# Patient Record
Sex: FEMALE | Race: BLACK OR AFRICAN AMERICAN | NOT HISPANIC OR LATINO | URBAN - METROPOLITAN AREA
[De-identification: names, ages, dates, MRNs, and addresses within clinical notes are randomized per-mention and may not be internally consistent; named-entity substitution may affect disease eponyms.]

---

## 2022-01-05 ENCOUNTER — EMERGENCY (EMERGENCY)
Facility: HOSPITAL | Age: 22
LOS: 0 days | Discharge: HOME | End: 2022-01-05
Attending: EMERGENCY MEDICINE | Admitting: EMERGENCY MEDICINE
Payer: COMMERCIAL

## 2022-01-05 VITALS
HEART RATE: 80 BPM | RESPIRATION RATE: 20 BRPM | TEMPERATURE: 98 F | OXYGEN SATURATION: 100 % | DIASTOLIC BLOOD PRESSURE: 64 MMHG | SYSTOLIC BLOOD PRESSURE: 131 MMHG

## 2022-01-05 VITALS
DIASTOLIC BLOOD PRESSURE: 88 MMHG | TEMPERATURE: 100 F | HEIGHT: 63 IN | OXYGEN SATURATION: 100 % | WEIGHT: 212.97 LBS | HEART RATE: 84 BPM | RESPIRATION RATE: 18 BRPM | SYSTOLIC BLOOD PRESSURE: 167 MMHG

## 2022-01-05 DIAGNOSIS — O26.891 OTHER SPECIFIED PREGNANCY RELATED CONDITIONS, FIRST TRIMESTER: ICD-10-CM

## 2022-01-05 DIAGNOSIS — R10.9 UNSPECIFIED ABDOMINAL PAIN: ICD-10-CM

## 2022-01-05 DIAGNOSIS — O20.9 HEMORRHAGE IN EARLY PREGNANCY, UNSPECIFIED: ICD-10-CM

## 2022-01-05 DIAGNOSIS — Z3A.08 8 WEEKS GESTATION OF PREGNANCY: ICD-10-CM

## 2022-01-05 LAB
ALLERGY+IMMUNOLOGY DIAG STUDY NOTE: SIGNIFICANT CHANGE UP
APPEARANCE UR: CLEAR — SIGNIFICANT CHANGE UP
BACTERIA # UR AUTO: NEGATIVE — SIGNIFICANT CHANGE UP
BASOPHILS # BLD AUTO: 0.01 K/UL — SIGNIFICANT CHANGE UP (ref 0–0.2)
BASOPHILS NFR BLD AUTO: 0.2 % — SIGNIFICANT CHANGE UP (ref 0–1)
BILIRUB UR-MCNC: NEGATIVE — SIGNIFICANT CHANGE UP
BLD GP AB SCN SERPL QL: SIGNIFICANT CHANGE UP
COLOR SPEC: YELLOW — SIGNIFICANT CHANGE UP
DAT IGG-SP REAG RBC-IMP: ABNORMAL
DIFF PNL FLD: ABNORMAL
DIR ANTIGLOB POLYSPECIFIC INTERPRETATION: ABNORMAL
EOSINOPHIL # BLD AUTO: 0.03 K/UL — SIGNIFICANT CHANGE UP (ref 0–0.7)
EOSINOPHIL NFR BLD AUTO: 0.6 % — SIGNIFICANT CHANGE UP (ref 0–8)
EPI CELLS # UR: 5 /HPF — SIGNIFICANT CHANGE UP (ref 0–5)
GLUCOSE UR QL: NEGATIVE — SIGNIFICANT CHANGE UP
HCG SERPL-ACNC: HIGH MIU/ML
HCT VFR BLD CALC: 29 % — LOW (ref 37–47)
HGB BLD-MCNC: 10.6 G/DL — LOW (ref 12–16)
HYALINE CASTS # UR AUTO: 1 /LPF — SIGNIFICANT CHANGE UP (ref 0–7)
IAT COMP-SP REAG SERPL QL: ABNORMAL
IMM GRANULOCYTES NFR BLD AUTO: 0.2 % — SIGNIFICANT CHANGE UP (ref 0.1–0.3)
KETONES UR-MCNC: NEGATIVE — SIGNIFICANT CHANGE UP
LEUKOCYTE ESTERASE UR-ACNC: NEGATIVE — SIGNIFICANT CHANGE UP
LYMPHOCYTES # BLD AUTO: 0.85 K/UL — LOW (ref 1.2–3.4)
LYMPHOCYTES # BLD AUTO: 16.2 % — LOW (ref 20.5–51.1)
MCHC RBC-ENTMCNC: 33 PG — HIGH (ref 27–31)
MCHC RBC-ENTMCNC: 36.6 G/DL — SIGNIFICANT CHANGE UP (ref 32–37)
MCV RBC AUTO: 90.3 FL — SIGNIFICANT CHANGE UP (ref 81–99)
MONOCYTES # BLD AUTO: 0.69 K/UL — HIGH (ref 0.1–0.6)
MONOCYTES NFR BLD AUTO: 13.2 % — HIGH (ref 1.7–9.3)
NEUTROPHILS # BLD AUTO: 3.65 K/UL — SIGNIFICANT CHANGE UP (ref 1.4–6.5)
NEUTROPHILS NFR BLD AUTO: 69.6 % — SIGNIFICANT CHANGE UP (ref 42.2–75.2)
NITRITE UR-MCNC: NEGATIVE — SIGNIFICANT CHANGE UP
NRBC # BLD: 0 /100 WBCS — SIGNIFICANT CHANGE UP (ref 0–0)
PH UR: 6.5 — SIGNIFICANT CHANGE UP (ref 5–8)
PLATELET # BLD AUTO: 274 K/UL — SIGNIFICANT CHANGE UP (ref 130–400)
PROT UR-MCNC: ABNORMAL
RBC # BLD: 3.21 M/UL — LOW (ref 4.2–5.4)
RBC # FLD: 12.3 % — SIGNIFICANT CHANGE UP (ref 11.5–14.5)
RBC CASTS # UR COMP ASSIST: 2 /HPF — SIGNIFICANT CHANGE UP (ref 0–4)
SP GR SPEC: 1.03 — SIGNIFICANT CHANGE UP (ref 1.01–1.03)
UROBILINOGEN FLD QL: SIGNIFICANT CHANGE UP
WBC # BLD: 5.24 K/UL — SIGNIFICANT CHANGE UP (ref 4.8–10.8)
WBC # FLD AUTO: 5.24 K/UL — SIGNIFICANT CHANGE UP (ref 4.8–10.8)
WBC UR QL: 4 /HPF — SIGNIFICANT CHANGE UP (ref 0–5)

## 2022-01-05 PROCEDURE — 76830 TRANSVAGINAL US NON-OB: CPT | Mod: 26

## 2022-01-05 PROCEDURE — 99285 EMERGENCY DEPT VISIT HI MDM: CPT

## 2022-01-05 NOTE — ED PROVIDER NOTE - OBJECTIVE STATEMENT
22 yo F with no significant PMHx,  currently around 8 weeks GA, presents to the ED c/o vaginal bleeding that occurred earlier tonight. Pt went to urinated and when she was wiping she noticed a small amount of BRB on toilet paper. Since then she has not had further bleeding. She admits to associated mild lower abdominal cramping. She denies other complaints. Pt denies fever, chills, nausea, vomiting, diarrhea, headache, dizziness, weakness, chest pain, SOB, back pain, LOC, trauma, cough, calf pain/swelling, recent travel, recent surgery.

## 2022-01-05 NOTE — ED PROVIDER NOTE - PHYSICAL EXAMINATION
VITAL SIGNS: I have reviewed nursing notes and confirm.  CONSTITUTIONAL: Well-developed; well-nourished; in no acute distress.  SKIN: Skin exam is warm and dry, no acute rash.  HEAD: Normocephalic; atraumatic.  EYES: Conjunctiva and sclera clear.  ENT: No nasal discharge; airway clear.   NECK: Supple; non tender.  CARD: S1, S2 normal; no murmurs, gallops, or rubs. Regular rate and rhythm.  RESP: No wheezes, rales or rhonchi. Speaking in full sentences.   ABD: Normal bowel sounds; soft; non-distended; non-tender; No rebound or guarding. No CVA tenderness.  : Normal external genitalia. Cervix closed. No bleeding or discharge. Chaperoned by NGOC Justin.   EXT: Normal ROM.  NEURO: Alert, oriented. Grossly unremarkable. No focal deficits.

## 2022-01-05 NOTE — ED PROVIDER NOTE - NS ED ROS FT
Review of Systems  Constitutional:  No fever, chills.  Eyes:  No visual changes, eye pain, or discharge.  ENMT:  No hearing changes, pain, or discharge. No nasal congestion, discharge, or bleeding. No throat pain, swelling, or difficulty swallowing.  Cardiac:  No chest pain, palpitations, syncope.  Respiratory:  No dyspnea, cough. No hemoptysis.  GI:  No nausea, vomiting, diarrhea, or abdominal pain.   :  No dysuria, frequency, or burning. (+) vaginal bleeding  MS:  No back pain.  Skin:  No skin rash, pruritis, jaundice, or lesions.  Neuro:  No headache, dizziness, loss of sensation, or focal weakness.  No change in mental status.

## 2022-01-05 NOTE — ED PROVIDER NOTE - PATIENT PORTAL LINK FT
You can access the FollowMyHealth Patient Portal offered by [facility] by registering at the following website: [email or web address]. By joining [employer]’s FollowMyHealth portal, you will also be able to view your health information using other applications (apps) compatible with our system.

## 2022-01-05 NOTE — ED PROVIDER NOTE - ATTENDING CONTRIBUTION TO CARE
pt is  8 weeks preg c/o vaginal bleeding, noticed after urinating and wiping she saw blood. no ab pain, fever.    pt in nad, ab soft, nt, nd. pelvic as above.    will get labs, imaging, supportive care

## 2022-01-05 NOTE — ED PROVIDER NOTE - CLINICAL SUMMARY MEDICAL DECISION MAKING FREE TEXT BOX
Labs reassuring.  Pelvic shows a closed os.  U/S confirms IUP.  VSS.  D/C home to f/u with GYN in [place].

## 2022-01-07 LAB
CULTURE RESULTS: SIGNIFICANT CHANGE UP
SPECIMEN SOURCE: SIGNIFICANT CHANGE UP

## 2023-02-14 ENCOUNTER — INPATIENT (INPATIENT)
Facility: HOSPITAL | Age: 23
LOS: 3 days | Discharge: HOME | DRG: 315 | End: 2023-02-18
Attending: INTERNAL MEDICINE | Admitting: INTERNAL MEDICINE
Payer: COMMERCIAL

## 2023-02-14 VITALS
HEIGHT: 63 IN | RESPIRATION RATE: 24 BRPM | SYSTOLIC BLOOD PRESSURE: 137 MMHG | DIASTOLIC BLOOD PRESSURE: 88 MMHG | OXYGEN SATURATION: 100 % | HEART RATE: 67 BPM | TEMPERATURE: 100 F | WEIGHT: 184.97 LBS

## 2023-02-14 PROCEDURE — 71046 X-RAY EXAM CHEST 2 VIEWS: CPT | Mod: 26

## 2023-02-14 RX ORDER — ACETAMINOPHEN 500 MG
975 TABLET ORAL ONCE
Refills: 0 | Status: COMPLETED | OUTPATIENT
Start: 2023-02-14 | End: 2023-02-14

## 2023-02-14 RX ORDER — INDOMETHACIN 50 MG
50 CAPSULE ORAL ONCE
Refills: 0 | Status: COMPLETED | OUTPATIENT
Start: 2023-02-14 | End: 2023-02-14

## 2023-02-14 RX ADMIN — Medication 50 MILLIGRAM(S): at 23:22

## 2023-02-14 NOTE — ED ADULT TRIAGE NOTE - TEMPERATURE IN FAHRENHEIT (DEGREES F)
100.2 Chonodrocutaneous Helical Advancement Flap Text: The defect edges were debeveled with a #15 scalpel blade.  Given the location of the defect and the proximity to free margins a chondrocutaneous helical advancement flap was deemed most appropriate.  Using a sterile surgical marker, the appropriate advancement flap was drawn incorporating the defect and placing the expected incisions within the relaxed skin tension lines where possible.    The area thus outlined was incised deep to adipose tissue with a #15 scalpel blade.  The skin margins were undermined to an appropriate distance in all directions utilizing iris scissors.

## 2023-02-14 NOTE — ED PROVIDER NOTE - ATTENDING APP SHARED VISIT CONTRIBUTION OF CARE
22-year-old female with past medical history of  (2022), reactive airway disease, and recently diagnosed Pericardial effusion presents to the ER for chest pain/shortness of breath X 6 months.  Patient states that every day she has been having trouble with her breathing, and it is getting worse with exertion, +midsternal chest discomfort with no radiation in the back, and nausea/vomiting X1 today.  Patient went to multiple different hospitals, most recently went to [facility] yesterday.  Paperwork shows that she had lab work including a D-dimer, and a CTA chest.  Given ibuprofen/dexamethasone yesterday, and discharged home with cardiology follow-up scheduled within 48 hours (has an appointment tomorrow morning).  Patient states that she has been having too much pain and therefore came to our hospital.  Feels the pain is causing her to have trouble breathing and also has dizziness.  Denies calf swelling, denies fever/cough/abdominal pain/URI symptoms or viral illness/dysuria/diarrhea/neck stiffness/headache/palpitations.    On exam patient in NAD, NCAT, PERRL, EOMI.  No carotid bruits.  Lungs: CTA B, however poor respiratory effort.  Chest wall: Mild tenderness to the sternal area.  Heart: No murmurs, regular S1-S2.  Abdomen: Soft, ND/NT, + BS, no rebound or guarding.  EXT: No pedal edema, no calf tenderness, distal pulses intact    A/P patient came with some discharge information from [facility] yesterday.  We will try to contact the ED to find out the results of the CTA.  Patient diagnosed with a pericardial effusion, we will do bedside screening cardiac sono, check labs, and reassess.    ALL: nkda  Meds: albuterol, ibuprofen  SH: no smoking,   PMD denies  LMP Feb 2, Regular periods

## 2023-02-14 NOTE — ED PROVIDER NOTE - PROGRESS NOTE DETAILS
spoke with cardiac fellow after evaluation of pt, concern for pericarditis vs myocarditis. pending labs. fellow recommend cardiac tele once labs are back spoke with [facility] for results from ed visit.. CTA reveals mild to moderate pericardial effusion with 1.2 cm thickness of pericardial space, LLL atelectasis and minimal small effusion. No PE. + axillary lymph nodes. Labs showed negative troponin and bnp. H&H 8/24, thyroid panel was negative. Tried to get approval for cardiac tele, however it is full, cardiac fellow to come see pt YF: spoke to ICU fellow Dr Cottrell given she had hypotension episode of SBP. He requests fluid to be given, repeat lactate and call back in 1/2 an hour to give him repeat VS and then he'll decide SDU vs ICU PAPITO Dwyer spoke to surgery for CT surgery and intensivist for CT surgery input to see if bed can be available for this patient. CCU not approved by Cardio fellow. Pt had one episode of hypotension in ER, responded well to IVFs (improved BP). PA states CT surgery/Intensivist on call said Given pt has no tamponade, and no surgical intervention needed or expected to be performed at present, pt not a CTU candidate and does not qualify for a CTU bed. Should the need arise for an emergent pericardiocentesis, please consult them.

## 2023-02-14 NOTE — ED ADULT NURSE NOTE - CHIEF COMPLAINT QUOTE
She's been having chest pain for six months, she was in a hospital in [place] yesterday for it, the did a CT scan. She says the chest pain is more severe today - EMS  Patient reports pain started one week after giving birth, had extensive chest work -up at [facility] yesterday.

## 2023-02-14 NOTE — ED PROVIDER NOTE - CRITICAL CARE ATTENDING CONTRIBUTION TO CARE
22-year-old female with past medical history of  (2022), reactive airway disease, and recently diagnosed Pericardial effusion presents to the ER for chest pain/shortness of breath X 6 months.  Patient states that every day she has been having trouble with her breathing, and it is getting worse with exertion, +midsternal chest discomfort with no radiation in the back, and nausea/vomiting X1 today.  Patient went to multiple different hospitals, most recently went to [facility] yesterday.  Paperwork shows that she had lab work including a D-dimer, and a CTA chest.  Given ibuprofen/dexamethasone yesterday, and discharged home with cardiology follow-up scheduled within 48 hours (has an appointment tomorrow morning).  Patient states that she has been having too much pain and therefore came to our hospital.  Feels the pain is causing her to have trouble breathing and also has dizziness.  Denies calf swelling, denies fever/cough/abdominal pain/URI symptoms or viral illness/dysuria/diarrhea/neck stiffness/headache/palpitations.    On exam patient in NAD, NCAT, PERRL, EOMI.  No carotid bruits.  Lungs: CTA B, however poor respiratory effort.  Chest wall: Mild tenderness to the sternal area.  Heart: No murmurs, regular S1-S2.  Abdomen: Soft, ND/NT, + BS, no rebound or guarding.  EXT: No pedal edema, no calf tenderness, distal pulses intact    A/P patient came with some discharge information from [facility] yesterday.  We will try to contact the ED to find out the results of the CTA.  Patient diagnosed with a pericardial effusion, we will do bedside screening cardiac sono, check labs, and reassess.    ALL: nkda  Meds: albuterol, ibuprofen  SH: no smoking,   PMD denies  LMP Feb 2, Regular periods

## 2023-02-14 NOTE — ED PROVIDER NOTE - CARE PLAN
1 Principal Discharge DX:	Pericardial effusion  Secondary Diagnosis:	Fever  Secondary Diagnosis:	Anemia  Secondary Diagnosis:	Chest pain  Secondary Diagnosis:	Shortness of breath

## 2023-02-14 NOTE — ED PROVIDER NOTE - OBJECTIVE STATEMENT
22 year old female with pmhx of c section presents to ed with chest pain. Pt admits has been having pain intermittent for 6 months, however worsened over last week. + sob. pt went to [facility] ed yesterday and had work up. CTA and labs were done, pt was diagnosed with pericardial effusion and cardiology follow up . Pt admits to body aches and feeling feverish today. 2 episodes of vomiting. pt denies abd pain, diarrhea, urinary symptoms, recent travel, sick contacts, or recent illness. no drug use.

## 2023-02-14 NOTE — ED PROVIDER NOTE - CLINICAL SUMMARY MEDICAL DECISION MAKING FREE TEXT BOX
22-year-old female with past medical history of  (2022), reactive airway disease, and recently diagnosed Pericardial effusion presents to the ER for chest pain/shortness of breath X 6 months.  Patient states that every day she has been having trouble with her breathing, and it is getting worse with exertion, +midsternal chest discomfort with no radiation in the back, and nausea/vomiting X1 today.  Patient went to multiple different hospitals, most recently went to [facility] yesterday.  Paperwork shows that she had lab work including a D-dimer, and a CTA chest.  Given ibuprofen/dexamethasone yesterday, and discharged home with cardiology follow-up scheduled within 48 hours (has an appointment tomorrow morning).  Patient states that she has been having too much pain and therefore came to our hospital.  Feels the pain is causing her to have trouble breathing and also has dizziness.  Denies calf swelling, denies fever/cough/abdominal pain/URI symptoms or viral illness/dysuria/diarrhea/neck stiffness/headache/palpitations.    All orders reviewed. Bedside sono showed pericardial effusion, concern for myocarditis vs pericarditis, cardio consult called, and pt to be admitted for further workup.

## 2023-02-14 NOTE — ED ADULT TRIAGE NOTE - CHIEF COMPLAINT QUOTE
She's been having chest pain for six months, she was in a hospital in [place] yesterday for it, the did a CT scan. She says the chest pain is more severe today - EMS  Patient reports pain started one week after giving birth, had extensive chest work -up at [facility] yesterday.

## 2023-02-14 NOTE — ED ADULT NURSE NOTE - OBJECTIVE STATEMENT
She's been having chest pain for six months, she was in a hospital in [place] yesterday for it, the did a CT scan. She says the chest pain is more severe today - EMS  Patient reports pain started one week after giving birth, had extensive chest work -up at [facility] yesterday.

## 2023-02-14 NOTE — ED PROVIDER NOTE - NS ED ATTENDING STATEMENT MOD
This was a shared visit with the JORGE. I reviewed and verified the documentation and independently performed the documented: I have personally provided the amount of critical care time documented below excluding time spent on separate procedures.

## 2023-02-15 ENCOUNTER — APPOINTMENT (OUTPATIENT)
Dept: CARDIOLOGY | Facility: CLINIC | Age: 23
End: 2023-02-15

## 2023-02-15 DIAGNOSIS — R07.9 CHEST PAIN, UNSPECIFIED: ICD-10-CM

## 2023-02-15 DIAGNOSIS — R06.02 SHORTNESS OF BREATH: ICD-10-CM

## 2023-02-15 LAB
ALBUMIN SERPL ELPH-MCNC: 3.8 G/DL — SIGNIFICANT CHANGE UP (ref 3.5–5.2)
ALBUMIN SERPL ELPH-MCNC: 4 G/DL — SIGNIFICANT CHANGE UP (ref 3.5–5.2)
ALLERGY+IMMUNOLOGY DIAG STUDY NOTE: SIGNIFICANT CHANGE UP
ALLERGY+IMMUNOLOGY DIAG STUDY NOTE: SIGNIFICANT CHANGE UP
ALP SERPL-CCNC: 56 U/L — SIGNIFICANT CHANGE UP (ref 30–115)
ALP SERPL-CCNC: 56 U/L — SIGNIFICANT CHANGE UP (ref 30–115)
ALT FLD-CCNC: 12 U/L — SIGNIFICANT CHANGE UP (ref 0–41)
ALT FLD-CCNC: 13 U/L — SIGNIFICANT CHANGE UP (ref 0–41)
ANION GAP SERPL CALC-SCNC: 10 MMOL/L — SIGNIFICANT CHANGE UP (ref 7–14)
ANION GAP SERPL CALC-SCNC: 9 MMOL/L — SIGNIFICANT CHANGE UP (ref 7–14)
APPEARANCE UR: ABNORMAL
AST SERPL-CCNC: 14 U/L — SIGNIFICANT CHANGE UP (ref 0–41)
AST SERPL-CCNC: 18 U/L — SIGNIFICANT CHANGE UP (ref 0–41)
BACTERIA # UR AUTO: ABNORMAL
BASE EXCESS BLDV CALC-SCNC: -2.1 MMOL/L — LOW (ref -2–3)
BASOPHILS # BLD AUTO: 0.01 K/UL — SIGNIFICANT CHANGE UP (ref 0–0.2)
BASOPHILS NFR BLD AUTO: 0.1 % — SIGNIFICANT CHANGE UP (ref 0–1)
BILIRUB SERPL-MCNC: 1 MG/DL — SIGNIFICANT CHANGE UP (ref 0.2–1.2)
BILIRUB SERPL-MCNC: 1.1 MG/DL — SIGNIFICANT CHANGE UP (ref 0.2–1.2)
BILIRUB UR-MCNC: NEGATIVE — SIGNIFICANT CHANGE UP
BLD GP AB SCN SERPL QL: SIGNIFICANT CHANGE UP
BUN SERPL-MCNC: 11 MG/DL — SIGNIFICANT CHANGE UP (ref 10–20)
BUN SERPL-MCNC: 12 MG/DL — SIGNIFICANT CHANGE UP (ref 10–20)
CA-I SERPL-SCNC: 1.13 MMOL/L — LOW (ref 1.15–1.33)
CALCIUM SERPL-MCNC: 8.5 MG/DL — SIGNIFICANT CHANGE UP (ref 8.4–10.5)
CALCIUM SERPL-MCNC: 8.8 MG/DL — SIGNIFICANT CHANGE UP (ref 8.4–10.5)
CHLORIDE SERPL-SCNC: 102 MMOL/L — SIGNIFICANT CHANGE UP (ref 98–110)
CHLORIDE SERPL-SCNC: 103 MMOL/L — SIGNIFICANT CHANGE UP (ref 98–110)
CO2 SERPL-SCNC: 25 MMOL/L — SIGNIFICANT CHANGE UP (ref 17–32)
CO2 SERPL-SCNC: 25 MMOL/L — SIGNIFICANT CHANGE UP (ref 17–32)
COLOR SPEC: YELLOW — SIGNIFICANT CHANGE UP
CREAT SERPL-MCNC: 0.6 MG/DL — LOW (ref 0.7–1.5)
CREAT SERPL-MCNC: 0.6 MG/DL — LOW (ref 0.7–1.5)
CRP SERPL-MCNC: 97 MG/L — HIGH
DAT IGG-SP REAG RBC-IMP: ABNORMAL
DIFF PNL FLD: NEGATIVE — SIGNIFICANT CHANGE UP
DIR ANTIGLOB POLYSPECIFIC INTERPRETATION: ABNORMAL
EGFR: 129 ML/MIN/1.73M2 — SIGNIFICANT CHANGE UP
EGFR: 129 ML/MIN/1.73M2 — SIGNIFICANT CHANGE UP
EOSINOPHIL # BLD AUTO: 0 K/UL — SIGNIFICANT CHANGE UP (ref 0–0.7)
EOSINOPHIL NFR BLD AUTO: 0 % — SIGNIFICANT CHANGE UP (ref 0–8)
EPI CELLS # UR: 7 /HPF — HIGH (ref 0–5)
ERYTHROCYTE [SEDIMENTATION RATE] IN BLOOD: 145 MM/HR — HIGH (ref 0–20)
GAS PNL BLDV: 139 MMOL/L — SIGNIFICANT CHANGE UP (ref 136–145)
GAS PNL BLDV: SIGNIFICANT CHANGE UP
GLUCOSE BLDC GLUCOMTR-MCNC: 95 MG/DL — SIGNIFICANT CHANGE UP (ref 70–99)
GLUCOSE SERPL-MCNC: 108 MG/DL — HIGH (ref 70–99)
GLUCOSE SERPL-MCNC: 86 MG/DL — SIGNIFICANT CHANGE UP (ref 70–99)
GLUCOSE UR QL: NEGATIVE — SIGNIFICANT CHANGE UP
HCG SERPL QL: NEGATIVE — SIGNIFICANT CHANGE UP
HCO3 BLDV-SCNC: 23 MMOL/L — SIGNIFICANT CHANGE UP (ref 22–29)
HCT VFR BLD CALC: 20.2 % — LOW (ref 37–47)
HCT VFR BLD CALC: 21.4 % — LOW (ref 37–47)
HCT VFR BLD CALC: 22.2 % — LOW (ref 37–47)
HCT VFR BLDA CALC: 23 % — LOW (ref 39–51)
HGB BLD CALC-MCNC: 7.5 G/DL — LOW (ref 12.6–17.4)
HGB BLD-MCNC: 6.9 G/DL — CRITICAL LOW (ref 12–16)
HGB BLD-MCNC: 7.4 G/DL — LOW (ref 12–16)
HGB BLD-MCNC: 7.7 G/DL — LOW (ref 12–16)
HYALINE CASTS # UR AUTO: 4 /LPF — SIGNIFICANT CHANGE UP (ref 0–7)
IAT COMP-SP REAG SERPL QL: ABNORMAL
IMM GRANULOCYTES NFR BLD AUTO: 0.3 % — SIGNIFICANT CHANGE UP (ref 0.1–0.3)
IRON SATN MFR SERPL: 18 UG/DL — LOW (ref 35–150)
IRON SATN MFR SERPL: 8 % — LOW (ref 15–50)
KETONES UR-MCNC: NEGATIVE — SIGNIFICANT CHANGE UP
LACTATE BLDV-MCNC: 1.7 MMOL/L — SIGNIFICANT CHANGE UP (ref 0.5–2)
LACTATE SERPL-SCNC: 2 MMOL/L — SIGNIFICANT CHANGE UP (ref 0.7–2)
LACTATE SERPL-SCNC: 2.2 MMOL/L — HIGH (ref 0.7–2)
LEUKOCYTE ESTERASE UR-ACNC: NEGATIVE — SIGNIFICANT CHANGE UP
LYMPHOCYTES # BLD AUTO: 0.71 K/UL — LOW (ref 1.2–3.4)
LYMPHOCYTES # BLD AUTO: 6.2 % — LOW (ref 20.5–51.1)
MAGNESIUM SERPL-MCNC: 1.9 MG/DL — SIGNIFICANT CHANGE UP (ref 1.8–2.4)
MCHC RBC-ENTMCNC: 34.5 G/DL — SIGNIFICANT CHANGE UP (ref 32–37)
MCHC RBC-ENTMCNC: 34.6 G/DL — SIGNIFICANT CHANGE UP (ref 32–37)
MCHC RBC-ENTMCNC: 34.7 G/DL — SIGNIFICANT CHANGE UP (ref 32–37)
MCHC RBC-ENTMCNC: 35.2 PG — HIGH (ref 27–31)
MCHC RBC-ENTMCNC: 35.6 PG — HIGH (ref 27–31)
MCHC RBC-ENTMCNC: 36 PG — HIGH (ref 27–31)
MCV RBC AUTO: 101.9 FL — HIGH (ref 81–99)
MCV RBC AUTO: 103.1 FL — HIGH (ref 81–99)
MCV RBC AUTO: 103.7 FL — HIGH (ref 81–99)
MONOCYTES # BLD AUTO: 0.78 K/UL — HIGH (ref 0.1–0.6)
MONOCYTES NFR BLD AUTO: 6.8 % — SIGNIFICANT CHANGE UP (ref 1.7–9.3)
NEUTROPHILS # BLD AUTO: 9.92 K/UL — HIGH (ref 1.4–6.5)
NEUTROPHILS NFR BLD AUTO: 86.6 % — HIGH (ref 42.2–75.2)
NITRITE UR-MCNC: NEGATIVE — SIGNIFICANT CHANGE UP
NRBC # BLD: 0 /100 WBCS — SIGNIFICANT CHANGE UP (ref 0–0)
NT-PROBNP SERPL-SCNC: 249 PG/ML — SIGNIFICANT CHANGE UP (ref 0–300)
PCO2 BLDV: 41 MMHG — SIGNIFICANT CHANGE UP (ref 39–42)
PH BLDV: 7.36 — SIGNIFICANT CHANGE UP (ref 7.32–7.43)
PH UR: 6 — SIGNIFICANT CHANGE UP (ref 5–8)
PLATELET # BLD AUTO: 392 K/UL — SIGNIFICANT CHANGE UP (ref 130–400)
PLATELET # BLD AUTO: 401 K/UL — HIGH (ref 130–400)
PLATELET # BLD AUTO: 433 K/UL — HIGH (ref 130–400)
PO2 BLDV: 41 MMHG — SIGNIFICANT CHANGE UP
POTASSIUM BLDV-SCNC: 3.4 MMOL/L — LOW (ref 3.5–5.1)
POTASSIUM SERPL-MCNC: 3.5 MMOL/L — SIGNIFICANT CHANGE UP (ref 3.5–5)
POTASSIUM SERPL-MCNC: 3.9 MMOL/L — SIGNIFICANT CHANGE UP (ref 3.5–5)
POTASSIUM SERPL-SCNC: 3.5 MMOL/L — SIGNIFICANT CHANGE UP (ref 3.5–5)
POTASSIUM SERPL-SCNC: 3.9 MMOL/L — SIGNIFICANT CHANGE UP (ref 3.5–5)
PROT SERPL-MCNC: 7.7 G/DL — SIGNIFICANT CHANGE UP (ref 6–8)
PROT SERPL-MCNC: 8.2 G/DL — HIGH (ref 6–8)
PROT UR-MCNC: ABNORMAL
RAPID RVP RESULT: SIGNIFICANT CHANGE UP
RBC # BLD: 1.91 M/UL — LOW (ref 4.2–5.4)
RBC # BLD: 2.1 M/UL — LOW (ref 4.2–5.4)
RBC # BLD: 2.14 M/UL — LOW (ref 4.2–5.4)
RBC # FLD: 15.2 % — HIGH (ref 11.5–14.5)
RBC # FLD: 16.3 % — HIGH (ref 11.5–14.5)
RBC # FLD: 16.5 % — HIGH (ref 11.5–14.5)
RBC CASTS # UR COMP ASSIST: 3 /HPF — SIGNIFICANT CHANGE UP (ref 0–4)
SAO2 % BLDV: 63.9 % — SIGNIFICANT CHANGE UP
SARS-COV-2 RNA SPEC QL NAA+PROBE: SIGNIFICANT CHANGE UP
SODIUM SERPL-SCNC: 137 MMOL/L — SIGNIFICANT CHANGE UP (ref 135–146)
SODIUM SERPL-SCNC: 137 MMOL/L — SIGNIFICANT CHANGE UP (ref 135–146)
SP GR SPEC: 1.02 — SIGNIFICANT CHANGE UP (ref 1.01–1.03)
TIBC SERPL-MCNC: 215 UG/DL — LOW (ref 220–430)
TROPONIN T SERPL-MCNC: <0.01 NG/ML — SIGNIFICANT CHANGE UP
TROPONIN T SERPL-MCNC: <0.01 NG/ML — SIGNIFICANT CHANGE UP
TSH SERPL-MCNC: 2.49 UIU/ML — SIGNIFICANT CHANGE UP (ref 0.27–4.2)
UIBC SERPL-MCNC: 197 UG/DL — SIGNIFICANT CHANGE UP (ref 110–370)
UROBILINOGEN FLD QL: SIGNIFICANT CHANGE UP
WBC # BLD: 11.46 K/UL — HIGH (ref 4.8–10.8)
WBC # BLD: 8.43 K/UL — SIGNIFICANT CHANGE UP (ref 4.8–10.8)
WBC # BLD: 8.82 K/UL — SIGNIFICANT CHANGE UP (ref 4.8–10.8)
WBC # FLD AUTO: 11.46 K/UL — HIGH (ref 4.8–10.8)
WBC # FLD AUTO: 8.43 K/UL — SIGNIFICANT CHANGE UP (ref 4.8–10.8)
WBC # FLD AUTO: 8.82 K/UL — SIGNIFICANT CHANGE UP (ref 4.8–10.8)
WBC UR QL: 7 /HPF — HIGH (ref 0–5)

## 2023-02-15 PROCEDURE — 99223 1ST HOSP IP/OBS HIGH 75: CPT

## 2023-02-15 PROCEDURE — 84484 ASSAY OF TROPONIN QUANT: CPT

## 2023-02-15 PROCEDURE — 93306 TTE W/DOPPLER COMPLETE: CPT

## 2023-02-15 PROCEDURE — 82728 ASSAY OF FERRITIN: CPT

## 2023-02-15 PROCEDURE — 80053 COMPREHEN METABOLIC PANEL: CPT

## 2023-02-15 PROCEDURE — 86235 NUCLEAR ANTIGEN ANTIBODY: CPT

## 2023-02-15 PROCEDURE — 87086 URINE CULTURE/COLONY COUNT: CPT

## 2023-02-15 PROCEDURE — 83993 ASSAY FOR CALPROTECTIN FECAL: CPT

## 2023-02-15 PROCEDURE — 36430 TRANSFUSION BLD/BLD COMPNT: CPT

## 2023-02-15 PROCEDURE — 85652 RBC SED RATE AUTOMATED: CPT

## 2023-02-15 PROCEDURE — 93005 ELECTROCARDIOGRAM TRACING: CPT

## 2023-02-15 PROCEDURE — 99221 1ST HOSP IP/OBS SF/LOW 40: CPT

## 2023-02-15 PROCEDURE — 86038 ANTINUCLEAR ANTIBODIES: CPT

## 2023-02-15 PROCEDURE — 86860 RBC ANTIBODY ELUTION: CPT

## 2023-02-15 PROCEDURE — 83735 ASSAY OF MAGNESIUM: CPT

## 2023-02-15 PROCEDURE — 87040 BLOOD CULTURE FOR BACTERIA: CPT

## 2023-02-15 PROCEDURE — 86901 BLOOD TYPING SEROLOGIC RH(D): CPT

## 2023-02-15 PROCEDURE — 86922 COMPATIBILITY TEST ANTIGLOB: CPT

## 2023-02-15 PROCEDURE — P9040: CPT

## 2023-02-15 PROCEDURE — 83615 LACTATE (LD) (LDH) ENZYME: CPT

## 2023-02-15 PROCEDURE — 85025 COMPLETE CBC W/AUTO DIFF WBC: CPT

## 2023-02-15 PROCEDURE — 83550 IRON BINDING TEST: CPT

## 2023-02-15 PROCEDURE — 86900 BLOOD TYPING SEROLOGIC ABO: CPT

## 2023-02-15 PROCEDURE — 86870 RBC ANTIBODY IDENTIFICATION: CPT

## 2023-02-15 PROCEDURE — 85027 COMPLETE CBC AUTOMATED: CPT

## 2023-02-15 PROCEDURE — 85730 THROMBOPLASTIN TIME PARTIAL: CPT

## 2023-02-15 PROCEDURE — 82746 ASSAY OF FOLIC ACID SERUM: CPT

## 2023-02-15 PROCEDURE — 81001 URINALYSIS AUTO W/SCOPE: CPT

## 2023-02-15 PROCEDURE — 86850 RBC ANTIBODY SCREEN: CPT

## 2023-02-15 PROCEDURE — 82607 VITAMIN B-12: CPT

## 2023-02-15 PROCEDURE — 84443 ASSAY THYROID STIM HORMONE: CPT

## 2023-02-15 PROCEDURE — 86255 FLUORESCENT ANTIBODY SCREEN: CPT

## 2023-02-15 PROCEDURE — 86077 PHYS BLOOD BANK SERV XMATCH: CPT

## 2023-02-15 PROCEDURE — 86200 CCP ANTIBODY: CPT

## 2023-02-15 PROCEDURE — 82962 GLUCOSE BLOOD TEST: CPT

## 2023-02-15 PROCEDURE — 36415 COLL VENOUS BLD VENIPUNCTURE: CPT

## 2023-02-15 PROCEDURE — 85610 PROTHROMBIN TIME: CPT

## 2023-02-15 PROCEDURE — 85379 FIBRIN DEGRADATION QUANT: CPT

## 2023-02-15 PROCEDURE — 86162 COMPLEMENT TOTAL (CH50): CPT

## 2023-02-15 PROCEDURE — 83540 ASSAY OF IRON: CPT

## 2023-02-15 PROCEDURE — 99222 1ST HOSP IP/OBS MODERATE 55: CPT

## 2023-02-15 PROCEDURE — 86160 COMPLEMENT ANTIGEN: CPT

## 2023-02-15 PROCEDURE — 83010 ASSAY OF HAPTOGLOBIN QUANT: CPT

## 2023-02-15 PROCEDURE — 86880 COOMBS TEST DIRECT: CPT

## 2023-02-15 PROCEDURE — 86140 C-REACTIVE PROTEIN: CPT

## 2023-02-15 PROCEDURE — 83605 ASSAY OF LACTIC ACID: CPT

## 2023-02-15 RX ORDER — SODIUM CHLORIDE 9 MG/ML
1000 INJECTION INTRAMUSCULAR; INTRAVENOUS; SUBCUTANEOUS ONCE
Refills: 0 | Status: COMPLETED | OUTPATIENT
Start: 2023-02-15 | End: 2023-02-15

## 2023-02-15 RX ORDER — ACETAMINOPHEN 500 MG
650 TABLET ORAL ONCE
Refills: 0 | Status: DISCONTINUED | OUTPATIENT
Start: 2023-02-15 | End: 2023-02-18

## 2023-02-15 RX ORDER — ASPIRIN/CALCIUM CARB/MAGNESIUM 324 MG
324 TABLET ORAL ONCE
Refills: 0 | Status: COMPLETED | OUTPATIENT
Start: 2023-02-15 | End: 2023-02-15

## 2023-02-15 RX ORDER — COLCHICINE 0.6 MG
0.6 TABLET ORAL ONCE
Refills: 0 | Status: COMPLETED | OUTPATIENT
Start: 2023-02-15 | End: 2023-02-15

## 2023-02-15 RX ORDER — ASPIRIN/CALCIUM CARB/MAGNESIUM 324 MG
650 TABLET ORAL THREE TIMES A DAY
Refills: 0 | Status: DISCONTINUED | OUTPATIENT
Start: 2023-02-15 | End: 2023-02-16

## 2023-02-15 RX ORDER — PANTOPRAZOLE SODIUM 20 MG/1
40 TABLET, DELAYED RELEASE ORAL
Refills: 0 | Status: DISCONTINUED | OUTPATIENT
Start: 2023-02-15 | End: 2023-02-18

## 2023-02-15 RX ORDER — COLCHICINE 0.6 MG
0.6 TABLET ORAL
Refills: 0 | Status: DISCONTINUED | OUTPATIENT
Start: 2023-02-15 | End: 2023-02-18

## 2023-02-15 RX ORDER — ASPIRIN/CALCIUM CARB/MAGNESIUM 324 MG
650 TABLET ORAL ONCE
Refills: 0 | Status: COMPLETED | OUTPATIENT
Start: 2023-02-15 | End: 2023-02-15

## 2023-02-15 RX ORDER — ACETAMINOPHEN 500 MG
975 TABLET ORAL EVERY 8 HOURS
Refills: 0 | Status: DISCONTINUED | OUTPATIENT
Start: 2023-02-15 | End: 2023-02-18

## 2023-02-15 RX ADMIN — Medication 975 MILLIGRAM(S): at 00:01

## 2023-02-15 RX ADMIN — Medication 975 MILLIGRAM(S): at 17:21

## 2023-02-15 RX ADMIN — Medication 324 MILLIGRAM(S): at 06:53

## 2023-02-15 RX ADMIN — Medication 975 MILLIGRAM(S): at 16:19

## 2023-02-15 RX ADMIN — Medication 0.6 MILLIGRAM(S): at 18:49

## 2023-02-15 RX ADMIN — SODIUM CHLORIDE 1000 MILLILITER(S): 9 INJECTION INTRAMUSCULAR; INTRAVENOUS; SUBCUTANEOUS at 02:31

## 2023-02-15 RX ADMIN — Medication 0.6 MILLIGRAM(S): at 02:41

## 2023-02-15 RX ADMIN — Medication 975 MILLIGRAM(S): at 18:49

## 2023-02-15 RX ADMIN — Medication 650 MILLIGRAM(S): at 13:56

## 2023-02-15 RX ADMIN — PANTOPRAZOLE SODIUM 40 MILLIGRAM(S): 20 TABLET, DELAYED RELEASE ORAL at 07:51

## 2023-02-15 RX ADMIN — Medication 650 MILLIGRAM(S): at 21:26

## 2023-02-15 RX ADMIN — Medication 650 MILLIGRAM(S): at 02:41

## 2023-02-15 NOTE — H&P ADULT - ATTENDING COMMENTS
#Chest pain, described as midsternal/ L sided  continuous, reproducible with palpation; began after  6 mos prior, +pre-eclampsia  with fever; presumed pericarditis  check crp, esr  asa 650 tid  colchicine 0.6 bid  bcx, rvp  rheum serologies  ekg noted   ce neg  tte; pocus with pericardial effusion no tamponade  appreciate cards    #Progress Note Handoff:  Pending (specify):  Consults_________, Tests________, Test Results_______, Other___tte______  Family discussion: d/w pt at bedside re: treatment plan, primary dx  Disposition: Home___/SNF___/Other________/Unknown at this time____x____

## 2023-02-15 NOTE — CONSULT NOTE ADULT - ASSESSMENT
IMPRESSION:    Possible pericarditis   Probable AYAAN   intermittent asthma     PLAN:    CNS:  No depressants     HEENT: Oral care    PULMONARY:  HOB @ 45 degrees.  Aspiration precautions.  Incentive spirometry  Albuterol PRN.  OP PFT and sleep testing     CARDIOVASCULAR:  ECHO.  Cardiology FU.  Avoid overload     GI: GI prophylaxis.  Feeding.  Bowel regimen     RENAL:  Follow up lytes.  Correct as needed    INFECTIOUS DISEASE: Follow up cultures.  Full RVP.  Procal     HEMATOLOGICAL:  DVT prophylaxis.  Dimer and LE duplex     ENDOCRINE:  Follow up FS.     MUSCULOSKELETAL:  OOB to chair.  Basic Rheum screen.  ESR and CRP    Tele

## 2023-02-15 NOTE — CONSULT NOTE ADULT - ASSESSMENT
21yo presented with chest pain concerning for pericarditis.    - Check TTE.  - Monitor on tele.  - Infectious w/u per primary team.  - W/u for anemia.  - Treat with asa 650mg TID and colchicine 0.6mg BID.  - Will discuss plan with attending cardiologist. 23yo presented with chest pain concerning for pericarditis.    - Bedside POCUS reveals mild to moderate pericardial effusion. No clinical signs of tamponade.  - Check TTE.  - Monitor on tele.  - Infectious w/u per primary team.  - W/u for anemia.  - Treat with asa 650mg TID and colchicine 0.6mg BID.  - Will discuss plan with attending cardiologist. 23yo presented with chest pain concerning for pericarditis.    - Bedside POCUS reveals mild to moderate pericardial effusion. No clinical signs of tamponade.  - Check TTE.  - Monitor on tele.  - Infectious w/u per primary team.  - W/u for anemia.  - Treat with asa 650mg TID and colchicine 0.6mg BID if no contraindication.  - Will discuss plan with attending cardiologist. 23yo presented with chest pain concerning for pericarditis.    - Bedside POCUS reveals mild to moderate pericardial effusion. No clinical signs of tamponade.  - Check TTE.  - Monitor on tele.  - Infectious w/u per primary team.  - W/u for anemia.  - IV hydration given fever, poor po intake and vomiting  - Treat with asa 650mg TID and colchicine 0.6mg BID if no contraindication.  - Will discuss plan with attending cardiologist. 21yo presented with chest pain concerning for pericarditis.    - Bedside POCUS reveals mild to moderate pericardial effusion. No clinical signs of tamponade.  - Check TTE.  - Monitor on tele.  - Infectious w/u per primary team.  - W/u for anemia.  - IV hydration given fever, poor po intake and vomiting  - Check ESR, CRP.  - Treat with asa 650mg TID and colchicine 0.6mg BID if no contraindication.  - Will discuss plan with attending cardiologist.

## 2023-02-15 NOTE — H&P ADULT - NSHPPHYSICALEXAM_GEN_ALL_CORE
PHYSICAL EXAM:  GENERAL: NAD, lying in bed comfortably  HEAD:  Atraumatic, Normocephalic  EYES: EOMI, PERRLA, conjunctiva and sclera clear  ENT: Moist mucous membranes  NECK: Supple, No JVD  CHEST/LUNG: Clear to auscultation bilaterally; No rales, rhonchi, wheezing, or rubs. Unlabored respirations  HEART: Regular rate and rhythm; No murmurs, rubs, or gallops  ABDOMEN: Bowel sounds present; Soft, Nontender, Nondistended. No hepatomegaly  EXTREMITIES:  2+ Peripheral Pulses, brisk capillary refill. No clubbing, cyanosis, or edema  NERVOUS SYSTEM:  Alert & Oriented X3, speech clear. No deficits   MSK: FROM all 4 extremities, full and equal strength  SKIN: No rashes or lesions PHYSICAL EXAM:  GENERAL: NAD, lying in bed comfortably  HEAD:  Atraumatic, Normocephalic  EYES: EOMI, PERRLA, conjunctiva and sclera clear  ENT: Moist mucous membranes  NECK: Supple, No JVD  CHEST/LUNG: Clear to auscultation bilaterally; Tenderness to palpation over chest wall  HEART: Regular rate and rhythm; No murmurs, rubs, or gallops  ABDOMEN: Bowel sounds present; Soft, Nontender  EXTREMITIES:  2+ Peripheral Pulses  NERVOUS SYSTEM:  Alert & Oriented X3, speech clear. No deficits   MSK: FROM all 4 extremities, full and equal strength  SKIN: No rashes or lesions

## 2023-02-15 NOTE — H&P ADULT - ASSESSMENT
23 y/o F w/ no PMHx presenting to ED for Chest Pain.    #Suspected Pericarditis  #Mild to Moderate Pericardial Effusion, w/o Bedside ECHO findings of Tamponade   - In ED, VS for TMax 100.3, Tachycardia to 110, BP soft  - WBC mildly elevated at 11.46  - CXR showed enlarged cardiac silhouette  - EKG w/ low voltage QRS and TWI  - Trop neg x 1, repeat in AM  - OP CT Chest showing mild to moderate pericardial effusion  - Given Colchicine and Indomethacin  - Given IVF w/ improvement in heart rate  - Cardio consulted, performed bedside ECHO and states that there are no signs of tamponade at this time, did note pericardial effusion  - Start on Colchicine 0.6 mg BID and Aspirin 650 mg TID  - Obtain 2D ECHO to evaluate for effusion  - If becomes more hypotensive, or tachycardic, Urgent CT Surgery evaluation  - f/u Cardiology consult  - f/u Respiratory Viral Panel, ESR, CRP  - Telemetry monitoring    #Macrocytic Anemia  - Hg 7.7, no prior to compare  - Obtain TSH, B12, Folate, Iron Profile  - Monitor CBC, Keep Hg > 8, Transfuse as needed, Active Type and Screen    #Diet: Regular  #DVT pro: None  #GI pro: Protonix  #Dispo: SDU          Labs sig for Hg 7.7 21 y/o F w/ no PMHx presenting to ED for Chest Pain.    #Suspected Pericarditis  #Mild to Moderate Pericardial Effusion, w/o Bedside ECHO findings of Tamponade   - On PE, patient appears in mild distress but comfortable, pain present on palpation over chest wall, no LE edema  - In ED, VS for TMax 100.3, Tachycardia to 110, BP soft  - WBC mildly elevated at 11.46  - CXR showed enlarged cardiac silhouette  - EKG w/ low voltage QRS and TWI  - Trop neg x 1, repeat in AM  - OP CT Chest showing mild to moderate pericardial effusion  - Given Colchicine and Indomethacin  - Given IVF w/ improvement in heart rate  - Cardio consulted, performed bedside ECHO and states that there are no signs of tamponade at this time, did note pericardial effusion  - Start on Colchicine 0.6 mg BID and Aspirin 650 mg TID  - Obtain 2D ECHO to evaluate for effusion  - If becomes more hypotensive, or tachycardic, Urgent CT Surgery evaluation  - f/u Cardiology consult  - f/u Respiratory Viral Panel, ESR, CRP  - Telemetry monitoring    #Macrocytic Anemia  - Hg 7.7, no prior to compare  - Obtain TSH, B12, Folate, Iron Profile  - Monitor CBC, Keep Hg > 8, Transfuse as needed, Active Type and Screen    #Diet: Regular  #DVT pro: None  #GI pro: Protonix  #Dispo: SDU 23 y/o F w/ no PMHx presenting to ED for Chest Pain.    #Suspected Pericarditis  #Mild to Moderate Pericardial Effusion, w/o Bedside ECHO findings of Tamponade   - On PE, patient appears in mild distress but comfortable, pain present on palpation over chest wall, no LE edema  - In ED, VS for TMax 100.3, Tachycardia to 110, BP soft  - WBC mildly elevated at 11.46  - CXR showed enlarged cardiac silhouette  - EKG w/ low voltage QRS and TWI  - Trop neg x 1, repeat in AM  - OP CT Chest showing mild to moderate pericardial effusion  - Given Colchicine and Indomethacin  - Given IVF w/ improvement in heart rate  - Cardio consulted, performed bedside ECHO and states that there are no signs of tamponade at this time, did note pericardial effusion  - Start on Colchicine 0.6 mg BID and Aspirin 650 mg TID  - Obtain 2D ECHO to evaluate for effusion  - Obtain CTA Chest report from [facility]  - If becomes more hypotensive, or tachycardic, Urgent CT Surgery evaluation  - f/u Cardiology consult  - f/u Respiratory Viral Panel, ESR, CRP  - Telemetry monitoring    #Macrocytic Anemia  - Hg 7.7, no prior to compare  - Obtain TSH, B12, Folate, Iron Profile  - Monitor CBC, Keep Hg > 8, Transfuse as needed, Active Type and Screen    #Diet: Regular  #DVT pro: None  #GI pro: Protonix  #Dispo: SDU

## 2023-02-15 NOTE — ED ADULT NURSE REASSESSMENT NOTE - NS ED NURSE REASSESS COMMENT FT1
Hand off report given by previous nurse Naomi. pt was in the main back and is upgraded to crit for complained of chest pain. pt is in crit room 1and on continuous cardiac monitor. Blood culture collected per Md order. VS updated. Plan of care explained to pt and mother. Safety and comfort measure in place. Will continue to monitor pt.

## 2023-02-15 NOTE — CONSULT NOTE ADULT - SUBJECTIVE AND OBJECTIVE BOX
Patient is a 22y old  Female who presents with a chief complaint of     HPI:  23 y/o F w/ no PMHx presenting to ED for Chest Pain. patient states that since her  6 months ago she has been having progressively worsening chest pain, non-radiating, pressure like, worse w/ lying down, tender to palpation a/w SOB due to pain and headache. Denies any fever, chills, recent travel, sick contacts, n/v/d, cough, wheezing, or nasal congestion. Was seen at [facility] and had CT Chest performed which showed mild to moderate pericardial effusion and findings consistent w/ reactive airway disease. Was discharged on albuterol which she has not taken and Ibuprofen w/ cardio OP f/u. Denies any urinary sx, abd pain, rash. Brother has asthma, unvaccinated against COVID    In ED, VS for TMax 100.3, Tachycardia to 110, BP soft. Labs sig for Hg 7.7, otherwise unremarkable, CXR showed enlarged cardiac silhouette, EKG w/ low voltage QRS and TWI. Given Colchicine and Indomethacin. Given IVF w/ improvement in heart rate. Cardio consulted, performed bedside ECHO and states that there are no signs of tamponade at this time, did note pericardial effusion. Denied admission to CCU or Cardio stepdown, ICU consulted, approved for Medical SDU.  (15 Feb 2023 04:25)      PAST MEDICAL & SURGICAL HISTORY:      SOCIAL HX:   Smoking           No              ETOH                            Other    FAMILY HISTORY:  :  No known cardiovacular family hisotry     Review Of Systems:     All ROS are negative except per HPI       Allergies    No Known Allergies    Intolerances          PHYSICAL EXAM    ICU Vital Signs Last 24 Hrs  T(C): 37.4 (15 Feb 2023 02:25), Max: 38 (15 Feb 2023 01:00)  T(F): 99.4 (15 Feb 2023 02:25), Max: 100.4 (15 Feb 2023 01:00)  HR: 104 (15 Feb 2023 04:23) (67 - 111)  BP: 100/49 (15 Feb 2023 04:23) (86/37 - 137/88)  BP(mean): 71 (15 Feb 2023 04:23) (70 - 71)  ABP: --  ABP(mean): --  RR: 20 (15 Feb 2023 04:23) (18 - 24)  SpO2: 98% (15 Feb 2023 04:23) (98% - 100%)    O2 Parameters below as of 15 Feb 2023 04:23  Patient On (Oxygen Delivery Method): room air            CONSTITUTIONAL:  Well nourished.   NAD    ENT:   Airway patent,   Mouth with normal mucosa.   No thrush      CARDIAC:   Normal rate,   Regular rhythm.    No edema    RESPIRATORY:   No wheezing  Bilateral BS   Not tachypneic,  No use of accessory muscles    GASTROINTESTINAL:  Abdomen soft,   Non-tender,   No guarding,   + BS      NEUROLOGICAL:   Alert and oriented   No motor deficits.    SKIN:   Skin normal color for race,   No evidence of rash.            LABS:                          7.7    11.46 )-----------( 433      ( 2023 23:33 )             22.2                                               02-14    137  |  102  |  12  ----------------------------<  108<H>  3.9   |  25  |  0.6<L>    Ca    8.8      2023 23:33    TPro  8.2<H>  /  Alb  4.0  /  TBili  1.0  /  DBili  x   /  AST  18  /  ALT  12  /  AlkPhos  56  02-14                                                 CARDIAC MARKERS ( 2023 23:33 )  x     / <0.01 ng/mL / x     / x     / x                                                LIVER FUNCTIONS - ( 2023 23:33 )  Alb: 4.0 g/dL / Pro: 8.2 g/dL / ALK PHOS: 56 U/L / ALT: 12 U/L / AST: 18 U/L / GGT: x                                                                                                                                       X-Rays reviewed                                                                                     ECHO    CXR interpreted by me     MEDICATIONS  (STANDING):  aspirin 650 milliGRAM(s) Oral three times a day  colchicine 0.6 milliGRAM(s) Oral two times a day  pantoprazole    Tablet 40 milliGRAM(s) Oral before breakfast    MEDICATIONS  (PRN):

## 2023-02-15 NOTE — H&P ADULT - HISTORY OF PRESENT ILLNESS
23 y/o F w/ no PMHx presenting to ED for Chest Pain. Pt admits has been having pain intermittent for 6 months, however worsened over last week. + sob. pt went to [facility] ed yesterday and had work up. CTA and labs were done, pt was diagnosed with pericardial effusion and cardiology follow up . Pt admits to body aches and feeling feverish today. 2 episodes of vomiting. pt denies abd pain, diarrhea, urinary symptoms, recent travel, sick contacts, or recent illness. no drug use.    In ED, VS for TMax 100.3, Tachycardia to 110, BP soft. Labs sig for Hg 7.7, otherwise unremarkable, CXR showed enlarged cardiac silhouette, EKG w/ low voltage QRS and TWI. Given Colchicine and Indomethacin. Given IVF w/ improvement in heart rate. Cardio consulted, performed bedside ECHO and states that there are no signs of tamponade at this time, did note pericardial effusion. Denied admission to CCU or Cardio stepdown, ICU consulted, approved for Medical SDU.  23 y/o F w/ no PMHx presenting to ED for Chest Pain. patient states that since her  6 months ago she has been having progressively worsening chest pain, non-radiating, pressure like, worse w/ lying down, tender to palpation a/w SOB due to pain and headache. Denies any fever, chills, recent travel, sick contacts, n/v/d, cough, wheezing, or nasal congestion. Was seen at [facility] and had CT Chest performed which showed mild to moderate pericardial effusion and findings consistent w/ reactive airway disease. Was discharged on albuterol which she has not taken and Ibuprofen w/ cardio OP f/u. Denies any urinary sx, abd pain, rash. Brother has asthma    In ED, VS for TMax 100.3, Tachycardia to 110, BP soft. Labs sig for Hg 7.7, otherwise unremarkable, CXR showed enlarged cardiac silhouette, EKG w/ low voltage QRS and TWI. Given Colchicine and Indomethacin. Given IVF w/ improvement in heart rate. Cardio consulted, performed bedside ECHO and states that there are no signs of tamponade at this time, did note pericardial effusion. Denied admission to CCU or Cardio stepdown, ICU consulted, approved for Medical SDU.  21 y/o F w/ no PMHx presenting to ED for Chest Pain. patient states that since her  6 months ago she has been having progressively worsening chest pain, non-radiating, pressure like, worse w/ lying down, tender to palpation a/w SOB due to pain and headache. Denies any fever, chills, recent travel, sick contacts, n/v/d, cough, wheezing, or nasal congestion. Was seen at [facility] and had CT Chest performed which showed mild to moderate pericardial effusion and findings consistent w/ reactive airway disease. Was discharged on albuterol which she has not taken and Ibuprofen w/ cardio OP f/u. Denies any urinary sx, abd pain, rash. Brother has asthma, unvaccinated against COVID    In ED, VS for TMax 100.3, Tachycardia to 110, BP soft. Labs sig for Hg 7.7, otherwise unremarkable, CXR showed enlarged cardiac silhouette, EKG w/ low voltage QRS and TWI. Given Colchicine and Indomethacin. Given IVF w/ improvement in heart rate. Cardio consulted, performed bedside ECHO and states that there are no signs of tamponade at this time, did note pericardial effusion. Denied admission to CCU or Cardio stepdown, ICU consulted, approved for Medical SDU.

## 2023-02-15 NOTE — H&P ADULT - NSHPLABSRESULTS_GEN_ALL_CORE
7.7    11.46 )-----------( 433      ( 14 Feb 2023 23:33 )             22.2       02-14    137  |  102  |  12  ----------------------------<  108<H>  3.9   |  25  |  0.6<L>    Ca    8.8      14 Feb 2023 23:33    TPro  8.2<H>  /  Alb  4.0  /  TBili  1.0  /  DBili  x   /  AST  18  /  ALT  12  /  AlkPhos  56  02-14

## 2023-02-15 NOTE — CONSULT NOTE ADULT - SUBJECTIVE AND OBJECTIVE BOX
HISTORY OF PRESENT ILLNESS:   22 year old female with pmhx of c section presents to ed with chest pain. Pt admits has been having pain intermittent for 6 months, however worsened over last week. + sob. pt went to [facility] ed yesterday and had work up. CTA and labs were done, pt was diagnosed with pericardial effusion and cardiology follow up . Pt admits to body aches and feeling feverish today. 2 episodes of vomiting. pt denies abd pain, diarrhea, urinary symptoms, recent travel, sick contacts, or recent illness. no drug use.    PAST MEDICAL & SURGICAL HISTORY      FAMILY HISTORY:  FAMILY HISTORY:      SOCIAL HISTORY:  []smoker  []Alcohol  []Drug    ROS:  Negative except as mentioned in HPI    ALLERGIES:  No Known Allergies      MEDICATIONS:  MEDICATIONS  (STANDING):    MEDICATIONS  (PRN):      HOME MEDICATIONS:  Home Medications:      VITALS:   T(F): 100.4 (02-15 @ 01:00), Max: 100.4 (02-15 @ 01:00)  HR: 110 (02-15 @ 01:00) (67 - 111)  BP: 118/58 (02-15 @ 01:00) (102/51 - 137/88)  BP(mean): --  RR: 22 (02-15 @ 00:33) (22 - 24)  SpO2: 100% (02-15 @ 00:33) (100% - 100%)    I&O's Summary      PHYSICAL EXAM:  GEN: Not in acute distress  HEENT: NCAT, PERRL, EOMI  LUNGS: Clear to auscultation bilaterally   CARDIOVASCULAR: RRR, S1/S2 present, no murmurs, rubs or gallops, no JVD, + PP bilaterally  ABD: Soft, non-tender, non-distended  EXT: No SYED  SKIN: Intact  NEURO: AAOx3    LABS:                        7.7    11.46 )-----------( 433      ( 14 Feb 2023 23:33 )             22.2     02-14    137  |  102  |  12  ----------------------------<  108<H>  3.9   |  25  |  0.6<L>    Ca    8.8      14 Feb 2023 23:33    TPro  8.2<H>  /  Alb  4.0  /  TBili  1.0  /  DBili  x   /  AST  18  /  ALT  12  /  AlkPhos  56  02-14      Troponin T, Serum: <0.01 ng/mL (02-14-23 @ 23:33)  Lactate, Blood: 2.2 mmol/L *H* (02-14-23 @ 23:33)    Troponin <0.01, CKMB --, CK --/ 02-14-23 @ 23:33    Serum Pro-Brain Natriuretic Peptide: 249 pg/mL (02-14-23 @ 23:33)      Hemoglobin A1C   Thyroid    EKG: NSR, no acute ST changes HISTORY OF PRESENT ILLNESS:   22 year old female with pmhx of c section presents to ed with chest pain. Pt admits has been having pain intermittent for 6 months, however worsened over last week. + sob. pt went to [facility] ed yesterday and had work up. CTA and labs were done, pt was diagnosed with pericardial effusion and cardiology follow up . Pt admits to body aches and feeling feverish today. 2 episodes of vomiting. pt denies abd pain, diarrhea, urinary symptoms, recent travel, sick contacts, or recent illness. no drug use.    PAST MEDICAL & SURGICAL HISTORY      FAMILY HISTORY:  FAMILY HISTORY:      SOCIAL HISTORY:  []smoker  []Alcohol  []Drug    ROS:  Negative except as mentioned in HPI    ALLERGIES:  No Known Allergies      MEDICATIONS:  MEDICATIONS  (STANDING):    MEDICATIONS  (PRN):      HOME MEDICATIONS:  Home Medications:      VITALS:   T(F): 100.4 (02-15 @ 01:00), Max: 100.4 (02-15 @ 01:00)  HR: 110 (02-15 @ 01:00) (67 - 111)  BP: 118/58 (02-15 @ 01:00) (102/51 - 137/88)  BP(mean): --  RR: 22 (02-15 @ 00:33) (22 - 24)  SpO2: 100% (02-15 @ 00:33) (100% - 100%)    I&O's Summary      PHYSICAL EXAM:  GEN: Not in acute distress  HEENT: NCAT, PERRL, EOMI  LUNGS: Clear to auscultation bilaterally   CARDIOVASCULAR: RRR, S1/S2 present, no murmurs, rubs or gallops, no JVD, + PP bilaterally  ABD: Soft, epigastric tenderness  EXT: No SYED  SKIN: Intact  NEURO: AAOx3    LABS:                        7.7    11.46 )-----------( 433      ( 14 Feb 2023 23:33 )             22.2     02-14    137  |  102  |  12  ----------------------------<  108<H>  3.9   |  25  |  0.6<L>    Ca    8.8      14 Feb 2023 23:33    TPro  8.2<H>  /  Alb  4.0  /  TBili  1.0  /  DBili  x   /  AST  18  /  ALT  12  /  AlkPhos  56  02-14      Troponin T, Serum: <0.01 ng/mL (02-14-23 @ 23:33)  Lactate, Blood: 2.2 mmol/L *H* (02-14-23 @ 23:33)    Troponin <0.01, CKMB --, CK --/ 02-14-23 @ 23:33    Serum Pro-Brain Natriuretic Peptide: 249 pg/mL (02-14-23 @ 23:33)      Hemoglobin A1C   Thyroid    EKG: NSR, no acute ST changes HISTORY OF PRESENT ILLNESS:   22 year old female with pmhx of c section presents to ed with chest pain. Pt admits has been having pain intermittent for 6 months, however worsened over last week. + sob. pt went to [facility] ed yesterday and had work up. CTA and labs were done, pt was diagnosed with pericardial effusion and cardiology follow up . Pt admits to body aches and feeling feverish today. 2 episodes of vomiting. pt denies diarrhea, urinary symptoms, recent travel, sick contacts, or recent illness. no drug use.    PAST MEDICAL & SURGICAL HISTORY      FAMILY HISTORY:  FAMILY HISTORY:      SOCIAL HISTORY:  []smoker  []Alcohol  []Drug    ROS:  Negative except as mentioned in HPI    ALLERGIES:  No Known Allergies      MEDICATIONS:  MEDICATIONS  (STANDING):    MEDICATIONS  (PRN):      HOME MEDICATIONS:  Home Medications:      VITALS:   T(F): 100.4 (02-15 @ 01:00), Max: 100.4 (02-15 @ 01:00)  HR: 110 (02-15 @ 01:00) (67 - 111)  BP: 118/58 (02-15 @ 01:00) (102/51 - 137/88)  BP(mean): --  RR: 22 (02-15 @ 00:33) (22 - 24)  SpO2: 100% (02-15 @ 00:33) (100% - 100%)    I&O's Summary      PHYSICAL EXAM:  GEN: Not in acute distress  HEENT: NCAT, PERRL, EOMI  LUNGS: Clear to auscultation bilaterally   CARDIOVASCULAR: RRR, S1/S2 present, no murmurs, rubs or gallops, no JVD, + PP bilaterally  ABD: Soft, epigastric tenderness  EXT: No SYED  SKIN: Intact  NEURO: AAOx3    LABS:                        7.7    11.46 )-----------( 433      ( 14 Feb 2023 23:33 )             22.2     02-14    137  |  102  |  12  ----------------------------<  108<H>  3.9   |  25  |  0.6<L>    Ca    8.8      14 Feb 2023 23:33    TPro  8.2<H>  /  Alb  4.0  /  TBili  1.0  /  DBili  x   /  AST  18  /  ALT  12  /  AlkPhos  56  02-14      Troponin T, Serum: <0.01 ng/mL (02-14-23 @ 23:33)  Lactate, Blood: 2.2 mmol/L *H* (02-14-23 @ 23:33)    Troponin <0.01, CKMB --, CK --/ 02-14-23 @ 23:33    Serum Pro-Brain Natriuretic Peptide: 249 pg/mL (02-14-23 @ 23:33)      Hemoglobin A1C   Thyroid    EKG: NSR, no acute ST changes HISTORY OF PRESENT ILLNESS:   22 year old female with pmhx of c section presents to ed with chest pain. Pt admits has been having pain intermittent for 6 months, however worsened over last week. + sob. pt went to [facility] ed yesterday and had work up. CTA and labs were done, pt was diagnosed with pericardial effusion and cardiology follow up . Pt admits to body aches and feeling feverish today. 2 episodes of vomiting. pt denies diarrhea, urinary symptoms, recent travel, sick contacts, or recent illness. no drug use.    PAST MEDICAL & SURGICAL HISTORY  None    FAMILY HISTORY:  FAMILY HISTORY: No family history of premature CAD or SCD      SOCIAL HISTORY:  []smoker  []Alcohol  []Drug  None    ROS:  Negative except as mentioned in HPI    ALLERGIES:  No Known Allergies      MEDICATIONS:  MEDICATIONS  (STANDING):    MEDICATIONS  (PRN):      HOME MEDICATIONS:  Home Medications:      VITALS:   T(F): 100.4 (02-15 @ 01:00), Max: 100.4 (02-15 @ 01:00)  HR: 110 (02-15 @ 01:00) (67 - 111)  BP: 118/58 (02-15 @ 01:00) (102/51 - 137/88)  BP(mean): --  RR: 22 (02-15 @ 00:33) (22 - 24)  SpO2: 100% (02-15 @ 00:33) (100% - 100%)    I&O's Summary      PHYSICAL EXAM:  GEN: Not in acute distress  HEENT: NCAT, PERRL, EOMI  Neck: supple, no JVD  LUNGS: Clear to auscultation bilaterally , no crackles  CARDIOVASCULAR: RRR, S1/S2 present, no murmurs, rubs or gallops, no JVD, + PP bilaterally  ABD: Soft, epigastric tenderness  EXT: No SYED  SKIN: Intact  NEURO: AAOx3    LABS:                        7.7    11.46 )-----------( 433      ( 14 Feb 2023 23:33 )             22.2     02-14    137  |  102  |  12  ----------------------------<  108<H>  3.9   |  25  |  0.6<L>    Ca    8.8      14 Feb 2023 23:33    TPro  8.2<H>  /  Alb  4.0  /  TBili  1.0  /  DBili  x   /  AST  18  /  ALT  12  /  AlkPhos  56  02-14      Troponin T, Serum: <0.01 ng/mL (02-14-23 @ 23:33)  Lactate, Blood: 2.2 mmol/L *H* (02-14-23 @ 23:33)    Troponin <0.01, CKMB --, CK --/ 02-14-23 @ 23:33    Serum Pro-Brain Natriuretic Peptide: 249 pg/mL (02-14-23 @ 23:33)      Hemoglobin A1C   Thyroid    EKG: NSR, no acute ST changes

## 2023-02-16 LAB
ALBUMIN SERPL ELPH-MCNC: 3.3 G/DL — LOW (ref 3.5–5.2)
ALP SERPL-CCNC: 53 U/L — SIGNIFICANT CHANGE UP (ref 30–115)
ALT FLD-CCNC: 18 U/L — SIGNIFICANT CHANGE UP (ref 0–41)
ANION GAP SERPL CALC-SCNC: 8 MMOL/L — SIGNIFICANT CHANGE UP (ref 7–14)
APTT BLD: 29.7 SEC — SIGNIFICANT CHANGE UP (ref 27–39.2)
AST SERPL-CCNC: 19 U/L — SIGNIFICANT CHANGE UP (ref 0–41)
BASOPHILS # BLD AUTO: 0.02 K/UL — SIGNIFICANT CHANGE UP (ref 0–0.2)
BASOPHILS NFR BLD AUTO: 0.3 % — SIGNIFICANT CHANGE UP (ref 0–1)
BILIRUB SERPL-MCNC: 1.4 MG/DL — HIGH (ref 0.2–1.2)
BUN SERPL-MCNC: 8 MG/DL — LOW (ref 10–20)
C3 SERPL-MCNC: 86 MG/DL — SIGNIFICANT CHANGE UP (ref 81–157)
C4 SERPL-MCNC: 18 MG/DL — SIGNIFICANT CHANGE UP (ref 13–39)
CALCIUM SERPL-MCNC: 8.1 MG/DL — LOW (ref 8.4–10.5)
CHLORIDE SERPL-SCNC: 104 MMOL/L — SIGNIFICANT CHANGE UP (ref 98–110)
CO2 SERPL-SCNC: 24 MMOL/L — SIGNIFICANT CHANGE UP (ref 17–32)
CREAT SERPL-MCNC: 0.7 MG/DL — SIGNIFICANT CHANGE UP (ref 0.7–1.5)
D DIMER BLD IA.RAPID-MCNC: 1836 NG/ML DDU — HIGH
EGFR: 125 ML/MIN/1.73M2 — SIGNIFICANT CHANGE UP
EOSINOPHIL # BLD AUTO: 0.02 K/UL — SIGNIFICANT CHANGE UP (ref 0–0.7)
EOSINOPHIL NFR BLD AUTO: 0.3 % — SIGNIFICANT CHANGE UP (ref 0–8)
FERRITIN SERPL-MCNC: 264 NG/ML — HIGH (ref 15–150)
FOLATE SERPL-MCNC: 10 NG/ML — SIGNIFICANT CHANGE UP
GLUCOSE SERPL-MCNC: 88 MG/DL — SIGNIFICANT CHANGE UP (ref 70–99)
HCT VFR BLD CALC: 23.9 % — LOW (ref 37–47)
HGB BLD-MCNC: 8.4 G/DL — LOW (ref 12–16)
IMM GRANULOCYTES NFR BLD AUTO: 0.4 % — HIGH (ref 0.1–0.3)
INR BLD: 1.11 RATIO — SIGNIFICANT CHANGE UP (ref 0.65–1.3)
LYMPHOCYTES # BLD AUTO: 0.72 K/UL — LOW (ref 1.2–3.4)
LYMPHOCYTES # BLD AUTO: 9.7 % — LOW (ref 20.5–51.1)
MAGNESIUM SERPL-MCNC: 1.9 MG/DL — SIGNIFICANT CHANGE UP (ref 1.8–2.4)
MCHC RBC-ENTMCNC: 34.3 PG — HIGH (ref 27–31)
MCHC RBC-ENTMCNC: 35.1 G/DL — SIGNIFICANT CHANGE UP (ref 32–37)
MCV RBC AUTO: 97.6 FL — SIGNIFICANT CHANGE UP (ref 81–99)
MONOCYTES # BLD AUTO: 0.97 K/UL — HIGH (ref 0.1–0.6)
MONOCYTES NFR BLD AUTO: 13 % — HIGH (ref 1.7–9.3)
NEUTROPHILS # BLD AUTO: 5.69 K/UL — SIGNIFICANT CHANGE UP (ref 1.4–6.5)
NEUTROPHILS NFR BLD AUTO: 76.3 % — HIGH (ref 42.2–75.2)
NRBC # BLD: 0 /100 WBCS — SIGNIFICANT CHANGE UP (ref 0–0)
PLATELET # BLD AUTO: 377 K/UL — SIGNIFICANT CHANGE UP (ref 130–400)
POTASSIUM SERPL-MCNC: 4.2 MMOL/L — SIGNIFICANT CHANGE UP (ref 3.5–5)
POTASSIUM SERPL-SCNC: 4.2 MMOL/L — SIGNIFICANT CHANGE UP (ref 3.5–5)
PROT SERPL-MCNC: 7.1 G/DL — SIGNIFICANT CHANGE UP (ref 6–8)
PROTHROM AB SERPL-ACNC: 12.7 SEC — SIGNIFICANT CHANGE UP (ref 9.95–12.87)
RBC # BLD: 2.45 M/UL — LOW (ref 4.2–5.4)
RBC # FLD: 15.5 % — HIGH (ref 11.5–14.5)
SODIUM SERPL-SCNC: 136 MMOL/L — SIGNIFICANT CHANGE UP (ref 135–146)
VIT B12 SERPL-MCNC: 303 PG/ML — SIGNIFICANT CHANGE UP (ref 232–1245)
WBC # BLD: 7.45 K/UL — SIGNIFICANT CHANGE UP (ref 4.8–10.8)
WBC # FLD AUTO: 7.45 K/UL — SIGNIFICANT CHANGE UP (ref 4.8–10.8)

## 2023-02-16 PROCEDURE — 99233 SBSQ HOSP IP/OBS HIGH 50: CPT

## 2023-02-16 PROCEDURE — 93306 TTE W/DOPPLER COMPLETE: CPT | Mod: 26

## 2023-02-16 PROCEDURE — 93010 ELECTROCARDIOGRAM REPORT: CPT

## 2023-02-16 RX ORDER — OXYCODONE HYDROCHLORIDE 5 MG/1
5 TABLET ORAL ONCE
Refills: 0 | Status: DISCONTINUED | OUTPATIENT
Start: 2023-02-16 | End: 2023-02-16

## 2023-02-16 RX ORDER — OXYCODONE HYDROCHLORIDE 5 MG/1
2.5 TABLET ORAL ONCE
Refills: 0 | Status: DISCONTINUED | OUTPATIENT
Start: 2023-02-16 | End: 2023-02-16

## 2023-02-16 RX ADMIN — Medication 650 MILLIGRAM(S): at 06:10

## 2023-02-16 RX ADMIN — Medication 0.6 MILLIGRAM(S): at 06:10

## 2023-02-16 RX ADMIN — PANTOPRAZOLE SODIUM 40 MILLIGRAM(S): 20 TABLET, DELAYED RELEASE ORAL at 06:10

## 2023-02-16 RX ADMIN — OXYCODONE HYDROCHLORIDE 2.5 MILLIGRAM(S): 5 TABLET ORAL at 18:58

## 2023-02-16 RX ADMIN — Medication 0.6 MILLIGRAM(S): at 17:34

## 2023-02-16 RX ADMIN — Medication 975 MILLIGRAM(S): at 20:47

## 2023-02-16 RX ADMIN — OXYCODONE HYDROCHLORIDE 2.5 MILLIGRAM(S): 5 TABLET ORAL at 17:30

## 2023-02-16 NOTE — PROGRESS NOTE ADULT - ASSESSMENT
22F PMHx pre-eclampsia s/p  2022 here with chest pain.    #Chest pain, described as midsternal/ L sided  continuous, reproducible with palpation; began after  6 mos prior, +pre-eclampsia  with fever; presumed pericarditis  crp, esr elevated  hold asa  colchicine 0.6 bid  bcx ntd  rheum serologies  ekg noted   ce neg  tte; pocus with pericardial effusion no tamponade  appreciate cards  #Macrocytic anemia  s/p one unit prbcs total  h/h stable, trend  no evidence bleed  ?direct jodi positive  check hapto, ldh; if positive heme eval  peripheral smear  b12, folate  iron studies c/w chronic disease  #DVT ppx  ambulation    #Progress Note Handoff:  Pending (specify):  Consults_________, Tests________, Test Results_______, Other___tte______  Family discussion: d/w pt at bedside re: treatment plan, primary dx  Disposition: Home___/SNF___/Other________/Unknown at this time____x____ .

## 2023-02-16 NOTE — PROGRESS NOTE ADULT - SUBJECTIVE AND OBJECTIVE BOX
INTERVAL HPI/OVERNIGHT EVENTS:    SUBJECTIVE: Patient seen and examined at bedside.     cc: cp  no sob, abd pain, fever  cp improving, no palpitations, hahn, orthopnea    OBJECTIVE:    VITAL SIGNS:  Vital Signs Last 24 Hrs  T(C): 36.6 (2023 11:49), Max: 38.1 (15 Feb 2023 14:34)  T(F): 97.9 (2023 11:49), Max: 100.6 (15 Feb 2023 15:46)  HR: 100 (2023 11:49) (94 - 119)  BP: 129/62 (2023 11:49) (121/57 - 129/62)  BP(mean): 89 (15 Feb 2023 21:15) (81 - 89)  RR: 18 (2023 11:49) (17 - 20)  SpO2: 100% (2023 11:49) (99% - 100%)    Parameters below as of 2023 11:49  Patient On (Oxygen Delivery Method): room air          PHYSICAL EXAM:    General: NAD  HEENT: NC/AT; PERRL, clear conjunctiva  Neck: supple  Respiratory: CTA b/l  Cardiovascular: +S1/S2; RRR  Abdomen: soft, NT/ND; +BS x4  Extremities: WWP, 2+ peripheral pulses b/l; no LE edema  Skin: normal color and turgor; no rash  Neurological:    MEDICATIONS:  MEDICATIONS  (STANDING):  acetaminophen     Tablet .. 650 milliGRAM(s) Oral once  colchicine 0.6 milliGRAM(s) Oral two times a day  pantoprazole    Tablet 40 milliGRAM(s) Oral before breakfast    MEDICATIONS  (PRN):  acetaminophen     Tablet .. 975 milliGRAM(s) Oral every 8 hours PRN Mild Pain (1 - 3)      ALLERGIES:  Allergies    No Known Allergies    Intolerances        LABS:                        8.4    7.45  )-----------( 377      ( 2023 07:15 )             23.9     Hemoglobin: 8.4 g/dL ( @ 07:15)  Hemoglobin: 7.4 g/dL (02-15 @ 16:31)  Hemoglobin: 6.9 g/dL (02-15 @ 11:48)  Hemoglobin: 7.7 g/dL ( @ 23:33)    CBC Full  -  ( 2023 07:15 )  WBC Count : 7.45 K/uL  RBC Count : 2.45 M/uL  Hemoglobin : 8.4 g/dL  Hematocrit : 23.9 %  Platelet Count - Automated : 377 K/uL  Mean Cell Volume : 97.6 fL  Mean Cell Hemoglobin : 34.3 pg  Mean Cell Hemoglobin Concentration : 35.1 g/dL  Auto Neutrophil # : 5.69 K/uL  Auto Lymphocyte # : 0.72 K/uL  Auto Monocyte # : 0.97 K/uL  Auto Eosinophil # : 0.02 K/uL  Auto Basophil # : 0.02 K/uL  Auto Neutrophil % : 76.3 %  Auto Lymphocyte % : 9.7 %  Auto Monocyte % : 13.0 %  Auto Eosinophil % : 0.3 %  Auto Basophil % : 0.3 %        136  |  104  |  8<L>  ----------------------------<  88  4.2   |  24  |  0.7    Ca    8.1<L>      2023 07:15  Mg     1.9         TPro  7.1  /  Alb  3.3<L>  /  TBili  1.4<H>  /  DBili  x   /  AST  19  /  ALT  18  /  AlkPhos  53  -16    Creatinine Trend: 0.7<--, 0.6<--, 0.6<--  LIVER FUNCTIONS - ( 2023 07:15 )  Alb: 3.3 g/dL / Pro: 7.1 g/dL / ALK PHOS: 53 U/L / ALT: 18 U/L / AST: 19 U/L / GGT: x               hs Troponin:            Urinalysis Basic - ( 15 Feb 2023 08:40 )    Color: Yellow / Appearance: Slightly Turbid / S.018 / pH: x  Gluc: x / Ketone: Negative  / Bili: Negative / Urobili: <2 mg/dL   Blood: x / Protein: 30 mg/dL / Nitrite: Negative   Leuk Esterase: Negative / RBC: 3 /HPF / WBC 7 /HPF   Sq Epi: x / Non Sq Epi: 7 /HPF / Bacteria: Moderate      CSF:                      EKG:   MICROBIOLOGY:    Culture - Blood (collected 15 Feb 2023 01:53)  Source: .Blood Blood  Preliminary Report (2023 10:01):    No growth to date.    Culture - Blood (collected 15 Feb 2023 01:53)  Source: .Blood Blood  Preliminary Report (2023 10:01):    No growth to date.      IMAGING:      Labs, imaging, EKG personally reviewed    RADIOLOGY & ADDITIONAL TESTS: Reviewed.

## 2023-02-16 NOTE — PROGRESS NOTE ADULT - SUBJECTIVE AND OBJECTIVE BOX
CHIEF COMPLAINT:  Patient is a 22y old  Female who presents with a chief complaint of     INTERVAL HISTORY/OVERNIGHT EVENTS:  Pt dropped Hb overnight and got 1 unit PRBC.     ======================  MEDICATIONS:  acetaminophen     Tablet .. 650 milliGRAM(s) Oral once  aspirin 650 milliGRAM(s) Oral three times a day  colchicine 0.6 milliGRAM(s) Oral two times a day  pantoprazole    Tablet 40 milliGRAM(s) Oral before breakfast        ======================  PHYSICAL EXAMINATION:  GEN:  nad.   HEENT:  eomi. ncat  PULM:  b/l lung sounds   CARD: s1, s2  ABD: +bs. ntnd  EXT:  no new rashes.    NEURO:  no new focal deficits.   ======================  OBJECTIVE:        VS:  T(F): 97.8 ( @ 08:00), Max: 100.6 (02-15 @ 15:46)  HR: 94 ( @ 08:00) (70 - 119)  BP: 122/57 ( @ 08:00) (103/53 - 126/59)  RR: 18 ( @ 08:00) (17 - 20)  SpO2: 100% ( @ 08:00) (99% - 100%)      I/O:        Weight trend:  Weight (kg): 83.9 (-)    ======================    LABS:                          8.4    7.45  )-----------( 377      ( 2023 07:15 )             23.9         136  |  104  |  8<L>  ----------------------------<  88  4.2   |  24  |  0.7    Ca    8.1<L>      2023 07:15  Mg     1.9         TPro  7.1  /  Alb  3.3<L>  /  TBili  1.4<H>  /  DBili  x   /  AST  19  /  ALT  18  /  AlkPhos  53      LIVER FUNCTIONS - ( 2023 07:15 )  Alb: 3.3 g/dL / Pro: 7.1 g/dL / ALK PHOS: 53 U/L / ALT: 18 U/L / AST: 19 U/L / GGT: x             Troponin T, Serum: <0.01 ng/mL (02-15 @ 11:48)    CARDIAC MARKERS ( 15 Feb 2023 11:48 )  x     / <0.01 ng/mL / x     / x     / x      CARDIAC MARKERS ( 2023 23:33 )  x     / <0.01 ng/mL / x     / x     / x          Serum Pro-Brain Natriuretic Peptide: 249 pg/mL ()    Urinalysis Basic - ( 15 Feb 2023 08:40 )    Color: Yellow / Appearance: Slightly Turbid / S.018 / pH: x  Gluc: x / Ketone: Negative  / Bili: Negative / Urobili: <2 mg/dL   Blood: x / Protein: 30 mg/dL / Nitrite: Negative   Leuk Esterase: Negative / RBC: 3 /HPF / WBC 7 /HPF   Sq Epi: x / Non Sq Epi: 7 /HPF / Bacteria: Moderate           CHIEF COMPLAINT:  Patient is a 22y old  Female who presents with a chief complaint of chest pain    INTERVAL HISTORY/OVERNIGHT EVENTS:  Pt dropped Hb overnight and got 1 unit PRBC.     ======================  MEDICATIONS:  acetaminophen     Tablet .. 650 milliGRAM(s) Oral once  aspirin 650 milliGRAM(s) Oral three times a day  colchicine 0.6 milliGRAM(s) Oral two times a day  pantoprazole    Tablet 40 milliGRAM(s) Oral before breakfast        ======================  PHYSICAL EXAMINATION:  GEN:  nad.   HEENT:  eomi. ncat  PULM:  b/l lung sounds   CARD: s1, s2  ABD: +bs. ntnd  EXT:  no new rashes.    NEURO:  no new focal deficits.   ======================  OBJECTIVE:        VS:  T(F): 97.8 ( @ 08:00), Max: 100.6 (02-15 @ 15:46)  HR: 94 ( @ 08:00) (70 - 119)  BP: 122/57 ( @ 08:00) (103/53 - 126/59)  RR: 18 ( @ 08:00) (17 - 20)  SpO2: 100% ( @ 08:00) (99% - 100%)      I/O:        Weight trend:  Weight (kg): 83.9 (-)    ======================    LABS:                          8.4    7.45  )-----------( 377      ( 2023 07:15 )             23.9     -    136  |  104  |  8<L>  ----------------------------<  88  4.2   |  24  |  0.7    Ca    8.1<L>      2023 07:15  Mg     1.9         TPro  7.1  /  Alb  3.3<L>  /  TBili  1.4<H>  /  DBili  x   /  AST  19  /  ALT  18  /  AlkPhos  53      LIVER FUNCTIONS - ( 2023 07:15 )  Alb: 3.3 g/dL / Pro: 7.1 g/dL / ALK PHOS: 53 U/L / ALT: 18 U/L / AST: 19 U/L / GGT: x             Troponin T, Serum: <0.01 ng/mL (02-15 @ 11:48)    CARDIAC MARKERS ( 15 Feb 2023 11:48 )  x     / <0.01 ng/mL / x     / x     / x      CARDIAC MARKERS ( 2023 23:33 )  x     / <0.01 ng/mL / x     / x     / x          Serum Pro-Brain Natriuretic Peptide: 249 pg/mL ()    Urinalysis Basic - ( 15 Feb 2023 08:40 )    Color: Yellow / Appearance: Slightly Turbid / S.018 / pH: x  Gluc: x / Ketone: Negative  / Bili: Negative / Urobili: <2 mg/dL   Blood: x / Protein: 30 mg/dL / Nitrite: Negative   Leuk Esterase: Negative / RBC: 3 /HPF / WBC 7 /HPF   Sq Epi: x / Non Sq Epi: 7 /HPF / Bacteria: Moderate

## 2023-02-16 NOTE — PROGRESS NOTE ADULT - ASSESSMENT
IMPRESSION  Pleuritic chest pain, ACS ruled out possible pericarditis  Moderate pericardial effusion  Anemia s/p 1 unit PRBC administration    RECOMMENDATIONS  telemonitoring  please get official TTE today  c/w colchicine 0.6 BID  can hold aspirin given acute drop in hemoglobin and need for transfusion  consider protonix and w/up for anemia   IMPRESSION  Pleuritic chest pain, ACS ruled out possible pericarditis  Moderate pericardial effusion  Anemia s/p 1 unit PRBC administration  Crohns Disease    RECOMMENDATIONS  telemonitoring  please get official TTE today  c/w colchicine 0.6 BID  can hold aspirin given acute drop in hemoglobin and need for transfusion  start protonix   consider GI eval pt has hx of Crohns disease

## 2023-02-17 LAB
ALBUMIN SERPL ELPH-MCNC: 3.3 G/DL — LOW (ref 3.5–5.2)
ALLERGY+IMMUNOLOGY DIAG STUDY NOTE: SIGNIFICANT CHANGE UP
ALP SERPL-CCNC: 56 U/L — SIGNIFICANT CHANGE UP (ref 30–115)
ALT FLD-CCNC: 15 U/L — SIGNIFICANT CHANGE UP (ref 0–41)
ANA PAT FLD IF-IMP: ABNORMAL
ANA TITR SER: ABNORMAL
ANION GAP SERPL CALC-SCNC: 11 MMOL/L — SIGNIFICANT CHANGE UP (ref 7–14)
AST SERPL-CCNC: 15 U/L — SIGNIFICANT CHANGE UP (ref 0–41)
BASOPHILS # BLD AUTO: 0.01 K/UL — SIGNIFICANT CHANGE UP (ref 0–0.2)
BASOPHILS NFR BLD AUTO: 0.1 % — SIGNIFICANT CHANGE UP (ref 0–1)
BILIRUB SERPL-MCNC: 0.9 MG/DL — SIGNIFICANT CHANGE UP (ref 0.2–1.2)
BLD GP AB SCN SERPL QL: SIGNIFICANT CHANGE UP
BUN SERPL-MCNC: 8 MG/DL — LOW (ref 10–20)
CALCIUM SERPL-MCNC: 8.2 MG/DL — LOW (ref 8.4–10.5)
CHLORIDE SERPL-SCNC: 104 MMOL/L — SIGNIFICANT CHANGE UP (ref 98–110)
CO2 SERPL-SCNC: 23 MMOL/L — SIGNIFICANT CHANGE UP (ref 17–32)
CREAT SERPL-MCNC: 0.7 MG/DL — SIGNIFICANT CHANGE UP (ref 0.7–1.5)
CULTURE RESULTS: SIGNIFICANT CHANGE UP
DAT IGG-SP REAG RBC-IMP: ABNORMAL
DIR ANTIGLOB POLYSPECIFIC INTERPRETATION: ABNORMAL
EGFR: 125 ML/MIN/1.73M2 — SIGNIFICANT CHANGE UP
EOSINOPHIL # BLD AUTO: 0.03 K/UL — SIGNIFICANT CHANGE UP (ref 0–0.7)
EOSINOPHIL NFR BLD AUTO: 0.4 % — SIGNIFICANT CHANGE UP (ref 0–8)
FOLATE SERPL-MCNC: 8.6 NG/ML — SIGNIFICANT CHANGE UP
GLUCOSE SERPL-MCNC: 91 MG/DL — SIGNIFICANT CHANGE UP (ref 70–99)
HAPTOGLOB SERPL-MCNC: 171 MG/DL — SIGNIFICANT CHANGE UP (ref 34–200)
HCT VFR BLD CALC: 24 % — LOW (ref 37–47)
HGB BLD-MCNC: 8.5 G/DL — LOW (ref 12–16)
IAT COMP-SP REAG SERPL QL: ABNORMAL
IMM GRANULOCYTES NFR BLD AUTO: 0.5 % — HIGH (ref 0.1–0.3)
LDH SERPL L TO P-CCNC: 231 — SIGNIFICANT CHANGE UP (ref 50–242)
LYMPHOCYTES # BLD AUTO: 0.58 K/UL — LOW (ref 1.2–3.4)
LYMPHOCYTES # BLD AUTO: 7.9 % — LOW (ref 20.5–51.1)
MAGNESIUM SERPL-MCNC: 1.8 MG/DL — SIGNIFICANT CHANGE UP (ref 1.8–2.4)
MCHC RBC-ENTMCNC: 34.4 PG — HIGH (ref 27–31)
MCHC RBC-ENTMCNC: 35.4 G/DL — SIGNIFICANT CHANGE UP (ref 32–37)
MCV RBC AUTO: 97.2 FL — SIGNIFICANT CHANGE UP (ref 81–99)
MONOCYTES # BLD AUTO: 0.94 K/UL — HIGH (ref 0.1–0.6)
MONOCYTES NFR BLD AUTO: 12.8 % — HIGH (ref 1.7–9.3)
NEUTROPHILS # BLD AUTO: 5.75 K/UL — SIGNIFICANT CHANGE UP (ref 1.4–6.5)
NEUTROPHILS NFR BLD AUTO: 78.3 % — HIGH (ref 42.2–75.2)
NRBC # BLD: 0 /100 WBCS — SIGNIFICANT CHANGE UP (ref 0–0)
PLATELET # BLD AUTO: 432 K/UL — HIGH (ref 130–400)
POTASSIUM SERPL-MCNC: 3.6 MMOL/L — SIGNIFICANT CHANGE UP (ref 3.5–5)
POTASSIUM SERPL-SCNC: 3.6 MMOL/L — SIGNIFICANT CHANGE UP (ref 3.5–5)
PROT SERPL-MCNC: 7.2 G/DL — SIGNIFICANT CHANGE UP (ref 6–8)
RBC # BLD: 2.47 M/UL — LOW (ref 4.2–5.4)
RBC # FLD: 14.5 % — SIGNIFICANT CHANGE UP (ref 11.5–14.5)
SODIUM SERPL-SCNC: 138 MMOL/L — SIGNIFICANT CHANGE UP (ref 135–146)
SPECIMEN SOURCE: SIGNIFICANT CHANGE UP
TROPONIN T SERPL-MCNC: <0.01 NG/ML — SIGNIFICANT CHANGE UP
VIT B12 SERPL-MCNC: 226 PG/ML — LOW (ref 232–1245)
WBC # BLD: 7.35 K/UL — SIGNIFICANT CHANGE UP (ref 4.8–10.8)
WBC # FLD AUTO: 7.35 K/UL — SIGNIFICANT CHANGE UP (ref 4.8–10.8)

## 2023-02-17 PROCEDURE — 99223 1ST HOSP IP/OBS HIGH 75: CPT

## 2023-02-17 PROCEDURE — 99232 SBSQ HOSP IP/OBS MODERATE 35: CPT

## 2023-02-17 PROCEDURE — 99285 EMERGENCY DEPT VISIT HI MDM: CPT

## 2023-02-17 PROCEDURE — 86077 PHYS BLOOD BANK SERV XMATCH: CPT

## 2023-02-17 PROCEDURE — 99233 SBSQ HOSP IP/OBS HIGH 50: CPT

## 2023-02-17 RX ORDER — IBUPROFEN 200 MG
600 TABLET ORAL THREE TIMES A DAY
Refills: 0 | Status: DISCONTINUED | OUTPATIENT
Start: 2023-02-17 | End: 2023-02-18

## 2023-02-17 RX ORDER — IBUPROFEN 200 MG
400 TABLET ORAL ONCE
Refills: 0 | Status: COMPLETED | OUTPATIENT
Start: 2023-02-17 | End: 2023-02-17

## 2023-02-17 RX ORDER — FERROUS SULFATE 325(65) MG
325 TABLET ORAL DAILY
Refills: 0 | Status: DISCONTINUED | OUTPATIENT
Start: 2023-02-17 | End: 2023-02-18

## 2023-02-17 RX ADMIN — Medication 400 MILLIGRAM(S): at 00:36

## 2023-02-17 RX ADMIN — Medication 600 MILLIGRAM(S): at 22:50

## 2023-02-17 RX ADMIN — Medication 600 MILLIGRAM(S): at 10:30

## 2023-02-17 RX ADMIN — Medication 0.6 MILLIGRAM(S): at 17:47

## 2023-02-17 RX ADMIN — Medication 0.6 MILLIGRAM(S): at 05:12

## 2023-02-17 RX ADMIN — Medication 600 MILLIGRAM(S): at 11:30

## 2023-02-17 RX ADMIN — Medication 975 MILLIGRAM(S): at 20:19

## 2023-02-17 RX ADMIN — Medication 40 MILLIGRAM(S): at 17:46

## 2023-02-17 RX ADMIN — PANTOPRAZOLE SODIUM 40 MILLIGRAM(S): 20 TABLET, DELAYED RELEASE ORAL at 05:11

## 2023-02-17 NOTE — PROGRESS NOTE ADULT - SUBJECTIVE AND OBJECTIVE BOX
INTERVAL HPI/OVERNIGHT EVENTS:    SUBJECTIVE: Patient seen and examined at bedside.     cc: chest pain  no sob, abd pain, fever  cp unchanged from yesterday, no palpitations, hahn, orthopnea  OBJECTIVE:    VITAL SIGNS:  Vital Signs Last 24 Hrs  T(C): 36.7 (17 Feb 2023 09:36), Max: 38.5 (16 Feb 2023 20:40)  T(F): 98 (17 Feb 2023 09:36), Max: 101.3 (16 Feb 2023 20:40)  HR: 109 (17 Feb 2023 09:36) (98 - 114)  BP: 128/62 (17 Feb 2023 09:36) (124/68 - 139/74)  BP(mean): --  RR: 18 (17 Feb 2023 09:36) (18 - 18)  SpO2: 98% (17 Feb 2023 09:36) (98% - 100%)    Parameters below as of 17 Feb 2023 09:36  Patient On (Oxygen Delivery Method): room air          PHYSICAL EXAM:    General: NAD  HEENT: NC/AT; PERRL, clear conjunctiva  Neck: supple  Respiratory: CTA b/l  Cardiovascular: +S1/S2; RRR  Abdomen: soft, NT/ND; +BS x4  Extremities: WWP, 2+ peripheral pulses b/l; no LE edema  Skin: normal color and turgor; no rash  Neurological:    MEDICATIONS:  MEDICATIONS  (STANDING):  acetaminophen     Tablet .. 650 milliGRAM(s) Oral once  colchicine 0.6 milliGRAM(s) Oral two times a day  ibuprofen  Tablet. 600 milliGRAM(s) Oral three times a day  pantoprazole    Tablet 40 milliGRAM(s) Oral before breakfast    MEDICATIONS  (PRN):  acetaminophen     Tablet .. 975 milliGRAM(s) Oral every 8 hours PRN Mild Pain (1 - 3)      ALLERGIES:  Allergies    No Known Allergies    Intolerances        LABS:                        8.5    7.35  )-----------( 432      ( 17 Feb 2023 06:50 )             24.0     Hemoglobin: 8.5 g/dL (02-17 @ 06:50)  Hemoglobin: 8.4 g/dL (02-16 @ 07:15)  Hemoglobin: 7.4 g/dL (02-15 @ 16:31)  Hemoglobin: 6.9 g/dL (02-15 @ 11:48)  Hemoglobin: 7.7 g/dL (02-14 @ 23:33)    CBC Full  -  ( 17 Feb 2023 06:50 )  WBC Count : 7.35 K/uL  RBC Count : 2.47 M/uL  Hemoglobin : 8.5 g/dL  Hematocrit : 24.0 %  Platelet Count - Automated : 432 K/uL  Mean Cell Volume : 97.2 fL  Mean Cell Hemoglobin : 34.4 pg  Mean Cell Hemoglobin Concentration : 35.4 g/dL  Auto Neutrophil # : 5.75 K/uL  Auto Lymphocyte # : 0.58 K/uL  Auto Monocyte # : 0.94 K/uL  Auto Eosinophil # : 0.03 K/uL  Auto Basophil # : 0.01 K/uL  Auto Neutrophil % : 78.3 %  Auto Lymphocyte % : 7.9 %  Auto Monocyte % : 12.8 %  Auto Eosinophil % : 0.4 %  Auto Basophil % : 0.1 %    02-17    138  |  104  |  8<L>  ----------------------------<  91  3.6   |  23  |  0.7    Ca    8.2<L>      17 Feb 2023 06:50  Mg     1.8     02-17    TPro  7.2  /  Alb  3.3<L>  /  TBili  0.9  /  DBili  x   /  AST  15  /  ALT  15  /  AlkPhos  56  02-17    Creatinine Trend: 0.7<--, 0.7<--, 0.6<--, 0.6<--  LIVER FUNCTIONS - ( 17 Feb 2023 06:50 )  Alb: 3.3 g/dL / Pro: 7.2 g/dL / ALK PHOS: 56 U/L / ALT: 15 U/L / AST: 15 U/L / GGT: x           PT/INR - ( 16 Feb 2023 11:30 )   PT: 12.70 sec;   INR: 1.11 ratio         PTT - ( 16 Feb 2023 11:30 )  PTT:29.7 sec    hs Troponin:              CSF:                      EKG:   MICROBIOLOGY:    Culture - Urine (collected 15 Feb 2023 08:40)  Source: Clean Catch Clean Catch (Midstream)  Final Report (17 Feb 2023 09:22):    >=3 organisms. Probable collection contamination.    Culture - Blood (collected 15 Feb 2023 01:53)  Source: .Blood Blood  Preliminary Report (16 Feb 2023 10:01):    No growth to date.    Culture - Blood (collected 15 Feb 2023 01:53)  Source: .Blood Blood  Preliminary Report (16 Feb 2023 10:01):    No growth to date.      IMAGING:      Labs, imaging, EKG personally reviewed    RADIOLOGY & ADDITIONAL TESTS: Reviewed.

## 2023-02-17 NOTE — PROGRESS NOTE ADULT - SUBJECTIVE AND OBJECTIVE BOX
CHIEF COMPLAINT:  Patient is a 22y old  Female who presents with a chief complaint of     INTERVAL HISTORY/OVERNIGHT EVENTS:  No major complaints.     ======================  MEDICATIONS:  acetaminophen     Tablet .. 650 milliGRAM(s) Oral once  colchicine 0.6 milliGRAM(s) Oral two times a day  ferrous    sulfate 325 milliGRAM(s) Oral daily  ibuprofen  Tablet. 600 milliGRAM(s) Oral three times a day  pantoprazole    Tablet 40 milliGRAM(s) Oral before breakfast  predniSONE   Tablet 40 milliGRAM(s) Oral daily        ======================  PHYSICAL EXAMINATION:  GEN:  nad.   HEENT:  eomi. ncat  PULM:  b/l lung sounds   CARD: s1, s2  ABD: +bs. ntnd  EXT:  no new rashes.    NEURO:  no new focal deficits.   ======================  OBJECTIVE:        VS:  T(F): 97.7 (02-17 @ 15:35), Max: 101.3 (02-16 @ 20:40)  HR: 106 (02-17 @ 15:35) (98 - 114)  BP: 138/72 (02-17 @ 15:35) (128/62 - 139/74)  RR: 18 (02-17 @ 15:35) (18 - 18)  SpO2: 98% (02-17 @ 15:35) (98% - 99%)  I/O:        Weight trend:  Weight (kg): 83.9 (02-14)    ======================    LABS:                          8.5    7.35  )-----------( 432      ( 17 Feb 2023 06:50 )             24.0     02-17    138  |  104  |  8<L>  ----------------------------<  91  3.6   |  23  |  0.7    Ca    8.2<L>      17 Feb 2023 06:50  Mg     1.8     02-17    TPro  7.2  /  Alb  3.3<L>  /  TBili  0.9  /  DBili  x   /  AST  15  /  ALT  15  /  AlkPhos  56  02-17    LIVER FUNCTIONS - ( 17 Feb 2023 06:50 )  Alb: 3.3 g/dL / Pro: 7.2 g/dL / ALK PHOS: 56 U/L / ALT: 15 U/L / AST: 15 U/L / GGT: x           PT/INR - ( 16 Feb 2023 11:30 )   PT: 12.70 sec;   INR: 1.11 ratio         PTT - ( 16 Feb 2023 11:30 )  PTT:29.7 sec  Troponin T, Serum: <0.01 ng/mL (02-17 @ 06:50)    CARDIAC MARKERS ( 17 Feb 2023 06:50 )  x     / <0.01 ng/mL / x     / x     / x          Serum Pro-Brain Natriuretic Peptide: 249 pg/mL (02-14)        Cultures:    Culture - Urine (collected 02-15)  Source: Clean Catch Clean Catch (Midstream)  Final Report:    >=3 organisms. Probable collection contamination.    Culture - Blood (collected 02-15)  Source: .Blood Blood  Preliminary Report:    No growth to date.    Culture - Blood (collected 02-15)  Source: .Blood Blood  Preliminary Report:    No growth to date.

## 2023-02-17 NOTE — CONSULT NOTE ADULT - SUBJECTIVE AND OBJECTIVE BOX
Gastroenterology Consultation:    Patient is a 22y old  Female who presents with a chief complaint of chest pain      Admitted on: 02-15-23      HPI:  21 y/o F w PMH of CD  presenting to ED for Chest Pain. patient states that since her  6 months ago she has been having progressively worsening chest pain, non-radiating, pressure like, worse w/ lying down, tender to palpation a/w SOB due to pain and headache. Was seen at [facility] and had CT Chest performed which showed mild to moderate pericardial effusion and findings consistent w/ reactive airway disease. Was discharged on albuterol which she has not taken and Ibuprofen w/ cardio OP f/u. Patient was diagnosed with Crohn's disease 7 years ago at [facility]. Has not been on any medication since then. Denies any hx of flares since first diagnosis. Last endoscopy was 7 years ago. Has been on Ibuprofen recently for pericarditis. Denies any signs of bleeding or abdominal pain. Hemoglobin dropped to 6.8  from 10. Denies hematochezia  or melena.     Prior EGD:  7 years ago. no documentation available   Prior Colonoscopy:  7 years ago. no documentation available     PAST MEDICAL & SURGICAL HISTORY:  No pertinent past medical history      No significant past surgical history            FAMILY HISTORY:  No pertinent family history in first degree relatives  No FH of GI cancers       Social History:  Tobacco: Denies   Alcohol: Denies   Drugs: Denies     Home Medications:        MEDICATIONS  (STANDING):  acetaminophen     Tablet .. 650 milliGRAM(s) Oral once  colchicine 0.6 milliGRAM(s) Oral two times a day  ibuprofen  Tablet. 600 milliGRAM(s) Oral three times a day  pantoprazole    Tablet 40 milliGRAM(s) Oral before breakfast    MEDICATIONS  (PRN):  acetaminophen     Tablet .. 975 milliGRAM(s) Oral every 8 hours PRN Mild Pain (1 - 3)      Allergies  No Known Allergies      Review of Systems:   Constitutional:  No Fever, No Chills  ENT/Mouth:  No Hearing Changes,  No Difficulty Swallowing  Eyes:  No Eye Pain, No Vision Changes  Cardiovascular:  No Chest Pain, No Palpitations  Respiratory:  No Cough, No Dyspnea  Gastrointestinal:  As described in HPI  Musculoskeletal:  No Joint Swelling, No Back Pain  Skin:  No Skin Lesions, No Jaundice  Neuro:  No Syncope, No Dizziness  Heme/Lymph:  No Bruising, No Bleeding.          Physical Examination:  T(C): 36.7 (23 @ 09:36), Max: 38.5 (23 @ 20:40)  HR: 109 (23 @ 09:36) (98 - 114)  BP: 128/62 (23 @ 09:36) (124/68 - 139/74)  RR: 18 (23 @ 09:36) (18 - 18)  SpO2: 98% (23 @ 09:36) (98% - 99%)          GENERAL: AAOx3, no acute distress.  HEAD:  Atraumatic, Normocephalic  EYES: conjunctiva and sclera clear  NECK: Supple, no JVD or thyromegaly  CHEST/LUNG: Clear to auscultation bilaterally  HEART: Regular rate and rhythm; normal S1, S2, No murmurs.  ABDOMEN: Soft, nontender, nondistended  NEUROLOGY: No asterixis or tremor.   SKIN: Intact, no jaundice        Data:                        8.5    7.35  )-----------( 432      ( 2023 06:50 )             24.0     Hgb Trend:  8.5  23 @ 06:50  8.4  23 @ 07:15  7.4  02-15-23 @ 16:31  6.9  02-15-23 @ 11:48  7.7  23 @ 23:33      02-17    138  |  104  |  8<L>  ----------------------------<  91  3.6   |  23  |  0.7    Ca    8.2<L>      2023 06:50  Mg     1.8         TPro  7.2  /  Alb  3.3<L>  /  TBili  0.9  /  DBili  x   /  AST  15  /  ALT  15  /  AlkPhos  56      Liver panel trend:  TBili 0.9   /   AST 15   /   ALT 15   /   AlkP 56   /   Tptn 7.2   /   Alb 3.3    /   DBili --        TBili 1.4   /   AST 19   /   ALT 18   /   AlkP 53   /   Tptn 7.1   /   Alb 3.3    /   DBili --        TBili 1.1   /   AST 14   /   ALT 13   /   AlkP 56   /   Tptn 7.7   /   Alb 3.8    /   DBili --      02-15  TBili 1.0   /   AST 18   /   ALT 12   /   AlkP 56   /   Tptn 8.2   /   Alb 4.0    /   DBili --      -14      PT/INR - ( 2023 11:30 )   PT: 12.70 sec;   INR: 1.11 ratio         PTT - ( 2023 11:30 )  PTT:29.7 sec    Culture - Urine (collected 15 Feb 2023 08:40)  Source: Clean Catch Clean Catch (Midstream)  Final Report (2023 09:22):    >=3 organisms. Probable collection contamination.    Culture - Blood (collected 15 Feb 2023 01:53)  Source: .Blood Blood  Preliminary Report (2023 10:01):    No growth to date.    Culture - Blood (collected 15 Feb 2023 01:53)  Source: .Blood Blood  Preliminary Report (2023 10:01):    No growth to date.          Radiology:

## 2023-02-17 NOTE — PROGRESS NOTE ADULT - ASSESSMENT
22F PMHx crohns, pre-eclampsia s/p  2022 here with chest pain.    #Chest pain, described as midsternal/ L sided  continuous, reproducible with palpation; began after  6 mos prior, +pre-eclampsia  with fever; presumed pericarditis  crp, esr elevated  ibuprofen 600 tid  colchicine 0.6 bid  bcx ntd; repeat; ua neg  michelle high, speckled pattern  f/u dsdna; lupus profile, anti ro, anti la  c3, c4, ch50  ekg noted   ce neg  tte with small pericardial effusion  rheum eval  #Macrocytic anemia  s/p one unit prbcs total  h/h stable, trend  no evidence bleed  ?direct jodi positive  check hapto, ldh; if positive heme eval  peripheral smear  b12, folate  iron studies c/w chronic disease  #Thrombocytosis  presumed reactive  trend  #Crohns  in remission, lost to follow up  gi eval  #DVT ppx  ambulation; improve score 0    #Progress Note Handoff:  Pending (specify):  Consults_________, Tests________, Test Results_______, Other___f/u rheum______  Family discussion: d/w pt at bedside re: treatment plan, primary dx  Disposition: Home___/SNF___/Other________/Unknown at this time____x____ . 22F PMHx crohns, pre-eclampsia s/p  2022 here with chest pain.    #Chest pain; with fever; presumed pericarditis  crp, esr elevated  ibuprofen 600 tid  colchicine 0.6 bid  bcx ntd; repeat; ua neg  michelle high, speckled pattern  f/u dsdna; lupus profile, anti ro, anti la  c3, c4, ch50  ekg noted   ce neg  tte with small pericardial effusion  rheum eval  #Macrocytic anemia  s/p one unit prbcs total  h/h stable, trend  no evidence bleed  ?direct jodi positive  check hapto, ldh; if positive heme eval  peripheral smear  b12, folate  iron studies c/w chronic disease  #Thrombocytosis  presumed reactive  trend  #Crohns  in remission, lost to follow up  gi eval  #DVT ppx  ambulation; improve score 0    #Progress Note Handoff:  Pending (specify):  Consults_________, Tests________, Test Results_______, Other___f/u rheum______  Family discussion: d/w pt at bedside re: treatment plan, primary dx  Disposition: Home___/SNF___/Other________/Unknown at this time____x____ .

## 2023-02-17 NOTE — PROGRESS NOTE ADULT - ATTENDING COMMENTS
Still awaiting echo. Called to expedite. Continue colchicine. Can use ibuprofen/indomethacin instead of aspirin. Patient says she has a history of Crohn's.
Briefly, patient most likely with pericarditis. Echocardiogram reviewed. LVEF is preserved, no RWMA, small pericardial effusion. Would treat with colchicine and ibuprofen as above. THERE IS NO INDICATIONS FOR STEROIDS IN THIS PATIENT. She does not have myocarditis. Would NOT give her steroids. She should see me in the office in two weeks.

## 2023-02-17 NOTE — CONSULT NOTE ADULT - ASSESSMENT
23 y/o F w/ no PMHx presenting to ED for Chest Pain. patient states that since her  6 months ago she has been having progressively worsening chest pain, non-radiating, pressure like, worse w/ lying down, tender to palpation a/w SOB due to pain and headache. found to have possible pericarditis. rheumatology is consulted for positive MALINI titer    Impression  #Pericarditis  -pt endorses chest pain x 6 months  - Echo: EF 60-65, Small circumferential pericardial effusion.  -C3 86, C4 18 wnl   -MALINI 1:2560 elevated. Esr 145 elevated, crp 97 elevated.  -Direct antiglobulin IgG positive  -UA 30 protien  -overall no other signs/sx of rheumatologic disease, i.e. inflammatory arthritis, leukopenia, and a history of pregnancy loss.    Recommendations  -check anti-dsDNA, anti-Smith,  anti-RNP, anti-SS-A/Ro,  anti-SS-B/La, antiphospholipid Abs   -c/w colchicine   -outpt rheum follow up 23 y/o F w/ no PMHx presenting to ED for Chest Pain. patient states that since her  6 months ago she has been having progressively worsening chest pain, non-radiating, pressure like, worse w/ lying down, tender to palpation a/w SOB due to pain and headache. found to have possible pericarditis. rheumatology is consulted for positive MALINI titer    Impression  #Pericarditis  -pt endorses chest pain x 6 months  - Echo: EF 60-65, Small circumferential pericardial effusion.  -C3 86, C4 18 wnl   -MALINI 1:2560 elevated. however pt endorse hx of crohn disease, which could make the MALINI positive  -Esr 145 elevated, crp 97 elevated.  -Direct antiglobulin IgG positive  -UA 30 protien  -overall no other signs/sx of rheumatologic disease, i.e. inflammatory arthritis, leukopenia, and a history of pregnancy loss.  #HO crohn disease     Recommendations  -check anti-dsDNA, anti-Smith,  anti-RNP, anti-SS-A/Ro,  anti-SS-B/La, antiphospholipid Abs   -c/w colchicine   -outpt rheum follow up 23 y/o F w/ no PMHx presenting to ED for Chest Pain. patient states that since her  6 months ago she has been having progressively worsening chest pain, non-radiating, pressure like, worse w/ lying down, tender to palpation a/w SOB due to pain and headache. found to have possible pericarditis. rheumatology is consulted for positive MALINI titer    Impression  #Pericarditis  -pt endorses chest pain x 6 months  - Echo: EF 60-65, Small circumferential pericardial effusion.  -C3 86, C4 18 wnl   -MALINI 1:2560 elevated. however pt endorse hx of crohn disease, which could make the MALINI positive  -Esr 145 elevated, crp 97 elevated.  -Direct antiglobulin IgG positive  -UA 30 protien  -overall no other signs/sx of rheumatologic disease, i.e. inflammatory arthritis, leukopenia, and a history of pregnancy loss.  -no recent viral infection history  #HO crohn disease     Recommendations  -check anti-dsDNA, anti-Smith,  anti-RNP, anti-SS-A/Ro,  anti-SS-B/La, antiphospholipid Abs   -anti ccp, lupus panel  -c/w aspirin, colchicine   -recommend prednisone 40mg PO for 1 week  -outpt rheum follow up

## 2023-02-17 NOTE — CONSULT NOTE ADULT - SUBJECTIVE AND OBJECTIVE BOX
Rheumatology CONSULT     Patient is a 22y old  Female who presents with a chief complaint of     HPI:  21 y/o F w/ no PMHx presenting to ED for Chest Pain. patient states that since her  6 months ago she has been having progressively worsening chest pain, non-radiating, pressure like, worse w/ lying down, tender to palpation a/w SOB due to pain and headache. Denies any fever, chills, recent travel, sick contacts, n/v/d, cough, wheezing, or nasal congestion. Was seen at [facility] and had CT Chest performed which showed mild to moderate pericardial effusion and findings consistent w/ reactive airway disease. Was discharged on albuterol which she has not taken and Ibuprofen w/ cardio OP f/u. Denies any urinary sx, abd pain, rash. Brother has asthma, unvaccinated against COVID    In ED, VS for TMax 100.3, Tachycardia to 110, BP soft. Labs sig for Hg 7.7, otherwise unremarkable, CXR showed enlarged cardiac silhouette, EKG w/ low voltage QRS and TWI. Given Colchicine and Indomethacin. Given IVF w/ improvement in heart rate. Cardio consulted, performed bedside ECHO and states that there are no signs of tamponade at this time, did note pericardial effusion. Denied admission to CCU or Cardio stepdown, ICU consulted, approved for Medical SDU.  (15 Feb 2023 04:25)       ROS:  Negative except for:    PAST MEDICAL & SURGICAL HISTORY:  No pertinent past medical history      No significant past surgical history          SOCIAL HISTORY:    FAMILY HISTORY:  No pertinent family history in first degree relatives        MEDICATIONS  (STANDING):  acetaminophen     Tablet .. 650 milliGRAM(s) Oral once  colchicine 0.6 milliGRAM(s) Oral two times a day  ibuprofen  Tablet. 600 milliGRAM(s) Oral three times a day  pantoprazole    Tablet 40 milliGRAM(s) Oral before breakfast    MEDICATIONS  (PRN):  acetaminophen     Tablet .. 975 milliGRAM(s) Oral every 8 hours PRN Mild Pain (1 - 3)      Allergies    No Known Allergies    Intolerances        Vital Signs Last 24 Hrs  T(C): 37.3 (2023 22:23), Max: 38.5 (2023 20:40)  T(F): 99.2 (2023 22:23), Max: 101.3 (2023 20:40)  HR: 114 (2023 20:40) (98 - 114)  BP: 139/74 (2023 20:40) (124/68 - 139/74)  BP(mean): --  RR: 18 (2023 17:00) (18 - 18)  SpO2: 99% (2023 17:00) (99% - 100%)    Parameters below as of 2023 17:00  Patient On (Oxygen Delivery Method): room air        PHYSICAL EXAM  General: adult in NAD  HEENT: clear oropharynx, anicteric sclera, pink conjunctiva  Neck: supple  CV: normal S1/S2 with no murmur rubs or gallops  Lungs: positive air movement b/l ant lungs,clear to auscultation, no wheezes, no rales  Abdomen: soft non-tender non-distended, no hepatosplenomegaly  Ext: no clubbing cyanosis or edema  Skin: no rashes and no petechiae  Neuro: alert and oriented X 4, no focal deficits      LABS:                          8.5    7.35  )-----------( 432      ( 2023 06:50 )             24.0         Mean Cell Volume : 97.2 fL  Mean Cell Hemoglobin : 34.4 pg  Mean Cell Hemoglobin Concentration : 35.4 g/dL  Auto Neutrophil # : 5.75 K/uL  Auto Lymphocyte # : 0.58 K/uL  Auto Monocyte # : 0.94 K/uL  Auto Eosinophil # : 0.03 K/uL  Auto Basophil # : 0.01 K/uL  Auto Neutrophil % : 78.3 %  Auto Lymphocyte % : 7.9 %  Auto Monocyte % : 12.8 %  Auto Eosinophil % : 0.4 %  Auto Basophil % : 0.1 %          138  |  104  |  8<L>  ----------------------------<  91  3.6   |  23  |  0.7    Ca    8.2<L>      2023 06:50  Mg     1.8         TPro  7.2  /  Alb  3.3<L>  /  TBili  0.9  /  DBili  x   /  AST  15  /  ALT  15  /  AlkPhos  56  02-      PT/INR - ( 2023 11:30 )   PT: 12.70 sec;   INR: 1.11 ratio         PTT - ( 2023 11:30 )  PTT:29.7 sec    Vitamin B12, Serum: 303 pg/mL (02-15 @ 11:48)  Folate, Serum: 10.0 ng/mL (02-15 @ 11:48)  Iron - Total Binding Capacity.: 215 ug/dL (02-15 @ 11:48)  Ferritin, Serum: 264 ng/mL (02-15 @ 11:48)

## 2023-02-17 NOTE — PROGRESS NOTE ADULT - ASSESSMENT
IMPRESSION  Pleuritic chest pain, ACS ruled out possible pericarditis  Moderate pericardial effusion  Anemia s/p 1 unit PRBC administration  Crohns Disease    RECOMMENDATIONS  telemonitoring  please get official TTE today  c/w colchicine 0.6 BID  can use ibuprofen 800mg q8 given hx of bleeding and drop in Hb with aspirin   consider protonix. Encouraged on PO hydration     IMPRESSION  Pleuritic chest pain, ACS ruled out possible pericarditis  Small pericardial effusion  Anemia s/p 1 unit PRBC administration  Crohns Disease    RECOMMENDATIONS  telemonitoring  c/w colchicine 0.6 BID  can use ibuprofen 800mg q8 given hx of bleeding and drop in Hb with aspirin   consider protonix. Encouraged on PO hydration

## 2023-02-17 NOTE — CONSULT NOTE ADULT - ATTENDING COMMENTS
22 year old female with a history of Crohn's disease who presented for a 6 month history of chest pain and was found to have pericarditis. Rheumatology is asked to see patient for +MALINI 1:2560. She reports giving birth 6 months ago she was found to have pre eclampsia, and developed chest pain that has been progressively worsening, pleuritic chest pain and dyspnea. She didn't take any medications for this. She reported a fever after being admitted but did not have any at home. Denies mucositis, skin rash, photosensitivity, joint pains, sicca symptoms. Initial labs showed macrocytic anemia requiring PRBC transfusion, iron deficiency, thrombocytosis, leukocytosis, lymphopenia, , CRP 97, elevated d-dimer, jodi+ normal LDH, C3 and C4 normal. TTE showed small pericardia effusion. CT scan at [facility] suggested pericardial effusion prior to admission here at [facility]. She was started on full dose aspirin, ibuprofen and colchicine. She has no typical symptoms of autoimmune disease such as fatigue, joint pains, skin rashes, mucositis, etc., but given high titer MALINI, jodi+, elevated inflammatory markers, and pericarditis her clinical presentation is concerning for SLE. Other differentials include viral, idiopathic, seronegative spondyloarthropathy related pericarditis, rheumatoid arthritis, scleroderma, ANCA associated vasculitis, myositis, sjogrens syndrome, MCTD, sarcoidosis.      Pericarditis  -Will follow up dsDNA, Sjogrens, Lupus profile, Marely-1, MARIVEL, ANCA, CCP  -Please add RF, Scleroderma antibodies  -Start prednisone 40 mg daily  -Follow up outpatient
I edited the note.
Briefly, 22 year old woman with chest pain, concerning for pericarditis. Troponins are negative and EKG does not show any acute ischemic changes. Would start treatment with high dose aspirin and colchicine. Follow up echocardiogram.

## 2023-02-17 NOTE — CONSULT NOTE ADULT - ASSESSMENT
23 y/o F w PMH of CD? presents  to ED for Chest Pain. Patient states that since her  6 months ago she has been having progressively worsening chest pain, Admitted for pericarditis. GI consulted for anemia.     # Anemia of chronic disease with element of LEONELA. Patient was diagnosed with Crohn's disease 7 years ago at [facility]. Has not been on any medication since then. Denies any hx of flares since first diagnosis. Last endoscopy was 7 years ago. Has been on Ibuprofen recently for pericarditis. Denies any signs of bleeding or abdominal pain. Hemoglobin dropped to 6.8  from 10. No signs of CD flare. Elevated ESR and CRP is not specific and can be related to Pericarditis.     PLAN:   Regular diet.   Trend CBC   Transfuse to keep hemoglobin > 7   Iron supplement   Treatment of pericarditis per primary team   Check fecal calprotectin   PPI QD   Patient needs to follow up with GI as outpatient for possible endoscopic workup snd repeat biopsies and inflammatory markers   Call as needed  23 y/o F w PMH of CD? presents  to ED for Chest Pain. Patient states that since her  6 months ago she has been having progressively worsening chest pain, Admitted for pericarditis. GI consulted for anemia.     # Anemia of chronic disease with element of LEONELA. Patient was diagnosed with Crohn's disease 7 years ago at [facility]. Has not been on any medication since then. Denies any hx of flares since first diagnosis. Last endoscopy was 7 years ago. Has been on Ibuprofen recently for pericarditis. Denies any signs of bleeding or abdominal pain. Hemoglobin dropped to 6.8  from 10. No signs of CD flare. Elevated ESR and CRP is not specific and can be related to Pericarditis.     PLAN:   Regular diet.   Trend CBC   Transfuse to keep hemoglobin > 7   IV Iron   Treatment of pericarditis per primary team   Check fecal calprotectin   PPI QD   Patient needs to follow up with GI as outpatient for possible endoscopic workup snd repeat biopsies and inflammatory markers   Call as needed  21 y/o F w PMH of CD? presents  to ED for Chest Pain. Patient states that since her  6 months ago she has been having progressively worsening chest pain, Admitted for pericarditis. GI consulted for anemia.     # Anemia of chronic disease with element of LEONELA. Patient was diagnosed with Crohn's disease 7 years ago at [facility]. Has not been on any medication since then. Denies any hx of flares since first diagnosis. Last endoscopy was 7 years ago. Has been on Ibuprofen recently for pericarditis. Denies any signs of bleeding or abdominal pain. Hemoglobin dropped to 6.8  from 10. No signs of CD flare. Elevated ESR and CRP is not specific and can be related to Pericarditis.     PLAN:   Regular diet.   Trend CBC   Transfuse to keep hemoglobin > 7   IV Iron   Treatment of pericarditis per primary team   Check fecal calprotectin   PPI QD   Patient needs to follow up with GI as outpatient for possible endoscopic workup and repeat biopsies and inflammatory markers   Call as needed  23 y/o F w PMH of CD? presents  to ED for Chest Pain. Patient states that since her  6 months ago she has been having progressively worsening chest pain, Admitted for pericarditis. GI consulted for anemia.     # Anemia of chronic disease with element of LEONELA. Patient was diagnosed with Crohn's disease 7 years ago at [facility]. Has not been on any medication since then. Denies any hx of flares since first diagnosis. Last endoscopy was 7 years ago. Has been on Ibuprofen recently for pericarditis. Denies any signs of bleeding or abdominal pain. Hemoglobin dropped to 6.8  from 10. No signs of CD flare. Elevated ESR and CRP is not specific and can be related to Pericarditis.     PLAN:   Regular diet.   Trend CBC   Transfuse to keep hemoglobin > 7   IV Iron   Treatment of pericarditis per primary team   Check fecal calprotectin   PPI QD   Patient needs to follow up with GI as outpatient for possible endoscopic workup and repeat biopsies and inflammatory markers   Call as needed       - Follow up with our [facility] located at [address] [place] [address]. Phone Number: [phone]

## 2023-02-18 ENCOUNTER — TRANSCRIPTION ENCOUNTER (OUTPATIENT)
Age: 23
End: 2023-02-18

## 2023-02-18 VITALS
TEMPERATURE: 97 F | DIASTOLIC BLOOD PRESSURE: 62 MMHG | OXYGEN SATURATION: 98 % | RESPIRATION RATE: 18 BRPM | SYSTOLIC BLOOD PRESSURE: 128 MMHG | HEART RATE: 87 BPM

## 2023-02-18 LAB
ALBUMIN SERPL ELPH-MCNC: 3.5 G/DL — SIGNIFICANT CHANGE UP (ref 3.5–5.2)
ALP SERPL-CCNC: 61 U/L — SIGNIFICANT CHANGE UP (ref 30–115)
ALT FLD-CCNC: 15 U/L — SIGNIFICANT CHANGE UP (ref 0–41)
ANION GAP SERPL CALC-SCNC: 12 MMOL/L — SIGNIFICANT CHANGE UP (ref 7–14)
AST SERPL-CCNC: 13 U/L — SIGNIFICANT CHANGE UP (ref 0–41)
BASOPHILS # BLD AUTO: 0 K/UL — SIGNIFICANT CHANGE UP (ref 0–0.2)
BASOPHILS NFR BLD AUTO: 0 % — SIGNIFICANT CHANGE UP (ref 0–1)
BILIRUB SERPL-MCNC: 0.7 MG/DL — SIGNIFICANT CHANGE UP (ref 0.2–1.2)
BLD GP AB SCN SERPL QL: SIGNIFICANT CHANGE UP
BUN SERPL-MCNC: 10 MG/DL — SIGNIFICANT CHANGE UP (ref 10–20)
CALCIUM SERPL-MCNC: 8.7 MG/DL — SIGNIFICANT CHANGE UP (ref 8.4–10.5)
CHLORIDE SERPL-SCNC: 104 MMOL/L — SIGNIFICANT CHANGE UP (ref 98–110)
CO2 SERPL-SCNC: 23 MMOL/L — SIGNIFICANT CHANGE UP (ref 17–32)
CREAT SERPL-MCNC: 0.6 MG/DL — LOW (ref 0.7–1.5)
EGFR: 129 ML/MIN/1.73M2 — SIGNIFICANT CHANGE UP
EOSINOPHIL # BLD AUTO: 0 K/UL — SIGNIFICANT CHANGE UP (ref 0–0.7)
EOSINOPHIL NFR BLD AUTO: 0 % — SIGNIFICANT CHANGE UP (ref 0–8)
GLUCOSE SERPL-MCNC: 151 MG/DL — HIGH (ref 70–99)
HCT VFR BLD CALC: 25.4 % — LOW (ref 37–47)
HGB BLD-MCNC: 9 G/DL — LOW (ref 12–16)
IMM GRANULOCYTES NFR BLD AUTO: 0.5 % — HIGH (ref 0.1–0.3)
LYMPHOCYTES # BLD AUTO: 0.44 K/UL — LOW (ref 1.2–3.4)
LYMPHOCYTES # BLD AUTO: 7.7 % — LOW (ref 20.5–51.1)
MAGNESIUM SERPL-MCNC: 1.9 MG/DL — SIGNIFICANT CHANGE UP (ref 1.8–2.4)
MCHC RBC-ENTMCNC: 33.8 PG — HIGH (ref 27–31)
MCHC RBC-ENTMCNC: 35.4 G/DL — SIGNIFICANT CHANGE UP (ref 32–37)
MCV RBC AUTO: 95.5 FL — SIGNIFICANT CHANGE UP (ref 81–99)
MONOCYTES # BLD AUTO: 0.5 K/UL — SIGNIFICANT CHANGE UP (ref 0.1–0.6)
MONOCYTES NFR BLD AUTO: 8.7 % — SIGNIFICANT CHANGE UP (ref 1.7–9.3)
NEUTROPHILS # BLD AUTO: 4.77 K/UL — SIGNIFICANT CHANGE UP (ref 1.4–6.5)
NEUTROPHILS NFR BLD AUTO: 83.1 % — HIGH (ref 42.2–75.2)
NRBC # BLD: 0 /100 WBCS — SIGNIFICANT CHANGE UP (ref 0–0)
PLATELET # BLD AUTO: 462 K/UL — HIGH (ref 130–400)
POTASSIUM SERPL-MCNC: 4.2 MMOL/L — SIGNIFICANT CHANGE UP (ref 3.5–5)
POTASSIUM SERPL-SCNC: 4.2 MMOL/L — SIGNIFICANT CHANGE UP (ref 3.5–5)
PROT SERPL-MCNC: 7.5 G/DL — SIGNIFICANT CHANGE UP (ref 6–8)
RBC # BLD: 2.66 M/UL — LOW (ref 4.2–5.4)
RBC # FLD: 14.2 % — SIGNIFICANT CHANGE UP (ref 11.5–14.5)
SODIUM SERPL-SCNC: 139 MMOL/L — SIGNIFICANT CHANGE UP (ref 135–146)
WBC # BLD: 5.74 K/UL — SIGNIFICANT CHANGE UP (ref 4.8–10.8)
WBC # FLD AUTO: 5.74 K/UL — SIGNIFICANT CHANGE UP (ref 4.8–10.8)

## 2023-02-18 PROCEDURE — 99239 HOSP IP/OBS DSCHRG MGMT >30: CPT

## 2023-02-18 RX ORDER — IBUPROFEN 200 MG
1 TABLET ORAL
Qty: 42 | Refills: 0
Start: 2023-02-18 | End: 2023-03-03

## 2023-02-18 RX ORDER — PANTOPRAZOLE SODIUM 20 MG/1
1 TABLET, DELAYED RELEASE ORAL
Qty: 14 | Refills: 0
Start: 2023-02-18 | End: 2023-03-03

## 2023-02-18 RX ORDER — COLCHICINE 0.6 MG
1 TABLET ORAL
Qty: 60 | Refills: 0
Start: 2023-02-18 | End: 2023-03-19

## 2023-02-18 RX ORDER — PREGABALIN 225 MG/1
1 CAPSULE ORAL
Qty: 14 | Refills: 0
Start: 2023-02-18 | End: 2023-03-03

## 2023-02-18 RX ADMIN — Medication 0.6 MILLIGRAM(S): at 05:49

## 2023-02-18 RX ADMIN — Medication 600 MILLIGRAM(S): at 05:49

## 2023-02-18 RX ADMIN — Medication 40 MILLIGRAM(S): at 05:49

## 2023-02-18 NOTE — PROGRESS NOTE ADULT - ASSESSMENT
22F PMHx crohns, pre-eclampsia s/p  2022 here with chest pain.    #Chest pain; with fever; presumed pericarditis  crp, esr elevated  ibuprofen 600 tid  colchicine 0.6 bid  bcx ntd  michelle high, speckled pattern  f/u dsdna; lupus profile, anti ro, anti la  c3, c4, ch50  ekg noted   ce neg  tte with small pericardial effusion  prednisone 40  chest pain resolved; stable for d/c, needs outpt pmd, cards, rheum f/u   #Macrocytic anemia  s/p one unit prbcs total  h/h stable, trend  no evidence bleed  ?direct jodi positive  hapto neg  peripheral smear  b12 low, start b12 supplementation  iron studies c/w chronic disease  #Thrombocytosis  presumed reactive  trend  #Crohns  in remission, lost to follow up  gi eval  #DVT ppx  ambulation; improve score 0

## 2023-02-18 NOTE — DISCHARGE NOTE PROVIDER - PROVIDER TOKENS
PROVIDER:[TOKEN:[[id number]]],PROVIDER:[TOKEN:[[id number]]],PROVIDER:[TOKEN:[[id number]]],PROVIDER:[TOKEN:[[id number]]]

## 2023-02-18 NOTE — DISCHARGE NOTE PROVIDER - CARE PROVIDERS DIRECT ADDRESSES
,DirectAddress_Unknown,DirectAddress_Unknown,[email or web address]

## 2023-02-18 NOTE — DISCHARGE NOTE PROVIDER - NSDCCPCAREPLAN_GEN_ALL_CORE_FT
PRINCIPAL DISCHARGE DIAGNOSIS  Diagnosis: Pericarditis  Assessment and Plan of Treatment: Pericarditis occurs when the membrane that surrounds the heart and its major blood vessels becomes inflamed. In most cases, the cause of pericarditis is not known. It can be caused by a virus, a heart attack, chest injury, or another illness. Pericarditis causes sharp chest pain, which gets worse when you lie down or take a deep breath. The pain gets better if you lean forward or sit up.   Pericarditis often heals on its own and usually does not cause any further problems. Most people recover within a couple of weeks. You were treated in the hospital with ibuprofen and colchicine. You were seen by Cardiology as well as Rheumatology, in order to see what the root cause of the pericarditis was. We collected blood work as part of the workup and they are in process. Follow up with your Cardiologist and Rheumatology as an outpatient for further care.  Follow-up care is a key part of your treatment and safety. Be sure to make and go to all appointments, and call your doctor if you develop new or worsening symptoms.      SECONDARY DISCHARGE DIAGNOSES  Diagnosis: Crohn disease  Assessment and Plan of Treatment: Crohn's disease is a lifelong inflammatory bowel disease. Parts of the digestive tract get swollen and irritated and may develop sores called ulcers. It usually occurs in the last part of the small intestine and the first part of the large intestine.  The main symptoms of Crohn's disease are belly pain, diarrhea, fever, and weight loss. It sometimes causes problems with joints, eyes, or skin. Symptoms may be mild or severe. The disease can also go into remission (not be active). Bad attacks are often treated in the hospital with medicines and liquids through a tube in your vein (IV).  Talk with your doctor about the best treatments for you. You may need medicines that help prevent or treat flare-ups. You may need surgery to remove part of your bowel if you have an abnormal opening in the bowel (fistula), an abscess, or an obstruction.  Follow-up care is a key part of your treatment and safety. Be sure to make and go to all appointments, and call your doctor or nurse if you are having problems. Take your medicines exactly as prescribed.   Do not take anti-inflammatory medicines, such as aspirin, ibuprofen (Advil, Motrin), or naproxen (Aleve). They may make your symptoms worse. Avoid foods that make your symptoms worse. These might include milk, alcohol, high-fibre foods, or spicy foods. Eat a healthy diet. Make sure to get enough iron. Rectal bleeding may make you lose iron. Good sources of iron include beef, lentils, spinach, raisins, and iron-enriched breads and cereals. Do not smoke. Smoking makes Crohn's disease worse.

## 2023-02-18 NOTE — DISCHARGE NOTE PROVIDER - HOSPITAL COURSE
21 y/o F w/ no PMHx presenting to ED for Chest Pain. patient states that since her  6 months ago she has been having progressively worsening chest pain, non-radiating, pressure like, worse w/ lying down, tender to palpation a/w SOB due to pain and headache. Denies any fever, chills, recent travel, sick contacts, n/v/d, cough, wheezing, or nasal congestion. Was seen at [facility] and had CT Chest performed which showed mild to moderate pericardial effusion and findings consistent w/ reactive airway disease. Was discharged on albuterol which she has not taken and Ibuprofen w/ cardio OP f/u. Denies any urinary sx, abd pain, rash. Brother has asthma, unvaccinated against COVID    In ED, VS for TMax 100.3, Tachycardia to 110, BP soft. Labs sig for Hg 7.7, otherwise unremarkable, CXR showed enlarged cardiac silhouette, EKG w/ low voltage QRS and TWI. Given Colchicine and Indomethacin. Given IVF w/ improvement in heart rate. Cardio consulted, performed bedside ECHO and states that there are no signs of tamponade at this time, did note pericardial effusion. Denied admission to CCU or Cardio stepdown, ICU consulted, approved for Medical SDU.     Pleuritic chest pain, with fever, ruled out ACS, most likely pericarditis. ESR, CRP elevated. CE negative. Cardio consulted: LVEF is preserved, no RWMA, small pericardial effusion. Tx with colchicine and ibuprofen. No steroids recommended from cardio standpoint. F/u with Cardio in 2 weeks.   Infectious w/u: blood cultures NGTD. UA negative.  Rheum consulted: MALINI high, speckled pattern. f/u dsDNA, lupus profile, antiphospholipid antibodies, Sjogren's antibodies, RF, scleroderma antibodies, C3, C4, CH50. Rheum recommend prednisone 40mg qd. F/u Rheum OP.  Macrocytic anemia, s/p 1u RBC total. Stable Hgb, no e/o bleed. ?Direct Ryan positive. Haptoglobin, LDH, coags normal. Folate normal, B12 low/normal. Iron studies consistent with anemia of chronic disease.  F/u [facility] for Crohn's disease (in remission).

## 2023-02-18 NOTE — PROGRESS NOTE ADULT - SUBJECTIVE AND OBJECTIVE BOX
INTERVAL HPI/OVERNIGHT EVENTS:    SUBJECTIVE: Patient seen and examined at bedside.     cc: cp  no cp, sob, abd pain, fever  no cp, palpitations, hahn, orthopnea  OBJECTIVE:    VITAL SIGNS:  Vital Signs Last 24 Hrs  T(C): 35.8 (18 Feb 2023 07:33), Max: 36.5 (17 Feb 2023 15:35)  T(F): 96.4 (18 Feb 2023 07:33), Max: 97.7 (17 Feb 2023 15:35)  HR: 88 (18 Feb 2023 07:33) (88 - 106)  BP: 131/60 (18 Feb 2023 07:33) (131/60 - 138/72)  BP(mean): 67 (18 Feb 2023 07:33) (67 - 67)  RR: 18 (18 Feb 2023 07:33) (18 - 18)  SpO2: 99% (18 Feb 2023 07:33) (98% - 99%)    Parameters below as of 18 Feb 2023 07:33  Patient On (Oxygen Delivery Method): room air          PHYSICAL EXAM:    General: NAD  HEENT: NC/AT; PERRL, clear conjunctiva  Neck: supple  Respiratory: CTA b/l  Cardiovascular: +S1/S2; RRR  Abdomen: soft, NT/ND; +BS x4  Extremities: WWP, 2+ peripheral pulses b/l; no LE edema  Skin: normal color and turgor; no rash  Neurological:    MEDICATIONS:  MEDICATIONS  (STANDING):  acetaminophen     Tablet .. 650 milliGRAM(s) Oral once  colchicine 0.6 milliGRAM(s) Oral two times a day  ferrous    sulfate 325 milliGRAM(s) Oral daily  ibuprofen  Tablet. 600 milliGRAM(s) Oral three times a day  pantoprazole    Tablet 40 milliGRAM(s) Oral before breakfast  predniSONE   Tablet 40 milliGRAM(s) Oral daily    MEDICATIONS  (PRN):  acetaminophen     Tablet .. 975 milliGRAM(s) Oral every 8 hours PRN Mild Pain (1 - 3)      ALLERGIES:  Allergies    No Known Allergies    Intolerances        LABS:                        9.0    5.74  )-----------( 462      ( 18 Feb 2023 06:44 )             25.4     Hemoglobin: 9.0 g/dL (02-18 @ 06:44)  Hemoglobin: 8.5 g/dL (02-17 @ 06:50)  Hemoglobin: 8.4 g/dL (02-16 @ 07:15)  Hemoglobin: 7.4 g/dL (02-15 @ 16:31)  Hemoglobin: 6.9 g/dL (02-15 @ 11:48)    CBC Full  -  ( 18 Feb 2023 06:44 )  WBC Count : 5.74 K/uL  RBC Count : 2.66 M/uL  Hemoglobin : 9.0 g/dL  Hematocrit : 25.4 %  Platelet Count - Automated : 462 K/uL  Mean Cell Volume : 95.5 fL  Mean Cell Hemoglobin : 33.8 pg  Mean Cell Hemoglobin Concentration : 35.4 g/dL  Auto Neutrophil # : 4.77 K/uL  Auto Lymphocyte # : 0.44 K/uL  Auto Monocyte # : 0.50 K/uL  Auto Eosinophil # : 0.00 K/uL  Auto Basophil # : 0.00 K/uL  Auto Neutrophil % : 83.1 %  Auto Lymphocyte % : 7.7 %  Auto Monocyte % : 8.7 %  Auto Eosinophil % : 0.0 %  Auto Basophil % : 0.0 %    02-18    139  |  104  |  10  ----------------------------<  151<H>  4.2   |  23  |  0.6<L>    Ca    8.7      18 Feb 2023 06:44  Mg     1.9     02-18    TPro  7.5  /  Alb  3.5  /  TBili  0.7  /  DBili  x   /  AST  13  /  ALT  15  /  AlkPhos  61  02-18    Creatinine Trend: 0.6<--, 0.7<--, 0.7<--, 0.6<--, 0.6<--  LIVER FUNCTIONS - ( 18 Feb 2023 06:44 )  Alb: 3.5 g/dL / Pro: 7.5 g/dL / ALK PHOS: 61 U/L / ALT: 15 U/L / AST: 13 U/L / GGT: x           PT/INR - ( 16 Feb 2023 11:30 )   PT: 12.70 sec;   INR: 1.11 ratio         PTT - ( 16 Feb 2023 11:30 )  PTT:29.7 sec    hs Troponin:              CSF:                      EKG:   MICROBIOLOGY:    IMAGING:      Labs, imaging, EKG personally reviewed    RADIOLOGY & ADDITIONAL TESTS: Reviewed.

## 2023-02-18 NOTE — DISCHARGE NOTE PROVIDER - NSDCMRMEDTOKEN_GEN_ALL_CORE_FT
B-12 1000 mcg oral tablet: 1 tab(s) orally once a day   colchicine 0.6 mg oral tablet: 1 tab(s) orally 2 times a day  ibuprofen 600 mg oral tablet: 1 tab(s) orally 3 times a day  pantoprazole 40 mg oral delayed release tablet: 1 tab(s) orally once a day (before a meal)  predniSONE 10 mg oral tablet: 4 tab(s) orally once a day for 14 days then  3 tabs once a day for 14 days

## 2023-02-18 NOTE — DISCHARGE NOTE NURSING/CASE MANAGEMENT/SOCIAL WORK - PATIENT PORTAL LINK FT
You can access the FollowMyHealth Patient Portal offered by [facility] by registering at the following website: [email or web address]. By joining [employer]’s FollowMyHealth portal, you will also be able to view your health information using other applications (apps) compatible with our system.

## 2023-02-18 NOTE — DISCHARGE NOTE PROVIDER - CARE PROVIDER_API CALL
Behuria, Supreeti (MD)  Cardiology; Internal Medicine; Nuclear Cardiology  [address] [place] [address]  Phone: [phone]  Fax: [phone]  Follow Up Time:     Carmina Kirby (DO)  Internal Medicine; Rheumatology  [address] [place] [address]  Phone: [phone]  Fax: [phone]  Follow Up Time:     August Gee)  Gastroenterology; Internal Medicine  [address] [place] [address]  Phone: [phone]  Fax: [phone]  Follow Up Time:     Swathi Velasco)  Internal Medicine  [address] [place] [address], [address] [place] [address]  Phone: [phone]  Fax: [phone]  Follow Up Time:

## 2023-02-19 LAB
ANTI-RIBONUCLEAR PROTEIN: >8 AI — HIGH
DSDNA AB FLD-ACNC: <0.2 AI — SIGNIFICANT CHANGE UP
ENA JO1 AB SER-ACNC: <0.2 AI — SIGNIFICANT CHANGE UP
ENA SM AB FLD QL: >8 AI — HIGH
ENA SS-A AB FLD IA-ACNC: 0.3 AI — SIGNIFICANT CHANGE UP

## 2023-02-20 LAB
C3 SERPL-MCNC: 98 MG/DL — SIGNIFICANT CHANGE UP (ref 81–157)
C4 SERPL-MCNC: 18 MG/DL — SIGNIFICANT CHANGE UP (ref 13–39)
CCP IGG SERPL-ACNC: <8 UNITS — SIGNIFICANT CHANGE UP
CULTURE RESULTS: SIGNIFICANT CHANGE UP
CULTURE RESULTS: SIGNIFICANT CHANGE UP
RF+CCP IGG SER-IMP: NEGATIVE — SIGNIFICANT CHANGE UP
SPECIMEN SOURCE: SIGNIFICANT CHANGE UP
SPECIMEN SOURCE: SIGNIFICANT CHANGE UP

## 2023-02-21 LAB
DSDNA AB SER QL CLIF: NEGATIVE — SIGNIFICANT CHANGE UP
TOTAL HEM COMP BLD-ACNC: 42 U/ML — SIGNIFICANT CHANGE UP (ref 42–95)

## 2023-02-22 LAB
AUTO DIFF PNL BLD: ABNORMAL
C-ANCA SER-ACNC: NEGATIVE — SIGNIFICANT CHANGE UP
CALPROTECTIN STL-MCNT: 33 UG/G — SIGNIFICANT CHANGE UP (ref 0–120)
CULTURE RESULTS: SIGNIFICANT CHANGE UP
DRVVT RATIO: 0.94 RATIO — SIGNIFICANT CHANGE UP (ref 0–1.21)
DRVVT SCREEN TO CONFIRM RATIO: SIGNIFICANT CHANGE UP
NORMALIZED SCT PPP-RTO: 1.15 RATIO — SIGNIFICANT CHANGE UP (ref 0–1.16)
NORMALIZED SCT PPP-RTO: SIGNIFICANT CHANGE UP
P-ANCA SER-ACNC: NEGATIVE — SIGNIFICANT CHANGE UP
SPECIMEN SOURCE: SIGNIFICANT CHANGE UP

## 2023-02-23 DIAGNOSIS — D63.8 ANEMIA IN OTHER CHRONIC DISEASES CLASSIFIED ELSEWHERE: ICD-10-CM

## 2023-02-23 DIAGNOSIS — Z20.822 CONTACT WITH AND (SUSPECTED) EXPOSURE TO COVID-19: ICD-10-CM

## 2023-02-23 DIAGNOSIS — J45.20 MILD INTERMITTENT ASTHMA, UNCOMPLICATED: ICD-10-CM

## 2023-02-23 DIAGNOSIS — K50.90 CROHN'S DISEASE, UNSPECIFIED, WITHOUT COMPLICATIONS: ICD-10-CM

## 2023-02-23 DIAGNOSIS — D75.838 OTHER THROMBOCYTOSIS: ICD-10-CM

## 2023-02-23 DIAGNOSIS — I31.9 DISEASE OF PERICARDIUM, UNSPECIFIED: ICD-10-CM

## 2023-02-23 DIAGNOSIS — D50.9 IRON DEFICIENCY ANEMIA, UNSPECIFIED: ICD-10-CM

## 2023-02-23 DIAGNOSIS — Z28.310 UNVACCINATED FOR COVID-19: ICD-10-CM

## 2023-02-28 ENCOUNTER — APPOINTMENT (OUTPATIENT)
Dept: RHEUMATOLOGY | Facility: CLINIC | Age: 23
End: 2023-02-28
Payer: COMMERCIAL

## 2023-02-28 VITALS
HEART RATE: 72 BPM | OXYGEN SATURATION: 98 % | SYSTOLIC BLOOD PRESSURE: 112 MMHG | DIASTOLIC BLOOD PRESSURE: 70 MMHG | BODY MASS INDEX: 35.44 KG/M2 | HEIGHT: 63 IN | WEIGHT: 200 LBS

## 2023-02-28 DIAGNOSIS — Z78.9 OTHER SPECIFIED HEALTH STATUS: ICD-10-CM

## 2023-02-28 DIAGNOSIS — Z87.19 PERSONAL HISTORY OF OTHER DISEASES OF THE DIGESTIVE SYSTEM: ICD-10-CM

## 2023-02-28 PROCEDURE — 99204 OFFICE O/P NEW MOD 45 MIN: CPT

## 2023-02-28 NOTE — HISTORY OF PRESENT ILLNESS
[FreeTextEntry1] : Patient is here for hospital follow up for pericarditis and she was found to have SLE based on +MALINI, RNP, Posada, positive jodi. DsDNA is negative. She took prednisone 40 mg and felt better no chest pains, it caused dizziness so she decreased prednisone to 20 mg, still has dizziness and headaches when she takes her prednisone at night. She is also on colchicine 0.6 mg daily and she is off ibuprofen. The last two days she is experiencing abdominal pains similar to her Crohn's stomach pain and light blood streaked stools for one bowel movement a day, she had a total of 3 BM's each of the last two day. She has stopped prednisone. No diarrhea.  Denies fatigue fevers, weight loss, night sweats, rash, photosensitivity, raynaud's, oral ulcers, nasal ulcers, hair loss, sinus problems, nosebleeds, visual disturbances, dry eyes, dry mouth, history of VTE, history of miscarriage.  She has a history Crohn's managed with tumeric, last time she saw Gi was 7 years ago as her abdominal symptoms have been quiescent.

## 2023-02-28 NOTE — ASSESSMENT
[FreeTextEntry1] : Pericarditis\par SLE positive MALINI, Posada, RNP\par History of Crohn's disease\par -Patient with pericarditis and without any other signs or symptoms of Lupus at this time. She has blood in her stools and abdominal pain. My concern is that she may be developing a stomach ulcer from prednisone. Crohns is also on the differential but steroids should help this. \par -She is refusing medications for SLE at this time as she doesn't believe she has this\par -Can stay off prednisone as chest pains is resolved and there is concern for GI bleed\par -ED precautions given for GI bleed \par -GI follow up \par F/u 1 month

## 2023-03-15 ENCOUNTER — APPOINTMENT (OUTPATIENT)
Dept: CARDIOLOGY | Facility: CLINIC | Age: 23
End: 2023-03-15
Payer: COMMERCIAL

## 2023-03-15 VITALS
WEIGHT: 205 LBS | TEMPERATURE: 98.3 F | RESPIRATION RATE: 20 BRPM | OXYGEN SATURATION: 100 % | HEIGHT: 63 IN | SYSTOLIC BLOOD PRESSURE: 117 MMHG | BODY MASS INDEX: 36.32 KG/M2 | HEART RATE: 89 BPM | DIASTOLIC BLOOD PRESSURE: 66 MMHG

## 2023-03-15 DIAGNOSIS — Z00.00 ENCOUNTER FOR GENERAL ADULT MEDICAL EXAMINATION W/OUT ABNORMAL FINDINGS: ICD-10-CM

## 2023-03-15 PROCEDURE — 99214 OFFICE O/P EST MOD 30 MIN: CPT | Mod: 25

## 2023-03-15 PROCEDURE — 93000 ELECTROCARDIOGRAM COMPLETE: CPT

## 2023-03-15 RX ORDER — PREDNISONE 10 MG/1
10 TABLET ORAL
Refills: 0 | Status: DISCONTINUED | COMMUNITY
End: 2023-03-15

## 2023-03-15 NOTE — HISTORY OF PRESENT ILLNESS
[FreeTextEntry1] : JASMINE BRAVO is a 22 year old woman with Crohn's disease presents to [facility] care after hospital stay 2/15/23 to 2/18/23. \par \par She presented with chest pain in the setting of a viral infection. She was ruled out for ACS, however echocardiogram showed a small pericardial effusion and inflammatory markers were high. She was diagnosed with pericarditis and started on colchicine and ibuprofen. She was discharged to follow up. \par \par Today, the patient denies chest pain, shortness of breath, palpitations and syncope. No leg swelling, orthopnea and PND. When she got home from the hospital, she saw the Rheumatologist and she was having some bloody stools so it was decided to stop prednisone. However, once she stopped prednisone, her chest pain came back so she restarted it but at a lower dose of 10mg daily.

## 2023-03-15 NOTE — ASSESSMENT
[FreeTextEntry1] : 22 year old woman with Crohn's disease presents to establish care after hospital stay 2/15/23 to 2/18/23. \par \par 1. Pericarditis: with small pericardial effusion. She is chest pain free. \par - Continue colchicine 0.6 mg BID for three months\par - In one month, she will call me and we will try to taper off prednisone to 5mg daily\par - Echocardiogram in 3 months to evaluate her pericardial effusion\par - Advised GI, Rheum follow up\par \par RTC in 3 months.

## 2023-03-15 NOTE — CARDIOLOGY SUMMARY
[de-identified] : 3/15/23: nsr, nonspecific ST and T wave changes [de-identified] : 2/16/23:  1. Left ventricular ejection fraction, by visual estimation, is 60 to 65%.\par  2. Normal left ventricular size and wall thicknesses, with normal systolic and diastolic function.\par  3. Mild tricuspid regurgitation.\par  4. Small circumferential pericardial effusion.

## 2023-03-31 LAB — HUMAN ERYTHROCYTE ANTIGEN PANEL RESULT: SIGNIFICANT CHANGE UP

## 2023-05-16 ENCOUNTER — EMERGENCY (EMERGENCY)
Facility: HOSPITAL | Age: 23
LOS: 0 days | Discharge: HOME | End: 2023-05-16
Attending: EMERGENCY MEDICINE
Payer: COMMERCIAL

## 2023-05-16 VITALS
DIASTOLIC BLOOD PRESSURE: 65 MMHG | RESPIRATION RATE: 18 BRPM | WEIGHT: 184.97 LBS | OXYGEN SATURATION: 98 % | HEART RATE: 107 BPM | TEMPERATURE: 100 F | SYSTOLIC BLOOD PRESSURE: 124 MMHG

## 2023-05-16 VITALS
TEMPERATURE: 99 F | SYSTOLIC BLOOD PRESSURE: 130 MMHG | HEART RATE: 82 BPM | DIASTOLIC BLOOD PRESSURE: 63 MMHG | RESPIRATION RATE: 18 BRPM | OXYGEN SATURATION: 98 %

## 2023-05-16 DIAGNOSIS — R07.2 PRECORDIAL PAIN: ICD-10-CM

## 2023-05-16 DIAGNOSIS — R70.0 ELEVATED ERYTHROCYTE SEDIMENTATION RATE: ICD-10-CM

## 2023-05-16 DIAGNOSIS — R06.02 SHORTNESS OF BREATH: ICD-10-CM

## 2023-05-16 DIAGNOSIS — K51.90 ULCERATIVE COLITIS, UNSPECIFIED, WITHOUT COMPLICATIONS: ICD-10-CM

## 2023-05-16 DIAGNOSIS — R79.82 ELEVATED C-REACTIVE PROTEIN (CRP): ICD-10-CM

## 2023-05-16 DIAGNOSIS — Z20.822 CONTACT WITH AND (SUSPECTED) EXPOSURE TO COVID-19: ICD-10-CM

## 2023-05-16 DIAGNOSIS — B34.9 VIRAL INFECTION, UNSPECIFIED: ICD-10-CM

## 2023-05-16 DIAGNOSIS — J02.9 ACUTE PHARYNGITIS, UNSPECIFIED: ICD-10-CM

## 2023-05-16 DIAGNOSIS — Z86.79 PERSONAL HISTORY OF OTHER DISEASES OF THE CIRCULATORY SYSTEM: ICD-10-CM

## 2023-05-16 DIAGNOSIS — R50.9 FEVER, UNSPECIFIED: ICD-10-CM

## 2023-05-16 DIAGNOSIS — R51.9 HEADACHE, UNSPECIFIED: ICD-10-CM

## 2023-05-16 LAB
ALBUMIN SERPL ELPH-MCNC: 4.3 G/DL — SIGNIFICANT CHANGE UP (ref 3.5–5.2)
ALP SERPL-CCNC: 57 U/L — SIGNIFICANT CHANGE UP (ref 30–115)
ALT FLD-CCNC: 14 U/L — SIGNIFICANT CHANGE UP (ref 0–41)
ANION GAP SERPL CALC-SCNC: 11 MMOL/L — SIGNIFICANT CHANGE UP (ref 7–14)
APPEARANCE UR: CLEAR — SIGNIFICANT CHANGE UP
AST SERPL-CCNC: 18 U/L — SIGNIFICANT CHANGE UP (ref 0–41)
BACTERIA # UR AUTO: ABNORMAL
BASOPHILS # BLD AUTO: 0.01 K/UL — SIGNIFICANT CHANGE UP (ref 0–0.2)
BASOPHILS NFR BLD AUTO: 0.1 % — SIGNIFICANT CHANGE UP (ref 0–1)
BILIRUB SERPL-MCNC: 0.9 MG/DL — SIGNIFICANT CHANGE UP (ref 0.2–1.2)
BILIRUB UR-MCNC: NEGATIVE — SIGNIFICANT CHANGE UP
BUN SERPL-MCNC: 8 MG/DL — LOW (ref 10–20)
CALCIUM SERPL-MCNC: 9.1 MG/DL — SIGNIFICANT CHANGE UP (ref 8.4–10.5)
CHLORIDE SERPL-SCNC: 94 MMOL/L — LOW (ref 98–110)
CO2 SERPL-SCNC: 26 MMOL/L — SIGNIFICANT CHANGE UP (ref 17–32)
COLOR SPEC: YELLOW — SIGNIFICANT CHANGE UP
CREAT SERPL-MCNC: 0.8 MG/DL — SIGNIFICANT CHANGE UP (ref 0.7–1.5)
CRP SERPL-MCNC: 42.9 MG/L — HIGH
DIFF PNL FLD: NEGATIVE — SIGNIFICANT CHANGE UP
EGFR: 106 ML/MIN/1.73M2 — SIGNIFICANT CHANGE UP
EOSINOPHIL # BLD AUTO: 0 K/UL — SIGNIFICANT CHANGE UP (ref 0–0.7)
EOSINOPHIL NFR BLD AUTO: 0 % — SIGNIFICANT CHANGE UP (ref 0–8)
EPI CELLS # UR: 5 /HPF — SIGNIFICANT CHANGE UP (ref 0–5)
ERYTHROCYTE [SEDIMENTATION RATE] IN BLOOD: 124 MM/HR — HIGH (ref 0–20)
FLUAV AG NPH QL: SIGNIFICANT CHANGE UP
FLUBV AG NPH QL: SIGNIFICANT CHANGE UP
GLUCOSE SERPL-MCNC: 110 MG/DL — HIGH (ref 70–99)
GLUCOSE UR QL: NEGATIVE — SIGNIFICANT CHANGE UP
HCG SERPL QL: NEGATIVE — SIGNIFICANT CHANGE UP
HCT VFR BLD CALC: 31 % — LOW (ref 37–47)
HGB BLD-MCNC: 11.1 G/DL — LOW (ref 12–16)
HYALINE CASTS # UR AUTO: 2 /LPF — SIGNIFICANT CHANGE UP (ref 0–7)
IMM GRANULOCYTES NFR BLD AUTO: 0.5 % — HIGH (ref 0.1–0.3)
KETONES UR-MCNC: SIGNIFICANT CHANGE UP
LEUKOCYTE ESTERASE UR-ACNC: NEGATIVE — SIGNIFICANT CHANGE UP
LIDOCAIN IGE QN: 24 U/L — SIGNIFICANT CHANGE UP (ref 7–60)
LYMPHOCYTES # BLD AUTO: 0.45 K/UL — LOW (ref 1.2–3.4)
LYMPHOCYTES # BLD AUTO: 5.5 % — LOW (ref 20.5–51.1)
MAGNESIUM SERPL-MCNC: 1.8 MG/DL — SIGNIFICANT CHANGE UP (ref 1.8–2.4)
MCHC RBC-ENTMCNC: 34.4 PG — HIGH (ref 27–31)
MCHC RBC-ENTMCNC: 35.8 G/DL — SIGNIFICANT CHANGE UP (ref 32–37)
MCV RBC AUTO: 96 FL — SIGNIFICANT CHANGE UP (ref 81–99)
MONOCYTES # BLD AUTO: 0.5 K/UL — SIGNIFICANT CHANGE UP (ref 0.1–0.6)
MONOCYTES NFR BLD AUTO: 6.1 % — SIGNIFICANT CHANGE UP (ref 1.7–9.3)
NEUTROPHILS # BLD AUTO: 7.16 K/UL — HIGH (ref 1.4–6.5)
NEUTROPHILS NFR BLD AUTO: 87.8 % — HIGH (ref 42.2–75.2)
NITRITE UR-MCNC: NEGATIVE — SIGNIFICANT CHANGE UP
NRBC # BLD: 0 /100 WBCS — SIGNIFICANT CHANGE UP (ref 0–0)
PH UR: 7 — SIGNIFICANT CHANGE UP (ref 5–8)
PLATELET # BLD AUTO: 354 K/UL — SIGNIFICANT CHANGE UP (ref 130–400)
PMV BLD: 9.8 FL — SIGNIFICANT CHANGE UP (ref 7.4–10.4)
POTASSIUM SERPL-MCNC: 4.5 MMOL/L — SIGNIFICANT CHANGE UP (ref 3.5–5)
POTASSIUM SERPL-SCNC: 4.5 MMOL/L — SIGNIFICANT CHANGE UP (ref 3.5–5)
PROT SERPL-MCNC: 8.5 G/DL — HIGH (ref 6–8)
PROT UR-MCNC: ABNORMAL
RBC # BLD: 3.23 M/UL — LOW (ref 4.2–5.4)
RBC # FLD: 12.3 % — SIGNIFICANT CHANGE UP (ref 11.5–14.5)
RBC CASTS # UR COMP ASSIST: 1 /HPF — SIGNIFICANT CHANGE UP (ref 0–4)
RSV RNA NPH QL NAA+NON-PROBE: SIGNIFICANT CHANGE UP
SARS-COV-2 RNA SPEC QL NAA+PROBE: SIGNIFICANT CHANGE UP
SODIUM SERPL-SCNC: 131 MMOL/L — LOW (ref 135–146)
SP GR SPEC: 1.02 — SIGNIFICANT CHANGE UP (ref 1.01–1.03)
TROPONIN T SERPL-MCNC: <0.01 NG/ML — SIGNIFICANT CHANGE UP
UROBILINOGEN FLD QL: ABNORMAL
WBC # BLD: 8.16 K/UL — SIGNIFICANT CHANGE UP (ref 4.8–10.8)
WBC # FLD AUTO: 8.16 K/UL — SIGNIFICANT CHANGE UP (ref 4.8–10.8)
WBC UR QL: 4 /HPF — SIGNIFICANT CHANGE UP (ref 0–5)

## 2023-05-16 PROCEDURE — 96374 THER/PROPH/DIAG INJ IV PUSH: CPT

## 2023-05-16 PROCEDURE — 36415 COLL VENOUS BLD VENIPUNCTURE: CPT

## 2023-05-16 PROCEDURE — 84484 ASSAY OF TROPONIN QUANT: CPT

## 2023-05-16 PROCEDURE — 81001 URINALYSIS AUTO W/SCOPE: CPT

## 2023-05-16 PROCEDURE — 93010 ELECTROCARDIOGRAM REPORT: CPT

## 2023-05-16 PROCEDURE — 85652 RBC SED RATE AUTOMATED: CPT

## 2023-05-16 PROCEDURE — 84703 CHORIONIC GONADOTROPIN ASSAY: CPT

## 2023-05-16 PROCEDURE — 83735 ASSAY OF MAGNESIUM: CPT

## 2023-05-16 PROCEDURE — 93005 ELECTROCARDIOGRAM TRACING: CPT

## 2023-05-16 PROCEDURE — 96376 TX/PRO/DX INJ SAME DRUG ADON: CPT

## 2023-05-16 PROCEDURE — 86140 C-REACTIVE PROTEIN: CPT

## 2023-05-16 PROCEDURE — 85025 COMPLETE CBC W/AUTO DIFF WBC: CPT

## 2023-05-16 PROCEDURE — 96375 TX/PRO/DX INJ NEW DRUG ADDON: CPT

## 2023-05-16 PROCEDURE — 71046 X-RAY EXAM CHEST 2 VIEWS: CPT | Mod: 26

## 2023-05-16 PROCEDURE — 80053 COMPREHEN METABOLIC PANEL: CPT

## 2023-05-16 PROCEDURE — 0241U: CPT

## 2023-05-16 PROCEDURE — 71046 X-RAY EXAM CHEST 2 VIEWS: CPT

## 2023-05-16 PROCEDURE — 83690 ASSAY OF LIPASE: CPT

## 2023-05-16 PROCEDURE — 99285 EMERGENCY DEPT VISIT HI MDM: CPT | Mod: 25

## 2023-05-16 PROCEDURE — 99284 EMERGENCY DEPT VISIT MOD MDM: CPT

## 2023-05-16 PROCEDURE — 93308 TTE F-UP OR LMTD: CPT

## 2023-05-16 RX ORDER — KETOROLAC TROMETHAMINE 30 MG/ML
15 SYRINGE (ML) INJECTION ONCE
Refills: 0 | Status: DISCONTINUED | OUTPATIENT
Start: 2023-05-16 | End: 2023-05-16

## 2023-05-16 RX ORDER — ACETAMINOPHEN 500 MG
650 TABLET ORAL ONCE
Refills: 0 | Status: COMPLETED | OUTPATIENT
Start: 2023-05-16 | End: 2023-05-16

## 2023-05-16 RX ORDER — METOCLOPRAMIDE HCL 10 MG
10 TABLET ORAL ONCE
Refills: 0 | Status: COMPLETED | OUTPATIENT
Start: 2023-05-16 | End: 2023-05-16

## 2023-05-16 RX ORDER — SODIUM CHLORIDE 9 MG/ML
1000 INJECTION, SOLUTION INTRAVENOUS ONCE
Refills: 0 | Status: COMPLETED | OUTPATIENT
Start: 2023-05-16 | End: 2023-05-16

## 2023-05-16 RX ORDER — ONDANSETRON 8 MG/1
4 TABLET, FILM COATED ORAL ONCE
Refills: 0 | Status: COMPLETED | OUTPATIENT
Start: 2023-05-16 | End: 2023-05-16

## 2023-05-16 RX ADMIN — Medication 15 MILLIGRAM(S): at 18:18

## 2023-05-16 RX ADMIN — Medication 10 MILLIGRAM(S): at 20:57

## 2023-05-16 RX ADMIN — ONDANSETRON 4 MILLIGRAM(S): 8 TABLET, FILM COATED ORAL at 18:18

## 2023-05-16 RX ADMIN — Medication 650 MILLIGRAM(S): at 20:58

## 2023-05-16 RX ADMIN — Medication 15 MILLIGRAM(S): at 20:57

## 2023-05-16 RX ADMIN — SODIUM CHLORIDE 1000 MILLILITER(S): 9 INJECTION, SOLUTION INTRAVENOUS at 18:18

## 2023-05-16 NOTE — ED ADULT NURSE NOTE - NS_ED_NURSE_TEACHING_TOPIC_ED_A_ED
"Subjective:      Yunior Leroy is a 20 y.o. male who presents with Shoulder Injury (Right shoulder pain/collar bone area x 3 days ago playing rugby )            Patient injured right clavicle while playing rugby 3 days ago, no improvement following rest.  Patient does not recall a specific event/injury at the time of playing but developed pain after the game.  He had a similar injury several years ago with a negative x-ray suspected torn muscle at that time.  No problems since.  No treatment other than rest since onset of pain.  Denies numbness/tingling into the arm.       Shoulder Injury    Incident location: playing rugby  The right shoulder is affected. The incident occurred 3 to 5 days ago. Injury mechanism: no specific injury. The quality of the pain is described as aching. The pain does not radiate. The pain is moderate. Pertinent negatives include no chest pain, muscle weakness, numbness or tingling. The symptoms are aggravated by movement and overhead lifting. He has tried rest for the symptoms. The treatment provided no relief.       Review of Systems   Constitutional: Negative for chills and fever.   Cardiovascular: Negative for chest pain.   Musculoskeletal: Positive for myalgias. Negative for back pain and neck pain.   Skin: Negative for itching and rash.   Neurological: Negative for tingling, sensory change, focal weakness and numbness.          Objective:     /68   Pulse 60   Temp 36.7 °C (98 °F)   Ht 1.905 m (6' 3\")   Wt (!) 127.9 kg (282 lb)   SpO2 97%   BMI 35.25 kg/m²      Physical Exam   Constitutional: He appears well-developed and well-nourished.   Cardiovascular: Intact distal pulses.    Musculoskeletal:   Decreased ROM of the right shoulder due to pain of the right clavicle.  Pain increases with elevation of the right arm above shoulder level.  TTP over the mid right clavicle.  No palpable deformity.  No tenting of the skin.    Neurological:   Sensation intact and unchanged in the " right UE per patient   Skin: Skin is warm and dry. Capillary refill takes less than 2 seconds.   Skin overlying right clavicle is without tenting, open wounds or ecchymosis   Psychiatric: He has a normal mood and affect. His behavior is normal. Judgment and thought content normal.   Nursing note and vitals reviewed.         Right Clavicle XR:  No acute abnormalities noted on x-ray.       Assessment/Plan:     1. Pain of right clavicle  Recommend rest, ice and activity as tolerated.  Naproxen OTC as needed for pain.  Follow-up if symptoms change, get worse or new symptoms develop.    - DX-CLAVICLE RIGHT; Future    2. Shoulder strain, right, initial encounter           PCP/Other specify

## 2023-05-16 NOTE — ED PROVIDER NOTE - CARE PLAN
1 Principal Discharge DX:	Viral illness  Secondary Diagnosis:	Chest pain  Secondary Diagnosis:	Headache

## 2023-05-16 NOTE — ED PROVIDER NOTE - ATTENDING CONTRIBUTION TO CARE
22-year-old woman, history of ulcerative colitis, pericarditis with pericardial effusion and February presents with 4 days of fever, body aches, sore throat, nasal congestion, headache, nausea and some chest pressure that feels similar to prior pericarditis.  Pain is mild to moderate, nonradiating.  No farhan shortness of breath though she does feel her chest is tight.  Of note, she is on prednisone grams daily.  On exam she has a low-grade temp, mild tachycardia, HEENT with neck supple, full range of motion pharyngeal erythema without cervical lymphadenopathy, no tonsillar exudate lungs clear, CV S1-S2 RRR, abdomen soft, nontender.  No rash, no peripheral edema.  Suspect viral illness, will check labs, imaging with chest x-ray and POCUS echo for effusion treat symptomatically, and reassess.

## 2023-05-16 NOTE — ED PROVIDER NOTE - PROGRESS NOTE DETAILS
parth: patient endorsed to me pending re-eval. patient with improvement of headache. return precautions discussed, fu instructions given.

## 2023-05-16 NOTE — ED PROVIDER NOTE - CLINICAL SUMMARY MEDICAL DECISION MAKING FREE TEXT BOX
Labs okay other than ESR/CRP mildly elevated but not as severe as prior episodes.  Fever broke, patient feeling much better, tachycardia resolved.  Suspect viral illness.  Patient has a follow-up appointment with her cardiologist tomorrow, advised to continue current medical regimen, aggressive fever control and hydration, return to the ED for persistent or worsening symptoms.  Patient is amenable to discharge.

## 2023-05-16 NOTE — ED PROVIDER NOTE - OBJECTIVE STATEMENT
22-year-old female with PMH of ulcerative colitis, pericarditis in February presents to ED for evaluation of fever x4 days + associated polymyalgia's, headache, sore throat.  Also reports having substernal chest pressure that feels similar to prior episodes of pericarditis.  Pain is 4 out of 10 in severity, nonradiating, without modifying relieving factors.  + SOB.  Notes temp of 101 at home intermittently relieved with Motrin and Tylenol.  She reports nausea x2 days + associated single episode of NB/NB emesis at home today prior to arrival.  Denies cough, congestion, abdominal pain, diarrhea, black or bloody stools, urinary symptoms, vaginal bleeding or discharge, rash, sick contacts.  Last bowel movement today was normal.  Reports decreased appetite and p.o. intake due to sore throat but is still taking fluids.  States she has followed closely with cardiology since her discharge in February and has appointment next week for echo with Dr. Behuria.

## 2023-05-16 NOTE — ED PROVIDER NOTE - NSFOLLOWUPINSTRUCTIONS_ED_ALL_ED_FT
(4) no limitation
Viral Illness, Adult  Viruses are tiny germs that can get into a person's body and cause illness. There are many different types of viruses, and they cause many types of illness. Viral illnesses can range from mild to severe. They can affect various parts of the body.    Common illnesses that are caused by a virus include colds and the flu. Viral illnesses also include serious conditions such as HIV/AIDS (human immunodeficiency virus/acquired immunodeficiency syndrome). A few viruses have been linked to certain cancers.    What are the causes?  Many types of viruses can cause illness. Viruses invade cells in your body, multiply, and cause the infected cells to malfunction or die. When the cell dies, it releases more of the virus. When this happens, you develop symptoms of the illness, and the virus continues to spread to other cells. If the virus takes over the function of the cell, it can cause the cell to divide and grow out of control, as is the case when a virus causes cancer.    Different viruses get into the body in different ways. You can get a virus by:    Swallowing food or water that is contaminated with the virus.  Breathing in droplets that have been coughed or sneezed into the air by an infected person.  Touching a surface that has been contaminated with the virus and then touching your eyes, nose, or mouth.  Being bitten by an insect or animal that carries the virus.  Having sexual contact with a person who is infected with the virus.  Being exposed to blood or fluids that contain the virus, either through an open cut or during a transfusion.    If a virus enters your body, your body's defense system (immune system) will try to fight the virus. You may be at higher risk for a viral illness if your immune system is weak.    What are the signs or symptoms?  Symptoms vary depending on the type of virus and the location of the cells that it invades. Common symptoms of the main types of viral illnesses include:    Cold and flu viruses     Fever.  Headache.  Sore throat.  Muscle aches.  Nasal congestion.  Cough.  Digestive system (gastrointestinal) viruses     Fever.  Abdominal pain.  Nausea.  Diarrhea.  Liver viruses (hepatitis)     Loss of appetite.  Tiredness.  Yellowing of the skin (jaundice).  Brain and spinal cord viruses     Fever.  Headache.  Stiff neck.  Nausea and vomiting.  Confusion or sleepiness.  Skin viruses     Warts.  Itching.  Rash.  Sexually transmitted viruses     Discharge.  Swelling.  Redness.  Rash.  How is this treated?  Viruses can be difficult to treat because they live within cells. Antibiotic medicines do not treat viruses because these drugs do not get inside cells. Treatment for a viral illness may include:    Resting and drinking plenty of fluids.  Medicines to relieve symptoms. These can include over-the-counter medicine for pain and fever, medicines for cough or congestion, and medicines to relieve diarrhea.  Antiviral medicines. These drugs are available only for certain types of viruses. They may help reduce flu symptoms if taken early. There are also many antiviral medicines for hepatitis and HIV/AIDS.    Some viral illnesses can be prevented with vaccinations. A common example is the flu shot.    Follow these instructions at home:  Medicines     Image   Take over-the-counter and prescription medicines only as told by your health care provider.  If you were prescribed an antiviral medicine, take it as told by your health care provider. Do not stop taking the medicine even if you start to feel better.  Be aware of when antibiotics are needed and when they are not needed. Antibiotics do not treat viruses. If your health care provider thinks that you may have a bacterial infection as well as a viral infection, you may get an antibiotic.    Do not ask for an antibiotic prescription if you have been diagnosed with a viral illness. That will not make your illness go away faster.  Frequently taking antibiotics when they are not needed can lead to antibiotic resistance. When this develops, the medicine no longer works against the bacteria that it normally fights.    General instructions     Drink enough fluids to keep your urine clear or pale yellow.  Rest as much as possible.  Return to your normal activities as told by your health care provider. Ask your health care provider what activities are safe for you.  Keep all follow-up visits as told by your health care provider. This is important.  How is this prevented?  ImageTake these actions to reduce your risk of viral infection:    Eat a healthy diet and get enough rest.  Wash your hands often with soap and water. This is especially important when you are in public places. If soap and water are not available, use hand .  Avoid close contact with friends and family who have a viral illness.  If you travel to areas where viral gastrointestinal infection is common, avoid drinking water or eating raw food.  Keep your immunizations up to date. Get a flu shot every year as told by your health care provider.  Do not share toothbrushes, nail clippers, razors, or needles with other people.  Always practice safe sex.    Contact a health care provider if:  You have symptoms of a viral illness that do not go away.  Your symptoms come back after going away.  Your symptoms get worse.  Get help right away if:  You have trouble breathing.  You have a severe headache or a stiff neck.  You have severe vomiting or abdominal pain.  This information is not intended to replace advice given to you by your health care provider. Make sure you discuss any questions you have with your health care provider.      Headache    A headache is pain or discomfort felt around the head or neck area. The specific cause of a headache may not be found as there are many types including tension headaches, migraine headaches, and cluster headaches. Watch your condition for any changes. Things you can do to manage your pain include taking over the counter and prescription medications as instructed by your health care provider, lying down in a dark quiet room, limiting stress, getting regular sleep, and refraining from alcohol and tobacco products.    SEEK IMMEDIATE MEDICAL CARE IF YOU EXPERIENCE THE FOLLOWING SYMPTOMS: fever, vomiting, stiff neck, loss of vision, problems with speech, muscle weakness, loss of balance, trouble walking, pass out, or confusion.    Nonspecific Chest Pain  Chest pain can be caused by many different conditions. There is always a chance that your pain could be related to something serious, such as a heart attack or a blood clot in your lungs. Chest pain can also be caused by conditions that are not life-threatening. If you have chest pain, it is very important to follow up with your health care provider.    What are the causes?  Causes of this condition include:    Heartburn.  Pneumonia or bronchitis.  Anxiety or stress.  Inflammation around your heart (pericarditis) or lung (pleuritis or pleurisy).  A blood clot in your lung.  A collapsed lung (pneumothorax). This can develop suddenly on its own (spontaneous pneumothorax) or from trauma to the chest.  Shingles infection (varicella-zoster virus).  Heart attack.  Damage to the bones, muscles, and cartilage that make up your chest wall. This can include:    Bruised bones due to injury.  Strained muscles or cartilage due to frequent or repeated coughing or overwork.  Fracture to one or more ribs.  Sore cartilage due to inflammation (costochondritis).      What increases the risk?  Risk factors for this condition may include:    Activities that increase your risk for trauma or injury to your chest.  Respiratory infections or conditions that cause frequent coughing.  Medical conditions or overeating that can cause heartburn.  Heart disease or family history of heart disease.  Conditions or health behaviors that increase your risk of developing a blood clot.  Having had chicken pox (varicella zoster).    What are the signs or symptoms?  Chest pain can feel like:    Burning or tingling on the surface of your chest or deep in your chest.  Crushing, pressure, aching, or squeezing pain.  Dull or sharp pain that is worse when you move, cough, or take a deep breath.  Pain that is also felt in your back, neck, shoulder, or arm, or pain that spreads to any of these areas.    Your chest pain may come and go, or it may stay constant.    How is this diagnosed?  Lab tests or other studies may be needed to find the cause of your pain. Your health care provider may have you take a test called an ECG (electrocardiogram). An ECG records your heartbeat patterns at the time the test is performed. You may also have other tests, such as:    Transthoracic echocardiogram (TTE). In this test, sound waves are used to create a picture of the heart structures and to look at how blood flows through your heart.  Transesophageal echocardiogram (CUCA). This is a more advanced imaging test that takes images from inside your body. It allows your health care provider to see your heart in finer detail.  Cardiac monitoring. This allows your health care provider to monitor your heart rate and rhythm in real time.  Holter monitor. This is a portable device that records your heartbeat and can help to diagnose abnormal heartbeats. It allows your health care provider to track your heart activity for several days, if needed.  Stress tests. These can be done through exercise or by taking medicine that makes your heart beat more quickly.  Blood tests.  Other imaging tests.    How is this treated?  Treatment depends on what is causing your chest pain. Treatment may include:    Medicines. These may include:    Acid blockers for heartburn.  Anti-inflammatory medicine.  Pain medicine for inflammatory conditions.  Antibiotic medicine, if an infection is present.  Medicines to dissolve blood clots.  Medicines to treat coronary artery disease (CAD).    Supportive care for conditions that do not require medicines. This may include:    Resting.  Applying heat or cold packs to injured areas.  Limiting activities until pain decreases.      Follow these instructions at home:  Medicines     If you were prescribed an antibiotic, take it as told by your health care provider. Do not stop taking the antibiotic even if you start to feel better.  Take over-the-counter and prescription medicines only as told by your health care provider.  Lifestyle     Do not use any products that contain nicotine or tobacco, such as cigarettes and e-cigarettes. If you need help quitting, ask your health care provider.  Do not drink alcohol.  ImageMake lifestyle changes as directed by your health care provider. These may include:    Getting regular exercise. Ask your health care provider to suggest some activities that are safe for you.  Eating a heart-healthy diet. A registered dietitian can help you to learn healthy eating options.  Maintaining a healthy weight.  Managing diabetes, if necessary.  Reducing stress, such as with yoga or relaxation techniques.    General instructions     Avoid any activities that bring on chest pain.  If heartburn is the cause for your chest pain, raise (elevate) the head of your bed about 6 inches (15 cm) by putting blocks under the legs. Sleeping with more pillows does not effectively relieve heartburn because it only changes the position of your head.  Keep all follow-up visits as told by your health care provider. This is important. This includes any further testing if your chest pain does not go away.  Contact a health care provider if:  Your chest pain does not go away.  You have a rash with blisters on your chest.  You have a fever.  You have chills.  Get help right away if:  Your chest pain is worse.  You have a cough that gets worse, or you cough up blood.  You have severe pain in your abdomen.  You have severe weakness.  You faint.  You have sudden, unexplained chest discomfort.  You have sudden, unexplained discomfort in your arms, back, neck, or jaw.  You have shortness of breath at any time.  You suddenly start to sweat, or your skin gets clammy.  You feel nauseous or you vomit.  You suddenly feel light-headed or dizzy.  Your heart begins to beat quickly, or it feels like it is skipping beats.  These symptoms may represent a serious problem that is an emergency. Do not wait to see if the symptoms will go away. Get medical help right away. Call your local emergency services (911 in the [place] Do not drive yourself to the hospital.     This information is not intended to replace advice given to you by your health care provider. Make sure you discuss any questions you have with your health care provider.

## 2023-05-16 NOTE — ED PROVIDER NOTE - PATIENT PORTAL LINK FT
You can access the FollowMyHealth Patient Portal offered by [facility] by registering at the following website: [email or web address]. By joining [employer]’s FollowMyHealth portal, you will also be able to view your health information using other applications (apps) compatible with our system.

## 2023-05-16 NOTE — ED ADULT NURSE NOTE - NSFALLUNIVINTERV_ED_ALL_ED
Bed/Stretcher in lowest position, wheels locked, appropriate side rails in place/Call bell, personal items and telephone in reach/Instruct patient to call for assistance before getting out of bed/chair/stretcher/Non-slip footwear applied when patient is off stretcher/[place] to call system/Physically safe environment - no spills, clutter or unnecessary equipment/Purposeful proactive rounding/Room/bathroom lighting operational, light cord in reach

## 2023-05-16 NOTE — ED PROVIDER NOTE - CARE PROVIDER_API CALL
See Rodriguez  Infectious Diseases  [address] [place] [address]  Phone: [phone]  Fax: [phone]  Follow Up Time: 1-3 Days

## 2023-05-16 NOTE — ED PROVIDER NOTE - PHYSICAL EXAMINATION
VITAL SIGNS: I have reviewed nursing notes and confirm.  CONSTITUTIONAL: Well-developed; well-nourished; in no acute distress.  SKIN: Skin exam is warm and dry, no acute rash.  HEAD: Normocephalic; atraumatic.  EYES: PERRL,  conjunctiva and sclera clear.  ENT: MMM, posterior oropharynx is clear without erythema, exudates or tonsillar hypertrophy.  Uvula is midline, floor mouth is soft.  No pooling of secretions  NECK: Supple; non tender. no CLAD  CARD: S1, S2 normal; no murmurs, gallops, or rubs. Regular rate and rhythm.  RESP: Normal respiratory effort, no tachypnea or distress. Lungs CTAB, no wheezes, rales or rhonchi.  ABD: soft, NT/ND.  chest wall non-tender  EXT: Normal ROM. No clubbing, cyanosis or edema.  NEURO: Alert, oriented. Grossly unremarkable. No focal deficits.  PSYCH: Cooperative, appropriate.

## 2023-05-17 ENCOUNTER — APPOINTMENT (OUTPATIENT)
Dept: CARDIOLOGY | Facility: CLINIC | Age: 23
End: 2023-05-17

## 2023-05-18 ENCOUNTER — EMERGENCY (EMERGENCY)
Facility: HOSPITAL | Age: 23
LOS: 0 days | Discharge: HOME | End: 2023-05-18
Attending: EMERGENCY MEDICINE
Payer: COMMERCIAL

## 2023-05-18 VITALS
TEMPERATURE: 99 F | RESPIRATION RATE: 18 BRPM | DIASTOLIC BLOOD PRESSURE: 70 MMHG | HEART RATE: 86 BPM | WEIGHT: 210.1 LBS | OXYGEN SATURATION: 100 % | SYSTOLIC BLOOD PRESSURE: 132 MMHG

## 2023-05-18 DIAGNOSIS — R07.89 OTHER CHEST PAIN: ICD-10-CM

## 2023-05-18 DIAGNOSIS — R51.9 HEADACHE, UNSPECIFIED: ICD-10-CM

## 2023-05-18 DIAGNOSIS — Z87.19 PERSONAL HISTORY OF OTHER DISEASES OF THE DIGESTIVE SYSTEM: ICD-10-CM

## 2023-05-18 DIAGNOSIS — G43.909 MIGRAINE, UNSPECIFIED, NOT INTRACTABLE, WITHOUT STATUS MIGRAINOSUS: ICD-10-CM

## 2023-05-18 PROBLEM — Z86.79 PERSONAL HISTORY OF OTHER DISEASES OF THE CIRCULATORY SYSTEM: Chronic | Status: ACTIVE | Noted: 2023-05-16

## 2023-05-18 LAB
ALBUMIN SERPL ELPH-MCNC: 4.2 G/DL — SIGNIFICANT CHANGE UP (ref 3.5–5.2)
ALP SERPL-CCNC: 46 U/L — SIGNIFICANT CHANGE UP (ref 30–115)
ALT FLD-CCNC: 14 U/L — SIGNIFICANT CHANGE UP (ref 0–41)
ANION GAP SERPL CALC-SCNC: 12 MMOL/L — SIGNIFICANT CHANGE UP (ref 7–14)
AST SERPL-CCNC: 18 U/L — SIGNIFICANT CHANGE UP (ref 0–41)
BASOPHILS # BLD AUTO: 0.01 K/UL — SIGNIFICANT CHANGE UP (ref 0–0.2)
BASOPHILS NFR BLD AUTO: 0.2 % — SIGNIFICANT CHANGE UP (ref 0–1)
BILIRUB SERPL-MCNC: 0.9 MG/DL — SIGNIFICANT CHANGE UP (ref 0.2–1.2)
BUN SERPL-MCNC: 10 MG/DL — SIGNIFICANT CHANGE UP (ref 10–20)
CALCIUM SERPL-MCNC: 9.2 MG/DL — SIGNIFICANT CHANGE UP (ref 8.4–10.5)
CHLORIDE SERPL-SCNC: 97 MMOL/L — LOW (ref 98–110)
CO2 SERPL-SCNC: 25 MMOL/L — SIGNIFICANT CHANGE UP (ref 17–32)
CREAT SERPL-MCNC: 0.7 MG/DL — SIGNIFICANT CHANGE UP (ref 0.7–1.5)
EGFR: 125 ML/MIN/1.73M2 — SIGNIFICANT CHANGE UP
EOSINOPHIL # BLD AUTO: 0 K/UL — SIGNIFICANT CHANGE UP (ref 0–0.7)
EOSINOPHIL NFR BLD AUTO: 0 % — SIGNIFICANT CHANGE UP (ref 0–8)
GLUCOSE SERPL-MCNC: 126 MG/DL — HIGH (ref 70–99)
HCG SERPL QL: NEGATIVE — SIGNIFICANT CHANGE UP
HCT VFR BLD CALC: 31.7 % — LOW (ref 37–47)
HGB BLD-MCNC: 11.4 G/DL — LOW (ref 12–16)
IMM GRANULOCYTES NFR BLD AUTO: 0.5 % — HIGH (ref 0.1–0.3)
LIDOCAIN IGE QN: 23 U/L — SIGNIFICANT CHANGE UP (ref 7–60)
LYMPHOCYTES # BLD AUTO: 0.49 K/UL — LOW (ref 1.2–3.4)
LYMPHOCYTES # BLD AUTO: 8.7 % — LOW (ref 20.5–51.1)
MCHC RBC-ENTMCNC: 33.4 PG — HIGH (ref 27–31)
MCHC RBC-ENTMCNC: 36 G/DL — SIGNIFICANT CHANGE UP (ref 32–37)
MCV RBC AUTO: 93 FL — SIGNIFICANT CHANGE UP (ref 81–99)
MONOCYTES # BLD AUTO: 0.49 K/UL — SIGNIFICANT CHANGE UP (ref 0.1–0.6)
MONOCYTES NFR BLD AUTO: 8.7 % — SIGNIFICANT CHANGE UP (ref 1.7–9.3)
NEUTROPHILS # BLD AUTO: 4.6 K/UL — SIGNIFICANT CHANGE UP (ref 1.4–6.5)
NEUTROPHILS NFR BLD AUTO: 81.9 % — HIGH (ref 42.2–75.2)
NRBC # BLD: 0 /100 WBCS — SIGNIFICANT CHANGE UP (ref 0–0)
PLATELET # BLD AUTO: 397 K/UL — SIGNIFICANT CHANGE UP (ref 130–400)
PMV BLD: 9.7 FL — SIGNIFICANT CHANGE UP (ref 7.4–10.4)
POTASSIUM SERPL-MCNC: 4.2 MMOL/L — SIGNIFICANT CHANGE UP (ref 3.5–5)
POTASSIUM SERPL-SCNC: 4.2 MMOL/L — SIGNIFICANT CHANGE UP (ref 3.5–5)
PROT SERPL-MCNC: 8.4 G/DL — HIGH (ref 6–8)
RBC # BLD: 3.41 M/UL — LOW (ref 4.2–5.4)
RBC # FLD: 12.1 % — SIGNIFICANT CHANGE UP (ref 11.5–14.5)
SODIUM SERPL-SCNC: 134 MMOL/L — LOW (ref 135–146)
TROPONIN T SERPL-MCNC: <0.01 NG/ML — SIGNIFICANT CHANGE UP
WBC # BLD: 5.62 K/UL — SIGNIFICANT CHANGE UP (ref 4.8–10.8)
WBC # FLD AUTO: 5.62 K/UL — SIGNIFICANT CHANGE UP (ref 4.8–10.8)

## 2023-05-18 PROCEDURE — 85025 COMPLETE CBC W/AUTO DIFF WBC: CPT

## 2023-05-18 PROCEDURE — 71045 X-RAY EXAM CHEST 1 VIEW: CPT

## 2023-05-18 PROCEDURE — 96375 TX/PRO/DX INJ NEW DRUG ADDON: CPT

## 2023-05-18 PROCEDURE — 84484 ASSAY OF TROPONIN QUANT: CPT

## 2023-05-18 PROCEDURE — 80053 COMPREHEN METABOLIC PANEL: CPT

## 2023-05-18 PROCEDURE — 71045 X-RAY EXAM CHEST 1 VIEW: CPT | Mod: 26

## 2023-05-18 PROCEDURE — 99285 EMERGENCY DEPT VISIT HI MDM: CPT

## 2023-05-18 PROCEDURE — 84703 CHORIONIC GONADOTROPIN ASSAY: CPT

## 2023-05-18 PROCEDURE — 36415 COLL VENOUS BLD VENIPUNCTURE: CPT

## 2023-05-18 PROCEDURE — 83690 ASSAY OF LIPASE: CPT

## 2023-05-18 PROCEDURE — 96374 THER/PROPH/DIAG INJ IV PUSH: CPT

## 2023-05-18 PROCEDURE — 99284 EMERGENCY DEPT VISIT MOD MDM: CPT | Mod: 25

## 2023-05-18 RX ORDER — SODIUM CHLORIDE 9 MG/ML
1000 INJECTION, SOLUTION INTRAVENOUS ONCE
Refills: 0 | Status: COMPLETED | OUTPATIENT
Start: 2023-05-18 | End: 2023-05-18

## 2023-05-18 RX ORDER — DEXAMETHASONE 0.5 MG/5ML
10 ELIXIR ORAL ONCE
Refills: 0 | Status: COMPLETED | OUTPATIENT
Start: 2023-05-18 | End: 2023-05-18

## 2023-05-18 RX ORDER — MAGNESIUM SULFATE 500 MG/ML
2 VIAL (ML) INJECTION ONCE
Refills: 0 | Status: COMPLETED | OUTPATIENT
Start: 2023-05-18 | End: 2023-05-18

## 2023-05-18 RX ORDER — ACETAMINOPHEN 500 MG
650 TABLET ORAL ONCE
Refills: 0 | Status: COMPLETED | OUTPATIENT
Start: 2023-05-18 | End: 2023-05-18

## 2023-05-18 RX ORDER — KETOROLAC TROMETHAMINE 30 MG/ML
30 SYRINGE (ML) INJECTION ONCE
Refills: 0 | Status: DISCONTINUED | OUTPATIENT
Start: 2023-05-18 | End: 2023-05-18

## 2023-05-18 RX ORDER — ONDANSETRON 8 MG/1
1 TABLET, FILM COATED ORAL
Qty: 15 | Refills: 0
Start: 2023-05-18 | End: 2023-05-22

## 2023-05-18 RX ORDER — METOCLOPRAMIDE HCL 10 MG
10 TABLET ORAL ONCE
Refills: 0 | Status: COMPLETED | OUTPATIENT
Start: 2023-05-18 | End: 2023-05-18

## 2023-05-18 RX ADMIN — Medication 10 MILLIGRAM(S): at 12:36

## 2023-05-18 RX ADMIN — Medication 30 MILLIGRAM(S): at 12:37

## 2023-05-18 RX ADMIN — Medication 25 GRAM(S): at 13:32

## 2023-05-18 RX ADMIN — SODIUM CHLORIDE 1000 MILLILITER(S): 9 INJECTION, SOLUTION INTRAVENOUS at 12:36

## 2023-05-18 RX ADMIN — Medication 650 MILLIGRAM(S): at 13:41

## 2023-05-18 RX ADMIN — Medication 102 MILLIGRAM(S): at 13:33

## 2023-05-18 NOTE — ED PROVIDER NOTE - OBJECTIVE STATEMENT
Patient is a 22-year-old female with PMH of ulcerative colitis, pericarditis in February presents to ED for evaluation of headache and right sided non radiating chest pain described as sharp. seen in the ED two days ago and evaluated for sore throat fever and headache. States her current headache is generalized, radiates to her neck and is associated with photo & phonophobia. Otherwise denies any fever, chills, changes in vision, cough, congestion, palpitations, sob, n/v/d, abd pain, constipation, urinary complaints, lower extremity pain/swelling.

## 2023-05-18 NOTE — ED PROVIDER NOTE - PROGRESS NOTE DETAILS
pk: improved, will dc pk: labs ekg cxr and headache cocktail ordered. pk: labs ekg imaging reviewed, no abnormalities. pt reports ongoing headahce, will give tylenol mag and decadron

## 2023-05-18 NOTE — ED PROVIDER NOTE - CLINICAL SUMMARY MEDICAL DECISION MAKING FREE TEXT BOX
This patient presents with a headache most consistent with benign headache from either viral illness, tension type headache vs migraine. No headache red flags. Neurologic exam without evidence of meningismus, AMS, focal neurologic findings so doubt meningitis, encephalitis, stroke. Presentation not consistent with acute intracranial bleed to include SAH (lack of risk factors, headache history). No history of trauma so doubt ICH. Given history and physical temporal arteritis unlikely, as is acute angle closure glaucoma. Doubt carotid artery dissection given no focal neuro deficits, no neck trauma or recent neck strain. Patient with no signs of increased intracranial pressure or weight loss and history and physical suggest more benign headache so less likely mass effect in brain from tumor or abscess or idiopathic intracranial hypertension. Pain was controlled with headache cocktail and patient discharged home with PMD follow up. Return precautions discussed.

## 2023-05-18 NOTE — ED PROVIDER NOTE - ATTENDING CONTRIBUTION TO CARE
22-year-old female history of Crohn's, pericarditis (on prednisone), denies significant PSH, non-smoker, denies daily EtOH use, now presents with gradual onset of nonexertional generalized headache the past several days, persistent, associated dizziness/lightheadedness, epigastric burning discomfort, NBNB vomiting and anorexia, denies recent trauma, paresthesias, focal weakness, fevers, chills, neck stiffness, visual disturbances or pain, facial swelling, dental pain or other associated complaints at present. The patient was seen on 5/16 for similar complaints, at that time she was febrile, but has been afebrile since that day, has not been taking antipyretics. Old chart reviewed. I have reviewed and agree with the initial nursing note, except as documented in my note.    VSS, awake, alert, non-toxic appearing, no scalp tenderness or pulsatile vasculature, PERRL / EOMI, no conjunctival injection, ears clear, oropharynx clear and w/o signs dental space infection, no JVD or bruit, no skin rash or lesions, chest CTAB, no w/r/r, +S1/S2, RRR, no m/r/g, abdomen soft, NT, ND, +BS, no peripheral edema, AO x 3, clear speech, steady gait.

## 2023-05-18 NOTE — ED PROVIDER NOTE - PATIENT PORTAL LINK FT
You can access the FollowMyHealth Patient Portal offered by [facility] by registering at the following website: [email or web address]. By joining [facility]’s FollowMyHealth portal, you will also be able to view your health information using other applications (apps) compatible with our system.

## 2023-05-18 NOTE — ED ADULT NURSE NOTE - NSFALLUNIVINTERV_ED_ALL_ED
Bed/Stretcher in lowest position, wheels locked, appropriate side rails in place/Call bell, personal items and telephone in reach/Instruct patient to call for assistance before getting out of bed/chair/stretcher/Non-slip footwear applied when patient is off stretcher/[place] to call system/Physically safe environment - no spills, clutter or unnecessary equipment/Purposeful proactive rounding/Room/bathroom lighting operational, light cord in reach

## 2023-08-01 PROCEDURE — 93308 TTE F-UP OR LMTD: CPT | Mod: 26

## 2023-08-29 ENCOUNTER — ASOB RESULT (OUTPATIENT)
Age: 23
End: 2023-08-29

## 2023-08-29 ENCOUNTER — APPOINTMENT (OUTPATIENT)
Dept: ANTEPARTUM | Facility: CLINIC | Age: 23
End: 2023-08-29
Payer: COMMERCIAL

## 2023-08-29 ENCOUNTER — APPOINTMENT (OUTPATIENT)
Dept: MATERNAL FETAL MEDICINE | Facility: CLINIC | Age: 23
End: 2023-08-29
Payer: COMMERCIAL

## 2023-08-29 VITALS
HEIGHT: 63 IN | DIASTOLIC BLOOD PRESSURE: 74 MMHG | BODY MASS INDEX: 34.91 KG/M2 | WEIGHT: 197 LBS | HEART RATE: 96 BPM | OXYGEN SATURATION: 98 % | SYSTOLIC BLOOD PRESSURE: 136 MMHG

## 2023-08-29 DIAGNOSIS — K50.10 CROHN'S DISEASE OF LARGE INTESTINE W/OUT COMPLICATIONS: ICD-10-CM

## 2023-08-29 PROCEDURE — 76817 TRANSVAGINAL US OBSTETRIC: CPT

## 2023-08-29 PROCEDURE — 99214 OFFICE O/P EST MOD 30 MIN: CPT | Mod: 25

## 2023-08-29 PROCEDURE — 76813 OB US NUCHAL MEAS 1 GEST: CPT

## 2023-08-29 RX ORDER — PREDNISONE 10 MG/1
10 TABLET ORAL DAILY
Qty: 30 | Refills: 0 | Status: DISCONTINUED | COMMUNITY
Start: 2023-03-15 | End: 2023-08-29

## 2023-08-29 RX ORDER — PNV NO.95/FERROUS FUM/FOLIC AC 28MG-0.8MG
TABLET ORAL
Refills: 0 | Status: ACTIVE | COMMUNITY
Start: 2023-08-29

## 2023-08-29 RX ORDER — COLCHICINE 0.6 MG/1
0.6 CAPSULE ORAL TWICE DAILY
Refills: 0 | Status: DISCONTINUED | COMMUNITY
End: 2023-08-29

## 2023-08-29 NOTE — OB HISTORY
[Pregnancy History] : boy [___] : no pregnancy complications reported [LMP: ___] : LMP: [unfilled] [ANIVAL: ___] : ANIVAL: [unfilled] [EGA: ___ wks] : EGA: [unfilled] wks

## 2023-08-29 NOTE — DISCUSSION/SUMMARY
[FreeTextEntry1] : 23-year-old  3 para 2 0 0 2 LMP 23 ANIVAL 3/11/24 referred from Dr. Davidson for elevated Down syndrome risk on  serum screen and thickened NT 3.1 mm. today at 12 weeks 1 day. Cell free DNA screen had insufficient fetal DNA. She was counselled and desires diagnostic testing. To have genetic counselling and amniocentesis in 3 - 4 weeks.

## 2023-08-29 NOTE — ACTIVE PROBLEMS
[Diabetes Mellitus] : no diabetes mellitus [Hypertension] : no hypertension [Heart Disease] : no heart disease [Renal Disease] : no kidney disease, no UTI [Neurologic Disorder] : no neurologic disorder, no epilepsy [Psychiatric Disorders] : no psychiatric disorders [Depression] : no depression, no post partum depression [Hepatic Disorder] : no hepatitis, no liver disease [Thrombophlebitis] : no varicosities, no phlebitis [Thyroid Disorder] : no thyroid dysfunction [Trauma] : no trauma/violence

## 2023-09-07 NOTE — ED PROVIDER NOTE - PROGRESS NOTE ADDITIONAL1
Additional Progress Note... Qbrexza Pregnancy And Lactation Text: There is no available data on Qbrexza use in pregnant women.  There is no available data on Qbrexza use in lactation.

## 2023-09-08 ENCOUNTER — APPOINTMENT (OUTPATIENT)
Dept: ANTEPARTUM | Facility: CLINIC | Age: 23
End: 2023-09-08

## 2023-09-18 ENCOUNTER — APPOINTMENT (OUTPATIENT)
Dept: ANTEPARTUM | Facility: CLINIC | Age: 23
End: 2023-09-18
Payer: COMMERCIAL

## 2023-09-18 ENCOUNTER — ASOB RESULT (OUTPATIENT)
Age: 23
End: 2023-09-18

## 2023-09-18 ENCOUNTER — LABORATORY RESULT (OUTPATIENT)
Age: 23
End: 2023-09-18

## 2023-09-18 ENCOUNTER — OUTPATIENT (OUTPATIENT)
Dept: OUTPATIENT SERVICES | Facility: HOSPITAL | Age: 23
LOS: 1 days | End: 2023-09-18
Payer: COMMERCIAL

## 2023-09-18 DIAGNOSIS — Z34.90 ENCOUNTER FOR SUPERVISION OF NORMAL PREGNANCY, UNSPECIFIED, UNSPECIFIED TRIMESTER: ICD-10-CM

## 2023-09-18 DIAGNOSIS — O28.3 ABNORMAL ULTRASONIC FINDING ON ANTENATAL SCREENING OF MOTHER: ICD-10-CM

## 2023-09-18 PROCEDURE — 87799 DETECT AGENT NOS DNA QUANT: CPT

## 2023-09-18 PROCEDURE — 88275 CYTOGENETICS 100-300: CPT

## 2023-09-18 PROCEDURE — 59000 AMNIOCENTESIS DIAGNOSTIC: CPT

## 2023-09-18 PROCEDURE — 76946 ECHO GUIDE FOR AMNIOCENTESIS: CPT | Mod: 26

## 2023-09-18 PROCEDURE — 96040: CPT

## 2023-09-18 PROCEDURE — 76805 OB US >/= 14 WKS SNGL FETUS: CPT

## 2023-09-18 PROCEDURE — 76946 ECHO GUIDE FOR AMNIOCENTESIS: CPT

## 2023-09-18 PROCEDURE — 99212 OFFICE O/P EST SF 10 MIN: CPT

## 2023-09-18 PROCEDURE — 88235 TISSUE CULTURE PLACENTA: CPT

## 2023-09-18 PROCEDURE — 76805 OB US >/= 14 WKS SNGL FETUS: CPT | Mod: 26

## 2023-09-18 PROCEDURE — 88269 CHROMOSOME ANALYS AMNIOTIC: CPT

## 2023-09-18 PROCEDURE — 88280 CHROMOSOME KARYOTYPE STUDY: CPT

## 2023-09-18 PROCEDURE — 88285 CHROMOSOME COUNT ADDITIONAL: CPT

## 2023-09-18 PROCEDURE — 81229 CYTOG ALYS CHRML ABNR SNPCGH: CPT

## 2023-09-18 PROCEDURE — 88271 CYTOGENETICS DNA PROBE: CPT

## 2023-09-18 PROCEDURE — 81265 STR MARKERS SPECIMEN ANAL: CPT

## 2023-09-18 PROCEDURE — 82106 ALPHA-FETOPROTEIN AMNIOTIC: CPT

## 2023-09-20 ENCOUNTER — NON-APPOINTMENT (OUTPATIENT)
Age: 23
End: 2023-09-20

## 2023-09-20 LAB
Lab: NOT DETECTED COPIES/ML
Lab: NOT DETECTED IU/ML
PARVOVIRUS B19 QPCR: NOT DETECTED IU/ML

## 2023-09-26 DIAGNOSIS — Z36.3 ENCOUNTER FOR ANTENATAL SCREENING FOR MALFORMATIONS: ICD-10-CM

## 2023-09-26 DIAGNOSIS — Z3A.15 15 WEEKS GESTATION OF PREGNANCY: ICD-10-CM

## 2023-09-26 DIAGNOSIS — Z34.90 ENCOUNTER FOR SUPERVISION OF NORMAL PREGNANCY, UNSPECIFIED, UNSPECIFIED TRIMESTER: ICD-10-CM

## 2023-09-26 DIAGNOSIS — O34.219 MATERNAL CARE FOR UNSPECIFIED TYPE SCAR FROM PREVIOUS CESAREAN DELIVERY: ICD-10-CM

## 2023-09-26 DIAGNOSIS — O99.212 OBESITY COMPLICATING PREGNANCY, SECOND TRIMESTER: ICD-10-CM

## 2023-09-26 DIAGNOSIS — O99.012 ANEMIA COMPLICATING PREGNANCY, SECOND TRIMESTER: ICD-10-CM

## 2023-09-26 DIAGNOSIS — O28.3 ABNORMAL ULTRASONIC FINDING ON ANTENATAL SCREENING OF MOTHER: ICD-10-CM

## 2023-09-26 DIAGNOSIS — O28.0 ABNORMAL HEMATOLOGICAL FINDING ON ANTENATAL SCREENING OF MOTHER: ICD-10-CM

## 2023-09-26 DIAGNOSIS — O99.112 OTHER DISEASES OF THE BLOOD AND BLOOD-FORMING ORGANS AND CERTAIN DISORDERS INVOLVING THE IMMUNE MECHANISM COMPLICATING PREGNANCY, SECOND TRIMESTER: ICD-10-CM

## 2023-09-26 DIAGNOSIS — O99.619 DISEASES OF THE DIGESTIVE SYSTEM COMPLICATING PREGNANCY, UNSPECIFIED TRIMESTER: ICD-10-CM

## 2023-09-26 DIAGNOSIS — O28.5 ABNORMAL CHROMOSOMAL AND GENETIC FINDING ON ANTENATAL SCREENING OF MOTHER: ICD-10-CM

## 2023-10-06 ENCOUNTER — NON-APPOINTMENT (OUTPATIENT)
Age: 23
End: 2023-10-06

## 2023-10-23 ENCOUNTER — APPOINTMENT (OUTPATIENT)
Dept: ANTEPARTUM | Facility: CLINIC | Age: 23
End: 2023-10-23

## 2023-10-24 LAB
AFP MOM: 1.59
AFP VALUE: 25.4 UG/ML
ALPHA FETOPROTEIN AMNIOTIC FLUID INTERPRETATION: NORMAL
ALPHA FETOPROTEIN AMNIOTIC FLUID: NORMAL
DIRECTOR: NORMAL
GESTATIONAL AGE(WKS): 15

## 2023-11-02 ENCOUNTER — ASOB RESULT (OUTPATIENT)
Age: 23
End: 2023-11-02

## 2023-11-02 ENCOUNTER — APPOINTMENT (OUTPATIENT)
Dept: ANTEPARTUM | Facility: CLINIC | Age: 23
End: 2023-11-02
Payer: COMMERCIAL

## 2023-11-02 VITALS
BODY MASS INDEX: 34.55 KG/M2 | WEIGHT: 195 LBS | HEART RATE: 100 BPM | HEIGHT: 63 IN | DIASTOLIC BLOOD PRESSURE: 80 MMHG | SYSTOLIC BLOOD PRESSURE: 135 MMHG

## 2023-11-02 DIAGNOSIS — O28.1 ABNORMAL BIOCHEMICAL FINDING ON ANTENATAL SCREENING OF MOTHER: ICD-10-CM

## 2023-11-02 DIAGNOSIS — O41.00X0 OLIGOHYDRAMNIOS, UNSPECIFIED TRIMESTER, NOT APPLICABLE OR UNSPECIFIED: ICD-10-CM

## 2023-11-02 DIAGNOSIS — Z3A.21 21 WEEKS GESTATION OF PREGNANCY: ICD-10-CM

## 2023-11-02 DIAGNOSIS — O35.EXX0 MATERNAL CARE FOR OTHER (SUSPECTED) FETAL ABNORMALITY AND DAMAGE, FETAL GENITOURINARY ANOMALIES, NOT APPLICABLE OR UNSPECIFIED: ICD-10-CM

## 2023-11-02 PROCEDURE — 76817 TRANSVAGINAL US OBSTETRIC: CPT

## 2023-11-02 PROCEDURE — 76816 OB US FOLLOW-UP PER FETUS: CPT

## 2023-11-02 PROCEDURE — 99214 OFFICE O/P EST MOD 30 MIN: CPT | Mod: 25

## 2023-11-08 ENCOUNTER — APPOINTMENT (OUTPATIENT)
Dept: PEDIATRIC CARDIOLOGY | Facility: CLINIC | Age: 23
End: 2023-11-08
Payer: COMMERCIAL

## 2023-11-08 PROCEDURE — 76827 ECHO EXAM OF FETAL HEART: CPT

## 2023-11-08 PROCEDURE — 76825 ECHO EXAM OF FETAL HEART: CPT

## 2023-11-08 PROCEDURE — 93325 DOPPLER ECHO COLOR FLOW MAPG: CPT

## 2023-11-08 PROCEDURE — 99205 OFFICE O/P NEW HI 60 MIN: CPT | Mod: 25

## 2023-11-15 ENCOUNTER — APPOINTMENT (OUTPATIENT)
Dept: MATERNAL FETAL MEDICINE | Facility: CLINIC | Age: 23
End: 2023-11-15

## 2023-11-15 ENCOUNTER — ASOB RESULT (OUTPATIENT)
Age: 23
End: 2023-11-15

## 2023-11-15 ENCOUNTER — APPOINTMENT (OUTPATIENT)
Dept: ANTEPARTUM | Facility: CLINIC | Age: 23
End: 2023-11-15
Payer: COMMERCIAL

## 2023-11-15 VITALS
BODY MASS INDEX: 35.08 KG/M2 | HEART RATE: 99 BPM | DIASTOLIC BLOOD PRESSURE: 80 MMHG | SYSTOLIC BLOOD PRESSURE: 140 MMHG | OXYGEN SATURATION: 98 % | WEIGHT: 198 LBS | HEIGHT: 63 IN

## 2023-11-15 PROCEDURE — 76816 OB US FOLLOW-UP PER FETUS: CPT

## 2023-12-09 ENCOUNTER — INPATIENT (INPATIENT)
Facility: HOSPITAL | Age: 23
LOS: 2 days | Discharge: HOME | DRG: 832 | End: 2023-12-12
Attending: STUDENT IN AN ORGANIZED HEALTH CARE EDUCATION/TRAINING PROGRAM | Admitting: STUDENT IN AN ORGANIZED HEALTH CARE EDUCATION/TRAINING PROGRAM
Payer: COMMERCIAL

## 2023-12-09 VITALS
RESPIRATION RATE: 16 BRPM | HEART RATE: 84 BPM | SYSTOLIC BLOOD PRESSURE: 130 MMHG | DIASTOLIC BLOOD PRESSURE: 63 MMHG | TEMPERATURE: 99 F

## 2023-12-09 DIAGNOSIS — O26.892 OTHER SPECIFIED PREGNANCY RELATED CONDITIONS, SECOND TRIMESTER: ICD-10-CM

## 2023-12-09 DIAGNOSIS — O26.899 OTHER SPECIFIED PREGNANCY RELATED CONDITIONS, UNSPECIFIED TRIMESTER: ICD-10-CM

## 2023-12-09 LAB
ALBUMIN SERPL ELPH-MCNC: 3.5 G/DL — SIGNIFICANT CHANGE UP (ref 3.5–5.2)
ALBUMIN SERPL ELPH-MCNC: 3.5 G/DL — SIGNIFICANT CHANGE UP (ref 3.5–5.2)
ALP SERPL-CCNC: 80 U/L — SIGNIFICANT CHANGE UP (ref 30–115)
ALP SERPL-CCNC: 80 U/L — SIGNIFICANT CHANGE UP (ref 30–115)
ALT FLD-CCNC: 11 U/L — SIGNIFICANT CHANGE UP (ref 0–41)
ALT FLD-CCNC: 11 U/L — SIGNIFICANT CHANGE UP (ref 0–41)
ANION GAP SERPL CALC-SCNC: 9 MMOL/L — SIGNIFICANT CHANGE UP (ref 7–14)
ANION GAP SERPL CALC-SCNC: 9 MMOL/L — SIGNIFICANT CHANGE UP (ref 7–14)
APPEARANCE UR: CLEAR — SIGNIFICANT CHANGE UP
APPEARANCE UR: CLEAR — SIGNIFICANT CHANGE UP
AST SERPL-CCNC: 18 U/L — SIGNIFICANT CHANGE UP (ref 0–41)
AST SERPL-CCNC: 18 U/L — SIGNIFICANT CHANGE UP (ref 0–41)
BACTERIA # UR AUTO: NEGATIVE /HPF — SIGNIFICANT CHANGE UP
BACTERIA # UR AUTO: NEGATIVE /HPF — SIGNIFICANT CHANGE UP
BASOPHILS # BLD AUTO: 0.01 K/UL — SIGNIFICANT CHANGE UP (ref 0–0.2)
BASOPHILS # BLD AUTO: 0.01 K/UL — SIGNIFICANT CHANGE UP (ref 0–0.2)
BASOPHILS NFR BLD AUTO: 0.2 % — SIGNIFICANT CHANGE UP (ref 0–1)
BASOPHILS NFR BLD AUTO: 0.2 % — SIGNIFICANT CHANGE UP (ref 0–1)
BILIRUB SERPL-MCNC: 0.4 MG/DL — SIGNIFICANT CHANGE UP (ref 0.2–1.2)
BILIRUB SERPL-MCNC: 0.4 MG/DL — SIGNIFICANT CHANGE UP (ref 0.2–1.2)
BILIRUB UR-MCNC: NEGATIVE — SIGNIFICANT CHANGE UP
BILIRUB UR-MCNC: NEGATIVE — SIGNIFICANT CHANGE UP
BLD GP AB SCN SERPL QL: SIGNIFICANT CHANGE UP
BLD GP AB SCN SERPL QL: SIGNIFICANT CHANGE UP
BUN SERPL-MCNC: 6 MG/DL — LOW (ref 10–20)
BUN SERPL-MCNC: 6 MG/DL — LOW (ref 10–20)
CALCIUM SERPL-MCNC: 8.1 MG/DL — LOW (ref 8.4–10.5)
CALCIUM SERPL-MCNC: 8.1 MG/DL — LOW (ref 8.4–10.5)
CAST: 4 /LPF — SIGNIFICANT CHANGE UP (ref 0–4)
CAST: 4 /LPF — SIGNIFICANT CHANGE UP (ref 0–4)
CHLORIDE SERPL-SCNC: 104 MMOL/L — SIGNIFICANT CHANGE UP (ref 98–110)
CHLORIDE SERPL-SCNC: 104 MMOL/L — SIGNIFICANT CHANGE UP (ref 98–110)
CO2 SERPL-SCNC: 20 MMOL/L — SIGNIFICANT CHANGE UP (ref 17–32)
CO2 SERPL-SCNC: 20 MMOL/L — SIGNIFICANT CHANGE UP (ref 17–32)
COLOR SPEC: YELLOW — SIGNIFICANT CHANGE UP
COLOR SPEC: YELLOW — SIGNIFICANT CHANGE UP
CREAT SERPL-MCNC: 0.6 MG/DL — LOW (ref 0.7–1.5)
CREAT SERPL-MCNC: 0.6 MG/DL — LOW (ref 0.7–1.5)
DIFF PNL FLD: NEGATIVE — SIGNIFICANT CHANGE UP
DIFF PNL FLD: NEGATIVE — SIGNIFICANT CHANGE UP
EGFR: 128 ML/MIN/1.73M2 — SIGNIFICANT CHANGE UP
EGFR: 128 ML/MIN/1.73M2 — SIGNIFICANT CHANGE UP
EOSINOPHIL # BLD AUTO: 0.02 K/UL — SIGNIFICANT CHANGE UP (ref 0–0.7)
EOSINOPHIL # BLD AUTO: 0.02 K/UL — SIGNIFICANT CHANGE UP (ref 0–0.7)
EOSINOPHIL NFR BLD AUTO: 0.4 % — SIGNIFICANT CHANGE UP (ref 0–8)
EOSINOPHIL NFR BLD AUTO: 0.4 % — SIGNIFICANT CHANGE UP (ref 0–8)
GLUCOSE SERPL-MCNC: 72 MG/DL — SIGNIFICANT CHANGE UP (ref 70–99)
GLUCOSE SERPL-MCNC: 72 MG/DL — SIGNIFICANT CHANGE UP (ref 70–99)
GLUCOSE UR QL: NEGATIVE MG/DL — SIGNIFICANT CHANGE UP
GLUCOSE UR QL: NEGATIVE MG/DL — SIGNIFICANT CHANGE UP
HCT VFR BLD CALC: 23.1 % — LOW (ref 37–47)
HCT VFR BLD CALC: 23.1 % — LOW (ref 37–47)
HGB BLD-MCNC: 8.5 G/DL — LOW (ref 12–16)
HGB BLD-MCNC: 8.5 G/DL — LOW (ref 12–16)
IMM GRANULOCYTES NFR BLD AUTO: 0.4 % — HIGH (ref 0.1–0.3)
IMM GRANULOCYTES NFR BLD AUTO: 0.4 % — HIGH (ref 0.1–0.3)
KETONES UR-MCNC: NEGATIVE MG/DL — SIGNIFICANT CHANGE UP
KETONES UR-MCNC: NEGATIVE MG/DL — SIGNIFICANT CHANGE UP
LEUKOCYTE ESTERASE UR-ACNC: NEGATIVE — SIGNIFICANT CHANGE UP
LEUKOCYTE ESTERASE UR-ACNC: NEGATIVE — SIGNIFICANT CHANGE UP
LYMPHOCYTES # BLD AUTO: 0.68 K/UL — LOW (ref 1.2–3.4)
LYMPHOCYTES # BLD AUTO: 0.68 K/UL — LOW (ref 1.2–3.4)
LYMPHOCYTES # BLD AUTO: 14.3 % — LOW (ref 20.5–51.1)
LYMPHOCYTES # BLD AUTO: 14.3 % — LOW (ref 20.5–51.1)
MCHC RBC-ENTMCNC: 36.8 G/DL — SIGNIFICANT CHANGE UP (ref 32–37)
MCHC RBC-ENTMCNC: 36.8 G/DL — SIGNIFICANT CHANGE UP (ref 32–37)
MCHC RBC-ENTMCNC: 38.1 PG — HIGH (ref 27–31)
MCHC RBC-ENTMCNC: 38.1 PG — HIGH (ref 27–31)
MCV RBC AUTO: 103.6 FL — HIGH (ref 81–99)
MCV RBC AUTO: 103.6 FL — HIGH (ref 81–99)
MONOCYTES # BLD AUTO: 0.42 K/UL — SIGNIFICANT CHANGE UP (ref 0.1–0.6)
MONOCYTES # BLD AUTO: 0.42 K/UL — SIGNIFICANT CHANGE UP (ref 0.1–0.6)
MONOCYTES NFR BLD AUTO: 8.9 % — SIGNIFICANT CHANGE UP (ref 1.7–9.3)
MONOCYTES NFR BLD AUTO: 8.9 % — SIGNIFICANT CHANGE UP (ref 1.7–9.3)
NEUTROPHILS # BLD AUTO: 3.59 K/UL — SIGNIFICANT CHANGE UP (ref 1.4–6.5)
NEUTROPHILS # BLD AUTO: 3.59 K/UL — SIGNIFICANT CHANGE UP (ref 1.4–6.5)
NEUTROPHILS NFR BLD AUTO: 75.8 % — HIGH (ref 42.2–75.2)
NEUTROPHILS NFR BLD AUTO: 75.8 % — HIGH (ref 42.2–75.2)
NITRITE UR-MCNC: NEGATIVE — SIGNIFICANT CHANGE UP
NITRITE UR-MCNC: NEGATIVE — SIGNIFICANT CHANGE UP
NRBC # BLD: 0 /100 WBCS — SIGNIFICANT CHANGE UP (ref 0–0)
NRBC # BLD: 0 /100 WBCS — SIGNIFICANT CHANGE UP (ref 0–0)
PH UR: 6.5 — SIGNIFICANT CHANGE UP (ref 5–8)
PH UR: 6.5 — SIGNIFICANT CHANGE UP (ref 5–8)
PLATELET # BLD AUTO: 335 K/UL — SIGNIFICANT CHANGE UP (ref 130–400)
PLATELET # BLD AUTO: 335 K/UL — SIGNIFICANT CHANGE UP (ref 130–400)
PMV BLD: 9.7 FL — SIGNIFICANT CHANGE UP (ref 7.4–10.4)
PMV BLD: 9.7 FL — SIGNIFICANT CHANGE UP (ref 7.4–10.4)
POTASSIUM SERPL-MCNC: 3.8 MMOL/L — SIGNIFICANT CHANGE UP (ref 3.5–5)
POTASSIUM SERPL-MCNC: 3.8 MMOL/L — SIGNIFICANT CHANGE UP (ref 3.5–5)
POTASSIUM SERPL-SCNC: 3.8 MMOL/L — SIGNIFICANT CHANGE UP (ref 3.5–5)
POTASSIUM SERPL-SCNC: 3.8 MMOL/L — SIGNIFICANT CHANGE UP (ref 3.5–5)
PROT SERPL-MCNC: 7.9 G/DL — SIGNIFICANT CHANGE UP (ref 6–8)
PROT SERPL-MCNC: 7.9 G/DL — SIGNIFICANT CHANGE UP (ref 6–8)
PROT UR-MCNC: 30 MG/DL
PROT UR-MCNC: 30 MG/DL
RBC # BLD: 2.23 M/UL — LOW (ref 4.2–5.4)
RBC # BLD: 2.23 M/UL — LOW (ref 4.2–5.4)
RBC # FLD: 14.4 % — SIGNIFICANT CHANGE UP (ref 11.5–14.5)
RBC # FLD: 14.4 % — SIGNIFICANT CHANGE UP (ref 11.5–14.5)
RBC CASTS # UR COMP ASSIST: 2 /HPF — SIGNIFICANT CHANGE UP (ref 0–4)
RBC CASTS # UR COMP ASSIST: 2 /HPF — SIGNIFICANT CHANGE UP (ref 0–4)
SODIUM SERPL-SCNC: 133 MMOL/L — LOW (ref 135–146)
SODIUM SERPL-SCNC: 133 MMOL/L — LOW (ref 135–146)
SP GR SPEC: 1.02 — SIGNIFICANT CHANGE UP (ref 1–1.03)
SP GR SPEC: 1.02 — SIGNIFICANT CHANGE UP (ref 1–1.03)
SQUAMOUS # UR AUTO: 1 /HPF — SIGNIFICANT CHANGE UP (ref 0–5)
SQUAMOUS # UR AUTO: 1 /HPF — SIGNIFICANT CHANGE UP (ref 0–5)
UROBILINOGEN FLD QL: 1 MG/DL — SIGNIFICANT CHANGE UP (ref 0.2–1)
UROBILINOGEN FLD QL: 1 MG/DL — SIGNIFICANT CHANGE UP (ref 0.2–1)
WBC # BLD: 4.74 K/UL — LOW (ref 4.8–10.8)
WBC # BLD: 4.74 K/UL — LOW (ref 4.8–10.8)
WBC # FLD AUTO: 4.74 K/UL — LOW (ref 4.8–10.8)
WBC # FLD AUTO: 4.74 K/UL — LOW (ref 4.8–10.8)
WBC UR QL: 2 /HPF — SIGNIFICANT CHANGE UP (ref 0–5)
WBC UR QL: 2 /HPF — SIGNIFICANT CHANGE UP (ref 0–5)

## 2023-12-09 PROCEDURE — 85025 COMPLETE CBC W/AUTO DIFF WBC: CPT

## 2023-12-09 PROCEDURE — 87653 STREP B DNA AMP PROBE: CPT

## 2023-12-09 PROCEDURE — 87086 URINE CULTURE/COLONY COUNT: CPT

## 2023-12-09 PROCEDURE — 87591 N.GONORRHOEAE DNA AMP PROB: CPT

## 2023-12-09 PROCEDURE — 83735 ASSAY OF MAGNESIUM: CPT

## 2023-12-09 PROCEDURE — 82746 ASSAY OF FOLIC ACID SERUM: CPT

## 2023-12-09 PROCEDURE — 87798 DETECT AGENT NOS DNA AMP: CPT

## 2023-12-09 PROCEDURE — 87801 DETECT AGNT MULT DNA AMPLI: CPT

## 2023-12-09 PROCEDURE — 87491 CHLMYD TRACH DNA AMP PROBE: CPT

## 2023-12-09 PROCEDURE — 80048 BASIC METABOLIC PNL TOTAL CA: CPT

## 2023-12-09 PROCEDURE — 93306 TTE W/DOPPLER COMPLETE: CPT

## 2023-12-09 PROCEDURE — 36415 COLL VENOUS BLD VENIPUNCTURE: CPT

## 2023-12-09 PROCEDURE — 87661 TRICHOMONAS VAGINALIS AMPLIF: CPT

## 2023-12-09 PROCEDURE — 82607 VITAMIN B-12: CPT

## 2023-12-09 RX ORDER — MAGNESIUM SULFATE 500 MG/ML
4 VIAL (ML) INJECTION ONCE
Refills: 0 | Status: COMPLETED | OUTPATIENT
Start: 2023-12-09 | End: 2023-12-09

## 2023-12-09 RX ORDER — AMPICILLIN TRIHYDRATE 250 MG
2 CAPSULE ORAL EVERY 6 HOURS
Refills: 0 | Status: DISCONTINUED | OUTPATIENT
Start: 2023-12-10 | End: 2023-12-11

## 2023-12-09 RX ORDER — MAGNESIUM SULFATE 500 MG/ML
2 VIAL (ML) INJECTION
Qty: 40 | Refills: 0 | Status: DISCONTINUED | OUTPATIENT
Start: 2023-12-09 | End: 2023-12-10

## 2023-12-09 RX ORDER — AMOXICILLIN 250 MG/5ML
500 SUSPENSION, RECONSTITUTED, ORAL (ML) ORAL EVERY 8 HOURS
Refills: 0 | Status: COMPLETED | OUTPATIENT
Start: 2023-12-09

## 2023-12-09 RX ORDER — AMPICILLIN TRIHYDRATE 250 MG
CAPSULE ORAL
Refills: 0 | Status: DISCONTINUED | OUTPATIENT
Start: 2023-12-09 | End: 2023-12-11

## 2023-12-09 RX ORDER — AMPICILLIN TRIHYDRATE 250 MG
2 CAPSULE ORAL ONCE
Refills: 0 | Status: COMPLETED | OUTPATIENT
Start: 2023-12-09 | End: 2023-12-09

## 2023-12-09 RX ORDER — AZITHROMYCIN 500 MG/1
1000 TABLET, FILM COATED ORAL ONCE
Refills: 0 | Status: COMPLETED | OUTPATIENT
Start: 2023-12-09 | End: 2023-12-09

## 2023-12-09 RX ADMIN — Medication 100 GRAM(S): at 23:48

## 2023-12-09 RX ADMIN — Medication 12 MILLIGRAM(S): at 23:53

## 2023-12-09 RX ADMIN — Medication 300 GRAM(S): at 22:53

## 2023-12-09 RX ADMIN — AZITHROMYCIN 1000 MILLIGRAM(S): 500 TABLET, FILM COATED ORAL at 23:52

## 2023-12-09 NOTE — OB PROVIDER TRIAGE NOTE - NSHPPHYSICALEXAM_GEN_ALL_CORE
Physical Exam  Vital Signs Last 24 Hrs  T(F): 98.9 (09 Dec 2023 20:48), Max: 98.9 (09 Dec 2023 20:48)  HR: 84 (09 Dec 2023 20:48) (84 - 84)  BP: 130/63 (09 Dec 2023 20:48) (130/63 - 130/63)  RR: 16 (09 Dec 2023 20:48) (16 - 16)    Gen: NAD, AOx3  Abdomen: soft, gravid, nontender, no palpable contractions  Cardio: RRR  Pulm:CTABL  Speculum: no pooling, minimal clear discharge, nitrazine pos, ferning neg    EFM: 140bpm/mod/pos accels  [place]:none  SVE:0/0/-3 vtx    BSS: MVP 2.2cm, vtx, BPP 10/10 Physical Exam  Vital Signs Last 24 Hrs  T(F): 98.9 (09 Dec 2023 20:48), Max: 98.9 (09 Dec 2023 20:48)  HR: 84 (09 Dec 2023 20:48) (84 - 84)  BP: 130/63 (09 Dec 2023 20:48) (130/63 - 130/63)  RR: 16 (09 Dec 2023 20:48) (16 - 16)    Gen: NAD, AOx3  Abdomen: soft, gravid, nontender, no palpable contractions  Cardio: RRR  Pulm:CTABL  Speculum: no pooling, minimal clear discharge, nitrazine pos, ferning neg    EFM: 140bpm/mod/pos accels  [place]:none  SVE:0/0/-3 vtx    BSS: MVP 2.2cm, vtx, BPP 10/10

## 2023-12-09 NOTE — OB PROVIDER TRIAGE NOTE - NS_OBGYNHISTORY_OBGYN_ALL_OB_FT
OB Hx:   1x ftnsvd 7-10  1x ft c/s nrfht     Gyn Hx: Denies h/o abnormal pap, STI, ovarian cysts, or fibroids

## 2023-12-09 NOTE — OB RN TRIAGE NOTE - FALL HARM RISK - UNIVERSAL INTERVENTIONS
Bed in lowest position, wheels locked, appropriate side rails in place/Call bell, personal items and telephone in reach/Instruct patient to call for assistance before getting out of bed or chair/Non-slip footwear when patient is out of bed/[place] to call system/Physically safe environment - no spills, clutter or unnecessary equipment/Purposeful Proactive Rounding/Room/bathroom lighting operational, light cord in reach Bed in lowest position, wheels locked, appropriate side rails in place/Call bell, personal items and telephone in reach/Instruct patient to call for assistance before getting out of bed or chair/Non-slip footwear when patient is out of bed/[place] to call system/Physically safe environment - no spills, clutter or unnecessary equipment/Purposeful Proactive Rounding/Room/bathroom lighting operational, light cord in reach

## 2023-12-09 NOTE — OB PROVIDER TRIAGE NOTE - HISTORY OF PRESENT ILLNESS
22 yo  @ 26w5d by LMP, ANIVAL 3/11/23 presents for 3 days of LOF, clear on her underwear. She denies any abdominal pain, vaginal bleeding, dysuria, chest pain, sob, cx, le pain/swelling. She denies any complications this pregnancy. She sees an OBGYN in [place]. h/o pericarditis, no known etiology. She is GBS unknown. Last BM today, last PO 3pm, last intercourse 2 weeks ago. 24 yo  @ 26w5d by LMP, ANIVAL 3/11/23 presents for 3 days of LOF, clear on her underwear. She denies any abdominal pain, vaginal bleeding, dysuria, chest pain, sob, cx, le pain/swelling. She denies any complications this pregnancy. She sees an OBGYN in [place]. h/o pericarditis, no known etiology. She is GBS unknown. Last BM today, last PO 3pm, last intercourse 2 weeks ago.

## 2023-12-09 NOTE — OB PROVIDER TRIAGE NOTE - NSOBPROVIDERNOTE_OBGYN_ALL_OB_FT
24 yo  @ 26w5d, LOF x 3 days, r/o rupture    -f/u labs  -cont efm/toco  -MFM consult in the AM    Dr. Mccullough and Dr. Purvis aware 22 yo  @ 26w5d, LOF x 3 days, r/o rupture    -f/u labs  -cont efm/toco  -MFM consult in the AM    Dr. Mccullough and Dr. Purvis aware 22 yo  @ 26w5d, LOF x 3 days, r/o rupture/PPROM    -f/u labs  -cont efm/toco  -MFM consult in the AM    Dr. Mccullough and Dr. Purvis aware 24 yo  @ 26w5d, LOF x 3 days, r/o rupture/PPROM    -f/u labs  -cont efm/toco  -MFM consult in the AM    Dr. Mccullough and Dr. Purvis aware

## 2023-12-09 NOTE — OB PROVIDER TRIAGE NOTE - NS_EDDCALCULATED_OBGYN_ALL_OB
First Trimester Sonogram #DVT PPx: HSQ  #Diet: NPO pending decision for add'l LP  #PT following #DVT PPx: HSQ  #Diet: Regular  #PT following - Psych following, appreciate recs  - Patient with small focus on MR brain which could represent a small subacute infarct with hemorrhagic transformation, a focus   of infection-cerebritis, a small mass, or atypical demyelination.  - TTE neg for thrombus  - F/u HbA1c, lipid panel

## 2023-12-10 DIAGNOSIS — Z87.59 PERSONAL HISTORY OF OTHER COMPLICATIONS OF PREGNANCY, CHILDBIRTH AND THE PUERPERIUM: ICD-10-CM

## 2023-12-10 DIAGNOSIS — Z3A.26 26 WEEKS GESTATION OF PREGNANCY: ICD-10-CM

## 2023-12-10 LAB
ALLERGY+IMMUNOLOGY DIAG STUDY NOTE: SIGNIFICANT CHANGE UP
ANION GAP SERPL CALC-SCNC: 10 MMOL/L — SIGNIFICANT CHANGE UP (ref 7–14)
ANION GAP SERPL CALC-SCNC: 10 MMOL/L — SIGNIFICANT CHANGE UP (ref 7–14)
ANION GAP SERPL CALC-SCNC: 9 MMOL/L — SIGNIFICANT CHANGE UP (ref 7–14)
ANION GAP SERPL CALC-SCNC: 9 MMOL/L — SIGNIFICANT CHANGE UP (ref 7–14)
ANISOCYTOSIS BLD QL: SLIGHT — SIGNIFICANT CHANGE UP
ANISOCYTOSIS BLD QL: SLIGHT — SIGNIFICANT CHANGE UP
BASOPHILS # BLD AUTO: 0 K/UL — SIGNIFICANT CHANGE UP (ref 0–0.2)
BASOPHILS NFR BLD AUTO: 0 % — SIGNIFICANT CHANGE UP (ref 0–1)
BUN SERPL-MCNC: 4 MG/DL — LOW (ref 10–20)
BUN SERPL-MCNC: 4 MG/DL — LOW (ref 10–20)
BUN SERPL-MCNC: 5 MG/DL — LOW (ref 10–20)
BUN SERPL-MCNC: 5 MG/DL — LOW (ref 10–20)
CALCIUM SERPL-MCNC: 7 MG/DL — LOW (ref 8.4–10.5)
CALCIUM SERPL-MCNC: 7 MG/DL — LOW (ref 8.4–10.5)
CALCIUM SERPL-MCNC: 7.3 MG/DL — LOW (ref 8.4–10.5)
CALCIUM SERPL-MCNC: 7.3 MG/DL — LOW (ref 8.4–10.5)
CHLORIDE SERPL-SCNC: 106 MMOL/L — SIGNIFICANT CHANGE UP (ref 98–110)
CO2 SERPL-SCNC: 18 MMOL/L — SIGNIFICANT CHANGE UP (ref 17–32)
CO2 SERPL-SCNC: 18 MMOL/L — SIGNIFICANT CHANGE UP (ref 17–32)
CO2 SERPL-SCNC: 20 MMOL/L — SIGNIFICANT CHANGE UP (ref 17–32)
CO2 SERPL-SCNC: 20 MMOL/L — SIGNIFICANT CHANGE UP (ref 17–32)
CREAT SERPL-MCNC: 0.5 MG/DL — LOW (ref 0.7–1.5)
CREAT SERPL-MCNC: 0.5 MG/DL — LOW (ref 0.7–1.5)
CREAT SERPL-MCNC: <0.5 MG/DL — LOW (ref 0.7–1.5)
CREAT SERPL-MCNC: <0.5 MG/DL — LOW (ref 0.7–1.5)
DAT IGG-SP REAG RBC-IMP: ABNORMAL
DAT IGG-SP REAG RBC-IMP: ABNORMAL
DIR ANTIGLOB POLYSPECIFIC INTERPRETATION: ABNORMAL
DIR ANTIGLOB POLYSPECIFIC INTERPRETATION: ABNORMAL
EGFR: 134 ML/MIN/1.73M2 — SIGNIFICANT CHANGE UP
EGFR: 134 ML/MIN/1.73M2 — SIGNIFICANT CHANGE UP
EGFR: 142 ML/MIN/1.73M2 — SIGNIFICANT CHANGE UP
EGFR: 142 ML/MIN/1.73M2 — SIGNIFICANT CHANGE UP
EOSINOPHIL # BLD AUTO: 0 K/UL — SIGNIFICANT CHANGE UP (ref 0–0.7)
EOSINOPHIL NFR BLD AUTO: 0 % — SIGNIFICANT CHANGE UP (ref 0–8)
GIANT PLATELETS BLD QL SMEAR: PRESENT — SIGNIFICANT CHANGE UP
GIANT PLATELETS BLD QL SMEAR: PRESENT — SIGNIFICANT CHANGE UP
GLUCOSE SERPL-MCNC: 115 MG/DL — HIGH (ref 70–99)
GLUCOSE SERPL-MCNC: 115 MG/DL — HIGH (ref 70–99)
GLUCOSE SERPL-MCNC: 144 MG/DL — HIGH (ref 70–99)
GLUCOSE SERPL-MCNC: 144 MG/DL — HIGH (ref 70–99)
HCT VFR BLD CALC: 23.6 % — LOW (ref 37–47)
HCT VFR BLD CALC: 23.6 % — LOW (ref 37–47)
HCT VFR BLD CALC: 24.3 % — LOW (ref 37–47)
HCT VFR BLD CALC: 24.3 % — LOW (ref 37–47)
HGB BLD-MCNC: 8.2 G/DL — LOW (ref 12–16)
HGB BLD-MCNC: 8.2 G/DL — LOW (ref 12–16)
HGB BLD-MCNC: 8.6 G/DL — LOW (ref 12–16)
HGB BLD-MCNC: 8.6 G/DL — LOW (ref 12–16)
IAT COMP-SP REAG SERPL QL: ABNORMAL
IAT COMP-SP REAG SERPL QL: ABNORMAL
IMM GRANULOCYTES NFR BLD AUTO: 0.6 % — HIGH (ref 0.1–0.3)
IMM GRANULOCYTES NFR BLD AUTO: 0.6 % — HIGH (ref 0.1–0.3)
LYMPHOCYTES # BLD AUTO: 0.17 K/UL — LOW (ref 1.2–3.4)
LYMPHOCYTES # BLD AUTO: 0.17 K/UL — LOW (ref 1.2–3.4)
LYMPHOCYTES # BLD AUTO: 0.35 K/UL — LOW (ref 1.2–3.4)
LYMPHOCYTES # BLD AUTO: 0.35 K/UL — LOW (ref 1.2–3.4)
LYMPHOCYTES # BLD AUTO: 11 % — LOW (ref 20.5–51.1)
LYMPHOCYTES # BLD AUTO: 11 % — LOW (ref 20.5–51.1)
LYMPHOCYTES # BLD AUTO: 4.3 % — LOW (ref 20.5–51.1)
LYMPHOCYTES # BLD AUTO: 4.3 % — LOW (ref 20.5–51.1)
MACROCYTES BLD QL: SLIGHT — SIGNIFICANT CHANGE UP
MACROCYTES BLD QL: SLIGHT — SIGNIFICANT CHANGE UP
MAGNESIUM SERPL-MCNC: 4.7 MG/DL — CRITICAL HIGH (ref 1.8–2.4)
MAGNESIUM SERPL-MCNC: 4.7 MG/DL — CRITICAL HIGH (ref 1.8–2.4)
MAGNESIUM SERPL-MCNC: 5.3 MG/DL — CRITICAL HIGH (ref 1.8–2.4)
MAGNESIUM SERPL-MCNC: 5.3 MG/DL — CRITICAL HIGH (ref 1.8–2.4)
MANUAL SMEAR VERIFICATION: SIGNIFICANT CHANGE UP
MANUAL SMEAR VERIFICATION: SIGNIFICANT CHANGE UP
MCHC RBC-ENTMCNC: 34.7 G/DL — SIGNIFICANT CHANGE UP (ref 32–37)
MCHC RBC-ENTMCNC: 34.7 G/DL — SIGNIFICANT CHANGE UP (ref 32–37)
MCHC RBC-ENTMCNC: 35.3 PG — HIGH (ref 27–31)
MCHC RBC-ENTMCNC: 35.3 PG — HIGH (ref 27–31)
MCHC RBC-ENTMCNC: 35.4 G/DL — SIGNIFICANT CHANGE UP (ref 32–37)
MCHC RBC-ENTMCNC: 35.4 G/DL — SIGNIFICANT CHANGE UP (ref 32–37)
MCHC RBC-ENTMCNC: 36.1 PG — HIGH (ref 27–31)
MCHC RBC-ENTMCNC: 36.1 PG — HIGH (ref 27–31)
MCV RBC AUTO: 101.7 FL — HIGH (ref 81–99)
MCV RBC AUTO: 101.7 FL — HIGH (ref 81–99)
MCV RBC AUTO: 102.1 FL — HIGH (ref 81–99)
MCV RBC AUTO: 102.1 FL — HIGH (ref 81–99)
MONOCYTES # BLD AUTO: 0.03 K/UL — LOW (ref 0.1–0.6)
MONOCYTES # BLD AUTO: 0.03 K/UL — LOW (ref 0.1–0.6)
MONOCYTES # BLD AUTO: 0.2 K/UL — SIGNIFICANT CHANGE UP (ref 0.1–0.6)
MONOCYTES # BLD AUTO: 0.2 K/UL — SIGNIFICANT CHANGE UP (ref 0.1–0.6)
MONOCYTES NFR BLD AUTO: 0.9 % — LOW (ref 1.7–9.3)
MONOCYTES NFR BLD AUTO: 0.9 % — LOW (ref 1.7–9.3)
MONOCYTES NFR BLD AUTO: 6.3 % — SIGNIFICANT CHANGE UP (ref 1.7–9.3)
MONOCYTES NFR BLD AUTO: 6.3 % — SIGNIFICANT CHANGE UP (ref 1.7–9.3)
NEUTROPHILS # BLD AUTO: 2.61 K/UL — SIGNIFICANT CHANGE UP (ref 1.4–6.5)
NEUTROPHILS # BLD AUTO: 2.61 K/UL — SIGNIFICANT CHANGE UP (ref 1.4–6.5)
NEUTROPHILS # BLD AUTO: 3.64 K/UL — SIGNIFICANT CHANGE UP (ref 1.4–6.5)
NEUTROPHILS # BLD AUTO: 3.64 K/UL — SIGNIFICANT CHANGE UP (ref 1.4–6.5)
NEUTROPHILS NFR BLD AUTO: 82.1 % — HIGH (ref 42.2–75.2)
NEUTROPHILS NFR BLD AUTO: 82.1 % — HIGH (ref 42.2–75.2)
NEUTROPHILS NFR BLD AUTO: 94.8 % — HIGH (ref 42.2–75.2)
NEUTROPHILS NFR BLD AUTO: 94.8 % — HIGH (ref 42.2–75.2)
NRBC # BLD: 0 /100 WBCS — SIGNIFICANT CHANGE UP (ref 0–0)
NRBC # BLD: 0 /100 WBCS — SIGNIFICANT CHANGE UP (ref 0–0)
PLAT MORPH BLD: NORMAL — SIGNIFICANT CHANGE UP
PLAT MORPH BLD: NORMAL — SIGNIFICANT CHANGE UP
PLATELET # BLD AUTO: 305 K/UL — SIGNIFICANT CHANGE UP (ref 130–400)
PLATELET # BLD AUTO: 305 K/UL — SIGNIFICANT CHANGE UP (ref 130–400)
PLATELET # BLD AUTO: 318 K/UL — SIGNIFICANT CHANGE UP (ref 130–400)
PLATELET # BLD AUTO: 318 K/UL — SIGNIFICANT CHANGE UP (ref 130–400)
PMV BLD: 9.3 FL — SIGNIFICANT CHANGE UP (ref 7.4–10.4)
PMV BLD: 9.3 FL — SIGNIFICANT CHANGE UP (ref 7.4–10.4)
PMV BLD: 9.6 FL — SIGNIFICANT CHANGE UP (ref 7.4–10.4)
PMV BLD: 9.6 FL — SIGNIFICANT CHANGE UP (ref 7.4–10.4)
POLYCHROMASIA BLD QL SMEAR: SIGNIFICANT CHANGE UP
POLYCHROMASIA BLD QL SMEAR: SIGNIFICANT CHANGE UP
POTASSIUM SERPL-MCNC: 4 MMOL/L — SIGNIFICANT CHANGE UP (ref 3.5–5)
POTASSIUM SERPL-MCNC: 4 MMOL/L — SIGNIFICANT CHANGE UP (ref 3.5–5)
POTASSIUM SERPL-MCNC: 4.5 MMOL/L — SIGNIFICANT CHANGE UP (ref 3.5–5)
POTASSIUM SERPL-MCNC: 4.5 MMOL/L — SIGNIFICANT CHANGE UP (ref 3.5–5)
POTASSIUM SERPL-SCNC: 4 MMOL/L — SIGNIFICANT CHANGE UP (ref 3.5–5)
POTASSIUM SERPL-SCNC: 4 MMOL/L — SIGNIFICANT CHANGE UP (ref 3.5–5)
POTASSIUM SERPL-SCNC: 4.5 MMOL/L — SIGNIFICANT CHANGE UP (ref 3.5–5)
POTASSIUM SERPL-SCNC: 4.5 MMOL/L — SIGNIFICANT CHANGE UP (ref 3.5–5)
RBC # BLD: 2.32 M/UL — LOW (ref 4.2–5.4)
RBC # BLD: 2.32 M/UL — LOW (ref 4.2–5.4)
RBC # BLD: 2.38 M/UL — LOW (ref 4.2–5.4)
RBC # BLD: 2.38 M/UL — LOW (ref 4.2–5.4)
RBC # FLD: 13.2 % — SIGNIFICANT CHANGE UP (ref 11.5–14.5)
RBC BLD AUTO: ABNORMAL
RBC BLD AUTO: ABNORMAL
SODIUM SERPL-SCNC: 133 MMOL/L — LOW (ref 135–146)
SODIUM SERPL-SCNC: 133 MMOL/L — LOW (ref 135–146)
SODIUM SERPL-SCNC: 136 MMOL/L — SIGNIFICANT CHANGE UP (ref 135–146)
SODIUM SERPL-SCNC: 136 MMOL/L — SIGNIFICANT CHANGE UP (ref 135–146)
WBC # BLD: 3.18 K/UL — LOW (ref 4.8–10.8)
WBC # BLD: 3.18 K/UL — LOW (ref 4.8–10.8)
WBC # BLD: 3.84 K/UL — LOW (ref 4.8–10.8)
WBC # BLD: 3.84 K/UL — LOW (ref 4.8–10.8)
WBC # FLD AUTO: 3.18 K/UL — LOW (ref 4.8–10.8)
WBC # FLD AUTO: 3.18 K/UL — LOW (ref 4.8–10.8)
WBC # FLD AUTO: 3.84 K/UL — LOW (ref 4.8–10.8)
WBC # FLD AUTO: 3.84 K/UL — LOW (ref 4.8–10.8)

## 2023-12-10 PROCEDURE — 99221 1ST HOSP IP/OBS SF/LOW 40: CPT

## 2023-12-10 PROCEDURE — 86077 PHYS BLOOD BANK SERV XMATCH: CPT

## 2023-12-10 PROCEDURE — 59025 FETAL NON-STRESS TEST: CPT | Mod: 26,59

## 2023-12-10 PROCEDURE — 99222 1ST HOSP IP/OBS MODERATE 55: CPT

## 2023-12-10 PROCEDURE — 76818 FETAL BIOPHYS PROFILE W/NST: CPT | Mod: 26

## 2023-12-10 PROCEDURE — 99223 1ST HOSP IP/OBS HIGH 75: CPT | Mod: 25

## 2023-12-10 PROCEDURE — 76815 OB US LIMITED FETUS(S): CPT | Mod: 26

## 2023-12-10 RX ORDER — INFLUENZA VIRUS VACCINE 15; 15; 15; 15 UG/.5ML; UG/.5ML; UG/.5ML; UG/.5ML
0.5 SUSPENSION INTRAMUSCULAR ONCE
Refills: 0 | Status: COMPLETED | OUTPATIENT
Start: 2023-12-10 | End: 2023-12-10

## 2023-12-10 RX ORDER — SODIUM CHLORIDE 9 MG/ML
1000 INJECTION, SOLUTION INTRAVENOUS
Refills: 0 | Status: DISCONTINUED | OUTPATIENT
Start: 2023-12-10 | End: 2023-12-10

## 2023-12-10 RX ADMIN — Medication 100 GRAM(S): at 23:50

## 2023-12-10 RX ADMIN — Medication 50 GM/HR: at 00:19

## 2023-12-10 RX ADMIN — Medication 100 GRAM(S): at 05:47

## 2023-12-10 RX ADMIN — SODIUM CHLORIDE 125 MILLILITER(S): 9 INJECTION, SOLUTION INTRAVENOUS at 00:52

## 2023-12-10 RX ADMIN — Medication 100 GRAM(S): at 11:50

## 2023-12-10 RX ADMIN — Medication 12 MILLIGRAM(S): at 23:54

## 2023-12-10 RX ADMIN — Medication 100 GRAM(S): at 17:50

## 2023-12-10 NOTE — PROGRESS NOTE ADULT - SUBJECTIVE AND OBJECTIVE BOX
PGY 3 Note    Pt evaluated at bedside for magnesium check. She denies visual disturbances, headache and right upper quadrant pain. Also denies nausea/vomiting/chest pain/epigastric pain/shortness of breath. Pain well controlled.     Objective:   T(F): 98 (12-10 @ 00:04), Max: 98.9 (12-09 @ 20:48)  HR: 69 (12-10 @ 07:20)  BP: 123/70 (12-10 @ 06:41) (107/54 - 130/63)  RR: 14 (12-10 @ 00:04)  SpO2: 98% (12-10 @ 07:20)    Gen: NAD, AAOx3  CV: S1S2, RRR, no M/R/G  Pulm: CTAB, no R/R/W  Abd: soft, GRAVID, nontender, no rebound or guarding, no epigastric tenderness, liver nonpalpable +BS.   Ext: no calf tenderness or edema SCDs in place  Neuro: Reflexes 2+ in bilaterally upper extremities    EFM: 120/mod/accels  [place]:0  SVE: deferred    Medications:  amoxicillin 500 milliGRAM(s) Oral every 8 hours  ampicillin  IVPB 2 Gram(s) IV Intermittent every 6 hours  ampicillin  IVPB      betamethasone Injectable 12 milliGRAM(s) IntraMuscular every 24 hours  influenza   Vaccine 0.5 milliLiter(s) IntraMuscular once  lactated ringers. 1000 milliLiter(s) IV Continuous <Continuous>  magnesium sulfate Infusion 2 Gm/Hr IV Continuous <Continuous>    No Known Allergies      Labs:                        8.5    4.74  )-----------( 335      ( 09 Dec 2023 22:20 )             23.1     12-09    133<L>  |  104  |  6<L>  ----------------------------<  72  3.8   |  20  |  0.6<L>    Ca    8.1<L>      09 Dec 2023 22:20    TPro  7.9  /  Alb  3.5  /  TBili  0.4  /  DBili  x   /  AST  18  /  ALT  11  /  AlkPhos  80  12-09     PGY 3 Note    Pt evaluated at bedside for magnesium check. She denies visual disturbances, headache and right upper quadrant pain. Also denies nausea/vomiting/chest pain/epigastric pain/shortness of breath. Pain well controlled.     Objective:   T(F): 98 (12-10 @ 00:04), Max: 98.9 (12-09 @ 20:48)  HR: 69 (12-10 @ 07:20)  BP: 123/70 (12-10 @ 06:41) (107/54 - 130/63)  RR: 14 (12-10 @ 00:04)  SpO2: 98% (12-10 @ 07:20)    Gen: NAD, AAOx3  CV: S1S2, RRR, no M/R/G  Pulm: CTAB, no R/R/W  Abd: soft, GRAVID, nontender, no rebound or guarding, no epigastric tenderness, liver nonpalpable +BS.   Ext: no calf tenderness or edema SCDs in place  Neuro: Reflexes 2+ in bilaterally upper extremities    EFM: 120/mod/accels  [place]:0  SVE: deferred    Medications:  amoxicillin 500 milliGRAM(s) Oral every 8 hours  ampicillin  IVPB 2 Gram(s) IV Intermittent every 6 hours  ampicillin  IVPB      betamethasone Injectable 12 milliGRAM(s) IntraMuscular every 24 hours  influenza   Vaccine 0.5 milliLiter(s) IntraMuscular once  lactated ringers. 1000 milliLiter(s) IV Continuous <Continuous>  magnesium sulfate Infusion 2 Gm/Hr IV Continuous <Continuous>    No Known Allergies      Labs:                        8.5    4.74  )-----------( 335      ( 09 Dec 2023 22:20 )             23.1     12-09    133<L>  |  104  |  6<L>  ----------------------------<  72  3.8   |  20  |  0.6<L>    Ca    8.1<L>      09 Dec 2023 22:20    TPro  7.9  /  Alb  3.5  /  TBili  0.4  /  DBili  x   /  AST  18  /  ALT  11  /  AlkPhos  80  12-09

## 2023-12-10 NOTE — CONSULT NOTE ADULT - ASSESSMENT
Ms. Dumont is a 24 yo  @ 26w5d by LMP with possible PPROM.    1.  Possible PPROM    We discussed that the diagnosis is not 100% clear because of the lapse between the first episode of leakage of fluid and the present time.  That being said we will treat her as if she has  premature rupture of membranes.    We discussed the increased risks of chorioamnionitis (infection in the amniotic fluid) in the context of PPROM.  We will closely monitor her vital signs, temperature, and WBC (which will initially be elevated due to  steroids), as well as her physical exam, especially fundal tenderness.  If there are concerns for chorioamnionitis we will most likely proceed towards delivery. Sometimes the signs of infection can be subtle and the diagnosis can be difficult to make.  Rarely chorioamnionitis can be life threatening requiring ICU admission. Under most circumstances we deliver at around 34 weeks gestation in the absence of chorioamnionitis.   We discussed that around 50% of patients with PPROM will deliver within seven days.    In addition there are increased risks of placental abruption, as well as the baby being bruised in utero due to reduction of the amniotic fluid.  There is also a possibility of cord prolapse which usually results in an emergency  delivery.    Currently there are no signs or symptoms of chorioamnionitis.    Recommend:    1.   steroids (first dose already received)  2.  Ampicillin followed by Amoxicillin  3.  Azithromycin   4.  NICU consult  5.  Continuous external fetal monitoring at the present time.  6.  Follow up GBS culture.    2. Anemia  - Elevated MCV  - Check B12 and folate levels  - Consider Hematology consult.    3. Multiple antibodies in type and Screen  - May be difficult to crossmatch blood.    4.  History of pericarditis in 2023.  - Echocardiogram  - Cardiology consult.  (Is followed by Dr. Behuria)    5.  Possible Lupus?  - Followed by Rheumatology, consider inpatient follow up.    6.  Pregnancy  - Increased risk of down syndrome, Increased NT, right pyelectasis, small pericardial effusion.  NIPTS inconclusive, amniocentesis karyotype and array are negative. She is GBS unknown.  - Previous  delivery.    Ms. Dumont expressed understanding and all of her questions were answered to her satisfaction.  Thank you for this consult.    Maurice Haney MD

## 2023-12-10 NOTE — PROGRESS NOTE ADULT - SUBJECTIVE AND OBJECTIVE BOX
PGY1 Magnesium Note     Subjective:   Pt evaluated at bedside for magnesium check. She denies headache, vision changes, dizziness, SOB, chest pain, RUQ/epigastric pain.    Objective:   Vital signs: T(F): --  HR: 80 (12-10-23 @ 11:58) (62 - 88)  BP: 129/58 (12-10-23 @ 11:40) (107/54 - 130/59)  SpO2: 99% (12-10-23 @ 11:58) (89% - 100%)    12-09-23 @ 07:01  -  12-10-23 @ 07:00  --------------------------------------------------------  IN: 1000 mL / OUT: 2050 mL / NET: -1050 mL    12-10-23 @ 07:01  -  12-10-23 @ 12:05  --------------------------------------------------------  IN: 375 mL / OUT: 400 mL / NET: -25 mL        Gen: NAD, AAOx3  CV: S1S2, RRR, no M/R/G  Pulm: CTAB, no rhonchi or rales or wheezing  Ext: no calf tenderness or edema SCDs in place  Neuro: 1+ UE DTR bilaterally  Abd: soft, GRAVId, nontender, no rebound or guarding, no epigastric tenderness    EFM: 125/mod/+accels, occasional decel  [place]: none  SVE: deferred    Medications:  ampicillin  IVPB: 100 (12-09-23 @ 23:48)  ampicillin  IVPB: 100 (12-10-23 @ 05:47)  azithromycin   Tablet: 1000 (12-09-23 @ 23:52)  betamethasone Injectable: 12 (12-09-23 @ 23:53)  lactated ringers.: 125 (12-10-23 @ 00:23)  magnesium sulfate  IVPB: 300 (12-09-23 @ 22:53)  magnesium sulfate Infusion: 50 (12-09-23 @ 23:19)      Labs:                         8.6    3.84  )-----------( 305      ( 10 Dec 2023 09:22 )             24.3   12-10    133<L>  |  106  |  4<L>  ----------------------------<  115<H>  4.5   |  18  |  <0.5<L>    Ca    7.3<L>      10 Dec 2023 09:22  Mg     4.7     12-10    TPro  7.9  /  Alb  3.5  /  TBili  0.4  /  DBili  x   /  AST  18  /  ALT  11  /  AlkPhos  80  12-09      Urinalysis Basic - ( 10 Dec 2023 09:22 )    Color: x / Appearance: x / SG: x / pH: x  Gluc: 115 mg/dL / Ketone: x  / Bili: x / Urobili: x   Blood: x / Protein: x / Nitrite: x   Leuk Esterase: x / RBC: x / WBC x   Sq Epi: x / Non Sq Epi: x / Bacteria: x   PGY1 Magnesium Note     Subjective:   Pt evaluated at bedside for magnesium check. She denies headache, vision changes, dizziness, SOB, chest pain, RUQ/epigastric pain.    Objective:   Vital signs: T(F): --  HR: 80 (12-10-23 @ 11:58) (62 - 88)  BP: 129/58 (12-10-23 @ 11:40) (107/54 - 130/59)  SpO2: 99% (12-10-23 @ 11:58) (89% - 100%)    12-09-23 @ 07:01  -  12-10-23 @ 07:00  --------------------------------------------------------  IN: 1000 mL / OUT: 2050 mL / NET: -1050 mL    12-10-23 @ 07:01  -  12-10-23 @ 12:05  --------------------------------------------------------  IN: 375 mL / OUT: 400 mL / NET: -25 mL        Gen: NAD, AAOx3  CV: S1S2, RRR, no M/R/G  Pulm: CTAB, no rhonchi or rales or wheezing  Ext: no calf tenderness or edema SCDs in place  Neuro: 1+ UE DTR bilaterally  Abd: soft, GRAVId, nontender, no rebound or guarding, no epigastric tenderness    EFM: 125/mod/+accels, occasional decel  [place]: none  SVE: deferred    Medications:  ampicillin  IVPB: 100 (12-09-23 @ 23:48)  ampicillin  IVPB: 100 (12-10-23 @ 05:47)  azithromycin   Tablet: 1000 (12-09-23 @ 23:52)  betamethasone Injectable: 12 (12-09-23 @ 23:53)  lactated ringers.: 125 (12-10-23 @ 00:23)  magnesium sulfate  IVPB: 300 (12-09-23 @ 22:53)  magnesium sulfate Infusion: 50 (12-09-23 @ 23:19)      Labs:                         8.6    3.84  )-----------( 305      ( 10 Dec 2023 09:22 )             24.3   12-10    133<L>  |  106  |  4<L>  ----------------------------<  115<H>  4.5   |  18  |  <0.5<L>    Ca    7.3<L>      10 Dec 2023 09:22  Mg     4.7     12-10    TPro  7.9  /  Alb  3.5  /  TBili  0.4  /  DBili  x   /  AST  18  /  ALT  11  /  AlkPhos  80  12-09      Urinalysis Basic - ( 10 Dec 2023 09:22 )    Color: x / Appearance: x / SG: x / pH: x  Gluc: 115 mg/dL / Ketone: x  / Bili: x / Urobili: x   Blood: x / Protein: x / Nitrite: x   Leuk Esterase: x / RBC: x / WBC x   Sq Epi: x / Non Sq Epi: x / Bacteria: x

## 2023-12-10 NOTE — OB PROVIDER H&P - NSLOWPPHRISK_OBGYN_A_OB
No previous uterine incision/[place] Pregnancy/Less than or equal to 4 previous vaginal births/No known bleeding disorder/No history of postpartum hemorrhage/No other PPH risks indicated

## 2023-12-10 NOTE — OB RN PATIENT PROFILE - FUNCTIONAL ASSESSMENT - DAILY ACTIVITY 2.
PER MOM STATE PT HAS A SPOT ON HIS LT LEG / LIKE A RED DOT / MOM STATE IT'S GETTING BIGGER / MOM WANT TO KNOW WHAT TO DO FOR IT / PLS ADV 4 = No assist / stand by assistance

## 2023-12-10 NOTE — CONSULT NOTE ADULT - SUBJECTIVE AND OBJECTIVE BOX
[facility] Consult    TRIAGE/ADMISSIONI HPI:  HPI:  Ms. Dumont is a 22 yo  @ 26w5d by LMP, ANIVAL 3/11/23 presents for 3 days of LOF, clear on her underwear. She denies any abdominal pain, vaginal bleeding, dysuria, chest pain, sob, cx, le pain/swelling. She denies any complications this pregnancy. She sees an OBGYN in [place]. h/o pericarditis, no known etiology. Possible history of crohns per chart. Possible h/o SLE per rheum chart, patient denies. Per chart patient has been following MFM for increased risk of down syndrome, Increased NT, right pyelectasis, small pericardial effusion. She is GBS unknown. NIPTS inconclusive, amniocentesis negative. Last BM today, last PO 3pm, last intercourse 2 weeks ago.  (10 Dec 2023 00:16)      [facility] Addendum:  She reports no current leakage of fluid.  She does not have abdominal pain.     PAST MEDICAL & SURGICAL HISTORY:  H/O pericarditis  Possible Crohn's disease  She has had blood transfusions in the past.     delivery        Obstetric history:    #1 (): Vaginal delivery of 7-11 boy, at 40 weeks GA, after 3 days hours of labor . patient received anesthesia. Delivery occurred at [facility]. no pregnancy complications reported.    #2 ():  delivery of 5-10 boy, at 40 weeks GA . Delivery occurred at [facility]. no pregnancy complications reported. Pregnancy #2 Comments: (Abnormal fetal heart rate tracing in labor).    GYN history:  No abnormal pap smears, no STDs     Allergies    No Known Allergies    Intolerances        MEDICATIONS   Prenatal vitamins      Family history:  Mother:  DM2      Social history:  No alcohol use, drug use, or smoking.  She does not work outside the house.     Review of systems:  A complete review of systems was obtained and is negative except as described in the HPI.    General:  In no apparent distress  T(F): 98 (12-10-23 @ 00:04), Max: 98.9 (23 @ 20:48)  HR: 82 (12-10-23 @ 13:44) (62 - 94)  BP: 123/58 (12-10-23 @ 12:41) (107/54 - 130/63)  RR: 14 (12-10-23 @ 00:04) (14 - 16)  SpO2: 97% (12-10-23 @ 13:49) (89% - 100%)  I&O's Summary    09 Dec 2023 07:01  -  10 Dec 2023 07:00  --------------------------------------------------------  IN: 1000 mL / OUT: 2050 mL / NET: -1050 mL    10 Dec 2023 07:01  -  10 Dec 2023 13:51  --------------------------------------------------------  IN: 1175 mL / OUT: 1100 mL / NET: 75 mL        HEENT:  Atraumatic.  Extraocular muscles intact.  CV:  Normal S1 S2.  No murmurs.  Pulmonary:  Clear to auscultation bilaterally.  No wheezing.  Abdomen:  Soft, nontender, nondistended, no rebound, no guarding.  No fundal tenderness  Musculoskeletal:  No calf tenderness  Neurology:  Deep tendon reflexes 2+ bilaterally.  Psych:  Awake, alert, oriented to person, place, time.  Speculum (resident exam): no pooling, minimal clear discharge, nitrazine pos, ferning neg                8.6    3.84  )-----------( 305      ( 10 Dec 2023 09:22 )             24.3                         8.5    4.74  )-----------( 335      ( 09 Dec 2023 22:20 )             23.1       12-10    133  |  106  |  4   ----------------------------<  115  4.5   |  18  |  <0.5      133  |  104  |  6   ----------------------------<  72  3.8   |  20  |  0.6    Ca    7.3      10 Dec 2023 09:22   Mg    4.7  10 Dec 2023 09:22  Phos    x    10 Dec 2023 09:22  Ca    8.1      09 Dec 2023 22:20   Mg    x   09 Dec 2023 22:20  Phos    x    09 Dec 2023 22:20    TPro  7.9  /  Alb  3.5  /  TBili  0.4  /  DBili  x   /  AST  18  /  ALT  11  /  AlkPhos  80  23      Type + Screen (23 @ 22:20)   ABO RH Interpretation: A POS  Antibody Elution and ID (23 @ 22:20)  Antibody Interpretation 1: Eluate Panagglutinin Antibody Identification (23 @ 22:20)  Antibody Interpretation 1: Serum Panagglutinin      Urinalysis Basic - ( 10 Dec 2023 09:22 )    Color: x / Appearance: x / SG: x / pH: x  Gluc: 115 mg/dL / Ketone: x  / Bili: x / Urobili: x   Blood: x / Protein: x / Nitrite: x   Leuk Esterase: x / RBC: x / WBC x   Sq Epi: x / Non Sq Epi: x / Bacteria: x                   [facility] Consult    TRIAGE/ADMISSIONI HPI:  HPI:  Ms. Dumont is a 24 yo  @ 26w5d by LMP, ANIVAL 3/11/23 presents for 3 days of LOF, clear on her underwear. She denies any abdominal pain, vaginal bleeding, dysuria, chest pain, sob, cx, le pain/swelling. She denies any complications this pregnancy. She sees an OBGYN in [place]. h/o pericarditis, no known etiology. Possible history of crohns per chart. Possible h/o SLE per rheum chart, patient denies. Per chart patient has been following MFM for increased risk of down syndrome, Increased NT, right pyelectasis, small pericardial effusion. She is GBS unknown. NIPTS inconclusive, amniocentesis negative. Last BM today, last PO 3pm, last intercourse 2 weeks ago.  (10 Dec 2023 00:16)      [facility] Addendum:  She reports no current leakage of fluid.  She does not have abdominal pain.     PAST MEDICAL & SURGICAL HISTORY:  H/O pericarditis  Possible Crohn's disease  She has had blood transfusions in the past.     delivery        Obstetric history:    #1 (): Vaginal delivery of 7-11 boy, at 40 weeks GA, after 3 days hours of labor . patient received anesthesia. Delivery occurred at [facility]. no pregnancy complications reported.    #2 ():  delivery of 5-10 boy, at 40 weeks GA . Delivery occurred at [facility]. no pregnancy complications reported. Pregnancy #2 Comments: (Abnormal fetal heart rate tracing in labor).    GYN history:  No abnormal pap smears, no STDs     Allergies    No Known Allergies    Intolerances        MEDICATIONS   Prenatal vitamins      Family history:  Mother:  DM2      Social history:  No alcohol use, drug use, or smoking.  She does not work outside the house.     Review of systems:  A complete review of systems was obtained and is negative except as described in the HPI.    General:  In no apparent distress  T(F): 98 (12-10-23 @ 00:04), Max: 98.9 (23 @ 20:48)  HR: 82 (12-10-23 @ 13:44) (62 - 94)  BP: 123/58 (12-10-23 @ 12:41) (107/54 - 130/63)  RR: 14 (12-10-23 @ 00:04) (14 - 16)  SpO2: 97% (12-10-23 @ 13:49) (89% - 100%)  I&O's Summary    09 Dec 2023 07:01  -  10 Dec 2023 07:00  --------------------------------------------------------  IN: 1000 mL / OUT: 2050 mL / NET: -1050 mL    10 Dec 2023 07:01  -  10 Dec 2023 13:51  --------------------------------------------------------  IN: 1175 mL / OUT: 1100 mL / NET: 75 mL        HEENT:  Atraumatic.  Extraocular muscles intact.  CV:  Normal S1 S2.  No murmurs.  Pulmonary:  Clear to auscultation bilaterally.  No wheezing.  Abdomen:  Soft, nontender, nondistended, no rebound, no guarding.  No fundal tenderness  Musculoskeletal:  No calf tenderness  Neurology:  Deep tendon reflexes 2+ bilaterally.  Psych:  Awake, alert, oriented to person, place, time.  Speculum (resident exam): no pooling, minimal clear discharge, nitrazine pos, ferning neg                8.6    3.84  )-----------( 305      ( 10 Dec 2023 09:22 )             24.3                         8.5    4.74  )-----------( 335      ( 09 Dec 2023 22:20 )             23.1       12-10    133  |  106  |  4   ----------------------------<  115  4.5   |  18  |  <0.5      133  |  104  |  6   ----------------------------<  72  3.8   |  20  |  0.6    Ca    7.3      10 Dec 2023 09:22   Mg    4.7  10 Dec 2023 09:22  Phos    x    10 Dec 2023 09:22  Ca    8.1      09 Dec 2023 22:20   Mg    x   09 Dec 2023 22:20  Phos    x    09 Dec 2023 22:20    TPro  7.9  /  Alb  3.5  /  TBili  0.4  /  DBili  x   /  AST  18  /  ALT  11  /  AlkPhos  80  23      Type + Screen (23 @ 22:20)   ABO RH Interpretation: A POS  Antibody Elution and ID (23 @ 22:20)  Antibody Interpretation 1: Eluate Panagglutinin Antibody Identification (23 @ 22:20)  Antibody Interpretation 1: Serum Panagglutinin      Urinalysis Basic - ( 10 Dec 2023 09:22 )    Color: x / Appearance: x / SG: x / pH: x  Gluc: 115 mg/dL / Ketone: x  / Bili: x / Urobili: x   Blood: x / Protein: x / Nitrite: x   Leuk Esterase: x / RBC: x / WBC x   Sq Epi: x / Non Sq Epi: x / Bacteria: x

## 2023-12-10 NOTE — PROGRESS NOTE ADULT - SUBJECTIVE AND OBJECTIVE BOX
MFM Procedure Note    PPROM?    Vertex fetal heart rate 125 beats per minute.  Deepest vertical pocket 3.5 cm.    External fetal heart tracing baseline 125 beats per minute.  Moderate variability.  Positive accelerations.  No decelerations  No contractions.    Reactive non stress test.    Maurice Haney MD

## 2023-12-10 NOTE — OB RN PATIENT PROFILE - FALL HARM RISK - UNIVERSAL INTERVENTIONS
Bed in lowest position, wheels locked, appropriate side rails in place/Call bell, personal items and telephone in reach/Instruct patient to call for assistance before getting out of bed or chair/Non-slip footwear when patient is out of bed/[place] to call system/Physically safe environment - no spills, clutter or unnecessary equipment/Purposeful Proactive Rounding/Room/bathroom lighting operational, light cord in reach Bed in lowest position, wheels locked, appropriate side rails in place/Call bell, personal items and telephone in reach/Instruct patient to call for assistance before getting out of bed or chair/Non-slip footwear when patient is out of bed/[place] to call system/Physically safe environment - no spills, clutter or unnecessary equipment/Purposeful Proactive Rounding/Room/bathroom lighting operational, light cord in reach

## 2023-12-10 NOTE — OB PROVIDER H&P - NSHPLABSRESULTS_GEN_ALL_CORE
8/21/23  HIV NR  Measles non immune  HbsAg NR  RPR 1:32 FTA NR  HepC NR  Rubella Immune    increased risk of down syndrome   increased Nuchal translucency     Sono  12w1d NT 3.1mm >99%  15w1d 5oz fetal anatomy limited bu no major fetal malformations noted - amniocentesis   21w4d 15oz oligohydramnios THE FETAL ANATOMY WAS INCOMPLETELY VISUALIZED DUE TO FETAL POSITION.  HOWEVER, NO MAJOR FETAL  MALFORMATIONS ARE NOTED  23w3d right pyelectasis, pericardial effusion    fetal echo @ 22w2d small pericardial effusion

## 2023-12-10 NOTE — CONSULT NOTE PEDS - SUBJECTIVE AND OBJECTIVE BOX
Ms. Dumont is a 23 year old   female currently at 26.6 weeks GA who presented overnight with concern for PPROM. Pregnancy complicated by increased risk of trisomy 21 on NIPS but with normal amniocentesis. She has history of 1 FT vaginal delivery and 1 FT CS delivery, complicated by  imperforate anus with surgical repair. Both other children now healthy. Maternal history is significant for history of transfusion with positive antibodies and possible Crohn's disease.     Upon admission, she was given betamethasone x1 and started on antibiotics, magnesium was started for neuroprotection. NICU team called to consult for PPROM and risk of  delivery.     I spoke with Kelsey at the bedside. We discussed the risks of PPROM and that if at any point there was concern for either her or the infant or signs of infection, the infant would be delivered. I explained that any infant born less than 36 weeks GA is automatically admitted to the NICU. I discussed the potential complications that can occur if she would deliver in the next few weeks, but that the outcomes would be expected to improve for each additional day of gestation. Specifically, we discussed the following risks:  1. RDS: Due to the infants age, the lungs are immature. Kelsey was given one dose of betamethasone with plans for a second tonight. At this gestational age, I would expect that the infant will still require intubation in the delivery room as part of initial stabilization. The infant may also require chest compressions or medications for resuscitation. Once the baby is stabilized in the delivery room, the infant will transfer to the NICU. The infant will require mechanical ventilation as his lungs mature; as soon as the infant is able to tolerate non-invasive ventilation, we will extubate the infant. As the pregnancy progresses, the likelihood of requiring intubation decreases significantly.  2. Sepsis: Due to the gestational age of the infant, and maternal PPROM, there is a high risk of sepsis. Likely, the infant will be given antibiotics at birth and we will assess for signs of sepsis.  3. Nutrition: Because of the infants age, the intestines are immature. Because of this, it is key that we give only breast milk to the infant. Maternal milk is best - encouraged mom to pump after delivery, and we will arrange for a breast pump for after discharge. She provided breastmilk for her first child for 3 months and her second for 2 months. However, donor milk is also available and recommended for the infant if maternal milk is in short supply. This decreases the risk of NEC, a serious infection, and of feeding intolerance. It is also associated with better long-term outcomes.  4. Survival: Even at 26 weeks GA, the likelihood of survival is quite high and this likelihood significantly increases over the next 1-2 weeks. The likelihood of childhood/post-NICU complications does remain significant at this GA but the degree of these complications can vary widely and many infants have very good outcomes. We will monitor his development in the NICU and he will be assessed by the rehabilitation team, as well as referred to developmental pediatrics after discharge.      We discussed in general the need for additional screening tests (IVH, ROP, PDA) depending on the age of the infant at the time of birth. We will also monitor jaundice, blood sugar, and weight gain.     At this GA, it is likely the infant will require to be in the NICU for several months. Both parents are encouraged to be present in the NICU and participate in the care of their infant as much as possible. Mothers questions were answered. I told her if the delivery plan changes or becomes more imminent, we are happy to re-discuss    If any new questions arise, please feel free to re-consult.    Plan:  1. Anticipate second course of betamethasone.  2. Maternal management and timing of delivery as per OB.   3. NICU to attend delivery.

## 2023-12-10 NOTE — PROGRESS NOTE ADULT - ASSESSMENT
22 yo  @ 26w6d, GBS unknown, with PPROM, on magnesium for neuroprotection, doing well.     - continue magnesium for neuroprotection  - f/u 0730 CBC, BMP, Mag  - s/p celestone #1, dose #2 ordered for today  - cont latency abx  - continuous EFM and toco  - monitor vitals  - Reevaluate in 2hours   - f/u UCx, GBS, GC/CT   - f/u antibody elution and ID   - pending official MFM and NICU consults    D/w D.r [facility] 22 yo  @ 26w6d, GBS unknown, with PPROM, on magnesium for neuroprotection, doing well.     - continue magnesium for neuroprotection  - f/u 0730 CBC, BMP, Mag  - s/p celestone #1, dose #2 ordered for today  - cont latency abx  - continuous EFM and toco  - monitor vitals  - Reevaluate in 2hours   - f/u UCx, GBS, GC/CT   - f/u antibody elution and ID   - pending official MFM and NICU consults    D/w D.r [facility]

## 2023-12-10 NOTE — PROGRESS NOTE ADULT - ASSESSMENT
24 yo  @ 26w6d, GBS unknown, with PPROM, on magnesium for neuroprotection, doing well.     - continue magnesium for neuroprotection  - f/u 0730 CBC, BMP, Mag  - s/p celestone #1, dose #2 ordered for today  - cont latency abx  - continuous EFM and toco  - monitor vitals  - Reevaluate in 2hours   - f/u UCx, GBS, GC/CT   - f/u antibody elution and ID   - pending official MFM and NICU consults

## 2023-12-10 NOTE — PROGRESS NOTE ADULT - ASSESSMENT
24 yo  @ 26w6d, GBS unknown, with PPROM, on magnesium for neuroprotection, doing well.     - continue magnesium for neuroprotection  - ordered 1500 CBC, BMP, Mag  - s/p celestone #1, dose #2 ordered for today  - cont latency abx  - continuous EFM and toco  - monitor vitals  - Reevaluate in 2hours   - f/u UCx, GBS, GC/CT   - f/u antibody elution and ID   - pending NICU consult

## 2023-12-10 NOTE — PROGRESS NOTE ADULT - SUBJECTIVE AND OBJECTIVE BOX
PGY 3 Note    Pt evaluated at bedside for magnesium check. She denies visual disturbances, headache and right upper quadrant pain. Also denies nausea/vomiting/chest pain/epigastric pain/shortness of breath. Pain well controlled.     Objective:   T(F): 98.42 (12-10 @ 12:41), Max: 98.42 (12-10 @ 12:41)  HR: 81 (12-10 @ 16:53)  BP: 123/58 (12-10 @ 12:41) (113/55 - 130/59)  RR: 18 (12-10 @ 12:41)  SpO2: 91% (12-10 @ 16:53)    Gen: NAD, AAOx3  CV: S1S2, RRR, no M/R/G  Pulm: CTAB, no R/R/W  Abd: soft, GRAVID, nontender, no rebound or guarding, no epigastric tenderness, liver nonpalpable +BS.   Ext: no calf tenderness or edema SCDs in place  Neuro: Reflexes 2+ in bilaterally upper extremities      EFM: 120/mod/accels  [place]: 0  SVE: deferred    Medications:  amoxicillin 500 milliGRAM(s) Oral every 8 hours  ampicillin  IVPB 2 Gram(s) IV Intermittent every 6 hours  ampicillin  IVPB      betamethasone Injectable 12 milliGRAM(s) IntraMuscular every 24 hours  influenza   Vaccine 0.5 milliLiter(s) IntraMuscular once  lactated ringers. 1000 milliLiter(s) IV Continuous <Continuous>  magnesium sulfate Infusion 2 Gm/Hr IV Continuous <Continuous>    No Known Allergies      Labs:                        8.6    3.84  )-----------( 305      ( 10 Dec 2023 09:22 )             24.3     12-10    133<L>  |  106  |  4<L>  ----------------------------<  115<H>  4.5   |  18  |  <0.5<L>    Ca    7.3<L>      10 Dec 2023 09:22  Mg     4.7     12-10    TPro  7.9  /  Alb  3.5  /  TBili  0.4  /  DBili  x   /  AST  18  /  ALT  11  /  AlkPhos  80  12-09    Magnesium: 4.7 mg/dL (12-10 @ 09:22)       PGY 3 Note    Pt evaluated at bedside for magnesium check. She denies visual disturbances, headache and right upper quadrant pain. Also denies nausea/vomiting/chest pain/epigastric pain/shortness of breath. Pain well controlled.     Objective:   T(F): 98.42 (12-10 @ 12:41), Max: 98.42 (12-10 @ 12:41)  HR: 81 (12-10 @ 16:53)  BP: 123/58 (12-10 @ 12:41) (113/55 - 130/59)  RR: 18 (12-10 @ 12:41)  SpO2: 91% (12-10 @ 16:53)    Gen: NAD, AAOx3  CV: S1S2, RRR, no M/R/G  Pulm: CTAB, no R/R/W  Abd: soft, GRAVID, nontender, no rebound or guarding, no epigastric tenderness, liver nonpalpable +BS.   Ext: no calf tenderness or edema SCDs in place  Neuro: Reflexes 2+ in bilaterally upper extremities      EFM: 120/mod/accels  [place]: 0  SVE: deferred    Medications:  amoxicillin 500 milliGRAM(s) Oral every 8 hours  ampicillin  IVPB 2 Gram(s) IV Intermittent every 6 hours  ampicillin  IVPB      betamethasone Injectable 12 milliGRAM(s) IntraMuscular every 24 hours  influenza   Vaccine 0.5 milliLiter(s) IntraMuscular once  lactated ringers. 1000 milliLiter(s) IV Continuous <Continuous>  magnesium sulfate Infusion 2 Gm/Hr IV Continuous <Continuous>    No Known Allergies      Labs:                        8.6    3.84  )-----------( 305      ( 10 Dec 2023 09:22 )             24.3     12-10    133<L>  |  106  |  4<L>  ----------------------------<  115<H>  4.5   |  18  |  <0.5<L>    Ca    7.3<L>      10 Dec 2023 09:22  Mg     4.7     12-10    TPro  7.9  /  Alb  3.5  /  TBili  0.4  /  DBili  x   /  AST  18  /  ALT  11  /  AlkPhos  80  12-09    Magnesium: 4.7 mg/dL (12-10 @ 09:22)

## 2023-12-10 NOTE — PROGRESS NOTE ADULT - SUBJECTIVE AND OBJECTIVE BOX
PGY2 note    Subjective:   Pt evaluated at bedside for magnesium check. She denies HA, changes in vision, or right upper quadrant pain. Denies CP, SOB, N/V, new onset LE swelling, calf tenderness.  Pain controlled.    Objective:   T(F): 98 (12-10 @ 00:04), Max: 98 (12-10 @ 00:04)  HR: 76 (12-10 @ 09:26)  BP: 120/59 (12-10 @ 08:11) (107/54 - 123/70)  RR: 14 (12-10 @ 00:04)  SpO2: 99% (12-10 @ 09:21)  Vital Signs Last 24 Hrs  BP: 120/59 (10 Dec 2023 08:11) (107/54 - 130/63)    12-09 @ 07:01  -  12-10 @ 07:00  --------------------------------------------------------  IN: 875 mL / OUT: 1650 mL / NET: -775 mL      EFM: 140/mod/+accels  [place]: none  SVE: deferred  Gen: NAD, AAOx3  CV: RRR, no M/R/G  Pulm: CTAB, no R/R/W  Abd: soft, gravid, nontender, no rebound or guarding, no epigastric tenderness, liver nonpalpable +BS.   :  voiding  Ext: +1 edema [place], SCDs in place  Neuro: 2+ BL upper extremity reflexes    Medications:  amoxicillin 500 milliGRAM(s) Oral every 8 hours  ampicillin  IVPB 2 Gram(s) IV Intermittent every 6 hours  ampicillin  IVPB      betamethasone Injectable 12 milliGRAM(s) IntraMuscular every 24 hours  influenza   Vaccine 0.5 milliLiter(s) IntraMuscular once  lactated ringers. 1000 milliLiter(s) IV Continuous <Continuous>  magnesium sulfate Infusion 2 Gm/Hr IV Continuous <Continuous>    No Known Allergies      Labs:                        8.5    4.74  )-----------( 335      ( 09 Dec 2023 22:20 )             23.1     12-09    133<L>  |  104  |  6<L>  ----------------------------<  72  3.8   |  20  |  0.6<L>    Ca    8.1<L>      09 Dec 2023 22:20    TPro  7.9  /  Alb  3.5  /  TBili  0.4  /  DBili  x   /  AST  18  /  ALT  11  /  AlkPhos  80  12-09

## 2023-12-10 NOTE — PROGRESS NOTE ADULT - ASSESSMENT
22 yo  @ 26w6d, GBS unknown, with PPROM, on magnesium for neuroprotection, doing well.     - continue magnesium for neuroprotection  - ordered 1500 CBC, BMP, Mag  - s/p celestone #1, dose #2 ordered for today  - cont latency abx  - continuous EFM and toco  - monitor vitals  - Reevaluate in 2hours   - f/u UCx, GBS, GC/CT   - f/u antibody elution and ID   - pending official MFM and NICU consults    Discussed with Dr. Cortes 24 yo  @ 26w6d, GBS unknown, with PPROM, on magnesium for neuroprotection, doing well.     - continue magnesium for neuroprotection  - ordered 1500 CBC, BMP, Mag  - s/p celestone #1, dose #2 ordered for today  - cont latency abx  - continuous EFM and toco  - monitor vitals  - Reevaluate in 2hours   - f/u UCx, GBS, GC/CT   - f/u antibody elution and ID   - pending official MFM and NICU consults    Discussed with Dr. Cortes

## 2023-12-10 NOTE — OB PROVIDER H&P - ATTENDING COMMENTS
26 weeks with suspected PPROM based on pos. nitrazine, 3 days of leakage of fluid, and low MVP  pt counseled on findings  MFM contacted  will start antibiotics, mag sulfate, betamethasone, consult NICU to discuss outcomes  EFM appropriate for gestational age  ontinue to mary flores

## 2023-12-10 NOTE — OB PROVIDER H&P - HISTORY OF PRESENT ILLNESS
24 yo  @ 26w5d by LMP, ANIVAL 3/11/23 presents for 3 days of LOF, clear on her underwear. She denies any abdominal pain, vaginal bleeding, dysuria, chest pain, sob, cx, le pain/swelling. She denies any complications this pregnancy. She sees an OBGYN in [place]. h/o pericarditis, no known etiology. She is GBS unknown. Last BM today, last PO 3pm, last intercourse 2 weeks ago. 22 yo  @ 26w5d by LMP, ANIVAL 3/11/23 presents for 3 days of LOF, clear on her underwear. She denies any abdominal pain, vaginal bleeding, dysuria, chest pain, sob, cx, le pain/swelling. She denies any complications this pregnancy. She sees an OBGYN in [place]. h/o pericarditis, no known etiology. She is GBS unknown. Last BM today, last PO 3pm, last intercourse 2 weeks ago. 22 yo  @ 26w5d by LMP, ANIVAL 3/11/23 presents for 3 days of LOF, clear on her underwear. She denies any abdominal pain, vaginal bleeding, dysuria, chest pain, sob, cx, le pain/swelling. She denies any complications this pregnancy. She sees an OBGYN in [place]. h/o pericarditis, no known etiology. Possible history of crohns per chart. Per chart patient has been following MFM for increased risk of down syndrome, Increased NT, right pyelectasis, small pericardial effusion. She is GBS unknown. NIPTS inconclusive, amniocentesis negative. Last BM today, last PO 3pm, last intercourse 2 weeks ago.  22 yo  @ 26w5d by LMP, ANIVAL 3/11/23 presents for 3 days of LOF, clear on her underwear. She denies any abdominal pain, vaginal bleeding, dysuria, chest pain, sob, cx, le pain/swelling. She denies any complications this pregnancy. She sees an OBGYN in [place]. h/o pericarditis, no known etiology. Possible history of crohns per chart. Per chart patient has been following MFM for increased risk of down syndrome, Increased NT, right pyelectasis, small pericardial effusion. She is GBS unknown. NIPTS inconclusive, amniocentesis negative. Last BM today, last PO 3pm, last intercourse 2 weeks ago.  22 yo  @ 26w5d by LMP, ANIVAL 3/11/23 presents for 3 days of LOF, clear on her underwear. She denies any abdominal pain, vaginal bleeding, dysuria, chest pain, sob, cx, le pain/swelling. She denies any complications this pregnancy. She sees an OBGYN in [place]. h/o pericarditis, no known etiology. Possible history of crohns per chart. Possible h/o SLE per rheum chart, patient denies. Per chart patient has been following MFM for increased risk of down syndrome, Increased NT, right pyelectasis, small pericardial effusion. She is GBS unknown. NIPTS inconclusive, amniocentesis negative. Last BM today, last PO 3pm, last intercourse 2 weeks ago.  24 yo  @ 26w5d by LMP, ANIVAL 3/11/23 presents for 3 days of LOF, clear on her underwear. She denies any abdominal pain, vaginal bleeding, dysuria, chest pain, sob, cx, le pain/swelling. She denies any complications this pregnancy. She sees an OBGYN in [place]. h/o pericarditis, no known etiology. Possible history of crohns per chart. Possible h/o SLE per rheum chart, patient denies. Per chart patient has been following MFM for increased risk of down syndrome, Increased NT, right pyelectasis, small pericardial effusion. She is GBS unknown. NIPTS inconclusive, amniocentesis negative. Last BM today, last PO 3pm, last intercourse 2 weeks ago.

## 2023-12-10 NOTE — PROGRESS NOTE ADULT - SUBJECTIVE AND OBJECTIVE BOX
PGY3 Magnesium Note     Subjective:   Pt evaluated at bedside for magnesium check. She denies headache, vision changes, dizziness, SOB, chest pain. Denies CTX or VB. Reports good FM.     Objective:   Vital signs: T(F): 98 (12-10-23 @ 00:04), Max: 98.9 (23 @ 20:48)  HR: 71 (12-10-23 @ 04:14) (71 - 88)  BP: 107/54 (12-10-23 @ 02:35) (107/54 - 130/63)  RR: 14 (12-10-23 @ 00:04) (14 - 16)  SpO2: 99% (12-10-23 @ 04:14) (98% - 100%)    23 @ 07:01  -  12-10-23 @ 04:58  --------------------------------------------------------  IN: 500 mL / OUT: 950 mL / NET: -450 mL    Gen: NAD, AAOx3  CV: S1S2, RRR, no M/R/G  Pulm: CTAB  Ext: no calf tenderness or edema, SCDs in place  Neuro: 2+ DTR bilaterally  Abd: soft, gravid, nontender, no rebound or guarding, no epigastric tenderness    EFM: 120/mod saul/pos accel  [place]: none  SVE:  deferred    Medications:  ampicillin  IVPB: 100 (23 @ 23:48)  azithromycin   Tablet: 1000 (23 @ 23:52)  betamethasone Injectable: 12 (23 @ 23:53)  lactated ringers.: 125 (12-10-23 @ 00:23)  magnesium sulfate  IVPB: 300 (23 @ 22:53)  magnesium sulfate Infusion: 50 (23 @ 23:19)      Labs:                         8.5    4.74  )-----------( 335      ( 09 Dec 2023 22:20 )             23.1       133<L>  |  104  |  6<L>  ----------------------------<  72  3.8   |  20  |  0.6<L>    Ca    8.1<L>      09 Dec 2023 22:20    TPro  7.9  /  Alb  3.5  /  TBili  0.4  /  DBili  x   /  AST  18  /  ALT  11  /  AlkPhos  80  12-    Urinalysis Basic - ( 09 Dec 2023 22:20 )  Color: Yellow / Appearance: Clear / S.021 / pH: x  Gluc: 72 mg/dL / Ketone: Negative mg/dL  / Bili: Negative / Urobili: 1.0 mg/dL   Blood: x / Protein: 30 mg/dL / Nitrite: Negative   Leuk Esterase: Negative / RBC: 2 /HPF / WBC 2 /HPF   Sq Epi: x / Non Sq Epi: 1 /HPF / Bacteria: Negative /HPF     PGY3 Magnesium Note     Subjective:   Pt evaluated at bedside for magnesium check. She denies headache, vision changes, dizziness, SOB, chest pain. Denies CTX or VB. Reports good FM.     Objective:   Vital signs: T(F): 98 (12-10-23 @ 00:04), Max: 98.9 (23 @ 20:48)  HR: 71 (12-10-23 @ 04:14) (71 - 88)  BP: 107/54 (12-10-23 @ 02:35) (107/54 - 130/63)  RR: 14 (12-10-23 @ 00:04) (14 - 16)  SpO2: 99% (12-10-23 @ 04:14) (98% - 100%)    23 @ 07:01  -  12-10-23 @ 04:58  --------------------------------------------------------  IN: 500 mL / OUT: 950 mL / NET: -450 mL    Gen: NAD, AAOx3  CV: S1S2, RRR, no M/R/G  Pulm: CTAB  Ext: no calf tenderness or edema, SCDs in place  Neuro: 2+ DTR bilaterally  Abd: soft, gravid, nontender, no rebound or guarding, no epigastric tenderness    EFM: 120/mod saul/pos accel  [place]: none  SVE:  deferred    Medications:  ampicillin  IVPB: 100 (23 @ 23:48)  azithromycin   Tablet: 1000 (23 @ 23:52)  betamethasone Injectable: 12 (23 @ 23:53)  lactated ringers.: 125 (12-10-23 @ 00:23)  magnesium sulfate  IVPB: 300 (23 @ 22:53)  magnesium sulfate Infusion: 50 (23 @ 23:19)      Labs:                         8.5    4.74  )-----------( 335      ( 09 Dec 2023 22:20 )             23.1       133<L>  |  104  |  6<L>  ----------------------------<  72  3.8   |  20  |  0.6<L>    Ca    8.1<L>      09 Dec 2023 22:20    TPro  7.9  /  Alb  3.5  /  TBili  0.4  /  DBili  x   /  AST  18  /  ALT  11  /  AlkPhos  80  12-    Urinalysis Basic - ( 09 Dec 2023 22:20 )  Color: Yellow / Appearance: Clear / S.021 / pH: x  Gluc: 72 mg/dL / Ketone: Negative mg/dL  / Bili: Negative / Urobili: 1.0 mg/dL   Blood: x / Protein: 30 mg/dL / Nitrite: Negative   Leuk Esterase: Negative / RBC: 2 /HPF / WBC 2 /HPF   Sq Epi: x / Non Sq Epi: 1 /HPF / Bacteria: Negative /HPF

## 2023-12-10 NOTE — OB PROVIDER H&P - NSHPPHYSICALEXAM_GEN_ALL_CORE
Physical Exam  Vital Signs Last 24 Hrs  T(F): 98.9 (09 Dec 2023 20:48), Max: 98.9 (09 Dec 2023 20:48)  HR: 84 (09 Dec 2023 20:48) (84 - 84)  BP: 130/63 (09 Dec 2023 20:48) (130/63 - 130/63)  RR: 16 (09 Dec 2023 20:48) (16 - 16)    Gen: NAD, AOx3  Abdomen: soft, gravid, nontender, no palpable contractions  Cardio: RRR  Pulm:CTABL  Speculum: no pooling, minimal clear discharge, nitrazine pos, ferning neg    EFM: 140bpm/mod/pos accels  [place]:none  SVE:0/0/-3 vtx    BSS: MVP 2.2cm, vtx, BPP 10/10 Physical Exam  Vital Signs Last 24 Hrs  T(F): 98.9 (09 Dec 2023 20:48), Max: 98.9 (09 Dec 2023 20:48)  HR: 84 (09 Dec 2023 20:48) (84 - 84)  BP: 130/63 (09 Dec 2023 20:48) (130/63 - 130/63)  RR: 16 (09 Dec 2023 20:48) (16 - 16)    Gen: NAD, AOx3  Abdomen: soft, gravid, nontender, no palpable contractions  Cardio: RRR  Pulm:CTABL  Speculum: no pooling, minimal clear discharge, nitrazine pos, ferning neg    EFM: 140bpm/mod/pos accels  [place]:none  SVE:0/0/-3 vtx    BSS: MVP 2.2cm, vtx, BPP 10/10

## 2023-12-10 NOTE — OB PROVIDER H&P - ASSESSMENT
24 yo  @ 26w5d, LOF x 3 days, PPROM    -f/u labs  -cont efm/toco  -Celestone course  -Magnesium for nueroprotection  -Antibiotic course    Dr. Mccullough and Dr. Purvis aware 22 yo  @ 26w5d, LOF x 3 days, PPROM    -f/u labs  -cont efm/toco  -Celestone course  -Magnesium for nueroprotection  -Antibiotic course    Dr. Mccullough and Dr. Purvis aware 24 yo  @ 26w5d, LOF x 3 days, PPROM    -f/u labs  -cont efm/toco  -Celestone course  -Magnesium for nueroprotection  -Antibiotic course  -NICU consult  -discussed with MFM, will be seen in AM, pt is stable    Dr. Mccullough and Dr. Purvis aware 22 yo  @ 26w5d, LOF x 3 days, PPROM    -f/u labs  -cont efm/toco  -Celestone course  -Magnesium for nueroprotection  -Antibiotic course  -NICU consult  -discussed with MFM, will be seen in AM, pt is stable    Dr. Mccullough and Dr. Purvis aware 24 yo  @ 26w5d, increased risk of down syndrome, increased NT, LOF x 3 days, suspected PPROM    -f/u labs  -cont efm/toco  -Celestone course  -Magnesium for nueroprotection  -Antibiotic course  -NICU consult  -discussed with MFM, will be seen in AM, pt is stable    Dr. Mccullough and Dr. Purvis aware

## 2023-12-10 NOTE — PROGRESS NOTE ADULT - ASSESSMENT
22 yo  @ 26w6d, GBS unknown, with PPROM, on magnesium for neuroprotection, doing well.   - continue magnesium for neuroprotection  - next labs at 0730  - s/p celestone #1, dose #2 ordered for today  - cont latency abx  - continuous EFM and toco  - monitor vitals  - Reevaluate in 2hours   - f/u UCx, GBS, GC/CT   - f/u antibody elution and ID   - pending official MFM and NICU consults

## 2023-12-11 LAB
ANION GAP SERPL CALC-SCNC: 8 MMOL/L — SIGNIFICANT CHANGE UP (ref 7–14)
ANION GAP SERPL CALC-SCNC: 8 MMOL/L — SIGNIFICANT CHANGE UP (ref 7–14)
BASOPHILS # BLD AUTO: 0 K/UL — SIGNIFICANT CHANGE UP (ref 0–0.2)
BASOPHILS # BLD AUTO: 0 K/UL — SIGNIFICANT CHANGE UP (ref 0–0.2)
BASOPHILS NFR BLD AUTO: 0 % — SIGNIFICANT CHANGE UP (ref 0–1)
BASOPHILS NFR BLD AUTO: 0 % — SIGNIFICANT CHANGE UP (ref 0–1)
BUN SERPL-MCNC: 7 MG/DL — LOW (ref 10–20)
BUN SERPL-MCNC: 7 MG/DL — LOW (ref 10–20)
C TRACH RRNA SPEC QL NAA+PROBE: SIGNIFICANT CHANGE UP
C TRACH RRNA SPEC QL NAA+PROBE: SIGNIFICANT CHANGE UP
CALCIUM SERPL-MCNC: 7.6 MG/DL — LOW (ref 8.4–10.5)
CALCIUM SERPL-MCNC: 7.6 MG/DL — LOW (ref 8.4–10.5)
CHLORIDE SERPL-SCNC: 108 MMOL/L — SIGNIFICANT CHANGE UP (ref 98–110)
CHLORIDE SERPL-SCNC: 108 MMOL/L — SIGNIFICANT CHANGE UP (ref 98–110)
CO2 SERPL-SCNC: 19 MMOL/L — SIGNIFICANT CHANGE UP (ref 17–32)
CO2 SERPL-SCNC: 19 MMOL/L — SIGNIFICANT CHANGE UP (ref 17–32)
CREAT SERPL-MCNC: 0.5 MG/DL — LOW (ref 0.7–1.5)
CREAT SERPL-MCNC: 0.5 MG/DL — LOW (ref 0.7–1.5)
CULTURE RESULTS: NO GROWTH — SIGNIFICANT CHANGE UP
CULTURE RESULTS: NO GROWTH — SIGNIFICANT CHANGE UP
EGFR: 134 ML/MIN/1.73M2 — SIGNIFICANT CHANGE UP
EGFR: 134 ML/MIN/1.73M2 — SIGNIFICANT CHANGE UP
EOSINOPHIL # BLD AUTO: 0 K/UL — SIGNIFICANT CHANGE UP (ref 0–0.7)
EOSINOPHIL # BLD AUTO: 0 K/UL — SIGNIFICANT CHANGE UP (ref 0–0.7)
EOSINOPHIL NFR BLD AUTO: 0 % — SIGNIFICANT CHANGE UP (ref 0–8)
EOSINOPHIL NFR BLD AUTO: 0 % — SIGNIFICANT CHANGE UP (ref 0–8)
FOLATE SERPL-MCNC: 16.6 NG/ML — SIGNIFICANT CHANGE UP
FOLATE SERPL-MCNC: 16.6 NG/ML — SIGNIFICANT CHANGE UP
GLUCOSE SERPL-MCNC: 119 MG/DL — HIGH (ref 70–99)
GLUCOSE SERPL-MCNC: 119 MG/DL — HIGH (ref 70–99)
HCT VFR BLD CALC: 25 % — LOW (ref 37–47)
HCT VFR BLD CALC: 25 % — LOW (ref 37–47)
HGB BLD-MCNC: 9.1 G/DL — LOW (ref 12–16)
HGB BLD-MCNC: 9.1 G/DL — LOW (ref 12–16)
IMM GRANULOCYTES NFR BLD AUTO: 2.9 % — HIGH (ref 0.1–0.3)
IMM GRANULOCYTES NFR BLD AUTO: 2.9 % — HIGH (ref 0.1–0.3)
LYMPHOCYTES # BLD AUTO: 0.5 K/UL — LOW (ref 1.2–3.4)
LYMPHOCYTES # BLD AUTO: 0.5 K/UL — LOW (ref 1.2–3.4)
LYMPHOCYTES # BLD AUTO: 10.5 % — LOW (ref 20.5–51.1)
LYMPHOCYTES # BLD AUTO: 10.5 % — LOW (ref 20.5–51.1)
MCHC RBC-ENTMCNC: 36.4 G/DL — SIGNIFICANT CHANGE UP (ref 32–37)
MCHC RBC-ENTMCNC: 36.4 G/DL — SIGNIFICANT CHANGE UP (ref 32–37)
MCHC RBC-ENTMCNC: 38.1 PG — HIGH (ref 27–31)
MCHC RBC-ENTMCNC: 38.1 PG — HIGH (ref 27–31)
MCV RBC AUTO: 104.6 FL — HIGH (ref 81–99)
MCV RBC AUTO: 104.6 FL — HIGH (ref 81–99)
MONOCYTES # BLD AUTO: 0.15 K/UL — SIGNIFICANT CHANGE UP (ref 0.1–0.6)
MONOCYTES # BLD AUTO: 0.15 K/UL — SIGNIFICANT CHANGE UP (ref 0.1–0.6)
MONOCYTES NFR BLD AUTO: 3.1 % — SIGNIFICANT CHANGE UP (ref 1.7–9.3)
MONOCYTES NFR BLD AUTO: 3.1 % — SIGNIFICANT CHANGE UP (ref 1.7–9.3)
N GONORRHOEA RRNA SPEC QL NAA+PROBE: SIGNIFICANT CHANGE UP
N GONORRHOEA RRNA SPEC QL NAA+PROBE: SIGNIFICANT CHANGE UP
NEUTROPHILS # BLD AUTO: 3.99 K/UL — SIGNIFICANT CHANGE UP (ref 1.4–6.5)
NEUTROPHILS # BLD AUTO: 3.99 K/UL — SIGNIFICANT CHANGE UP (ref 1.4–6.5)
NEUTROPHILS NFR BLD AUTO: 83.5 % — HIGH (ref 42.2–75.2)
NEUTROPHILS NFR BLD AUTO: 83.5 % — HIGH (ref 42.2–75.2)
NRBC # BLD: 0 /100 WBCS — SIGNIFICANT CHANGE UP (ref 0–0)
NRBC # BLD: 0 /100 WBCS — SIGNIFICANT CHANGE UP (ref 0–0)
PLATELET # BLD AUTO: 330 K/UL — SIGNIFICANT CHANGE UP (ref 130–400)
PLATELET # BLD AUTO: 330 K/UL — SIGNIFICANT CHANGE UP (ref 130–400)
PMV BLD: 9.6 FL — SIGNIFICANT CHANGE UP (ref 7.4–10.4)
PMV BLD: 9.6 FL — SIGNIFICANT CHANGE UP (ref 7.4–10.4)
POTASSIUM SERPL-MCNC: 4.5 MMOL/L — SIGNIFICANT CHANGE UP (ref 3.5–5)
POTASSIUM SERPL-MCNC: 4.5 MMOL/L — SIGNIFICANT CHANGE UP (ref 3.5–5)
POTASSIUM SERPL-SCNC: 4.5 MMOL/L — SIGNIFICANT CHANGE UP (ref 3.5–5)
POTASSIUM SERPL-SCNC: 4.5 MMOL/L — SIGNIFICANT CHANGE UP (ref 3.5–5)
RBC # BLD: 2.39 M/UL — LOW (ref 4.2–5.4)
RBC # BLD: 2.39 M/UL — LOW (ref 4.2–5.4)
RBC # FLD: 14.6 % — HIGH (ref 11.5–14.5)
RBC # FLD: 14.6 % — HIGH (ref 11.5–14.5)
SODIUM SERPL-SCNC: 135 MMOL/L — SIGNIFICANT CHANGE UP (ref 135–146)
SODIUM SERPL-SCNC: 135 MMOL/L — SIGNIFICANT CHANGE UP (ref 135–146)
SPECIMEN SOURCE: SIGNIFICANT CHANGE UP
VIT B12 SERPL-MCNC: 285 PG/ML — SIGNIFICANT CHANGE UP (ref 232–1245)
VIT B12 SERPL-MCNC: 285 PG/ML — SIGNIFICANT CHANGE UP (ref 232–1245)
WBC # BLD: 4.78 K/UL — LOW (ref 4.8–10.8)
WBC # BLD: 4.78 K/UL — LOW (ref 4.8–10.8)
WBC # FLD AUTO: 4.78 K/UL — LOW (ref 4.8–10.8)
WBC # FLD AUTO: 4.78 K/UL — LOW (ref 4.8–10.8)

## 2023-12-11 PROCEDURE — 93306 TTE W/DOPPLER COMPLETE: CPT | Mod: 26

## 2023-12-11 PROCEDURE — 99222 1ST HOSP IP/OBS MODERATE 55: CPT

## 2023-12-11 PROCEDURE — 99221 1ST HOSP IP/OBS SF/LOW 40: CPT

## 2023-12-11 RX ORDER — AMOXICILLIN 250 MG/5ML
500 SUSPENSION, RECONSTITUTED, ORAL (ML) ORAL EVERY 8 HOURS
Refills: 0 | Status: DISCONTINUED | OUTPATIENT
Start: 2023-12-11 | End: 2023-12-11

## 2023-12-11 RX ORDER — AMOXICILLIN 250 MG/5ML
500 SUSPENSION, RECONSTITUTED, ORAL (ML) ORAL EVERY 8 HOURS
Refills: 0 | Status: DISCONTINUED | OUTPATIENT
Start: 2023-12-11 | End: 2023-12-12

## 2023-12-11 RX ADMIN — Medication 500 MILLIGRAM(S): at 21:54

## 2023-12-11 RX ADMIN — Medication 100 GRAM(S): at 05:50

## 2023-12-11 RX ADMIN — Medication 100 GRAM(S): at 11:50

## 2023-12-11 NOTE — CONSULT NOTE ADULT - ATTENDING COMMENTS
Briefly, 23 year old woman for whom cardiology is consulted for history of pericarditis. She has no cardiac symptoms. Echocardiogram reviewed with a trace pericardial effusion, much improved from previous echocardiogram. Trace pericardial effusion can be a physiological part of pregnancy. No further cardiac intervention at this time. Will see patient in the office on discharge.

## 2023-12-11 NOTE — CONSULT NOTE ADULT - ATTENDING COMMENTS
23 year-old woman with h/o possible SLE admitted with PPROM at 27 weeks gestation, rheumatology consulted given possible history of SLE. Pt was thought to possibly have SLE in February 2023, when she was hospitalized with pericarditis and was found to have MALINI 1:2560 speckled, [place] >8, RNP >8, as well as +SUREKHA. She saw Dr. Kirby of rheumatology as an outpatient but declined any treatment for SLE as she was not convinced about the diagnosis, she thought it was based on bloodwork as opposed to her symptoms. Pt has been feeling well since that time, she denies any episodes of chest pain or SOB, rashes, joint pain or swelling, oral ulcers. Denies any h/o miscarriages. Pt's exam today is non-contributory aside from the pregnancy. While pt had multiple tests for SLE which came back high positive, clinically it is not entirely clear whether she has the full-blown diagnosis at this point, or whether she would benefit from any long-term treatment.    - I advised pt to follow up non-urgently with rheumatology as an outpatient for further evaluation and bloodwork

## 2023-12-11 NOTE — CONSULT NOTE ADULT - SUBJECTIVE AND OBJECTIVE BOX
Outpt cardiologist: Dr Behuria    HPI:  24 yo  @ 26w5d by LMP, ANIVAL 3/11/23 presents for 3 days of LOF, clear on her underwear. She denies any abdominal pain, vaginal bleeding, dysuria, chest pain, sob, cx, le pain/swelling. She denies any complications this pregnancy. She sees an OBGYN in [place]. h/o pericarditis, no known etiology. Possible history of crohns per chart. Possible h/o SLE per rheum chart, patient denies. Per chart patient has been following MFM for increased risk of down syndrome, Increased NT, right pyelectasis, small pericardial effusion. She is GBS unknown. NIPTS inconclusive, amniocentesis negative. Last BM today, last PO 3pm, last intercourse 2 weeks ago.  (10 Dec 2023 00:16)      ---  cardio fellow additional notes:    22 y/o F with PMHx of pericarditis s/p colchicine and prednisone resolved 3/23, Crohns, possible Lupus (refused Tx after risk vs benefit discussion) p/w possible premature rupture of membranes at 27 wks gestation. Cardio consulted for prior hx of pericarditis.     PAST MEDICAL & SURGICAL HISTORY  H/O pericarditis    No significant past surgical history        FAMILY HISTORY:  FAMILY HISTORY:  No pertinent family history in first degree relatives        SOCIAL HISTORY:  Social History:      ALLERGIES:  No Known Allergies      MEDICATIONS:  amoxicillin 500 milliGRAM(s) Oral every 8 hours  ampicillin  IVPB 2 Gram(s) IV Intermittent every 6 hours  ampicillin  IVPB      influenza   Vaccine 0.5 milliLiter(s) IntraMuscular once      HOME MEDICATIONS:  Home Medications:      VITALS:   T(F): 98.06 ( @ 08:00), Max: 98.9 ( @ 20:48)  HR: 71 ( @ 09:20) (47 - 94)  BP: 139/73 ( @ 09:20) (107/54 - 139/73)  BP(mean): --  RR: 18 (12-10 @ 12:41) (14 - 18)  SpO2: 93% (12-10 @ 17:50) (89% - 100%)    I&O's Summary    10 Dec 2023 07:01  -  11 Dec 2023 07:00  --------------------------------------------------------  IN: 1175 mL / OUT: 1100 mL / NET: 75 mL        REVIEW OF SYSTEMS:  CONSTITUTIONAL: No weakness, fevers or chills  HEENT: No visual changes, neck/ear pain  RESPIRATORY: No cough   CARDIOVASCULAR: See HPI  GASTROINTESTINAL: No abdominal pain. No nausea, vomiting, diarrhea   GENITOURINARY: No dysuria, frequency or hematuria  NEUROLOGICAL: No new focal deficits  SKIN: No new rashes    PHYSICAL EXAM:  General: Not in distress.     HEENT: EOMI  Cardio: regular, S1, S2, ESTHER 2/6 left parasternal border  Pulm: B/L BS.  Abdomen: Soft, non-tender, distended. Normoactive bowel sounds  Extremities: No edema b/l le  Neuro: A&O x3. No focal deficits    LABS:                        9.1    4.78  )-----------( 330      ( 11 Dec 2023 07:50 )             25.0     12-11    135  |  108  |  7<L>  ----------------------------<  119<H>  4.5   |  19  |  0.5<L>    Ca    7.6<L>      11 Dec 2023 07:50  Mg     5.3     12-10    TPro  7.9  /  Alb  3.5  /  TBili  0.4  /  DBili  x   /  AST  18  /  ALT  11  /  AlkPhos  80  12-              RADIOLOGY:  -CXR: NA  -TTE: < from: TTE Echo Complete w/o Contrast w/ Doppler (23 @ 15:13) >  Summary:   1. Left ventricular ejection fraction, by visual estimation, is 60 to   65%.   2. Normal left ventricular size and wall thicknesses, with normal   systolic and diastolic function.   3. Mild tricuspid regurgitation.   4. Small circumferential pericardial effusion.    PHYSICIAN INTERPRETATION:  Left Ventricle: Normal left ventricular size and wall thicknesses, with   normal systolic and diastolic function. Left ventricular ejection   fraction, by visual estimation, is 60 to 65%.  Right Ventricle: Normal right ventricular size and function.  Left Atrium: Normal left atrialsize.  Right Atrium: Normal right atrial size. RA Area is 13.71 cm² cm2.  Pericardium: Small circumferential pericardial effusion.  Mitral Valve: Structurally normal mitral valve with normal leaflet   excursion. No evidence of mitral stenosis. No mitral regurgitation.  Tricuspid Valve: Structurally normal tricuspid valve with normal leaflet   excursion. Mild tricuspid regurgitation is visualized.  Aortic Valve: Normal trileaflet aortic valve with normal opening. No   evidence of aortic stenosis. No aortic regurgitation.  Pulmonic Valve: The pulmonic valve is normal. Trace pulmonic   regurgitation.  Aorta: The aortic root is normal in size. The ascending aorta was not   well visualized.  Venous: The inferior vena cava was normal sized, with respiratory size   variation greater than 50%.    < end of copied text >    -CCTA: NA  -STRESS TEST: NA  -CATHETERIZATION: NA  -OTHER:  ECG:  NA    TELEMETRY EVENTS: NA   Outpt cardiologist: Dr Behuria    HPI:  22 yo  @ 26w5d by LMP, ANIVAL 3/11/23 presents for 3 days of LOF, clear on her underwear. She denies any abdominal pain, vaginal bleeding, dysuria, chest pain, sob, cx, le pain/swelling. She denies any complications this pregnancy. She sees an OBGYN in [place]. h/o pericarditis, no known etiology. Possible history of crohns per chart. Possible h/o SLE per rheum chart, patient denies. Per chart patient has been following MFM for increased risk of down syndrome, Increased NT, right pyelectasis, small pericardial effusion. She is GBS unknown. NIPTS inconclusive, amniocentesis negative. Last BM today, last PO 3pm, last intercourse 2 weeks ago.  (10 Dec 2023 00:16)      ---  cardio fellow additional notes:    24 y/o F with PMHx of pericarditis s/p colchicine and prednisone resolved 3/23, Crohns, possible Lupus (refused Tx after risk vs benefit discussion) p/w possible premature rupture of membranes at 27 wks gestation. Cardio consulted for prior hx of pericarditis.     PAST MEDICAL & SURGICAL HISTORY  H/O pericarditis    No significant past surgical history        FAMILY HISTORY:  FAMILY HISTORY:  No pertinent family history in first degree relatives        SOCIAL HISTORY:  Social History:      ALLERGIES:  No Known Allergies      MEDICATIONS:  amoxicillin 500 milliGRAM(s) Oral every 8 hours  ampicillin  IVPB 2 Gram(s) IV Intermittent every 6 hours  ampicillin  IVPB      influenza   Vaccine 0.5 milliLiter(s) IntraMuscular once      HOME MEDICATIONS:  Home Medications:      VITALS:   T(F): 98.06 ( @ 08:00), Max: 98.9 ( @ 20:48)  HR: 71 ( @ 09:20) (47 - 94)  BP: 139/73 ( @ 09:20) (107/54 - 139/73)  BP(mean): --  RR: 18 (12-10 @ 12:41) (14 - 18)  SpO2: 93% (12-10 @ 17:50) (89% - 100%)    I&O's Summary    10 Dec 2023 07:01  -  11 Dec 2023 07:00  --------------------------------------------------------  IN: 1175 mL / OUT: 1100 mL / NET: 75 mL        REVIEW OF SYSTEMS:  CONSTITUTIONAL: No weakness, fevers or chills  HEENT: No visual changes, neck/ear pain  RESPIRATORY: No cough   CARDIOVASCULAR: See HPI  GASTROINTESTINAL: No abdominal pain. No nausea, vomiting, diarrhea   GENITOURINARY: No dysuria, frequency or hematuria  NEUROLOGICAL: No new focal deficits  SKIN: No new rashes    PHYSICAL EXAM:  General: Not in distress.     HEENT: EOMI  Cardio: regular, S1, S2, ESTHER 2/6 left parasternal border  Pulm: B/L BS.  Abdomen: Soft, non-tender, distended. Normoactive bowel sounds  Extremities: No edema b/l le  Neuro: A&O x3. No focal deficits    LABS:                        9.1    4.78  )-----------( 330      ( 11 Dec 2023 07:50 )             25.0     12-11    135  |  108  |  7<L>  ----------------------------<  119<H>  4.5   |  19  |  0.5<L>    Ca    7.6<L>      11 Dec 2023 07:50  Mg     5.3     12-10    TPro  7.9  /  Alb  3.5  /  TBili  0.4  /  DBili  x   /  AST  18  /  ALT  11  /  AlkPhos  80  12-              RADIOLOGY:  -CXR: NA  -TTE: < from: TTE Echo Complete w/o Contrast w/ Doppler (23 @ 15:13) >  Summary:   1. Left ventricular ejection fraction, by visual estimation, is 60 to   65%.   2. Normal left ventricular size and wall thicknesses, with normal   systolic and diastolic function.   3. Mild tricuspid regurgitation.   4. Small circumferential pericardial effusion.    PHYSICIAN INTERPRETATION:  Left Ventricle: Normal left ventricular size and wall thicknesses, with   normal systolic and diastolic function. Left ventricular ejection   fraction, by visual estimation, is 60 to 65%.  Right Ventricle: Normal right ventricular size and function.  Left Atrium: Normal left atrialsize.  Right Atrium: Normal right atrial size. RA Area is 13.71 cm² cm2.  Pericardium: Small circumferential pericardial effusion.  Mitral Valve: Structurally normal mitral valve with normal leaflet   excursion. No evidence of mitral stenosis. No mitral regurgitation.  Tricuspid Valve: Structurally normal tricuspid valve with normal leaflet   excursion. Mild tricuspid regurgitation is visualized.  Aortic Valve: Normal trileaflet aortic valve with normal opening. No   evidence of aortic stenosis. No aortic regurgitation.  Pulmonic Valve: The pulmonic valve is normal. Trace pulmonic   regurgitation.  Aorta: The aortic root is normal in size. The ascending aorta was not   well visualized.  Venous: The inferior vena cava was normal sized, with respiratory size   variation greater than 50%.    < end of copied text >    -CCTA: NA  -STRESS TEST: NA  -CATHETERIZATION: NA  -OTHER:  ECG:  NA    TELEMETRY EVENTS: NA   Outpt cardiologist: Dr Behuria    HPI:  22 yo  @ 26w5d by LMP, ANIVAL 3/11/23 presents for 3 days of LOF, clear on her underwear. She denies any abdominal pain, vaginal bleeding, dysuria, chest pain, sob, cx, le pain/swelling. She denies any complications this pregnancy. She sees an OBGYN in [place]. h/o pericarditis, no known etiology. Possible history of crohns per chart. Possible h/o SLE per rheum chart, patient denies. Per chart patient has been following MFM for increased risk of down syndrome, Increased NT, right pyelectasis, small pericardial effusion. She is GBS unknown. NIPTS inconclusive, amniocentesis negative. Last BM today, last PO 3pm, last intercourse 2 weeks ago.  (10 Dec 2023 00:16)      ---  cardio fellow additional notes:    22 y/o F with PMHx of pericarditis s/p colchicine and prednisone resolved 3/23, Crohns, possible Lupus (refused Tx after risk vs benefit discussion) p/w possible premature rupture of membranes at 27 wks gestation. Cardio consulted for prior hx of pericarditis.     PAST MEDICAL & SURGICAL HISTORY  H/O pericarditis    No significant past surgical history        FAMILY HISTORY:  FAMILY HISTORY:  No pertinent family history in first degree relatives        SOCIAL HISTORY:  Social History: no toxic habits      ALLERGIES:  No Known Allergies      MEDICATIONS:  amoxicillin 500 milliGRAM(s) Oral every 8 hours  ampicillin  IVPB 2 Gram(s) IV Intermittent every 6 hours  ampicillin  IVPB      influenza   Vaccine 0.5 milliLiter(s) IntraMuscular once      HOME MEDICATIONS:  Home Medications:      VITALS:   T(F): 98.06 ( @ 08:00), Max: 98.9 ( @ 20:48)  HR: 71 ( @ 09:20) (47 - 94)  BP: 139/73 ( @ 09:20) (107/54 - 139/73)  BP(mean): --  RR: 18 (12-10 @ 12:41) (14 - 18)  SpO2: 93% (12-10 @ 17:50) (89% - 100%)    I&O's Summary    10 Dec 2023 07:01  -  11 Dec 2023 07:00  --------------------------------------------------------  IN: 1175 mL / OUT: 1100 mL / NET: 75 mL        REVIEW OF SYSTEMS:  CONSTITUTIONAL: No weakness, fevers or chills  HEENT: No visual changes, neck/ear pain  RESPIRATORY: No cough   CARDIOVASCULAR: See HPI  GASTROINTESTINAL: No abdominal pain. No nausea, vomiting, diarrhea   GENITOURINARY: No dysuria, frequency or hematuria  NEUROLOGICAL: No new focal deficits  SKIN: No new rashes  10 point ROS completed; negative except as stated in HPI    PHYSICAL EXAM:  General: Not in distress, pleasant  HEENT: EOMI, PERRLA  Neck: supple, no JVD  Cardio: regular, S1, S2, ESTHER 2/6 left parasternal border  Pulm: B/L BS, no crackles  Abdomen: Soft, non-tender, distended. Normoactive bowel sounds  Extremities: No edema b/l le, warm  Neuro: A&O x3. No focal deficits    LABS:                        9.1    4.78  )-----------( 330      ( 11 Dec 2023 07:50 )             25.0     12-11    135  |  108  |  7<L>  ----------------------------<  119<H>  4.5   |  19  |  0.5<L>    Ca    7.6<L>      11 Dec 2023 07:50  Mg     5.3     12-10    TPro  7.9  /  Alb  3.5  /  TBili  0.4  /  DBili  x   /  AST  18  /  ALT  11  /  AlkPhos  80  12-              RADIOLOGY:  -CXR: NA  -TTE: < from: TTE Echo Complete w/o Contrast w/ Doppler (23 @ 15:13) >  Summary:   1. Left ventricular ejection fraction, by visual estimation, is 60 to   65%.   2. Normal left ventricular size and wall thicknesses, with normal   systolic and diastolic function.   3. Mild tricuspid regurgitation.   4. Small circumferential pericardial effusion.    PHYSICIAN INTERPRETATION:  Left Ventricle: Normal left ventricular size and wall thicknesses, with   normal systolic and diastolic function. Left ventricular ejection   fraction, by visual estimation, is 60 to 65%.  Right Ventricle: Normal right ventricular size and function.  Left Atrium: Normal left atrialsize.  Right Atrium: Normal right atrial size. RA Area is 13.71 cm² cm2.  Pericardium: Small circumferential pericardial effusion.  Mitral Valve: Structurally normal mitral valve with normal leaflet   excursion. No evidence of mitral stenosis. No mitral regurgitation.  Tricuspid Valve: Structurally normal tricuspid valve with normal leaflet   excursion. Mild tricuspid regurgitation is visualized.  Aortic Valve: Normal trileaflet aortic valve with normal opening. No   evidence of aortic stenosis. No aortic regurgitation.  Pulmonic Valve: The pulmonic valve is normal. Trace pulmonic   regurgitation.  Aorta: The aortic root is normal in size. The ascending aorta was not   well visualized.  Venous: The inferior vena cava was normal sized, with respiratory size   variation greater than 50%.    < end of copied text >    -CCTA: NA  -STRESS TEST: NA  -CATHETERIZATION: NA  -OTHER:  ECG:  NA    TELEMETRY EVENTS: NA   Outpt cardiologist: Dr Behuria    HPI:  24 yo  @ 26w5d by LMP, ANIVAL 3/11/23 presents for 3 days of LOF, clear on her underwear. She denies any abdominal pain, vaginal bleeding, dysuria, chest pain, sob, cx, le pain/swelling. She denies any complications this pregnancy. She sees an OBGYN in [place]. h/o pericarditis, no known etiology. Possible history of crohns per chart. Possible h/o SLE per rheum chart, patient denies. Per chart patient has been following MFM for increased risk of down syndrome, Increased NT, right pyelectasis, small pericardial effusion. She is GBS unknown. NIPTS inconclusive, amniocentesis negative. Last BM today, last PO 3pm, last intercourse 2 weeks ago.  (10 Dec 2023 00:16)      ---  cardio fellow additional notes:    24 y/o F with PMHx of pericarditis s/p colchicine and prednisone resolved 3/23, Crohns, possible Lupus (refused Tx after risk vs benefit discussion) p/w possible premature rupture of membranes at 27 wks gestation. Cardio consulted for prior hx of pericarditis.     PAST MEDICAL & SURGICAL HISTORY  H/O pericarditis    No significant past surgical history        FAMILY HISTORY:  FAMILY HISTORY:  No pertinent family history in first degree relatives        SOCIAL HISTORY:  Social History: no toxic habits      ALLERGIES:  No Known Allergies      MEDICATIONS:  amoxicillin 500 milliGRAM(s) Oral every 8 hours  ampicillin  IVPB 2 Gram(s) IV Intermittent every 6 hours  ampicillin  IVPB      influenza   Vaccine 0.5 milliLiter(s) IntraMuscular once      HOME MEDICATIONS:  Home Medications:      VITALS:   T(F): 98.06 ( @ 08:00), Max: 98.9 ( @ 20:48)  HR: 71 ( @ 09:20) (47 - 94)  BP: 139/73 ( @ 09:20) (107/54 - 139/73)  BP(mean): --  RR: 18 (12-10 @ 12:41) (14 - 18)  SpO2: 93% (12-10 @ 17:50) (89% - 100%)    I&O's Summary    10 Dec 2023 07:01  -  11 Dec 2023 07:00  --------------------------------------------------------  IN: 1175 mL / OUT: 1100 mL / NET: 75 mL        REVIEW OF SYSTEMS:  CONSTITUTIONAL: No weakness, fevers or chills  HEENT: No visual changes, neck/ear pain  RESPIRATORY: No cough   CARDIOVASCULAR: See HPI  GASTROINTESTINAL: No abdominal pain. No nausea, vomiting, diarrhea   GENITOURINARY: No dysuria, frequency or hematuria  NEUROLOGICAL: No new focal deficits  SKIN: No new rashes  10 point ROS completed; negative except as stated in HPI    PHYSICAL EXAM:  General: Not in distress, pleasant  HEENT: EOMI, PERRLA  Neck: supple, no JVD  Cardio: regular, S1, S2, ESTHER 2/6 left parasternal border  Pulm: B/L BS, no crackles  Abdomen: Soft, non-tender, distended. Normoactive bowel sounds  Extremities: No edema b/l le, warm  Neuro: A&O x3. No focal deficits    LABS:                        9.1    4.78  )-----------( 330      ( 11 Dec 2023 07:50 )             25.0     12-11    135  |  108  |  7<L>  ----------------------------<  119<H>  4.5   |  19  |  0.5<L>    Ca    7.6<L>      11 Dec 2023 07:50  Mg     5.3     12-10    TPro  7.9  /  Alb  3.5  /  TBili  0.4  /  DBili  x   /  AST  18  /  ALT  11  /  AlkPhos  80  12-              RADIOLOGY:  -CXR: NA  -TTE: < from: TTE Echo Complete w/o Contrast w/ Doppler (23 @ 15:13) >  Summary:   1. Left ventricular ejection fraction, by visual estimation, is 60 to   65%.   2. Normal left ventricular size and wall thicknesses, with normal   systolic and diastolic function.   3. Mild tricuspid regurgitation.   4. Small circumferential pericardial effusion.    PHYSICIAN INTERPRETATION:  Left Ventricle: Normal left ventricular size and wall thicknesses, with   normal systolic and diastolic function. Left ventricular ejection   fraction, by visual estimation, is 60 to 65%.  Right Ventricle: Normal right ventricular size and function.  Left Atrium: Normal left atrialsize.  Right Atrium: Normal right atrial size. RA Area is 13.71 cm² cm2.  Pericardium: Small circumferential pericardial effusion.  Mitral Valve: Structurally normal mitral valve with normal leaflet   excursion. No evidence of mitral stenosis. No mitral regurgitation.  Tricuspid Valve: Structurally normal tricuspid valve with normal leaflet   excursion. Mild tricuspid regurgitation is visualized.  Aortic Valve: Normal trileaflet aortic valve with normal opening. No   evidence of aortic stenosis. No aortic regurgitation.  Pulmonic Valve: The pulmonic valve is normal. Trace pulmonic   regurgitation.  Aorta: The aortic root is normal in size. The ascending aorta was not   well visualized.  Venous: The inferior vena cava was normal sized, with respiratory size   variation greater than 50%.    < end of copied text >    -CCTA: NA  -STRESS TEST: NA  -CATHETERIZATION: NA  -OTHER:  ECG:  NA    TELEMETRY EVENTS: NA

## 2023-12-11 NOTE — CONSULT NOTE ADULT - ASSESSMENT
Patient is a 23y old  Female who presents with a chief complaint of PPROM.  We were consulted for possible lupus diagnosis.    Patient meets 4 criteria of SLE out of 10:  1- Positive MALINI  2- Positive anti- Posada antibody  3- Serositis i.e. pericarditis  4- Possible hemolytic anemia with positive direct antiglobulin test    Patient is convinced she does not have lupus, and refuses medications    Plan:      - Will discuss with patient necessity of initiation of hydroxychloroquine  - No evidence of CNS or renal manifestations of SLE requiring more intense regimen for now  - Will need OP rheumatology follow-up.    THIS IS AN INCOMPLETE NOTE, NEEDS TO BE SEEN BY ATTENDING   Patient is a 23y old  Female who presents with a chief complaint of PPROM.  We were consulted for possible lupus diagnosis.    Patient meets 4 criteria of SLE out of 10:  1- Positive MALINI  2- Positive anti- Posada antibody  3- Serositis i.e. pericarditis  4- Possible hemolytic anemia with positive direct antiglobulin test    Patient is convinced she does not have lupus, and refuses medications    Plan:      - Continue with current management, no medications  - Please arrange follow-up in the rheumatology clinic as routine

## 2023-12-11 NOTE — PROGRESS NOTE ADULT - SUBJECTIVE AND OBJECTIVE BOX
PGY3 NOTE    HPI: Ms. Dumont was seen and examined at bedside. Reports she is doing well. Denies CTX, VB. Reports good FM. Has not experienced any leakage of fluid since prior to coming to the hospital. Denies HA, CP, SOB, changes in vision, edema.     PAST MEDICAL & SURGICAL HISTORY:  H/O pericarditis (dx 5mos postpartum)  Possible Crohn's disease (dx at 16yo)  She has had blood transfusions in the past (X2)   delivery (x1)    Obstetric history:    #1 (): Vaginal delivery of 7-11 boy, at 40 weeks GA, after 3 days hours of labor . Pt received anesthesia. Delivery occurred at [facility]. No pregnancy complications reported.    #2 ():  delivery of 5-10 boy, at 40 weeks GA . Delivery occurred at [facility]. no pregnancy complications reported. Pregnancy #2 Comments: (Abnormal fetal heart rate tracing in labor).    GYN history:  No abnormal pap smears, no STDs     Allergies:  No Known Allergies    MEDICATIONS   Prenatal vitamins    Family history:  Mother:  DM2    Social history:  No alcohol use, drug use, or smoking.  She does not work outside the house.     Vital Signs Last 24 Hrs  T(C): 36.9 (10 Dec 2023 12:41), Max: 36.9 (10 Dec 2023 12:41)  T(F): 98.42 (10 Dec 2023 12:41), Max: 98.42 (10 Dec 2023 12:41)  HR: 73 (11 Dec 2023 05:33) (47 - 94)  BP: 128/63 (11 Dec 2023 05:33) (118/59 - 130/59)  RR: 18 (10 Dec 2023 12:41) (18 - 18)  SpO2: 93% (10 Dec 2023 17:50) (89% - 100%)    Gen: AAOx3, NAD  Card: RRR, no M/R/G  Pulm: CTAB  Abd: Soft, nontender, no palpable contractions    EFM: 130/mod saul/pos accel  [place]: none  SVE: deferred   Speculum (resident exam): no pooling, minimal clear discharge, nitrazine pos, ferning neg    Labs:               8.2    3.18  )-----------( 318      ( 12-10 @ 17:21 )             23.6                8.6    3.84  )-----------( 305      ( 12-10 @ 09:22 )             24.3                8.5    4.74  )-----------( 335      (  @ 22:20 )             23.1       10 Dec 2023 17:21    136    |  106    |  5<L>   ----------------------------<  144<H>  4.0     |  20     |  0.5<L>  10 Dec 2023 09:22    133<L>  |  106    |  4<L>   ----------------------------<  115<H>  4.5     |  18     |  <0.5<L>    Ca    7.0<L>      10 Dec 2023 17:21  Ca    7.3<L>      10 Dec 2023 09:22  Mg     5.3<HH>     10 Dec 2023 17:21  Mg     4.7<HH>     10 Dec 2023 09:22    TPro  7.9    /  Alb  3.5    /  TBili  0.4    /  DBili  x      /  AST  18     /  ALT  11     /  AlkPhos  80     09 Dec 2023 22:20    Type + Screen (23 @ 22:20)   ABO RH Interpretation: A POS  Antibody Elution and ID (23 @ 22:20)  Antibody Interpretation 1: Eluate Panagglutinin Antibody Identification (23 @ 22:20)  Antibody Interpretation 1: Serum Panagglutinin                 PGY3 NOTE    HPI: Ms. Dumont was seen and examined at bedside. Reports she is doing well. Denies CTX, VB. Reports good FM. Has not experienced any leakage of fluid since prior to coming to the hospital. Denies HA, CP, SOB, changes in vision, edema.     PAST MEDICAL & SURGICAL HISTORY:  H/O pericarditis (dx 5mos postpartum)  Possible Crohn's disease (dx at 14yo)  She has had blood transfusions in the past (X2)   delivery (x1)    Obstetric history:    #1 (): Vaginal delivery of 7-11 boy, at 40 weeks GA, after 3 days hours of labor . Pt received anesthesia. Delivery occurred at [facility]. No pregnancy complications reported.    #2 ():  delivery of 5-10 boy, at 40 weeks GA . Delivery occurred at [facility]. no pregnancy complications reported. Pregnancy #2 Comments: (Abnormal fetal heart rate tracing in labor).    GYN history:  No abnormal pap smears, no STDs     Allergies:  No Known Allergies    MEDICATIONS   Prenatal vitamins    Family history:  Mother:  DM2    Social history:  No alcohol use, drug use, or smoking.  She does not work outside the house.     Vital Signs Last 24 Hrs  T(C): 36.9 (10 Dec 2023 12:41), Max: 36.9 (10 Dec 2023 12:41)  T(F): 98.42 (10 Dec 2023 12:41), Max: 98.42 (10 Dec 2023 12:41)  HR: 73 (11 Dec 2023 05:33) (47 - 94)  BP: 128/63 (11 Dec 2023 05:33) (118/59 - 130/59)  RR: 18 (10 Dec 2023 12:41) (18 - 18)  SpO2: 93% (10 Dec 2023 17:50) (89% - 100%)    Gen: AAOx3, NAD  Card: RRR, no M/R/G  Pulm: CTAB  Abd: Soft, nontender, no palpable contractions    EFM: 130/mod saul/pos accel  [place]: none  SVE: deferred   Speculum (resident exam): no pooling, minimal clear discharge, nitrazine pos, ferning neg    Labs:               8.2    3.18  )-----------( 318      ( 12-10 @ 17:21 )             23.6                8.6    3.84  )-----------( 305      ( 12-10 @ 09:22 )             24.3                8.5    4.74  )-----------( 335      (  @ 22:20 )             23.1       10 Dec 2023 17:21    136    |  106    |  5<L>   ----------------------------<  144<H>  4.0     |  20     |  0.5<L>  10 Dec 2023 09:22    133<L>  |  106    |  4<L>   ----------------------------<  115<H>  4.5     |  18     |  <0.5<L>    Ca    7.0<L>      10 Dec 2023 17:21  Ca    7.3<L>      10 Dec 2023 09:22  Mg     5.3<HH>     10 Dec 2023 17:21  Mg     4.7<HH>     10 Dec 2023 09:22    TPro  7.9    /  Alb  3.5    /  TBili  0.4    /  DBili  x      /  AST  18     /  ALT  11     /  AlkPhos  80     09 Dec 2023 22:20    Type + Screen (23 @ 22:20)   ABO RH Interpretation: A POS  Antibody Elution and ID (23 @ 22:20)  Antibody Interpretation 1: Eluate Panagglutinin Antibody Identification (23 @ 22:20)  Antibody Interpretation 1: Serum Panagglutinin                 PGY3 NOTE    Gestational Age: 27w0d  Hospital Day: #3    HPI: Ms. Dumont was seen and examined at bedside. Reports she is doing well. Denies CTX, VB. Reports good FM. Has not experienced any leakage of fluid since prior to coming to the hospital. Denies HA, CP, SOB, changes in vision, edema.     PAST MEDICAL & SURGICAL HISTORY:  H/O pericarditis (dx 5mos postpartum)  Possible Crohn's disease (dx at 16yo)  She has had blood transfusions in the past (X2)   delivery (x1)    Obstetric history:    #1 (): Vaginal delivery of 7-11 boy, at 40 weeks GA, after 3 days hours of labor . Pt received anesthesia. Delivery occurred at [facility]. No pregnancy complications reported.    #2 ():  delivery of 5-10 boy, at 40 weeks GA . Delivery occurred at [facility]. no pregnancy complications reported. Pregnancy #2 Comments: (Abnormal fetal heart rate tracing in labor).    GYN history:  No abnormal pap smears, no STDs     Allergies:  No Known Allergies    MEDICATIONS   Prenatal vitamins    Family history:  Mother:  DM2    Social history:  No alcohol use, drug use, or smoking.  She does not work outside the house.     Vital Signs Last 24 Hrs  T(C): 36.9 (10 Dec 2023 12:41), Max: 36.9 (10 Dec 2023 12:41)  T(F): 98.42 (10 Dec 2023 12:41), Max: 98.42 (10 Dec 2023 12:41)  HR: 73 (11 Dec 2023 05:33) (47 - 94)  BP: 128/63 (11 Dec 2023 05:33) (118/59 - 130/59)  RR: 18 (10 Dec 2023 12:41) (18 - 18)  SpO2: 93% (10 Dec 2023 17:50) (89% - 100%)    Gen: AAOx3, NAD  Card: RRR, no M/R/G  Pulm: CTAB  Abd: Soft, nontender, no palpable contractions    EFM: 130/mod saul/pos accel  [place]: none  SVE: deferred   Speculum (resident exam): no pooling, minimal clear discharge, nitrazine pos, ferning neg    Labs:               8.2    3.18  )-----------( 318      ( 12-10 @ 17:21 )             23.6                8.6    3.84  )-----------( 305      ( 12-10 @ 09:22 )             24.3                8.5    4.74  )-----------( 335      (  @ 22:20 )             23.1       10 Dec 2023 17:21    136    |  106    |  5<L>   ----------------------------<  144<H>  4.0     |  20     |  0.5<L>  10 Dec 2023 09:22    133<L>  |  106    |  4<L>   ----------------------------<  115<H>  4.5     |  18     |  <0.5<L>    Ca    7.0<L>      10 Dec 2023 17:21  Ca    7.3<L>      10 Dec 2023 09:22  Mg     5.3<HH>     10 Dec 2023 17:21  Mg     4.7<HH>     10 Dec 2023 09:22    TPro  7.9    /  Alb  3.5    /  TBili  0.4    /  DBili  x      /  AST  18     /  ALT  11     /  AlkPhos  80     09 Dec 2023 22:20    Type + Screen (23 @ 22:20)   ABO RH Interpretation: A POS  Antibody Elution and ID (23 @ 22:20)  Antibody Interpretation 1: Eluate Panagglutinin Antibody Identification (23 @ 22:20)  Antibody Interpretation 1: Serum Panagglutinin                 PGY3 NOTE    Gestational Age: 27w0d  Hospital Day: #3    HPI: Ms. Dumont was seen and examined at bedside. Reports she is doing well. Denies CTX, VB. Reports good FM. Has not experienced any leakage of fluid since prior to coming to the hospital. Denies HA, CP, SOB, changes in vision, edema.     PAST MEDICAL & SURGICAL HISTORY:  H/O pericarditis (dx 5mos postpartum)  Possible Crohn's disease (dx at 16yo)  She has had blood transfusions in the past (X2)   delivery (x1)    Obstetric history:    #1 (): Vaginal delivery of 7-11 boy, at 40 weeks GA, after 3 days hours of labor . Pt received anesthesia. Delivery occurred at [facility]. No pregnancy complications reported.    #2 ():  delivery of 5-10 boy, at 40 weeks GA . Delivery occurred at [facility]. no pregnancy complications reported. Pregnancy #2 Comments: (Abnormal fetal heart rate tracing in labor).    GYN history:  No abnormal pap smears, no STDs     Allergies:  No Known Allergies    MEDICATIONS   Prenatal vitamins    Family history:  Mother:  DM2    Social history:  No alcohol use, drug use, or smoking.  She does not work outside the house.     Vital Signs Last 24 Hrs  T(C): 36.9 (10 Dec 2023 12:41), Max: 36.9 (10 Dec 2023 12:41)  T(F): 98.42 (10 Dec 2023 12:41), Max: 98.42 (10 Dec 2023 12:41)  HR: 73 (11 Dec 2023 05:33) (47 - 94)  BP: 128/63 (11 Dec 2023 05:33) (118/59 - 130/59)  RR: 18 (10 Dec 2023 12:41) (18 - 18)  SpO2: 93% (10 Dec 2023 17:50) (89% - 100%)    Gen: AAOx3, NAD  Card: RRR, no M/R/G  Pulm: CTAB  Abd: Soft, nontender, no palpable contractions    EFM: 130/mod saul/pos accel  [place]: none  SVE: deferred   Speculum (resident exam): no pooling, minimal clear discharge, nitrazine pos, ferning neg    Labs:               8.2    3.18  )-----------( 318      ( 12-10 @ 17:21 )             23.6                8.6    3.84  )-----------( 305      ( 12-10 @ 09:22 )             24.3                8.5    4.74  )-----------( 335      (  @ 22:20 )             23.1       10 Dec 2023 17:21    136    |  106    |  5<L>   ----------------------------<  144<H>  4.0     |  20     |  0.5<L>  10 Dec 2023 09:22    133<L>  |  106    |  4<L>   ----------------------------<  115<H>  4.5     |  18     |  <0.5<L>    Ca    7.0<L>      10 Dec 2023 17:21  Ca    7.3<L>      10 Dec 2023 09:22  Mg     5.3<HH>     10 Dec 2023 17:21  Mg     4.7<HH>     10 Dec 2023 09:22    TPro  7.9    /  Alb  3.5    /  TBili  0.4    /  DBili  x      /  AST  18     /  ALT  11     /  AlkPhos  80     09 Dec 2023 22:20    Type + Screen (23 @ 22:20)   ABO RH Interpretation: A POS  Antibody Elution and ID (23 @ 22:20)  Antibody Interpretation 1: Eluate Panagglutinin Antibody Identification (23 @ 22:20)  Antibody Interpretation 1: Serum Panagglutinin

## 2023-12-11 NOTE — CHART NOTE - NSCHARTNOTEFT_GEN_A_CORE
PGY4 Note    MFM procedure note    Non stress test    Date: 12/11/2023  Start Time: 2213  End Time: 2244    Baseline: 135  beats per minute  Variability:  moderate  Accelerations: positive  Decelerations: negative    Tocometer: none    Reactive NST.    MFM to be made aware.

## 2023-12-11 NOTE — CHART NOTE - NSCHARTNOTEFT_GEN_A_CORE
PGY-4 Note    Patient seen and examined at bedside. No acute complaints. No further leaking noted. Denies contractions, vaginal bleeding.     Vital Signs Last 24 Hrs  T(C): 36.7 (11 Dec 2023 08:00), Max: 36.7 (11 Dec 2023 08:00)  T(F): 98.06 (11 Dec 2023 08:00), Max: 98.06 (11 Dec 2023 08:00)  HR: 60 (11 Dec 2023 16:33) (60 - 78)  BP: 129/60 (11 Dec 2023 16:33) (118/59 - 156/76)    Gen: AOx3, NAD  EFM: 135/mod/+accels  [place]: no contractions  Ferning neg  BSS: 12/11 MVP 2.03 x 2.4cm     Patient stable for transfer to .  NST BID  Regular Diet  Bedrest with Bathroom priv  Vitals q4h    Discussed with Dr. Oliveros PGY-4 Note    Patient seen and examined at bedside. No acute complaints. No further leaking noted. Denies contractions, vaginal bleeding.     Vital Signs Last 24 Hrs  T(C): 36.7 (11 Dec 2023 08:00), Max: 36.7 (11 Dec 2023 08:00)  T(F): 98.06 (11 Dec 2023 08:00), Max: 98.06 (11 Dec 2023 08:00)  HR: 60 (11 Dec 2023 16:33) (60 - 78)  BP: 129/60 (11 Dec 2023 16:33) (118/59 - 156/76)    Gen: AOx3, NAD  EFM: 135/mod/+accels  [place]: no contractions  Ferning neg  BSS: 12/11 MVP 2.03 x 2.4cm     Patient stable for transfer to .  NST BID  Regular Diet  Bedrest with Bathroom priv  Vitals q4h    Discussed with Dr. Oliveros

## 2023-12-11 NOTE — PROGRESS NOTE ADULT - ASSESSMENT
Ms. Dumont is a 24 yo  @ 27w0d, history of pericarditis, Crohns in remission, Lupus?, with possible PPROM.    1.  Possible PPROM  - S/p  steroids -12/10  - C/w latency antibiotics  - NICU consult appreciated  - EFM/toco  - F/u GBS, G/C swabs    2. Anemia  - Elevated MCV  - CBC, B12, folate ordered for this morning    3. Multiple antibodies in type and screen  - Crossed 2u PRBCs, may be difficult to obtain additional blood    4.  History of pericarditis in 2023.  - Echocardiogram ordered  - Cardiology consult.  (Is followed by Dr. Behuria)    5.  Possible Lupus?  - Followed by Rheumatology, consider inpatient follow up.    6.  Pregnancy  - Increased risk of down syndrome, Increased NT, right pyelectasis, small pericardial effusion.  NIPTS inconclusive, amniocentesis karyotype and array are negative. She is GBS unknown.  - Previous  delivery.  - PNVs  - Ambulate as tolerated  - Regular PO diet   Ms. Dumont is a 22 yo  @ 27w0d, history of pericarditis, Crohns in remission, Lupus?, with possible PPROM.    1.  Possible PPROM  - S/p  steroids -12/10  - C/w latency antibiotics  - NICU consult appreciated  - EFM/toco  - F/u GBS, G/C swabs    2. Anemia  - Elevated MCV  - CBC, B12, B12, folate ordered for this morning    3. Multiple antibodies in type and screen  - Crossed 2u PRBCs, may be difficult to obtain additional blood    4.  History of pericarditis with small pericardial effusion in February-2023. S/p prednisone and colchicine.  - Echocardiogram ordered  - Is followed by Dr. Behuria. Last seen in 2023.    5.  Possible Lupus?  - Followed by Rheumatology, consider inpatient follow up.    6. Chron's in remission  - Diagnosed at 16yo, last colonoscopy then  - Lost to follow up, pt asymptomatic    7.  Pregnancy  - Increased risk of down syndrome, Increased NT, right pyelectasis, small pericardial effusion.  NIPTS inconclusive, amniocentesis karyotype and array are negative. She is GBS unknown.  - Previous  delivery.  - PNVs  - Ambulate as tolerated  - Regular PO diet   Ms. Dumont is a 24 yo  @ 27w0d, history of pericarditis, Crohns in remission, Lupus?, with possible PPROM.    1.  Possible PPROM  - S/p  steroids -12/10  - C/w latency antibiotics  - NICU consult appreciated  - EFM/toco  - F/u GBS, G/C swabs    2. Anemia  - Elevated MCV  - CBC, B12, B12, folate ordered for this morning    3. Multiple antibodies in type and screen  - Crossed 2u PRBCs, may be difficult to obtain additional blood    4.  History of pericarditis with small pericardial effusion in February-2023. S/p prednisone and colchicine.  - Echocardiogram ordered  - Is followed by Dr. Behuria. Last seen in 2023.    5.  Possible Lupus?  - Followed by Rheumatology, consider inpatient follow up.    6. Chron's in remission  - Diagnosed at 14yo, last colonoscopy then  - Lost to follow up, pt asymptomatic    7.  Pregnancy  - Increased risk of down syndrome, Increased NT, right pyelectasis, small pericardial effusion.  NIPTS inconclusive, amniocentesis karyotype and array are negative. She is GBS unknown.  - Previous  delivery.  - PNVs  - Ambulate as tolerated  - Regular PO diet   Ms. Dumont is a 24 yo  @ 27w0d, history of pericarditis, Crohns in remission, Lupus?, with possible PPROM.    1.  Possible PPROM  - S/p  steroids -12/10  - C/w latency antibiotics  - NICU consult appreciated  - EFM/toco  - F/u GBS, G/C swabs    2. Anemia  - Elevated MCV  - CBC, B12, B12, folate ordered for this morning    3. Multiple antibodies in type and screen  - Crossed 2u PRBCs, may be difficult to obtain additional blood    4.  History of pericarditis with small pericardial effusion in February-2023. S/p prednisone and colchicine.  - Echocardiogram ordered  - Is followed by Dr. Behuria. Last seen in 2023. Will order inpatient consult     5.  Dx of Lupus  - Followed by Rheumatology, patient refusing treatment  - Will order inpatient consult     6. Chron's in remission  - Diagnosed at 16yo, last colonoscopy then  - Lost to follow up, pt asymptomatic    7.  Pregnancy  - Increased risk of down syndrome, Increased NT, right pyelectasis, small pericardial effusion.  S/p fetal echo (otherwise normal). NIPTS inconclusive, amniocentesis karyotype and array are negative. She is GBS unknown.  - Previous  delivery.  - PNVs  - Ambulate as tolerated  - Regular PO diet   Ms. Dumont is a 22 yo  @ 27w0d, history of pericarditis, Crohns in remission, Lupus?, with possible PPROM.    1.  Possible PPROM  - S/p  steroids -12/10  - C/w latency antibiotics  - NICU consult appreciated  - EFM/toco  - F/u GBS, G/C swabs    2. Anemia  - Elevated MCV  - CBC, B12, B12, folate ordered for this morning    3. Multiple antibodies in type and screen  - Crossed 2u PRBCs, may be difficult to obtain additional blood    4.  History of pericarditis with small pericardial effusion in February-2023. S/p prednisone and colchicine.  - Echocardiogram ordered  - Is followed by Dr. Behuria. Last seen in 2023. Will order inpatient consult     5.  Dx of Lupus  - Followed by Rheumatology, patient refusing treatment  - Will order inpatient consult     6. Chron's in remission  - Diagnosed at 14yo, last colonoscopy then  - Lost to follow up, pt asymptomatic    7.  Pregnancy  - Increased risk of down syndrome, Increased NT, right pyelectasis, small pericardial effusion.  S/p fetal echo (otherwise normal). NIPTS inconclusive, amniocentesis karyotype and array are negative. She is GBS unknown.  - Previous  delivery.  - PNVs  - Ambulate as tolerated  - Regular PO diet

## 2023-12-11 NOTE — CONSULT NOTE ADULT - ASSESSMENT
22 y/o F with PMHx of pericarditis s/p colchicine and prednisone resolved 3/23, Crohns, possible Lupus (refused Tx after risk vs benefit discussion) p/w possible premature rupture of membranes at 27 wks gestation. Cardio consulted for prior hx of pericarditis.     IMPRESSION  H/O Pericarditis s/p colchicine and prednisone resolved 3/2  Crohns disease  Possible Lupus refused Tx after risk vs benefit discussion  Anemia  Possible premature rupture of membranes at 27 wks gestation     RECOMMENDATIONS  currently asymptomatic from cardiac standpoint  flow murmur on exam, can get TTE to confirm resolution of small pericardial effusion seen on prior echo  recall prn 24 y/o F with PMHx of pericarditis s/p colchicine and prednisone resolved 3/23, Crohns, possible Lupus (refused Tx after risk vs benefit discussion) p/w possible premature rupture of membranes at 27 wks gestation. Cardio consulted for prior hx of pericarditis.     IMPRESSION  H/O Pericarditis s/p colchicine and prednisone resolved 3/2  Crohns disease  Possible Lupus refused Tx after risk vs benefit discussion  Anemia  Possible premature rupture of membranes at 27 wks gestation     RECOMMENDATIONS  currently asymptomatic from cardiac standpoint  flow murmur on exam, can get TTE to confirm resolution of small pericardial effusion seen on prior echo  recall prn

## 2023-12-11 NOTE — CONSULT NOTE ADULT - SUBJECTIVE AND OBJECTIVE BOX
Patient is a 23y old  Female who presents with a chief complaint of pprom (10 Dec 2023 16:53)    HPI:  24 yo  @ 26w5d by LMP, ANIVAL 3/11/23 presents for 3 days of LOF, clear on her underwear. She denies any abdominal pain, vaginal bleeding, dysuria, chest pain, sob, cx, le pain/swelling. She denies any complications this pregnancy. She sees an OBGYN in [place]. h/o pericarditis, no known etiology. Possible history of crohns per chart. Possible h/o SLE per rheum chart, patient denies. Per chart patient has been following MFM for increased risk of down syndrome, Increased NT, right pyelectasis, small pericardial effusion. She is GBS unknown. NIPTS inconclusive, amniocentesis negative. Last BM today, last PO 3pm, last intercourse 2 weeks ago.  (10 Dec 2023 00:16)    Patient was seen previously in the rheumatology clinic for possible lupus diagnosis as she had pericardial effusion seen on echo and workup revealed positive MALINI.  Further workup also revealed positive anti-Smith antibody, with positive direct antiglobulin test unsure if patient had hemolytic anemia.    Patient denies any rashes, photosensitivity, oral ulcers, arthritis, seizures or psychosis.  Previous UA showed a protein level of 30.    Patient took previously prednisone for the pericarditis, however, not convinced she has lupus, stopped following up with rheumatology clinic, is not on any medications.      Additional Information:    REVIEW OF SYSTEMS:  All Review of systems negative, except for those marked:  Constitutional	Normal: no fever, weight loss, fatigue, repeated infections, loss of apetite  .		[] Abnormal:  Eyes		Normal: no double or blurred vision, red eye, glaucoma, cataracts, photophobia,   .		eye pain  .		[] Comments/Additional Information:  ENT		Normal: no decreased hearing, discharge, stuffiness, change in voice, difficulty   .		swallowing, mouth sores  .		[] Abnormal:  Respiratory	Normal: no SOB, asthma, bronchitis, coughing, pain with breathing, TB  .		[] Abnormal:  Cardiovascular	Normal: no chest pain, palpitations, tachycardia, high blood pressure, abnormal   .		ECG  .		[] Abnormal:  GI		Normal: no food intolerance, diet change, jaundice, hepatitis, nausea, vomiting,   .		abdominal pain, diarrhea, blood in stool  .		[] Abnormal:  Genitourinary	Normal: no kidney failure, difficulty with urination, blood in urine, dysuria  .		[] Abnormal:  Integumentary	Normal: no rashes, psoriasis, moles, hair loss, Raynaud’s  .		[] Abnormal:  Psychiatric	Normal: no depression, psychosis, sleeping difficulties, confusion  .		[] Abnormal:  Endocrine	Normal: no thyroid disease, diabetes, hirsuitism, obesity  .		[] Abnormal:  Neurologic	Normal: no headaches, seizures, speech disturbances, cognitive changes,   .		clumsiness, numbness  .		[] Abnormal:  Hematologic/Lymph	Normal: no low HCT, blood transfusions, lymph node enlargement,   .			bleeding, bruising  .			[] Abnormal:  Musculoskeletal		Normal: no joint pain, cramps, weakness, myalgias  .			[] Abnormal:    MEDICATIONS  (STANDING):  amoxicillin 500 milliGRAM(s) Oral every 8 hours  ampicillin  IVPB 2 Gram(s) IV Intermittent every 6 hours  ampicillin  IVPB      influenza   Vaccine 0.5 milliLiter(s) IntraMuscular once    MEDICATIONS  (PRN):    Allergies    No Known Allergies    Intolerances      PPD:  Vaccines:    PAST MEDICAL & SURGICAL HISTORY:  H/O pericarditis      No significant past surgical history        Growth & Development:    FAMILY HISTORY:  [] Arthritis:  [] Lupus/Collagen Vascular:  [] Psoriasis:  [] Uveitis:  [] Thyroid Disease:  [] Ankylosing Spondylitis:  [] Lyme  [] IBD  [] Acute Rheumatic Fever  [] Diabetes    SOCIAL HISTORY:  School Performance/Attendance:  [] Animal/Insect Exposure:    Vital Signs Last 24 Hrs  T(C): 36.7 (11 Dec 2023 08:00), Max: 36.9 (10 Dec 2023 12:41)  T(F): 98.06 (11 Dec 2023 08:00), Max: 98.42 (10 Dec 2023 12:41)  HR: 71 (11 Dec 2023 09:20) (47 - 94)  BP: 139/73 (11 Dec 2023 09:20) (118/59 - 139/73)  BP(mean): --  RR: 18 (10 Dec 2023 12:41) (18 - 18)  SpO2: 93% (10 Dec 2023 17:50) (89% - 100%)      Daily     Daily     PHYSICAL EXAM:  All physical exam findings normal, except for those marked:  General Appearance:  Skin 		WNL: no rash, lesion, ulcers, indurations, nodules or tightening, normal nail bed   .		capillaries  .		[] Abnormal:  Eyes		WNL: normal conjunctiva and lids, normal pupils and iris  .		[] Abnormal:  ENT		WNL: normal appearance of ears, nose lips, teeth, gums, oropharynx, oral   .		mucosal and palate  .		[] Abnormal:  Neck: 		WNL: no masses, normal thyroid  .		[] Abnormal:  Cardiovascular: WNL: normal auscultation, normal peripheral pulses, no peripheral edema  .		[] Abnormal:  Respiratory: 	WNL: normal respiratory effort  .		[] Abnormal:  GI:		WNL: no masses or tenderness, normal liver and spleen  .		[] Abnormal:  Lymphatic: 	WNL: normal cervical, axillary and inguinal nodes  .		[] Abnormal:  Neurologic: 	WNL: normal DTR’s, normal sensation  .		[] Abnormal:  Psychiatric: 	WNL: normal judgment and insight, normal memory, normal mood and affect  .		[] Abnormal:  Genitalia: 	WNL: normal breasts, genitals and pubic hair  .		[] Abnormal:  Musculoskeletal:	WNL: normal digits, normal muscle strength, full ROM, normal gait  .			[] Abnormal/see Joint exam below  .			[] Leg Lengths:  .			[] Muscle Atrophy:  .			[] Global Assessment of Disease Activity (1-10):    Joint:  [] Warmth	[] Pain/Motion	[] Less ROM	[] Effusion	[] Tender	[] Swelling  Joint :  [] Warmth	[] Pain/Motion	[] Less ROM	[] Effusion	[] Tender	[] Swelling  Joint :  [] Warmth	[] Pain/Motion	[] Less ROM	[] Effusion	[] Tender	[] Swelling  Joint :  [] Warmth	[] Pain/Motion	[] Less ROM	[] Effusion	[] Tender	[] Swelling    Lab Results:                        9.1    4.78  )-----------( 330      ( 11 Dec 2023 07:50 )             25.0     12-    135  |  108  |  7<L>  ----------------------------<  119<H>  4.5   |  19  |  0.5<L>    Ca    7.6<L>      11 Dec 2023 07:50  Mg     5.3     12-10    TPro  7.9  /  Alb  3.5  /  TBili  0.4  /  DBili  x   /  AST  18  /  ALT  11  /  AlkPhos  80  12-      Urinalysis Basic - ( 11 Dec 2023 07:50 )    Color: x / Appearance: x / SG: x / pH: x  Gluc: 119 mg/dL / Ketone: x  / Bili: x / Urobili: x   Blood: x / Protein: x / Nitrite: x   Leuk Esterase: x / RBC: x / WBC x   Sq Epi: x / Non Sq Epi: x / Bacteria: x     Patient is a 23y old  Female who presents with a chief complaint of pprom (10 Dec 2023 16:53)    HPI:  24 yo  @ 26w5d by LMP, ANIVAL 3/11/23 presents for 3 days of LOF, clear on her underwear. She denies any abdominal pain, vaginal bleeding, dysuria, chest pain, sob, cx, le pain/swelling. She denies any complications this pregnancy. She sees an OBGYN in [place]. h/o pericarditis, no known etiology. Possible history of crohns per chart. Possible h/o SLE per rheum chart, patient denies. Per chart patient has been following MFM for increased risk of down syndrome, Increased NT, right pyelectasis, small pericardial effusion. She is GBS unknown. NIPTS inconclusive, amniocentesis negative. Last BM today, last PO 3pm, last intercourse 2 weeks ago.  (10 Dec 2023 00:16)    Patient was seen previously in the rheumatology clinic for possible lupus diagnosis as she had pericardial effusion seen on echo and workup revealed positive MALINI.  Further workup also revealed positive anti-Smith antibody, with positive direct antiglobulin test unsure if patient had hemolytic anemia.    Patient denies any rashes, photosensitivity, oral ulcers, arthritis, seizures or psychosis.  Previous UA showed a protein level of 30.    Patient took previously prednisone for the pericarditis, however, not convinced she has lupus, stopped following up with rheumatology clinic, is not on any medications.      Additional Information:    REVIEW OF SYSTEMS:  All Review of systems negative, except for those marked:  Constitutional	Normal: no fever, weight loss, fatigue, repeated infections, loss of apetite  .		[] Abnormal:  Eyes		Normal: no double or blurred vision, red eye, glaucoma, cataracts, photophobia,   .		eye pain  .		[] Comments/Additional Information:  ENT		Normal: no decreased hearing, discharge, stuffiness, change in voice, difficulty   .		swallowing, mouth sores  .		[] Abnormal:  Respiratory	Normal: no SOB, asthma, bronchitis, coughing, pain with breathing, TB  .		[] Abnormal:  Cardiovascular	Normal: no chest pain, palpitations, tachycardia, high blood pressure, abnormal   .		ECG  .		[] Abnormal:  GI		Normal: no food intolerance, diet change, jaundice, hepatitis, nausea, vomiting,   .		abdominal pain, diarrhea, blood in stool  .		[] Abnormal:  Genitourinary	Normal: no kidney failure, difficulty with urination, blood in urine, dysuria  .		[] Abnormal:  Integumentary	Normal: no rashes, psoriasis, moles, hair loss, Raynaud’s  .		[] Abnormal:  Psychiatric	Normal: no depression, psychosis, sleeping difficulties, confusion  .		[] Abnormal:  Endocrine	Normal: no thyroid disease, diabetes, hirsuitism, obesity  .		[] Abnormal:  Neurologic	Normal: no headaches, seizures, speech disturbances, cognitive changes,   .		clumsiness, numbness  .		[] Abnormal:  Hematologic/Lymph	Normal: no low HCT, blood transfusions, lymph node enlargement,   .			bleeding, bruising  .			[] Abnormal:  Musculoskeletal		Normal: no joint pain, cramps, weakness, myalgias  .			[] Abnormal:    MEDICATIONS  (STANDING):  amoxicillin 500 milliGRAM(s) Oral every 8 hours  ampicillin  IVPB 2 Gram(s) IV Intermittent every 6 hours  ampicillin  IVPB      influenza   Vaccine 0.5 milliLiter(s) IntraMuscular once    MEDICATIONS  (PRN):    Allergies    No Known Allergies    Intolerances      PPD:  Vaccines:    PAST MEDICAL & SURGICAL HISTORY:  H/O pericarditis      No significant past surgical history        Growth & Development:    FAMILY HISTORY:  [] Arthritis:  [] Lupus/Collagen Vascular:  [] Psoriasis:  [] Uveitis:  [] Thyroid Disease:  [] Ankylosing Spondylitis:  [] Lyme  [] IBD  [] Acute Rheumatic Fever  [] Diabetes    SOCIAL HISTORY:  School Performance/Attendance:  [] Animal/Insect Exposure:    Vital Signs Last 24 Hrs  T(C): 36.7 (11 Dec 2023 08:00), Max: 36.9 (10 Dec 2023 12:41)  T(F): 98.06 (11 Dec 2023 08:00), Max: 98.42 (10 Dec 2023 12:41)  HR: 71 (11 Dec 2023 09:20) (47 - 94)  BP: 139/73 (11 Dec 2023 09:20) (118/59 - 139/73)  BP(mean): --  RR: 18 (10 Dec 2023 12:41) (18 - 18)  SpO2: 93% (10 Dec 2023 17:50) (89% - 100%)      Daily     Daily     PHYSICAL EXAM:  All physical exam findings normal, except for those marked:  General Appearance:  Skin 		WNL: no rash, lesion, ulcers, indurations, nodules or tightening, normal nail bed   .		capillaries  .		[] Abnormal:  Eyes		WNL: normal conjunctiva and lids, normal pupils and iris  .		[] Abnormal:  ENT		WNL: normal appearance of ears, nose lips, teeth, gums, oropharynx, oral   .		mucosal and palate  .		[] Abnormal:  Neck: 		WNL: no masses, normal thyroid  .		[] Abnormal:  Cardiovascular: WNL: normal auscultation, normal peripheral pulses, no peripheral edema  .		[] Abnormal:  Respiratory: 	WNL: normal respiratory effort  .		[] Abnormal:  GI:		WNL: no masses or tenderness, normal liver and spleen  .		[] Abnormal:  Lymphatic: 	WNL: normal cervical, axillary and inguinal nodes  .		[] Abnormal:  Neurologic: 	WNL: normal DTR’s, normal sensation  .		[] Abnormal:  Psychiatric: 	WNL: normal judgment and insight, normal memory, normal mood and affect  .		[] Abnormal:  Genitalia: 	WNL: normal breasts, genitals and pubic hair  .		[] Abnormal:  Musculoskeletal:	WNL: normal digits, normal muscle strength, full ROM, normal gait  .			[] Abnormal/see Joint exam below  .			[] Leg Lengths:  .			[] Muscle Atrophy:  .			[] Global Assessment of Disease Activity (1-10):    Joint:  [] Warmth	[] Pain/Motion	[] Less ROM	[] Effusion	[] Tender	[] Swelling  Joint :  [] Warmth	[] Pain/Motion	[] Less ROM	[] Effusion	[] Tender	[] Swelling  Joint :  [] Warmth	[] Pain/Motion	[] Less ROM	[] Effusion	[] Tender	[] Swelling  Joint :  [] Warmth	[] Pain/Motion	[] Less ROM	[] Effusion	[] Tender	[] Swelling    Lab Results:                        9.1    4.78  )-----------( 330      ( 11 Dec 2023 07:50 )             25.0     12-    135  |  108  |  7<L>  ----------------------------<  119<H>  4.5   |  19  |  0.5<L>    Ca    7.6<L>      11 Dec 2023 07:50  Mg     5.3     12-10    TPro  7.9  /  Alb  3.5  /  TBili  0.4  /  DBili  x   /  AST  18  /  ALT  11  /  AlkPhos  80  12-      Urinalysis Basic - ( 11 Dec 2023 07:50 )    Color: x / Appearance: x / SG: x / pH: x  Gluc: 119 mg/dL / Ketone: x  / Bili: x / Urobili: x   Blood: x / Protein: x / Nitrite: x   Leuk Esterase: x / RBC: x / WBC x   Sq Epi: x / Non Sq Epi: x / Bacteria: x

## 2023-12-12 ENCOUNTER — TRANSCRIPTION ENCOUNTER (OUTPATIENT)
Age: 23
End: 2023-12-12

## 2023-12-12 VITALS
TEMPERATURE: 99 F | DIASTOLIC BLOOD PRESSURE: 61 MMHG | RESPIRATION RATE: 18 BRPM | SYSTOLIC BLOOD PRESSURE: 128 MMHG | HEART RATE: 84 BPM

## 2023-12-12 LAB
ANION GAP SERPL CALC-SCNC: 10 MMOL/L — SIGNIFICANT CHANGE UP (ref 7–14)
ANION GAP SERPL CALC-SCNC: 10 MMOL/L — SIGNIFICANT CHANGE UP (ref 7–14)
BASOPHILS # BLD AUTO: 0 K/UL — SIGNIFICANT CHANGE UP (ref 0–0.2)
BASOPHILS # BLD AUTO: 0 K/UL — SIGNIFICANT CHANGE UP (ref 0–0.2)
BASOPHILS NFR BLD AUTO: 0 % — SIGNIFICANT CHANGE UP (ref 0–1)
BASOPHILS NFR BLD AUTO: 0 % — SIGNIFICANT CHANGE UP (ref 0–1)
BUN SERPL-MCNC: 6 MG/DL — LOW (ref 10–20)
BUN SERPL-MCNC: 6 MG/DL — LOW (ref 10–20)
CALCIUM SERPL-MCNC: 8.1 MG/DL — LOW (ref 8.4–10.5)
CALCIUM SERPL-MCNC: 8.1 MG/DL — LOW (ref 8.4–10.5)
CHLORIDE SERPL-SCNC: 107 MMOL/L — SIGNIFICANT CHANGE UP (ref 98–110)
CHLORIDE SERPL-SCNC: 107 MMOL/L — SIGNIFICANT CHANGE UP (ref 98–110)
CO2 SERPL-SCNC: 19 MMOL/L — SIGNIFICANT CHANGE UP (ref 17–32)
CO2 SERPL-SCNC: 19 MMOL/L — SIGNIFICANT CHANGE UP (ref 17–32)
CREAT SERPL-MCNC: <0.5 MG/DL — LOW (ref 0.7–1.5)
CREAT SERPL-MCNC: <0.5 MG/DL — LOW (ref 0.7–1.5)
EGFR: 142 ML/MIN/1.73M2 — SIGNIFICANT CHANGE UP
EGFR: 142 ML/MIN/1.73M2 — SIGNIFICANT CHANGE UP
EOSINOPHIL # BLD AUTO: 0 K/UL — SIGNIFICANT CHANGE UP (ref 0–0.7)
EOSINOPHIL # BLD AUTO: 0 K/UL — SIGNIFICANT CHANGE UP (ref 0–0.7)
EOSINOPHIL NFR BLD AUTO: 0 % — SIGNIFICANT CHANGE UP (ref 0–8)
EOSINOPHIL NFR BLD AUTO: 0 % — SIGNIFICANT CHANGE UP (ref 0–8)
GLUCOSE SERPL-MCNC: 92 MG/DL — SIGNIFICANT CHANGE UP (ref 70–99)
GLUCOSE SERPL-MCNC: 92 MG/DL — SIGNIFICANT CHANGE UP (ref 70–99)
GROUP B BETA STREP DNA (PCR): SIGNIFICANT CHANGE UP
GROUP B BETA STREP DNA (PCR): SIGNIFICANT CHANGE UP
HCT VFR BLD CALC: 24.9 % — LOW (ref 37–47)
HCT VFR BLD CALC: 24.9 % — LOW (ref 37–47)
HGB BLD-MCNC: 8.7 G/DL — LOW (ref 12–16)
HGB BLD-MCNC: 8.7 G/DL — LOW (ref 12–16)
IMM GRANULOCYTES NFR BLD AUTO: 1.2 % — HIGH (ref 0.1–0.3)
IMM GRANULOCYTES NFR BLD AUTO: 1.2 % — HIGH (ref 0.1–0.3)
LYMPHOCYTES # BLD AUTO: 0.84 K/UL — LOW (ref 1.2–3.4)
LYMPHOCYTES # BLD AUTO: 0.84 K/UL — LOW (ref 1.2–3.4)
LYMPHOCYTES # BLD AUTO: 14.1 % — LOW (ref 20.5–51.1)
LYMPHOCYTES # BLD AUTO: 14.1 % — LOW (ref 20.5–51.1)
MCHC RBC-ENTMCNC: 34.9 G/DL — SIGNIFICANT CHANGE UP (ref 32–37)
MCHC RBC-ENTMCNC: 34.9 G/DL — SIGNIFICANT CHANGE UP (ref 32–37)
MCHC RBC-ENTMCNC: 36.3 PG — HIGH (ref 27–31)
MCHC RBC-ENTMCNC: 36.3 PG — HIGH (ref 27–31)
MCV RBC AUTO: 103.8 FL — HIGH (ref 81–99)
MCV RBC AUTO: 103.8 FL — HIGH (ref 81–99)
MONOCYTES # BLD AUTO: 0.7 K/UL — HIGH (ref 0.1–0.6)
MONOCYTES # BLD AUTO: 0.7 K/UL — HIGH (ref 0.1–0.6)
MONOCYTES NFR BLD AUTO: 11.7 % — HIGH (ref 1.7–9.3)
MONOCYTES NFR BLD AUTO: 11.7 % — HIGH (ref 1.7–9.3)
NEUTROPHILS # BLD AUTO: 4.36 K/UL — SIGNIFICANT CHANGE UP (ref 1.4–6.5)
NEUTROPHILS # BLD AUTO: 4.36 K/UL — SIGNIFICANT CHANGE UP (ref 1.4–6.5)
NEUTROPHILS NFR BLD AUTO: 73 % — SIGNIFICANT CHANGE UP (ref 42.2–75.2)
NEUTROPHILS NFR BLD AUTO: 73 % — SIGNIFICANT CHANGE UP (ref 42.2–75.2)
NRBC # BLD: 0 /100 WBCS — SIGNIFICANT CHANGE UP (ref 0–0)
NRBC # BLD: 0 /100 WBCS — SIGNIFICANT CHANGE UP (ref 0–0)
PLATELET # BLD AUTO: 293 K/UL — SIGNIFICANT CHANGE UP (ref 130–400)
PLATELET # BLD AUTO: 293 K/UL — SIGNIFICANT CHANGE UP (ref 130–400)
PMV BLD: 9.4 FL — SIGNIFICANT CHANGE UP (ref 7.4–10.4)
PMV BLD: 9.4 FL — SIGNIFICANT CHANGE UP (ref 7.4–10.4)
POTASSIUM SERPL-MCNC: 4.4 MMOL/L — SIGNIFICANT CHANGE UP (ref 3.5–5)
POTASSIUM SERPL-MCNC: 4.4 MMOL/L — SIGNIFICANT CHANGE UP (ref 3.5–5)
POTASSIUM SERPL-SCNC: 4.4 MMOL/L — SIGNIFICANT CHANGE UP (ref 3.5–5)
POTASSIUM SERPL-SCNC: 4.4 MMOL/L — SIGNIFICANT CHANGE UP (ref 3.5–5)
RBC # BLD: 2.4 M/UL — LOW (ref 4.2–5.4)
RBC # BLD: 2.4 M/UL — LOW (ref 4.2–5.4)
RBC # FLD: 13.3 % — SIGNIFICANT CHANGE UP (ref 11.5–14.5)
RBC # FLD: 13.3 % — SIGNIFICANT CHANGE UP (ref 11.5–14.5)
SODIUM SERPL-SCNC: 136 MMOL/L — SIGNIFICANT CHANGE UP (ref 135–146)
SODIUM SERPL-SCNC: 136 MMOL/L — SIGNIFICANT CHANGE UP (ref 135–146)
SOURCE GROUP B STREP: SIGNIFICANT CHANGE UP
SOURCE GROUP B STREP: SIGNIFICANT CHANGE UP
WBC # BLD: 5.97 K/UL — SIGNIFICANT CHANGE UP (ref 4.8–10.8)
WBC # BLD: 5.97 K/UL — SIGNIFICANT CHANGE UP (ref 4.8–10.8)
WBC # FLD AUTO: 5.97 K/UL — SIGNIFICANT CHANGE UP (ref 4.8–10.8)
WBC # FLD AUTO: 5.97 K/UL — SIGNIFICANT CHANGE UP (ref 4.8–10.8)

## 2023-12-12 RX ORDER — AMOXICILLIN 250 MG/5ML
1 SUSPENSION, RECONSTITUTED, ORAL (ML) ORAL
Qty: 12 | Refills: 0
Start: 2023-12-12 | End: 2023-12-15

## 2023-12-12 RX ADMIN — Medication 500 MILLIGRAM(S): at 05:53

## 2023-12-12 RX ADMIN — Medication 500 MILLIGRAM(S): at 14:19

## 2023-12-12 NOTE — DISCHARGE NOTE ANTEPARTUM - HOSPITAL COURSE
Pt admitted for possible premature  rupture of membranes. S/p magnesium and course of celestone from -12/10. Received latency antibiotics and discharged on PO Amoxacillin x5 days total. After additional workup, low suspicion for PPPROM and patient stable for discharge.   S/p rheum consult for lupus, pt not desiring management. S/p cardio consult for h/o pericarditis, nl echo.

## 2023-12-12 NOTE — DISCHARGE NOTE ANTEPARTUM - MEDICATION SUMMARY - MEDICATIONS TO TAKE
I will START or STAY ON the medications listed below when I get home from the hospital:    amoxicillin 500 mg oral capsule  -- 1 cap(s) by mouth every 8 hours  -- Indication: For poissible infection

## 2023-12-12 NOTE — CHART NOTE - NSCHARTNOTEFT_GEN_A_CORE
PGY3 Note    EFM: 150BPM/moderate variability/no decelerations  TOCO: no contractions noted    Reassuring NST PGY3 NST Note    EFM: 150BPM/moderate variability/+ accels 10x10  TOCO: no contractions noted    Reassuring NST

## 2023-12-12 NOTE — DISCHARGE NOTE ANTEPARTUM - NS MD DC FALL RISK RISK
For information on Fall & Injury Prevention, visit: [email or web address] OR  [email or web address] OR  https://www.cdc.gov/steadi/patient.html For information on Fall & Injury Prevention, visit: [email or web address] OR  [email or web address] OR  https://www.cdc.gov/steadi/patient.html

## 2023-12-12 NOTE — DISCHARGE NOTE ANTEPARTUM - NSDCQMCOGNITION_NEU_ALL_CORE
Medication: atenolol (TENORMIN) 25mg  Last office visit date:08/29/2023  Next appointment scheduled?: Yes   Number of refills given: 90 days refill 1    Medication: Pioglitazone (ACTOS) 45mg tablet  Last office visit date: 08/29/2023  Next appointment scheduled?: Yes   Number of refills given: 90 days refill 1    
No difficulties

## 2023-12-12 NOTE — DISCHARGE NOTE ANTEPARTUM - PLAN OF CARE
Follow up with Rheumatology - If you experience regular/painful contractions, leakage of fluid, vaginal bleeding, or decreased/absent fetal movement then call your doctor or come to labor and delivery  - Maintain PO hydration  - Fetal kick counts  - Follow up with your OBGYN at your next scheduled visit.   - Take the remaining course of Amaxcillin. Continue to follow up for your sonograms every 4 weeks.

## 2023-12-12 NOTE — DISCHARGE NOTE ANTEPARTUM - PATIENT PORTAL LINK FT
You can access the FollowMyHealth Patient Portal offered by [facility] by registering at the following website: [email or web address] By joining [employer]’s FollowMyHealth portal, you will also be able to view your health information using other applications (apps) compatible with our system. You can access the FollowMyHealth Patient Portal offered by [facility] by registering at the following website: [email or web address]. By joining [employer]’s FollowMyHealth portal, you will also be able to view your health information using other applications (apps) compatible with our system.

## 2023-12-12 NOTE — PROGRESS NOTE ADULT - ASSESSMENT
Ms. Dumont is a 24 yo  @ 27w1d, history of pericarditis, Crohns in remission, Lupus?, with possible PPROM.    1.  Possible PPROM  - S/p  steroids -12/10  - C/w latency antibiotics, currently on PO Amoxacillin  - NICU consult appreciated  - F/u GBS. Gc/Ct negative.     2. Anemia  - Elevated MCV, B12 and folate wnl  - Rpt labs this morning    3. Multiple antibodies in type and screen  - Crossed 2u PRBCs, may be difficult to obtain additional blood    4.  History of pericarditis with small pericardial effusion in February-2023. S/p prednisone and colchicine.  - Normal TTE  - Is followed by Dr. Behuria. Last seen in 2023. Consult appreciated     5.  Dx of Lupus  - Followed by Rheumatology, patient refusing treatment  - Consult appreciated     6. Chron's in remission  - Diagnosed at 16yo, last colonoscopy then  - Lost to follow up, pt asymptomatic    7.  Pregnancy  - Increased risk of down syndrome, Increased NT, right pyelectasis, small pericardial effusion.  S/p fetal echo (otherwise normal). NIPTS inconclusive, amniocentesis karyotype and array are negative. She is GBS unknown.  - Previous  delivery.  - PNVs  - Ambulate as tolerated  - Regular PO diet  - NST BID Ms. Dumont is a 22 yo  @ 27w1d, history of pericarditis, Crohns in remission, Lupus?, with possible PPROM.    1.  Possible PPROM  - S/p  steroids -12/10  - C/w latency antibiotics, currently on PO Amoxacillin  - NICU consult appreciated  - F/u GBS. Gc/Ct negative.     2. Anemia  - Elevated MCV, B12 and folate wnl  - Rpt labs this morning    3. Multiple antibodies in type and screen  - Crossed 2u PRBCs, may be difficult to obtain additional blood    4.  History of pericarditis with small pericardial effusion in February-2023. S/p prednisone and colchicine.  - Normal TTE  - Is followed by Dr. Behuria. Last seen in 2023. Consult appreciated     5.  Dx of Lupus  - Followed by Rheumatology, patient refusing treatment  - Consult appreciated     6. Chron's in remission  - Diagnosed at 16yo, last colonoscopy then  - Lost to follow up, pt asymptomatic    7.  Pregnancy  - Increased risk of down syndrome, Increased NT, right pyelectasis, small pericardial effusion.  S/p fetal echo (otherwise normal). NIPTS inconclusive, amniocentesis karyotype and array are negative. She is GBS unknown.  - Previous  delivery.  - PNVs  - Ambulate as tolerated  - Regular PO diet  - NST BID

## 2023-12-12 NOTE — DISCHARGE NOTE ANTEPARTUM - CARE PLAN
1 Principal Discharge DX:	Pregnant  Assessment and plan of treatment:	- If you experience regular/painful contractions, leakage of fluid, vaginal bleeding, or decreased/absent fetal movement then call your doctor or come to labor and delivery  - Maintain PO hydration  - Fetal kick counts  - Follow up with your OBGYN at your next scheduled visit.   - Take the remaining course of Amaxcillin.  Secondary Diagnosis:	Lupus  Assessment and plan of treatment:	Follow up with Rheumatology  Secondary Diagnosis:	Oligohydramnios in third trimester  Assessment and plan of treatment:	Continue to follow up for your sonograms every 4 weeks.

## 2023-12-12 NOTE — DISCHARGE NOTE ANTEPARTUM - CARE PROVIDER_API CALL
Marcela Godinez  Rheumatology  [address] [place] [id number]  Phone: [phone]  Fax: [phone]  Follow Up Time:     Nisa Davidson MD,   Phone: (   )    -  Fax: (   )    -  Follow Up Time:     Behuria, Supreeti  Cardiology  [address] [place] [address], [address]  [place] [address]  Phone: [phone]  Fax: [phone]  Follow Up Time:    Marcela Godinez  Rheumatology  [address] [place] [id number]  Phone: [phone]  Fax: [phone]  Follow Up Time:     Nisa Davidson MD,   Phone: (   )    -  Fax: (   )    -  Follow Up Time:     Behuria, Supreeti  Cardiology  [address] [place] [address], [address]  [place] [address]  Phone: [phone]  Fax: [phone]  Follow Up Time:

## 2023-12-12 NOTE — PROGRESS NOTE ADULT - SUBJECTIVE AND OBJECTIVE BOX
PGY3 NOTE    Gestational Age: 27w1d  Hospital Day: #4    HPI: Ms. Dumont was seen and examined at bedside. Reports she is doing well. Denies CTX, VB. Reports good FM. Has not experienced any leakage of fluid since prior to coming to the hospital. Repeat ferning was negative again yesterday. Denies HA, CP, SOB, changes in vision, edema.     PAST MEDICAL & SURGICAL HISTORY:  H/O pericarditis (dx 5mos postpartum)  Possible Crohn's disease (dx at 14yo)  She has had blood transfusions in the past (X2)   delivery (x1)    Obstetric history:    #1 (): Vaginal delivery of 7-11 boy, at 40 weeks GA, after 3 days hours of labor . Pt received anesthesia. Delivery occurred at [facility]. No pregnancy complications reported.    #2 ():  delivery of 5-10 boy, at 40 weeks GA . Delivery occurred at [facility]. no pregnancy complications reported. Pregnancy #2 Comments: (Abnormal fetal heart rate tracing in labor).    GYN history:  No abnormal pap smears, no STDs     Allergies:  No Known Allergies    MEDICATIONS   Prenatal vitamins    Family history:  Mother:  DM2    Social history:  No alcohol use, drug use, or smoking.  She does not work outside the house.     Vital Signs Last 24 Hrs  T(C): 37.1 (12 Dec 2023 03:01), Max: 37.1 (12 Dec 2023 03:01)  T(F): 98.8 (12 Dec 2023 03:01), Max: 98.8 (12 Dec 2023 03:01)  HR: 82 (12 Dec 2023 03:01) (60 - 113)  BP: 121/60 (12 Dec 2023 03:01) (115/79 - 156/76)  RR: 18 (12 Dec 2023 03:01) (18 - 18)    Gen: AAOx3, NAD  Card: RRR, no M/R/G  Pulm: CTAB  Abd: Soft, nontender, no palpable contractions    Labs:                          9.1    4.78  )-----------( 330      ( 11 Dec 2023 07:50 )             25.0                 8.2    3.18  )-----------( 318      ( 10 @ 17:21 )             23.6                8.6    3.84  )-----------( 305      ( 12-10 @ 09:22 )             24.3                8.5    4.74  )-----------( 335      (  @ 22:20 )             23.1           135  |  108  |  7<L>  ----------------------------<  119<H>  4.5   |  19  |  0.5<L>    Ca    7.6<L>      11 Dec 2023 07:50  Mg     5.3     12-10      10 Dec 2023 17:21    136    |  106    |  5<L>   ----------------------------<  144<H>  4.0     |  20     |  0.5<L>  10 Dec 2023 09:22    133<L>  |  106    |  4<L>   ----------------------------<  115<H>  4.5     |  18     |  <0.5<L>    Ca    7.0<L>      10 Dec 2023 17:21  Ca    7.3<L>      10 Dec 2023 09:22  Mg     5.3<HH>     10 Dec 2023 17:21  Mg     4.7<HH>     10 Dec 2023 09:22    TPro  7.9    /  Alb  3.5    /  TBili  0.4    /  DBili  x      /  AST  18     /  ALT  11     /  AlkPhos  80     09 Dec 2023 22:20    Type + Screen (23 @ 22:20)   ABO RH Interpretation: A POS  Antibody Elution and ID (23 @ 22:20)  Antibody Interpretation 1: Eluate Panagglutinin Antibody Identification (23 @ 22:20)  Antibody Interpretation 1: Serum Panagglutinin    ECG: 3/15/23: nsr, nonspecific ST and T wave changes   Echo: 23: 1. Left ventricular ejection fraction, by visual estimation, is 60 to 65%. 2. Normal left ventricular size and wall thicknesses, with normal systolic and diastolic function. 3. Mild tricuspid regurgitation. 4. Small circumferential pericardial effusion.   ----   23 MVP 2.3cm vtx [id number]  (25%)   MVP 2.03 x 2.4cm    TTE: WNL EF 65-70%                 PGY3 NOTE    Gestational Age: 27w1d  Hospital Day: #4    HPI: Ms. Dumont was seen and examined at bedside. Reports she is doing well. Denies CTX, VB. Reports good FM. Has not experienced any leakage of fluid since prior to coming to the hospital. Repeat ferning was negative again yesterday. Denies HA, CP, SOB, changes in vision, edema.     PAST MEDICAL & SURGICAL HISTORY:  H/O pericarditis (dx 5mos postpartum)  Possible Crohn's disease (dx at 14yo)  She has had blood transfusions in the past (X2)   delivery (x1)    Obstetric history:    #1 (): Vaginal delivery of 7-11 boy, at 40 weeks GA, after 3 days hours of labor . Pt received anesthesia. Delivery occurred at [facility]. No pregnancy complications reported.    #2 ():  delivery of 5-10 boy, at 40 weeks GA . Delivery occurred at [facility]. no pregnancy complications reported. Pregnancy #2 Comments: (Abnormal fetal heart rate tracing in labor).    GYN history:  No abnormal pap smears, no STDs     Allergies:  No Known Allergies    MEDICATIONS   Prenatal vitamins    Family history:  Mother:  DM2    Social history:  No alcohol use, drug use, or smoking.  She does not work outside the house.     Vital Signs Last 24 Hrs  T(C): 37.1 (12 Dec 2023 03:01), Max: 37.1 (12 Dec 2023 03:01)  T(F): 98.8 (12 Dec 2023 03:01), Max: 98.8 (12 Dec 2023 03:01)  HR: 82 (12 Dec 2023 03:01) (60 - 113)  BP: 121/60 (12 Dec 2023 03:01) (115/79 - 156/76)  RR: 18 (12 Dec 2023 03:01) (18 - 18)    Gen: AAOx3, NAD  Card: RRR, no M/R/G  Pulm: CTAB  Abd: Soft, nontender, no palpable contractions    Labs:                          9.1    4.78  )-----------( 330      ( 11 Dec 2023 07:50 )             25.0                 8.2    3.18  )-----------( 318      ( 10 @ 17:21 )             23.6                8.6    3.84  )-----------( 305      ( 12-10 @ 09:22 )             24.3                8.5    4.74  )-----------( 335      (  @ 22:20 )             23.1           135  |  108  |  7<L>  ----------------------------<  119<H>  4.5   |  19  |  0.5<L>    Ca    7.6<L>      11 Dec 2023 07:50  Mg     5.3     12-10      10 Dec 2023 17:21    136    |  106    |  5<L>   ----------------------------<  144<H>  4.0     |  20     |  0.5<L>  10 Dec 2023 09:22    133<L>  |  106    |  4<L>   ----------------------------<  115<H>  4.5     |  18     |  <0.5<L>    Ca    7.0<L>      10 Dec 2023 17:21  Ca    7.3<L>      10 Dec 2023 09:22  Mg     5.3<HH>     10 Dec 2023 17:21  Mg     4.7<HH>     10 Dec 2023 09:22    TPro  7.9    /  Alb  3.5    /  TBili  0.4    /  DBili  x      /  AST  18     /  ALT  11     /  AlkPhos  80     09 Dec 2023 22:20    Type + Screen (23 @ 22:20)   ABO RH Interpretation: A POS  Antibody Elution and ID (23 @ 22:20)  Antibody Interpretation 1: Eluate Panagglutinin Antibody Identification (23 @ 22:20)  Antibody Interpretation 1: Serum Panagglutinin    ECG: 3/15/23: nsr, nonspecific ST and T wave changes   Echo: 23: 1. Left ventricular ejection fraction, by visual estimation, is 60 to 65%. 2. Normal left ventricular size and wall thicknesses, with normal systolic and diastolic function. 3. Mild tricuspid regurgitation. 4. Small circumferential pericardial effusion.   ----   23 MVP 2.3cm vtx [id number] [id number]  (25%)   MVP 2.03 x 2.4cm    TTE: WNL EF 65-70%

## 2023-12-12 NOTE — DISCHARGE NOTE ANTEPARTUM - PROVIDER TOKENS
PROVIDER:[TOKEN:[[id number]]],FREE:[LAST:[Nisa Davidson MD],PHONE:[(   )    -],FAX:[(   )    -]],PROVIDER:[TOKEN:[[id number]]] PROVIDER:[TOKEN:[[id number]]],FREE:[LAST:[Nisa Davidson MD],PHONE:[(   )    -],FAX:[(   )    -]],PROVIDER:[TOKEN:[[id number]]]

## 2023-12-14 ENCOUNTER — INPATIENT (INPATIENT)
Facility: HOSPITAL | Age: 23
LOS: 2 days | Discharge: HOME | DRG: 832 | End: 2023-12-17
Attending: OBSTETRICS & GYNECOLOGY | Admitting: OBSTETRICS & GYNECOLOGY
Payer: COMMERCIAL

## 2023-12-14 VITALS — DIASTOLIC BLOOD PRESSURE: 66 MMHG | SYSTOLIC BLOOD PRESSURE: 106 MMHG | HEART RATE: 87 BPM

## 2023-12-14 DIAGNOSIS — O26.899 OTHER SPECIFIED PREGNANCY RELATED CONDITIONS, UNSPECIFIED TRIMESTER: ICD-10-CM

## 2023-12-14 DIAGNOSIS — O26.892 OTHER SPECIFIED PREGNANCY RELATED CONDITIONS, SECOND TRIMESTER: ICD-10-CM

## 2023-12-14 LAB
ALBUMIN SERPL ELPH-MCNC: 3.9 G/DL — SIGNIFICANT CHANGE UP (ref 3.5–5.2)
ALBUMIN SERPL ELPH-MCNC: 3.9 G/DL — SIGNIFICANT CHANGE UP (ref 3.5–5.2)
ALP SERPL-CCNC: 51 U/L — SIGNIFICANT CHANGE UP (ref 30–115)
ALP SERPL-CCNC: 51 U/L — SIGNIFICANT CHANGE UP (ref 30–115)
ALT FLD-CCNC: 18 U/L — SIGNIFICANT CHANGE UP (ref 0–41)
ALT FLD-CCNC: 18 U/L — SIGNIFICANT CHANGE UP (ref 0–41)
ANION GAP SERPL CALC-SCNC: 13 MMOL/L — SIGNIFICANT CHANGE UP (ref 7–14)
ANION GAP SERPL CALC-SCNC: 13 MMOL/L — SIGNIFICANT CHANGE UP (ref 7–14)
APTT BLD: 24.6 SEC — LOW (ref 27–39.2)
APTT BLD: 24.6 SEC — LOW (ref 27–39.2)
APTT BLD: 25.9 SEC — LOW (ref 27–39.2)
APTT BLD: 25.9 SEC — LOW (ref 27–39.2)
APTT BLD: 28.9 SEC — SIGNIFICANT CHANGE UP (ref 27–39.2)
APTT BLD: 28.9 SEC — SIGNIFICANT CHANGE UP (ref 27–39.2)
AST SERPL-CCNC: 74 U/L — HIGH (ref 0–41)
AST SERPL-CCNC: 74 U/L — HIGH (ref 0–41)
BASOPHILS # BLD AUTO: 0 K/UL — SIGNIFICANT CHANGE UP (ref 0–0.2)
BASOPHILS # BLD AUTO: 0.01 K/UL — SIGNIFICANT CHANGE UP (ref 0–0.2)
BASOPHILS # BLD AUTO: 0.01 K/UL — SIGNIFICANT CHANGE UP (ref 0–0.2)
BASOPHILS NFR BLD AUTO: 0 % — SIGNIFICANT CHANGE UP (ref 0–1)
BASOPHILS NFR BLD AUTO: 0.2 % — SIGNIFICANT CHANGE UP (ref 0–1)
BASOPHILS NFR BLD AUTO: 0.2 % — SIGNIFICANT CHANGE UP (ref 0–1)
BILIRUB SERPL-MCNC: 0.4 MG/DL — SIGNIFICANT CHANGE UP (ref 0.2–1.2)
BILIRUB SERPL-MCNC: 0.4 MG/DL — SIGNIFICANT CHANGE UP (ref 0.2–1.2)
BLD GP AB SCN SERPL QL: SIGNIFICANT CHANGE UP
BLD GP AB SCN SERPL QL: SIGNIFICANT CHANGE UP
BUN SERPL-MCNC: 7 MG/DL — LOW (ref 10–20)
BUN SERPL-MCNC: 7 MG/DL — LOW (ref 10–20)
CALCIUM SERPL-MCNC: 8.7 MG/DL — SIGNIFICANT CHANGE UP (ref 8.4–10.5)
CALCIUM SERPL-MCNC: 8.7 MG/DL — SIGNIFICANT CHANGE UP (ref 8.4–10.5)
CHLORIDE SERPL-SCNC: 105 MMOL/L — SIGNIFICANT CHANGE UP (ref 98–110)
CHLORIDE SERPL-SCNC: 105 MMOL/L — SIGNIFICANT CHANGE UP (ref 98–110)
CO2 SERPL-SCNC: 18 MMOL/L — SIGNIFICANT CHANGE UP (ref 17–32)
CO2 SERPL-SCNC: 18 MMOL/L — SIGNIFICANT CHANGE UP (ref 17–32)
CREAT SERPL-MCNC: <0.5 MG/DL — LOW (ref 0.7–1.5)
CREAT SERPL-MCNC: <0.5 MG/DL — LOW (ref 0.7–1.5)
EGFR: 142 ML/MIN/1.73M2 — SIGNIFICANT CHANGE UP
EGFR: 142 ML/MIN/1.73M2 — SIGNIFICANT CHANGE UP
EOSINOPHIL # BLD AUTO: 0.02 K/UL — SIGNIFICANT CHANGE UP (ref 0–0.7)
EOSINOPHIL # BLD AUTO: 0.02 K/UL — SIGNIFICANT CHANGE UP (ref 0–0.7)
EOSINOPHIL # BLD AUTO: 0.03 K/UL — SIGNIFICANT CHANGE UP (ref 0–0.7)
EOSINOPHIL NFR BLD AUTO: 0.3 % — SIGNIFICANT CHANGE UP (ref 0–8)
EOSINOPHIL NFR BLD AUTO: 0.3 % — SIGNIFICANT CHANGE UP (ref 0–8)
EOSINOPHIL NFR BLD AUTO: 0.6 % — SIGNIFICANT CHANGE UP (ref 0–8)
FIBRINOGEN PPP-MCNC: 229 MG/DL — SIGNIFICANT CHANGE UP (ref 200–435)
FIBRINOGEN PPP-MCNC: 229 MG/DL — SIGNIFICANT CHANGE UP (ref 200–435)
FIBRINOGEN PPP-MCNC: 264 MG/DL — SIGNIFICANT CHANGE UP (ref 200–435)
FIBRINOGEN PPP-MCNC: 264 MG/DL — SIGNIFICANT CHANGE UP (ref 200–435)
GLUCOSE SERPL-MCNC: 104 MG/DL — HIGH (ref 70–99)
GLUCOSE SERPL-MCNC: 104 MG/DL — HIGH (ref 70–99)
HCT VFR BLD CALC: 26 % — LOW (ref 37–47)
HCT VFR BLD CALC: 26 % — LOW (ref 37–47)
HCT VFR BLD CALC: 29.7 % — LOW (ref 37–47)
HCT VFR BLD CALC: 29.7 % — LOW (ref 37–47)
HCT VFR BLD CALC: 30.1 % — LOW (ref 37–47)
HCT VFR BLD CALC: 30.1 % — LOW (ref 37–47)
HGB BLD-MCNC: 10.4 G/DL — LOW (ref 12–16)
HGB BLD-MCNC: 10.4 G/DL — LOW (ref 12–16)
HGB BLD-MCNC: 11 G/DL — LOW (ref 12–16)
HGB BLD-MCNC: 11 G/DL — LOW (ref 12–16)
HGB BLD-MCNC: 9.1 G/DL — LOW (ref 12–16)
HGB BLD-MCNC: 9.1 G/DL — LOW (ref 12–16)
IMM GRANULOCYTES NFR BLD AUTO: 0.5 % — HIGH (ref 0.1–0.3)
IMM GRANULOCYTES NFR BLD AUTO: 0.5 % — HIGH (ref 0.1–0.3)
IMM GRANULOCYTES NFR BLD AUTO: 0.6 % — HIGH (ref 0.1–0.3)
IMM GRANULOCYTES NFR BLD AUTO: 0.6 % — HIGH (ref 0.1–0.3)
IMM GRANULOCYTES NFR BLD AUTO: 0.8 % — HIGH (ref 0.1–0.3)
IMM GRANULOCYTES NFR BLD AUTO: 0.8 % — HIGH (ref 0.1–0.3)
INR BLD: 0.96 RATIO — SIGNIFICANT CHANGE UP (ref 0.65–1.3)
INR BLD: 0.96 RATIO — SIGNIFICANT CHANGE UP (ref 0.65–1.3)
INR BLD: 0.97 RATIO — SIGNIFICANT CHANGE UP (ref 0.65–1.3)
INR BLD: 0.97 RATIO — SIGNIFICANT CHANGE UP (ref 0.65–1.3)
INR BLD: 0.98 RATIO — SIGNIFICANT CHANGE UP (ref 0.65–1.3)
INR BLD: 0.98 RATIO — SIGNIFICANT CHANGE UP (ref 0.65–1.3)
LYMPHOCYTES # BLD AUTO: 0.74 K/UL — LOW (ref 1.2–3.4)
LYMPHOCYTES # BLD AUTO: 0.74 K/UL — LOW (ref 1.2–3.4)
LYMPHOCYTES # BLD AUTO: 0.78 K/UL — LOW (ref 1.2–3.4)
LYMPHOCYTES # BLD AUTO: 0.78 K/UL — LOW (ref 1.2–3.4)
LYMPHOCYTES # BLD AUTO: 0.84 K/UL — LOW (ref 1.2–3.4)
LYMPHOCYTES # BLD AUTO: 0.84 K/UL — LOW (ref 1.2–3.4)
LYMPHOCYTES # BLD AUTO: 13.4 % — LOW (ref 20.5–51.1)
LYMPHOCYTES # BLD AUTO: 13.4 % — LOW (ref 20.5–51.1)
LYMPHOCYTES # BLD AUTO: 14.7 % — LOW (ref 20.5–51.1)
LYMPHOCYTES # BLD AUTO: 14.7 % — LOW (ref 20.5–51.1)
LYMPHOCYTES # BLD AUTO: 15.9 % — LOW (ref 20.5–51.1)
LYMPHOCYTES # BLD AUTO: 15.9 % — LOW (ref 20.5–51.1)
MCHC RBC-ENTMCNC: 33.9 PG — HIGH (ref 27–31)
MCHC RBC-ENTMCNC: 33.9 PG — HIGH (ref 27–31)
MCHC RBC-ENTMCNC: 34.3 PG — HIGH (ref 27–31)
MCHC RBC-ENTMCNC: 34.3 PG — HIGH (ref 27–31)
MCHC RBC-ENTMCNC: 35 G/DL — SIGNIFICANT CHANGE UP (ref 32–37)
MCHC RBC-ENTMCNC: 36.5 G/DL — SIGNIFICANT CHANGE UP (ref 32–37)
MCHC RBC-ENTMCNC: 36.5 G/DL — SIGNIFICANT CHANGE UP (ref 32–37)
MCHC RBC-ENTMCNC: 36.9 PG — HIGH (ref 27–31)
MCHC RBC-ENTMCNC: 36.9 PG — HIGH (ref 27–31)
MCV RBC AUTO: 101 FL — HIGH (ref 81–99)
MCV RBC AUTO: 101 FL — HIGH (ref 81–99)
MCV RBC AUTO: 96.7 FL — SIGNIFICANT CHANGE UP (ref 81–99)
MCV RBC AUTO: 96.7 FL — SIGNIFICANT CHANGE UP (ref 81–99)
MCV RBC AUTO: 98.1 FL — SIGNIFICANT CHANGE UP (ref 81–99)
MCV RBC AUTO: 98.1 FL — SIGNIFICANT CHANGE UP (ref 81–99)
MONOCYTES # BLD AUTO: 0.63 K/UL — HIGH (ref 0.1–0.6)
MONOCYTES # BLD AUTO: 0.63 K/UL — HIGH (ref 0.1–0.6)
MONOCYTES # BLD AUTO: 0.81 K/UL — HIGH (ref 0.1–0.6)
MONOCYTES # BLD AUTO: 0.81 K/UL — HIGH (ref 0.1–0.6)
MONOCYTES # BLD AUTO: 0.82 K/UL — HIGH (ref 0.1–0.6)
MONOCYTES # BLD AUTO: 0.82 K/UL — HIGH (ref 0.1–0.6)
MONOCYTES NFR BLD AUTO: 11.9 % — HIGH (ref 1.7–9.3)
MONOCYTES NFR BLD AUTO: 11.9 % — HIGH (ref 1.7–9.3)
MONOCYTES NFR BLD AUTO: 13.1 % — HIGH (ref 1.7–9.3)
MONOCYTES NFR BLD AUTO: 13.1 % — HIGH (ref 1.7–9.3)
MONOCYTES NFR BLD AUTO: 17.4 % — HIGH (ref 1.7–9.3)
MONOCYTES NFR BLD AUTO: 17.4 % — HIGH (ref 1.7–9.3)
NEUTROPHILS # BLD AUTO: 3.04 K/UL — SIGNIFICANT CHANGE UP (ref 1.4–6.5)
NEUTROPHILS # BLD AUTO: 3.04 K/UL — SIGNIFICANT CHANGE UP (ref 1.4–6.5)
NEUTROPHILS # BLD AUTO: 3.82 K/UL — SIGNIFICANT CHANGE UP (ref 1.4–6.5)
NEUTROPHILS # BLD AUTO: 3.82 K/UL — SIGNIFICANT CHANGE UP (ref 1.4–6.5)
NEUTROPHILS # BLD AUTO: 4.57 K/UL — SIGNIFICANT CHANGE UP (ref 1.4–6.5)
NEUTROPHILS # BLD AUTO: 4.57 K/UL — SIGNIFICANT CHANGE UP (ref 1.4–6.5)
NEUTROPHILS NFR BLD AUTO: 65.5 % — SIGNIFICANT CHANGE UP (ref 42.2–75.2)
NEUTROPHILS NFR BLD AUTO: 65.5 % — SIGNIFICANT CHANGE UP (ref 42.2–75.2)
NEUTROPHILS NFR BLD AUTO: 71.8 % — SIGNIFICANT CHANGE UP (ref 42.2–75.2)
NEUTROPHILS NFR BLD AUTO: 71.8 % — SIGNIFICANT CHANGE UP (ref 42.2–75.2)
NEUTROPHILS NFR BLD AUTO: 72.7 % — SIGNIFICANT CHANGE UP (ref 42.2–75.2)
NEUTROPHILS NFR BLD AUTO: 72.7 % — SIGNIFICANT CHANGE UP (ref 42.2–75.2)
NRBC # BLD: 0 /100 WBCS — SIGNIFICANT CHANGE UP (ref 0–0)
PLATELET # BLD AUTO: 290 K/UL — SIGNIFICANT CHANGE UP (ref 130–400)
PLATELET # BLD AUTO: 290 K/UL — SIGNIFICANT CHANGE UP (ref 130–400)
PLATELET # BLD AUTO: 353 K/UL — SIGNIFICANT CHANGE UP (ref 130–400)
PLATELET # BLD AUTO: 353 K/UL — SIGNIFICANT CHANGE UP (ref 130–400)
PLATELET # BLD AUTO: 364 K/UL — SIGNIFICANT CHANGE UP (ref 130–400)
PLATELET # BLD AUTO: 364 K/UL — SIGNIFICANT CHANGE UP (ref 130–400)
PMV BLD: 9.6 FL — SIGNIFICANT CHANGE UP (ref 7.4–10.4)
PMV BLD: 9.8 FL — SIGNIFICANT CHANGE UP (ref 7.4–10.4)
PMV BLD: 9.8 FL — SIGNIFICANT CHANGE UP (ref 7.4–10.4)
POTASSIUM SERPL-MCNC: 5.6 MMOL/L — HIGH (ref 3.5–5)
POTASSIUM SERPL-MCNC: 5.6 MMOL/L — HIGH (ref 3.5–5)
POTASSIUM SERPL-SCNC: 5.6 MMOL/L — HIGH (ref 3.5–5)
POTASSIUM SERPL-SCNC: 5.6 MMOL/L — HIGH (ref 3.5–5)
PROT SERPL-MCNC: 8.1 G/DL — HIGH (ref 6–8)
PROT SERPL-MCNC: 8.1 G/DL — HIGH (ref 6–8)
PROTHROM AB SERPL-ACNC: 10.9 SEC — SIGNIFICANT CHANGE UP (ref 9.95–12.87)
PROTHROM AB SERPL-ACNC: 10.9 SEC — SIGNIFICANT CHANGE UP (ref 9.95–12.87)
PROTHROM AB SERPL-ACNC: 11 SEC — SIGNIFICANT CHANGE UP (ref 9.95–12.87)
PROTHROM AB SERPL-ACNC: 11 SEC — SIGNIFICANT CHANGE UP (ref 9.95–12.87)
PROTHROM AB SERPL-ACNC: 11.2 SEC — SIGNIFICANT CHANGE UP (ref 9.95–12.87)
PROTHROM AB SERPL-ACNC: 11.2 SEC — SIGNIFICANT CHANGE UP (ref 9.95–12.87)
RBC # BLD: 2.65 M/UL — LOW (ref 4.2–5.4)
RBC # BLD: 2.65 M/UL — LOW (ref 4.2–5.4)
RBC # BLD: 2.98 M/UL — LOW (ref 4.2–5.4)
RBC # BLD: 2.98 M/UL — LOW (ref 4.2–5.4)
RBC # BLD: 3.07 M/UL — LOW (ref 4.2–5.4)
RBC # BLD: 3.07 M/UL — LOW (ref 4.2–5.4)
RBC # FLD: 13.3 % — SIGNIFICANT CHANGE UP (ref 11.5–14.5)
RBC # FLD: 13.3 % — SIGNIFICANT CHANGE UP (ref 11.5–14.5)
RBC # FLD: 13.4 % — SIGNIFICANT CHANGE UP (ref 11.5–14.5)
RBC # FLD: 13.4 % — SIGNIFICANT CHANGE UP (ref 11.5–14.5)
RBC # FLD: 14 % — SIGNIFICANT CHANGE UP (ref 11.5–14.5)
RBC # FLD: 14 % — SIGNIFICANT CHANGE UP (ref 11.5–14.5)
SODIUM SERPL-SCNC: 136 MMOL/L — SIGNIFICANT CHANGE UP (ref 135–146)
SODIUM SERPL-SCNC: 136 MMOL/L — SIGNIFICANT CHANGE UP (ref 135–146)
WBC # BLD: 4.65 K/UL — LOW (ref 4.8–10.8)
WBC # BLD: 4.65 K/UL — LOW (ref 4.8–10.8)
WBC # BLD: 5.31 K/UL — SIGNIFICANT CHANGE UP (ref 4.8–10.8)
WBC # BLD: 5.31 K/UL — SIGNIFICANT CHANGE UP (ref 4.8–10.8)
WBC # BLD: 6.28 K/UL — SIGNIFICANT CHANGE UP (ref 4.8–10.8)
WBC # BLD: 6.28 K/UL — SIGNIFICANT CHANGE UP (ref 4.8–10.8)
WBC # FLD AUTO: 4.65 K/UL — LOW (ref 4.8–10.8)
WBC # FLD AUTO: 4.65 K/UL — LOW (ref 4.8–10.8)
WBC # FLD AUTO: 5.31 K/UL — SIGNIFICANT CHANGE UP (ref 4.8–10.8)
WBC # FLD AUTO: 5.31 K/UL — SIGNIFICANT CHANGE UP (ref 4.8–10.8)
WBC # FLD AUTO: 6.28 K/UL — SIGNIFICANT CHANGE UP (ref 4.8–10.8)
WBC # FLD AUTO: 6.28 K/UL — SIGNIFICANT CHANGE UP (ref 4.8–10.8)

## 2023-12-14 PROCEDURE — 85730 THROMBOPLASTIN TIME PARTIAL: CPT

## 2023-12-14 PROCEDURE — 85610 PROTHROMBIN TIME: CPT

## 2023-12-14 PROCEDURE — 82728 ASSAY OF FERRITIN: CPT

## 2023-12-14 PROCEDURE — 80053 COMPREHEN METABOLIC PANEL: CPT

## 2023-12-14 PROCEDURE — 83010 ASSAY OF HAPTOGLOBIN QUANT: CPT

## 2023-12-14 PROCEDURE — 84156 ASSAY OF PROTEIN URINE: CPT

## 2023-12-14 PROCEDURE — 99418 PROLNG IP/OBS E/M EA 15 MIN: CPT | Mod: 25

## 2023-12-14 PROCEDURE — 85385 FIBRINOGEN ANTIGEN: CPT

## 2023-12-14 PROCEDURE — 82607 VITAMIN B-12: CPT

## 2023-12-14 PROCEDURE — 83615 LACTATE (LD) (LDH) ENZYME: CPT

## 2023-12-14 PROCEDURE — 83550 IRON BINDING TEST: CPT

## 2023-12-14 PROCEDURE — 36415 COLL VENOUS BLD VENIPUNCTURE: CPT

## 2023-12-14 PROCEDURE — 85384 FIBRINOGEN ACTIVITY: CPT

## 2023-12-14 PROCEDURE — 99223 1ST HOSP IP/OBS HIGH 75: CPT | Mod: 25

## 2023-12-14 PROCEDURE — 85045 AUTOMATED RETICULOCYTE COUNT: CPT

## 2023-12-14 PROCEDURE — 83921 ORGANIC ACID SINGLE QUANT: CPT

## 2023-12-14 PROCEDURE — 85025 COMPLETE CBC W/AUTO DIFF WBC: CPT

## 2023-12-14 PROCEDURE — 83540 ASSAY OF IRON: CPT

## 2023-12-14 PROCEDURE — 82570 ASSAY OF URINE CREATININE: CPT

## 2023-12-14 RX ORDER — INFLUENZA VIRUS VACCINE 15; 15; 15; 15 UG/.5ML; UG/.5ML; UG/.5ML; UG/.5ML
0.5 SUSPENSION INTRAMUSCULAR ONCE
Refills: 0 | Status: COMPLETED | OUTPATIENT
Start: 2023-12-14 | End: 2023-12-14

## 2023-12-14 RX ORDER — AMOXICILLIN 250 MG/5ML
500 SUSPENSION, RECONSTITUTED, ORAL (ML) ORAL EVERY 8 HOURS
Refills: 0 | Status: DISCONTINUED | OUTPATIENT
Start: 2023-12-14 | End: 2023-12-15

## 2023-12-14 NOTE — OB PROVIDER H&P - NSHPLABSRESULTS_GEN_ALL_CORE
8/21/23  HIV NR  Measles non immune  HbsAg NR  RPR 1:32 FTA NR  HepC NR  Rubella Immune    increased risk of down syndrome   increased Nuchal translucency     Sono  12w1d NT 3.1mm >99%  15w1d 5oz fetal anatomy limited bu no major fetal malformations noted - amniocentesis   21w4d 15oz oligohydramnios THE FETAL ANATOMY WAS INCOMPLETELY VISUALIZED DUE TO FETAL POSITION.  HOWEVER, NO MAJOR FETAL  MALFORMATIONS ARE NOTED  23w3d right pyelectasis, pericardial effusion    fetal echo @ 22w2d small pericardial effusion    12/14 6.28>10.4/29.7<364, PT/PTT/INR 11/25.9/0.97, fibrinogen 264, A pos, antibody screen positive

## 2023-12-14 NOTE — OB RN PATIENT PROFILE - FALL HARM RISK - UNIVERSAL INTERVENTIONS
Bed in lowest position, wheels locked, appropriate side rails in place/Call bell, personal items and telephone in reach/Instruct patient to call for assistance before getting out of bed or chair/Non-slip footwear when patient is out of bed/[place] to call system/Physically safe environment - no spills, clutter or unnecessary equipment/Purposeful Proactive Rounding/Room/bathroom lighting operational, light cord in reach Bed in lowest position, wheels locked, appropriate side rails in place/Call bell, personal items and telephone in reach/Instruct patient to call for assistance before getting out of bed or chair/Non-slip footwear when patient is out of bed/[place] to call system/Physically safe environment - no spills, clutter or unnecessary equipment/Purposeful Proactive Rounding/Room/bathroom lighting operational, light cord in reach

## 2023-12-14 NOTE — OB PROVIDER H&P - ASSESSMENT
22 yo  @ 27w3d, with vaginal bleeding with concern for placental abruption     - Admit to OB  - cont toco/efm  - regular diet   - f/u 1700 CBC + Coags   - c/w Amoxicillin   - f/u MFM consultation    D/w Dr. Pickering and Dr. Everett.  24 yo  @ 27w3d, with vaginal bleeding with concern for placental abruption     - Admit to OB  - cont toco/efm  - regular diet   - f/u 1700 CBC + Coags   - c/w Amoxicillin   - f/u MFM consultation    D/w Dr. Pickering and Dr. Everett.

## 2023-12-14 NOTE — OB PROVIDER TRIAGE NOTE - NSOBPROVIDERNOTE_OBGYN_ALL_OB_FT
24 yo  @ 27w3d by LMP, ANIVAL 3/11/23 presenting due to vaginal bleeding    -f/u labs  -cont efm/toco  -bed rest for now  -regular diet

## 2023-12-14 NOTE — OB PROVIDER TRIAGE NOTE - NSHPPHYSICALEXAM_GEN_ALL_CORE
HR: 87 (12-14-23 @ 09:46) (87 - 87)  BP: 106/66 (12-14-23 @ 09:46) (106/66 - 106/66)  BMI (kg/m2): 35.4 (12-10-23 @ 00:04)    Gen: A+OX3. NAD  Cardio: RRR  Resp: CTAB  Abd: Soft, Nontender. Gravid. No palpable contractions  SVE: C/L/P  Speculum: cervix closed, no pooling, scant bloody mucus at cervical os, cervical ectropion noted  BSS: vtx, BPP 8/8, MVP 3.48cm, TVCL 3.84cm    FHR: 140BPM/mod saul/accels pos  TOCO: none noted

## 2023-12-14 NOTE — OB RN TRIAGE NOTE - FALL HARM RISK - UNIVERSAL INTERVENTIONS
Bed in lowest position, wheels locked, appropriate side rails in place/Call bell, personal items and telephone in reach/Instruct patient to call for assistance before getting out of bed or chair/Non-slip footwear when patient is out of bed/[place] to call system/Physically safe environment - no spills, clutter or unnecessary equipment/Purposeful Proactive Rounding/Room/bathroom lighting operational, light cord in reach Bed in lowest position, wheels locked, appropriate side rails in place/Call bell, personal items and telephone in reach/Instruct patient to call for assistance before getting out of bed or chair/Non-slip footwear when patient is out of bed/[place] to call system/Physically safe environment - no spills, clutter or unnecessary equipment/Purposeful Proactive Rounding/Room/bathroom lighting operational, light cord in reach

## 2023-12-14 NOTE — OB PROVIDER H&P - ATTENDING COMMENTS
I was physically present for the key portions of the evaluation and management (e/m) service provided. I agree with the above history,physical and plan which I have reviewed and edited where appropriate  Patient seen face to face and case reviewed, precautions given to patient    Patient presented to triage and was evaluated starting 09:43. The fetal heart rate monitor continued until 00:00 midnight. I spent 883 minutes caring for the patient. It included obtaining a history, performing an examination, continuously monitoring the fetus and interpretation of the fetal heart rate strip, ordering and reviewing labs, documentingin the medical record and a discussion with the patient.

## 2023-12-14 NOTE — OB PROVIDER TRIAGE NOTE - HISTORY OF PRESENT ILLNESS
24 yo  @ 27w3d by LMP, ANIVAL 3/11/23 presenting due to vaginal bleeding. Woke up this AM and went to urinate, noticed small blood when she wiped and in the toilet. Denies contracitons/abdominal pain, LOF, or heavy vaginal bleeding. Endorses good fetal movement. She sees an OBGYN in [place].     Last BM: yesterday  Last intercourse: months  Last PO: this AM    Pregnancy complicated by:  1.  Possible PPROM, discharged on   - S/p  steroids -12/10  - C/w latency antibiotics, currently on PO Amoxacillin  - GBS neg. Gc/Ct negative.     2. Multiple antibodies in type and screen    3.  History of pericarditis with small pericardial effusion in February-2023. S/p prednisone and colchicine.  - Normal TTE  - Is followed by Dr. Behuria. Last seen in 2023    4.  Dx of Lupus  - Followed by Rheumatology, patient refusing treatment  - Consult appreciated     5. Crohn's in remission  - Diagnosed at 16yo, last colonoscopy then  - Lost to follow up, pt asymptomatic    6.  Pregnancy  - Increased risk of down syndrome, Increased NT, right pyelectasis, small pericardial effusion.  S/p fetal echo (otherwise normal). NIPTS inconclusive, amniocentesis karyotype and array are negative.  - Previous  delivery. 24 yo  @ 27w3d by LMP, ANIVAL 3/11/23 presenting due to vaginal bleeding. Woke up this AM and went to urinate, noticed small blood when she wiped and in the toilet. Denies contracitons/abdominal pain, LOF, or heavy vaginal bleeding. Endorses good fetal movement. She sees an OBGYN in [place].     Last BM: yesterday  Last intercourse: months  Last PO: this AM    Pregnancy complicated by:  1.  Possible PPROM, discharged on   - S/p  steroids -12/10  - C/w latency antibiotics, currently on PO Amoxacillin  - GBS neg. Gc/Ct negative.     2. Multiple antibodies in type and screen    3.  History of pericarditis with small pericardial effusion in February-2023. S/p prednisone and colchicine.  - Normal TTE  - Is followed by Dr. Behuria. Last seen in 2023    4.  Dx of Lupus  - Followed by Rheumatology, patient refusing treatment  - Consult appreciated     5. Crohn's in remission  - Diagnosed at 14yo, last colonoscopy then  - Lost to follow up, pt asymptomatic    6.  Pregnancy  - Increased risk of down syndrome, Increased NT, right pyelectasis, small pericardial effusion.  S/p fetal echo (otherwise normal). NIPTS inconclusive, amniocentesis karyotype and array are negative.  - Previous  delivery.

## 2023-12-14 NOTE — OB PROVIDER TRIAGE NOTE - NS_PHYSICALALLNEG_OBGYN_ALL_OB
All other review of systems negative, except as noted in HPI Lab Facility: [id number] Lab Facility: [id number]

## 2023-12-14 NOTE — OB PROVIDER H&P - HISTORY OF PRESENT ILLNESS
24 yo  @ 27w3d by LMP, ANIVAL 3/11/23 presenting due to vaginal bleeding. Woke up this AM and went to urinate, noticed small blood when she wiped and in the toilet. Denies contracitons/abdominal pain, LOF, or heavy vaginal bleeding. Endorses good fetal movement. She sees an OBGYN in [place].     Last BM: yesterday  Last intercourse: months  Last PO: this AM    Pregnancy complicated by:  1.  Possible PPROM, discharged on   - S/p  steroids -12/10  - C/w latency antibiotics, currently on PO Amoxacillin  - GBS neg. Gc/Ct negative.     2. Multiple antibodies in type and screen    3.  History of pericarditis with small pericardial effusion in February-2023. S/p prednisone and colchicine.  - Normal TTE  - Is followed by Dr. Behuria. Last seen in 2023    4.  Dx of Lupus  - Followed by Rheumatology, patient refusing treatment  - Consult appreciated     5. Crohn's in remission  - Diagnosed at 14yo, last colonoscopy then  - Lost to follow up, pt asymptomatic    6.  Pregnancy  - Increased risk of down syndrome, Increased NT, right pyelectasis, small pericardial effusion.  S/p fetal echo (otherwise normal). NIPTS inconclusive, amniocentesis karyotype and array are negative.  - Previous  delivery. 24 yo  @ 27w3d by LMP, ANIVAL 3/11/23 presenting due to vaginal bleeding. Woke up this AM and went to urinate, noticed small blood when she wiped and in the toilet. Denies contracitons/abdominal pain, LOF, or heavy vaginal bleeding. Endorses good fetal movement. She sees an OBGYN in [place].     Last BM: yesterday  Last intercourse: months  Last PO: this AM    Pregnancy complicated by:  1.  Possible PPROM, discharged on   - S/p  steroids -12/10  - C/w latency antibiotics, currently on PO Amoxacillin  - GBS neg. Gc/Ct negative.     2. Multiple antibodies in type and screen    3.  History of pericarditis with small pericardial effusion in February-2023. S/p prednisone and colchicine.  - Normal TTE  - Is followed by Dr. Behuria. Last seen in 2023    4.  Dx of Lupus  - Followed by Rheumatology, patient refusing treatment  - Consult appreciated     5. Crohn's in remission  - Diagnosed at 16yo, last colonoscopy then  - Lost to follow up, pt asymptomatic    6.  Pregnancy  - Increased risk of down syndrome, Increased NT, right pyelectasis, small pericardial effusion.  S/p fetal echo (otherwise normal). NIPTS inconclusive, amniocentesis karyotype and array are negative.  - Previous  delivery.

## 2023-12-15 DIAGNOSIS — O99.012 ANEMIA COMPLICATING PREGNANCY, SECOND TRIMESTER: ICD-10-CM

## 2023-12-15 DIAGNOSIS — Z87.19 PERSONAL HISTORY OF OTHER DISEASES OF THE DIGESTIVE SYSTEM: ICD-10-CM

## 2023-12-15 DIAGNOSIS — Z86.79 PERSONAL HISTORY OF OTHER DISEASES OF THE CIRCULATORY SYSTEM: ICD-10-CM

## 2023-12-15 DIAGNOSIS — L93.0 DISCOID LUPUS ERYTHEMATOSUS: ICD-10-CM

## 2023-12-15 DIAGNOSIS — O41.02X0 OLIGOHYDRAMNIOS, SECOND TRIMESTER, NOT APPLICABLE OR UNSPECIFIED: ICD-10-CM

## 2023-12-15 DIAGNOSIS — Z28.09 IMMUNIZATION NOT CARRIED OUT BECAUSE OF OTHER CONTRAINDICATION: ICD-10-CM

## 2023-12-15 DIAGNOSIS — Z3A.26 26 WEEKS GESTATION OF PREGNANCY: ICD-10-CM

## 2023-12-15 DIAGNOSIS — O99.712 DISEASES OF THE SKIN AND SUBCUTANEOUS TISSUE COMPLICATING PREGNANCY, SECOND TRIMESTER: ICD-10-CM

## 2023-12-15 LAB
ALBUMIN SERPL ELPH-MCNC: 3 G/DL — LOW (ref 3.5–5.2)
ALBUMIN SERPL ELPH-MCNC: 3 G/DL — LOW (ref 3.5–5.2)
ALP SERPL-CCNC: 56 U/L — SIGNIFICANT CHANGE UP (ref 30–115)
ALP SERPL-CCNC: 56 U/L — SIGNIFICANT CHANGE UP (ref 30–115)
ALT FLD-CCNC: 10 U/L — SIGNIFICANT CHANGE UP (ref 0–41)
ALT FLD-CCNC: 10 U/L — SIGNIFICANT CHANGE UP (ref 0–41)
ANION GAP SERPL CALC-SCNC: 9 MMOL/L — SIGNIFICANT CHANGE UP (ref 7–14)
ANION GAP SERPL CALC-SCNC: 9 MMOL/L — SIGNIFICANT CHANGE UP (ref 7–14)
APTT BLD: 26.6 SEC — LOW (ref 27–39.2)
APTT BLD: 26.6 SEC — LOW (ref 27–39.2)
APTT BLD: 27.5 SEC — SIGNIFICANT CHANGE UP (ref 27–39.2)
APTT BLD: 27.5 SEC — SIGNIFICANT CHANGE UP (ref 27–39.2)
AST SERPL-CCNC: 12 U/L — SIGNIFICANT CHANGE UP (ref 0–41)
AST SERPL-CCNC: 12 U/L — SIGNIFICANT CHANGE UP (ref 0–41)
BASOPHILS # BLD AUTO: 0.01 K/UL — SIGNIFICANT CHANGE UP (ref 0–0.2)
BASOPHILS NFR BLD AUTO: 0.2 % — SIGNIFICANT CHANGE UP (ref 0–1)
BILIRUB SERPL-MCNC: 0.3 MG/DL — SIGNIFICANT CHANGE UP (ref 0.2–1.2)
BILIRUB SERPL-MCNC: 0.3 MG/DL — SIGNIFICANT CHANGE UP (ref 0.2–1.2)
BUN SERPL-MCNC: 6 MG/DL — LOW (ref 10–20)
BUN SERPL-MCNC: 6 MG/DL — LOW (ref 10–20)
CALCIUM SERPL-MCNC: 8.2 MG/DL — LOW (ref 8.4–10.5)
CALCIUM SERPL-MCNC: 8.2 MG/DL — LOW (ref 8.4–10.5)
CHLORIDE SERPL-SCNC: 108 MMOL/L — SIGNIFICANT CHANGE UP (ref 98–110)
CHLORIDE SERPL-SCNC: 108 MMOL/L — SIGNIFICANT CHANGE UP (ref 98–110)
CO2 SERPL-SCNC: 21 MMOL/L — SIGNIFICANT CHANGE UP (ref 17–32)
CO2 SERPL-SCNC: 21 MMOL/L — SIGNIFICANT CHANGE UP (ref 17–32)
CREAT SERPL-MCNC: <0.5 MG/DL — LOW (ref 0.7–1.5)
CREAT SERPL-MCNC: <0.5 MG/DL — LOW (ref 0.7–1.5)
EGFR: 142 ML/MIN/1.73M2 — SIGNIFICANT CHANGE UP
EGFR: 142 ML/MIN/1.73M2 — SIGNIFICANT CHANGE UP
EOSINOPHIL # BLD AUTO: 0.02 K/UL — SIGNIFICANT CHANGE UP (ref 0–0.7)
EOSINOPHIL # BLD AUTO: 0.02 K/UL — SIGNIFICANT CHANGE UP (ref 0–0.7)
EOSINOPHIL # BLD AUTO: 0.03 K/UL — SIGNIFICANT CHANGE UP (ref 0–0.7)
EOSINOPHIL # BLD AUTO: 0.03 K/UL — SIGNIFICANT CHANGE UP (ref 0–0.7)
EOSINOPHIL NFR BLD AUTO: 0.4 % — SIGNIFICANT CHANGE UP (ref 0–8)
EOSINOPHIL NFR BLD AUTO: 0.4 % — SIGNIFICANT CHANGE UP (ref 0–8)
EOSINOPHIL NFR BLD AUTO: 0.7 % — SIGNIFICANT CHANGE UP (ref 0–8)
EOSINOPHIL NFR BLD AUTO: 0.7 % — SIGNIFICANT CHANGE UP (ref 0–8)
FIBRINOGEN PPP-MCNC: 206 MG/DL — SIGNIFICANT CHANGE UP (ref 200–435)
FIBRINOGEN PPP-MCNC: 206 MG/DL — SIGNIFICANT CHANGE UP (ref 200–435)
FIBRINOGEN PPP-MCNC: 223 MG/DL — SIGNIFICANT CHANGE UP (ref 200–435)
FIBRINOGEN PPP-MCNC: 223 MG/DL — SIGNIFICANT CHANGE UP (ref 200–435)
FIBRINOGEN PPP-MCNC: 229 MG/DL — SIGNIFICANT CHANGE UP (ref 200–435)
FIBRINOGEN PPP-MCNC: 229 MG/DL — SIGNIFICANT CHANGE UP (ref 200–435)
GLUCOSE SERPL-MCNC: 82 MG/DL — SIGNIFICANT CHANGE UP (ref 70–99)
GLUCOSE SERPL-MCNC: 82 MG/DL — SIGNIFICANT CHANGE UP (ref 70–99)
HCT VFR BLD CALC: 27.5 % — LOW (ref 37–47)
HCT VFR BLD CALC: 27.5 % — LOW (ref 37–47)
HCT VFR BLD CALC: 27.7 % — LOW (ref 37–47)
HCT VFR BLD CALC: 27.7 % — LOW (ref 37–47)
HGB BLD-MCNC: 9.6 G/DL — LOW (ref 12–16)
IMM GRANULOCYTES NFR BLD AUTO: 0.2 % — SIGNIFICANT CHANGE UP (ref 0.1–0.3)
IMM GRANULOCYTES NFR BLD AUTO: 0.2 % — SIGNIFICANT CHANGE UP (ref 0.1–0.3)
IMM GRANULOCYTES NFR BLD AUTO: 0.4 % — HIGH (ref 0.1–0.3)
IMM GRANULOCYTES NFR BLD AUTO: 0.4 % — HIGH (ref 0.1–0.3)
INR BLD: 0.97 RATIO — SIGNIFICANT CHANGE UP (ref 0.65–1.3)
INR BLD: 0.97 RATIO — SIGNIFICANT CHANGE UP (ref 0.65–1.3)
INR BLD: 0.99 RATIO — SIGNIFICANT CHANGE UP (ref 0.65–1.3)
INR BLD: 0.99 RATIO — SIGNIFICANT CHANGE UP (ref 0.65–1.3)
LYMPHOCYTES # BLD AUTO: 0.69 K/UL — LOW (ref 1.2–3.4)
LYMPHOCYTES # BLD AUTO: 0.69 K/UL — LOW (ref 1.2–3.4)
LYMPHOCYTES # BLD AUTO: 0.71 K/UL — LOW (ref 1.2–3.4)
LYMPHOCYTES # BLD AUTO: 0.71 K/UL — LOW (ref 1.2–3.4)
LYMPHOCYTES # BLD AUTO: 15.5 % — LOW (ref 20.5–51.1)
LYMPHOCYTES # BLD AUTO: 15.5 % — LOW (ref 20.5–51.1)
LYMPHOCYTES # BLD AUTO: 15.7 % — LOW (ref 20.5–51.1)
LYMPHOCYTES # BLD AUTO: 15.7 % — LOW (ref 20.5–51.1)
MCHC RBC-ENTMCNC: 34.5 PG — HIGH (ref 27–31)
MCHC RBC-ENTMCNC: 34.5 PG — HIGH (ref 27–31)
MCHC RBC-ENTMCNC: 34.7 G/DL — SIGNIFICANT CHANGE UP (ref 32–37)
MCHC RBC-ENTMCNC: 34.7 G/DL — SIGNIFICANT CHANGE UP (ref 32–37)
MCHC RBC-ENTMCNC: 34.9 G/DL — SIGNIFICANT CHANGE UP (ref 32–37)
MCHC RBC-ENTMCNC: 34.9 G/DL — SIGNIFICANT CHANGE UP (ref 32–37)
MCHC RBC-ENTMCNC: 35.3 PG — HIGH (ref 27–31)
MCHC RBC-ENTMCNC: 35.3 PG — HIGH (ref 27–31)
MCV RBC AUTO: 101.8 FL — HIGH (ref 81–99)
MCV RBC AUTO: 101.8 FL — HIGH (ref 81–99)
MCV RBC AUTO: 98.9 FL — SIGNIFICANT CHANGE UP (ref 81–99)
MCV RBC AUTO: 98.9 FL — SIGNIFICANT CHANGE UP (ref 81–99)
MONOCYTES # BLD AUTO: 0.72 K/UL — HIGH (ref 0.1–0.6)
MONOCYTES # BLD AUTO: 0.72 K/UL — HIGH (ref 0.1–0.6)
MONOCYTES # BLD AUTO: 0.75 K/UL — HIGH (ref 0.1–0.6)
MONOCYTES # BLD AUTO: 0.75 K/UL — HIGH (ref 0.1–0.6)
MONOCYTES NFR BLD AUTO: 16.1 % — HIGH (ref 1.7–9.3)
MONOCYTES NFR BLD AUTO: 16.1 % — HIGH (ref 1.7–9.3)
MONOCYTES NFR BLD AUTO: 16.6 % — HIGH (ref 1.7–9.3)
MONOCYTES NFR BLD AUTO: 16.6 % — HIGH (ref 1.7–9.3)
NEUTROPHILS # BLD AUTO: 3 K/UL — SIGNIFICANT CHANGE UP (ref 1.4–6.5)
NEUTROPHILS # BLD AUTO: 3 K/UL — SIGNIFICANT CHANGE UP (ref 1.4–6.5)
NEUTROPHILS # BLD AUTO: 3.01 K/UL — SIGNIFICANT CHANGE UP (ref 1.4–6.5)
NEUTROPHILS # BLD AUTO: 3.01 K/UL — SIGNIFICANT CHANGE UP (ref 1.4–6.5)
NEUTROPHILS NFR BLD AUTO: 66.6 % — SIGNIFICANT CHANGE UP (ref 42.2–75.2)
NEUTROPHILS NFR BLD AUTO: 66.6 % — SIGNIFICANT CHANGE UP (ref 42.2–75.2)
NEUTROPHILS NFR BLD AUTO: 67.4 % — SIGNIFICANT CHANGE UP (ref 42.2–75.2)
NEUTROPHILS NFR BLD AUTO: 67.4 % — SIGNIFICANT CHANGE UP (ref 42.2–75.2)
NRBC # BLD: 0 /100 WBCS — SIGNIFICANT CHANGE UP (ref 0–0)
PLATELET # BLD AUTO: 293 K/UL — SIGNIFICANT CHANGE UP (ref 130–400)
PLATELET # BLD AUTO: 293 K/UL — SIGNIFICANT CHANGE UP (ref 130–400)
PLATELET # BLD AUTO: 295 K/UL — SIGNIFICANT CHANGE UP (ref 130–400)
PLATELET # BLD AUTO: 295 K/UL — SIGNIFICANT CHANGE UP (ref 130–400)
PMV BLD: 9.6 FL — SIGNIFICANT CHANGE UP (ref 7.4–10.4)
POTASSIUM SERPL-MCNC: 3.8 MMOL/L — SIGNIFICANT CHANGE UP (ref 3.5–5)
POTASSIUM SERPL-MCNC: 3.8 MMOL/L — SIGNIFICANT CHANGE UP (ref 3.5–5)
POTASSIUM SERPL-SCNC: 3.8 MMOL/L — SIGNIFICANT CHANGE UP (ref 3.5–5)
POTASSIUM SERPL-SCNC: 3.8 MMOL/L — SIGNIFICANT CHANGE UP (ref 3.5–5)
PROT SERPL-MCNC: 6.5 G/DL — SIGNIFICANT CHANGE UP (ref 6–8)
PROT SERPL-MCNC: 6.5 G/DL — SIGNIFICANT CHANGE UP (ref 6–8)
PROTHROM AB SERPL-ACNC: 11 SEC — SIGNIFICANT CHANGE UP (ref 9.95–12.87)
PROTHROM AB SERPL-ACNC: 11 SEC — SIGNIFICANT CHANGE UP (ref 9.95–12.87)
PROTHROM AB SERPL-ACNC: 11.3 SEC — SIGNIFICANT CHANGE UP (ref 9.95–12.87)
PROTHROM AB SERPL-ACNC: 11.3 SEC — SIGNIFICANT CHANGE UP (ref 9.95–12.87)
RBC # BLD: 2.72 M/UL — LOW (ref 4.2–5.4)
RBC # BLD: 2.72 M/UL — LOW (ref 4.2–5.4)
RBC # BLD: 2.78 M/UL — LOW (ref 4.2–5.4)
RBC # BLD: 2.78 M/UL — LOW (ref 4.2–5.4)
RBC # FLD: 13.5 % — SIGNIFICANT CHANGE UP (ref 11.5–14.5)
RBC # FLD: 13.5 % — SIGNIFICANT CHANGE UP (ref 11.5–14.5)
RBC # FLD: 13.8 % — SIGNIFICANT CHANGE UP (ref 11.5–14.5)
RBC # FLD: 13.8 % — SIGNIFICANT CHANGE UP (ref 11.5–14.5)
SODIUM SERPL-SCNC: 138 MMOL/L — SIGNIFICANT CHANGE UP (ref 135–146)
SODIUM SERPL-SCNC: 138 MMOL/L — SIGNIFICANT CHANGE UP (ref 135–146)
WBC # BLD: 4.46 K/UL — LOW (ref 4.8–10.8)
WBC # BLD: 4.46 K/UL — LOW (ref 4.8–10.8)
WBC # BLD: 4.52 K/UL — LOW (ref 4.8–10.8)
WBC # BLD: 4.52 K/UL — LOW (ref 4.8–10.8)
WBC # FLD AUTO: 4.46 K/UL — LOW (ref 4.8–10.8)
WBC # FLD AUTO: 4.46 K/UL — LOW (ref 4.8–10.8)
WBC # FLD AUTO: 4.52 K/UL — LOW (ref 4.8–10.8)
WBC # FLD AUTO: 4.52 K/UL — LOW (ref 4.8–10.8)

## 2023-12-15 PROCEDURE — 99221 1ST HOSP IP/OBS SF/LOW 40: CPT

## 2023-12-15 PROCEDURE — 99233 SBSQ HOSP IP/OBS HIGH 50: CPT | Mod: 25

## 2023-12-15 PROCEDURE — 99418 PROLNG IP/OBS E/M EA 15 MIN: CPT | Mod: 25

## 2023-12-15 PROCEDURE — 76818 FETAL BIOPHYS PROFILE W/NST: CPT | Mod: 26

## 2023-12-15 RX ORDER — AMOXICILLIN 250 MG/5ML
500 SUSPENSION, RECONSTITUTED, ORAL (ML) ORAL ONCE
Refills: 0 | Status: COMPLETED | OUTPATIENT
Start: 2023-12-15 | End: 2023-12-15

## 2023-12-15 RX ORDER — AMOXICILLIN 250 MG/5ML
500 SUSPENSION, RECONSTITUTED, ORAL (ML) ORAL EVERY 8 HOURS
Refills: 0 | Status: COMPLETED | OUTPATIENT
Start: 2023-12-15 | End: 2023-12-17

## 2023-12-15 RX ADMIN — Medication 500 MILLIGRAM(S): at 07:02

## 2023-12-15 RX ADMIN — Medication 500 MILLIGRAM(S): at 21:58

## 2023-12-15 RX ADMIN — Medication 1 TABLET(S): at 12:23

## 2023-12-15 RX ADMIN — Medication 500 MILLIGRAM(S): at 15:40

## 2023-12-15 NOTE — CONSULT NOTE ADULT - SUBJECTIVE AND OBJECTIVE BOX
[facility] Consult    TRIAGE/ADMISSIONI HPI:  24 yo  @ 27w3d by LMP, ANIVAL 3/11/23 presenting due to vaginal bleeding. Woke up this AM and went to urinate, noticed small blood when she wiped and in the toilet. Denies contracitons/abdominal pain, LOF, or heavy vaginal bleeding. Endorses good fetal movement. She sees an OBGYN in [place].     Pregnancy complicated by:  1.  Possible PPROM, discharged on   - S/p  steroids -12/10  - C/w latency antibiotics, currently on PO Amoxacillin  - GBS neg. Gc/Ct negative.     2. Multiple antibodies in type and screen    3.  History of pericarditis with small pericardial effusion in February-2023. S/p prednisone and colchicine.  - Normal TTE  - Is followed by Dr. Behuria. Consult appreciated from recent admission.     4.  Dx of Lupus  - Followed by Rheumatology, patient refusing treatment  - Consult appreciated  from recent admission.     5. Crohn's in remission  - Diagnosed at 14yo, last colonoscopy then  - Lost to follow up, pt asymptomatic    6.  Pregnancy  - Increased risk of down syndrome, Increased NT, right pyelectasis, small pericardial effusion.  S/p fetal echo (otherwise normal). NIPTS inconclusive, amniocentesis karyotype and array are negative.  - Previous  delivery.      [facility] Addendum:  She reports feeling well this morning, without abdominal pain/CTX or bleeding. Reports good FM.     PAST MEDICAL & SURGICAL HISTORY:  H/O pericarditis  H/O SLE  Possible Crohn's disease  She has had blood transfusions in the past.   delivery    Obstetric history:    #1 (): Vaginal delivery of 7-11 boy, at 40 weeks GA, after 3 days hours of labor . patient received anesthesia. Delivery occurred at [facility]. no pregnancy complications reported.    #2 ():  delivery of 5-10 boy, at 40 weeks GA . Delivery occurred at [facility]. no pregnancy complications reported. Pregnancy #2 Comments: (Abnormal fetal heart rate tracing in labor).    GYN history:  No abnormal pap smears, no STDs     Allergies  No Known Allergies    MEDICATIONS   Prenatal vitamins  Amoxacillin    Family history:  Mother:  DM2    Social history:  No alcohol use, drug use, or smoking.  She does not work outside the house.     Review of systems:  A complete review of systems was obtained and is negative except as described in the HPI.    Physical exam:    Vital Signs Last 24 Hrs  T(F): 98.78 (14 Dec 2023 19:24), Max: 98.8 (14 Dec 2023 14:44)  HR: 72 (15 Dec 2023 06:13) (62 - 90)  BP: 121/57 (15 Dec 2023 06:13) (106/66 - 149/68)  RR: 20 (14 Dec 2023 19:24) (20 - 20)    Gen: AAOx3, NAD  Heart: RRR, S1 S2 WNL  Lungs: CTAB  Abdomen: Soft, nontender, no distension, gravid  Ext: No calf tenderness, no swelling    EFM: 130/mod saul/pos accel  toco: none          Type + Screen (23 @ 22:20)   ABO RH Interpretation: A POS  Antibody Elution and ID (23 @ 22:20)  Antibody Interpretation 1: Eluate Panagglutinin Antibody Identification (23 @ 22:20)  Antibody Interpretation 1: Serum Panagglutinin                       [facility] Consult    TRIAGE/ADMISSIONI HPI:  22 yo  @ 27w3d by LMP, ANIVAL 3/11/23 presenting due to vaginal bleeding. Woke up this AM and went to urinate, noticed small blood when she wiped and in the toilet. Denies contracitons/abdominal pain, LOF, or heavy vaginal bleeding. Endorses good fetal movement. She sees an OBGYN in [place].     Pregnancy complicated by:  1.  Possible PPROM, discharged on   - S/p  steroids -12/10  - C/w latency antibiotics, currently on PO Amoxacillin  - GBS neg. Gc/Ct negative.     2. Multiple antibodies in type and screen    3.  History of pericarditis with small pericardial effusion in February-2023. S/p prednisone and colchicine.  - Normal TTE  - Is followed by Dr. Behuria. Consult appreciated from recent admission.     4.  Dx of Lupus  - Followed by Rheumatology, patient refusing treatment  - Consult appreciated  from recent admission.     5. Crohn's in remission  - Diagnosed at 16yo, last colonoscopy then  - Lost to follow up, pt asymptomatic    6.  Pregnancy  - Increased risk of down syndrome, Increased NT, right pyelectasis, small pericardial effusion.  S/p fetal echo (otherwise normal). NIPTS inconclusive, amniocentesis karyotype and array are negative.  - Previous  delivery.      [facility] Addendum:  She reports feeling well this morning, without abdominal pain/CTX or bleeding. Reports good FM.     PAST MEDICAL & SURGICAL HISTORY:  H/O pericarditis  H/O SLE  Possible Crohn's disease  She has had blood transfusions in the past.   delivery    Obstetric history:    #1 (): Vaginal delivery of 7-11 boy, at 40 weeks GA, after 3 days hours of labor . patient received anesthesia. Delivery occurred at [facility]. no pregnancy complications reported.    #2 ():  delivery of 5-10 boy, at 40 weeks GA . Delivery occurred at [facility]. no pregnancy complications reported. Pregnancy #2 Comments: (Abnormal fetal heart rate tracing in labor).    GYN history:  No abnormal pap smears, no STDs     Allergies  No Known Allergies    MEDICATIONS   Prenatal vitamins  Amoxacillin    Family history:  Mother:  DM2    Social history:  No alcohol use, drug use, or smoking.  She does not work outside the house.     Review of systems:  A complete review of systems was obtained and is negative except as described in the HPI.    Physical exam:    Vital Signs Last 24 Hrs  T(F): 98.78 (14 Dec 2023 19:24), Max: 98.8 (14 Dec 2023 14:44)  HR: 72 (15 Dec 2023 06:13) (62 - 90)  BP: 121/57 (15 Dec 2023 06:13) (106/66 - 149/68)  RR: 20 (14 Dec 2023 19:24) (20 - 20)    Gen: AAOx3, NAD  Heart: RRR, S1 S2 WNL  Lungs: CTAB  Abdomen: Soft, nontender, no distension, gravid  Ext: No calf tenderness, no swelling    EFM: 130/mod saul/pos accel  toco: none          Type + Screen (23 @ 22:20)   ABO RH Interpretation: A POS  Antibody Elution and ID (23 @ 22:20)  Antibody Interpretation 1: Eluate Panagglutinin Antibody Identification (23 @ 22:20)  Antibody Interpretation 1: Serum Panagglutinin                       [facility] Consult  PGY3 Note  Gestational Age: 27w4d  HD: #2    ADMISSION HPI:  22 yo  @ 27w3d by LMP, ANIVAL 3/11/23 presenting due to vaginal bleeding. Woke up this AM and went to urinate, noticed small blood when she wiped and in the toilet. Denies contractions and pain, LOF, or heavy vaginal bleeding. Endorses good fetal movement. She sees an OBGYN in [place].     Pregnancy complicated by:  1.  Possible PPROM, discharged on  with low suscpision   - S/p  steroids -12/10  - C/w latency antibiotics, currently on PO Amoxacillin  - GBS neg. Gc/Ct negative.     2. Multiple antibodies in type and screen    3.  History of pericarditis with small pericardial effusion in February-2023. S/p prednisone and colchicine.  - Normal TTE  - Is followed by Dr. Behuria. Consult appreciated from recent admission.     4.  Dx of Lupus  - Followed by Rheumatology, patient refusing treatment  - Consult appreciated  from recent admission.     5. Crohn's in remission  - Diagnosed at 16yo, last colonoscopy then  - Lost to follow up, pt asymptomatic    6.  Pregnancy  - Increased risk of down syndrome, Increased NT, right pyelectasis, small pericardial effusion.  S/p fetal echo (otherwise normal). NIPTS inconclusive, amniocentesis karyotype and array are negative.  - Previous  delivery.      M Addendum this Morning:  She reports feeling well this morning, without abdominal pain/CTX or bleeding. Reports good FM. Denies HA, CP, SOB, changes in vision, RUQ/epigastric pain, edema.    PAST MEDICAL & SURGICAL HISTORY:  H/O pericarditis  H/O SLE  Possible Crohn's disease  She has had blood transfusions in the past.   delivery    Obstetric history:    #1 (): Vaginal delivery of 7-11 boy, at 40 weeks GA, after 3 days hours of labor . patient received anesthesia. Delivery occurred at [facility]. no pregnancy complications reported.    #2 ():  delivery of 5-10 boy, at 40 weeks GA . Delivery occurred at [facility]. no pregnancy complications reported. Pregnancy #2 Comments: (Abnormal fetal heart rate tracing in labor).    GYN history:  No abnormal pap smears, no STDs     Allergies  No Known Allergies    MEDICATIONS   Prenatal vitamins  Amoxacillin    Family history:  Mother:  DM2    Social history:  No alcohol use, drug use, or smoking.  She does not work outside the house.     Review of systems:  A complete review of systems was obtained and is negative except as described in the HPI.    Physical exam:  Vital Signs Last 24 Hrs  T(F): 98.78 (14 Dec 2023 19:24), Max: 98.8 (14 Dec 2023 14:44)  HR: 72 (15 Dec 2023 06:13) (62 - 90)  BP: 121/57 (15 Dec 2023 06:13) (106/66 - 149/68)  RR: 20 (14 Dec 2023 19:24) (20 - 20)    Gen: AAOx3, NAD  Heart: RRR, S1 S2 WNL  Lungs: CTAB  Abdomen: Soft, nontender, no distension, gravid  Ext: No calf tenderness, no swelling    EFM: 130/mod saul/pos accel  toco: none                 9.6    4.52  )-----------( 295      ( 12-15 @ 05:10 )             27.7                9.1    4.65  )-----------( 290      (  @ 23:16 )             26.0                11.0   5.31  )-----------( 353      (  @ 17:00 )             30.1                10.4   6.28  )-----------( 364      (  @ 10:50 )             29.7                8.7    5.97  )-----------( 293      (  @ 07:53 )             24.9     14 Dec 2023 23:16    138    |  108    |  6<L>   ----------------------------<  82     3.8     |  21     |  <0.5<L>  14 Dec 2023 17:00    136    |  105    |  7<L>   ----------------------------<  104<H>  5.6<H>   |  18     |  <0.5<L>    Ca    8.2<L>      14 Dec 2023 23:16  Ca    8.7        14 Dec 2023 17:00    TPro  6.5    /  Alb  3.0<L>  /  TBili  0.3    /  DBili  x      /  AST  12     /  ALT  10     /  AlkPhos  56     14 Dec 2023 23:16  TPro  8.1<H>  /  Alb  3.9    /  TBili  0.4    /  DBili  x      /  AST  74<H>  /  ALT  18     /  AlkPhos  51     14 Dec 2023 17:00    PT/INR - ( 15 Dec 2023 05:10 )   PT: 11.30 sec;   INR: 0.99 ratio    PTT - ( 15 Dec 2023 05:10 )  PTT:26.6 sec    Fibrinogen Clauss (12.15.23 @ 05:10)    Fibrinogen Clauss: 229    Fibrinogen Clauss (23 @ 23:16)    Fibrinogen Clauss: 206    Fibrinogen Clauss (23 @ 17:00)    Fibrinogen Clauss: 229    Fibrinogen Clauss (23 @ 10:50)    Fibrinogen Clauss: 264    Type + Screen (23 @ 10:56)    ABO RH Interpretation: A POS    Type + Screen (23 @ 22:20)     ABO RH Interpretation: A POS  Antibody Elution and ID (23 @ 22:20)  Antibody Interpretation 1: Eluate Panagglutinin Antibody Identification (23 @ 22:20)  Antibody Interpretation 1: Serum Panagglutinin    23 MVP 2.3cm vtx [id number]  (25%)   MVP 2.03 x 2.4cm    TTE: WNL EF 65-70%                         [facility] Consult  PGY3 Note  Gestational Age: 27w4d  HD: #2    ADMISSION HPI:  22 yo  @ 27w3d by LMP, ANIVAL 3/11/23 presenting due to vaginal bleeding. Woke up this AM and went to urinate, noticed small blood when she wiped and in the toilet. Denies contractions and pain, LOF, or heavy vaginal bleeding. Endorses good fetal movement. She sees an OBGYN in [place].     Pregnancy complicated by:  1.  Possible PPROM, discharged on  with low suscpision   - S/p  steroids -12/10  - C/w latency antibiotics, currently on PO Amoxacillin  - GBS neg. Gc/Ct negative.     2. Multiple antibodies in type and screen    3.  History of pericarditis with small pericardial effusion in February-2023. S/p prednisone and colchicine.  - Normal TTE  - Is followed by Dr. Behuria. Consult appreciated from recent admission.     4.  Dx of Lupus  - Followed by Rheumatology, patient refusing treatment  - Consult appreciated  from recent admission.     5. Crohn's in remission  - Diagnosed at 14yo, last colonoscopy then  - Lost to follow up, pt asymptomatic    6.  Pregnancy  - Increased risk of down syndrome, Increased NT, right pyelectasis, small pericardial effusion.  S/p fetal echo (otherwise normal). NIPTS inconclusive, amniocentesis karyotype and array are negative.  - Previous  delivery.      M Addendum this Morning:  She reports feeling well this morning, without abdominal pain/CTX or bleeding. Reports good FM. Denies HA, CP, SOB, changes in vision, RUQ/epigastric pain, edema.    PAST MEDICAL & SURGICAL HISTORY:  H/O pericarditis  H/O SLE  Possible Crohn's disease  She has had blood transfusions in the past.   delivery    Obstetric history:    #1 (): Vaginal delivery of 7-11 boy, at 40 weeks GA, after 3 days hours of labor . patient received anesthesia. Delivery occurred at [facility]. no pregnancy complications reported.    #2 ():  delivery of 5-10 boy, at 40 weeks GA . Delivery occurred at [facility]. no pregnancy complications reported. Pregnancy #2 Comments: (Abnormal fetal heart rate tracing in labor).    GYN history:  No abnormal pap smears, no STDs     Allergies  No Known Allergies    MEDICATIONS   Prenatal vitamins  Amoxacillin    Family history:  Mother:  DM2    Social history:  No alcohol use, drug use, or smoking.  She does not work outside the house.     Review of systems:  A complete review of systems was obtained and is negative except as described in the HPI.    Physical exam:  Vital Signs Last 24 Hrs  T(F): 98.78 (14 Dec 2023 19:24), Max: 98.8 (14 Dec 2023 14:44)  HR: 72 (15 Dec 2023 06:13) (62 - 90)  BP: 121/57 (15 Dec 2023 06:13) (106/66 - 149/68)  RR: 20 (14 Dec 2023 19:24) (20 - 20)    Gen: AAOx3, NAD  Heart: RRR, S1 S2 WNL  Lungs: CTAB  Abdomen: Soft, nontender, no distension, gravid  Ext: No calf tenderness, no swelling    EFM: 130/mod saul/pos accel  toco: none                 9.6    4.52  )-----------( 295      ( 12-15 @ 05:10 )             27.7                9.1    4.65  )-----------( 290      (  @ 23:16 )             26.0                11.0   5.31  )-----------( 353      (  @ 17:00 )             30.1                10.4   6.28  )-----------( 364      (  @ 10:50 )             29.7                8.7    5.97  )-----------( 293      (  @ 07:53 )             24.9     14 Dec 2023 23:16    138    |  108    |  6<L>   ----------------------------<  82     3.8     |  21     |  <0.5<L>  14 Dec 2023 17:00    136    |  105    |  7<L>   ----------------------------<  104<H>  5.6<H>   |  18     |  <0.5<L>    Ca    8.2<L>      14 Dec 2023 23:16  Ca    8.7        14 Dec 2023 17:00    TPro  6.5    /  Alb  3.0<L>  /  TBili  0.3    /  DBili  x      /  AST  12     /  ALT  10     /  AlkPhos  56     14 Dec 2023 23:16  TPro  8.1<H>  /  Alb  3.9    /  TBili  0.4    /  DBili  x      /  AST  74<H>  /  ALT  18     /  AlkPhos  51     14 Dec 2023 17:00    PT/INR - ( 15 Dec 2023 05:10 )   PT: 11.30 sec;   INR: 0.99 ratio    PTT - ( 15 Dec 2023 05:10 )  PTT:26.6 sec    Fibrinogen Clauss (12.15.23 @ 05:10)    Fibrinogen Clauss: 229    Fibrinogen Clauss (23 @ 23:16)    Fibrinogen Clauss: 206    Fibrinogen Clauss (23 @ 17:00)    Fibrinogen Clauss: 229    Fibrinogen Clauss (23 @ 10:50)    Fibrinogen Clauss: 264    Type + Screen (23 @ 10:56)    ABO RH Interpretation: A POS    Type + Screen (23 @ 22:20)     ABO RH Interpretation: A POS  Antibody Elution and ID (23 @ 22:20)  Antibody Interpretation 1: Eluate Panagglutinin Antibody Identification (23 @ 22:20)  Antibody Interpretation 1: Serum Panagglutinin    23 MVP 2.3cm vtx [id number] [id number]  (25%)   MVP 2.03 x 2.4cm    TTE: WNL EF 65-70%                         [facility] Consult  PGY3 Note  Gestational Age: 27w4d  HD: #2    ADMISSION HPI:  22 yo  @ 27w3d by LMP, ANIVAL 3/11/23 presenting due to vaginal bleeding. Woke up this AM and went to urinate, noticed small blood when she wiped and in the toilet. Denies contractions and pain, LOF, or heavy vaginal bleeding. Endorses good fetal movement. She sees an OBGYN in [place].     Pregnancy complicated by:  1.  Possible PPROM, discharged on  with low suscpision   - S/p  steroids -12/10  - C/w latency antibiotics, currently on PO Amoxacillin  - GBS neg. Gc/Ct negative.     2. Multiple antibodies in type and screen    3.  History of pericarditis with small pericardial effusion in February-2023. S/p prednisone and colchicine.  - Normal TTE  - Is followed by Dr. Behuria. Consult appreciated from recent admission.     4.  Dx of Lupus  - Followed by Rheumatology, patient refusing treatment  - Consult appreciated  from recent admission.     5. Crohn's in remission  - Diagnosed at 14yo, last colonoscopy then  - Lost to follow up, pt asymptomatic    6.  Pregnancy  - Increased risk of down syndrome, Increased NT, right pyelectasis, small pericardial effusion.  S/p fetal echo (otherwise normal). NIPTS inconclusive, amniocentesis karyotype and array are negative.  - Previous  delivery.      M Addendum this Morning:  She reports feeling well this morning, without abdominal pain/CTX or bleeding. Reports good FM. Denies HA, CP, SOB, changes in vision, RUQ/epigastric pain, edema.    PAST MEDICAL & SURGICAL HISTORY:  H/O pericarditis  H/O SLE  Possible Crohn's disease  She has had blood transfusions in the past.   delivery    Obstetric history:    #1 (): Vaginal delivery of 7-11 boy, at 40 weeks GA, after 3 days hours of labor . patient received anesthesia. Delivery occurred at [facility]. no pregnancy complications reported.    #2 ():  delivery of 5-10 boy, at 40 weeks GA . Delivery occurred at [facility]. no pregnancy complications reported. Pregnancy #2 Comments: (Abnormal fetal heart rate tracing in labor).    GYN history:  No abnormal pap smears, no STDs     Allergies  No Known Allergies    MEDICATIONS   Prenatal vitamins  Amoxacillin    Family history:  Mother:  DM2    Social history:  No alcohol use, drug use, or smoking.  She does not work outside the house.     Review of systems:  A complete review of systems was obtained and is negative except as described in the HPI.    Physical exam:  Vital Signs Last 24 Hrs  T(F): 98.78 (14 Dec 2023 19:24), Max: 98.8 (14 Dec 2023 14:44)  HR: 72 (15 Dec 2023 06:13) (62 - 90)  BP: 121/57 (15 Dec 2023 06:13) (106/66 - 149/68)  RR: 20 (14 Dec 2023 19:24) (20 - 20)    Gen: AAOx3, NAD  Heart: RRR, S1 S2 WNL  Lungs: CTAB  Abdomen: Soft, nontender, no distension, gravid  Ext: No calf tenderness, no swelling    EFM: 130/mod saul/pos accel  toco: none                 9.6    4.52  )-----------( 295      ( 12-15 @ 05:10 )             27.7                9.1    4.65  )-----------( 290      (  @ 23:16 )             26.0                11.0   5.31  )-----------( 353      (  @ 17:00 )             30.1                10.4   6.28  )-----------( 364      (  @ 10:50 )             29.7                8.7    5.97  )-----------( 293      (  @ 07:53 )             24.9     14 Dec 2023 23:16    138    |  108    |  6<L>   ----------------------------<  82     3.8     |  21     |  <0.5<L>  14 Dec 2023 17:00    136    |  105    |  7<L>   ----------------------------<  104<H>  5.6<H>   |  18     |  <0.5<L>    Ca    8.2<L>      14 Dec 2023 23:16  Ca    8.7        14 Dec 2023 17:00    TPro  6.5    /  Alb  3.0<L>  /  TBili  0.3    /  DBili  x      /  AST  12     /  ALT  10     /  AlkPhos  56     14 Dec 2023 23:16  TPro  8.1<H>  /  Alb  3.9    /  TBili  0.4    /  DBili  x      /  AST  74<H>  /  ALT  18     /  AlkPhos  51     14 Dec 2023 17:00    PT/INR - ( 15 Dec 2023 05:10 )   PT: 11.30 sec;   INR: 0.99 ratio    PTT - ( 15 Dec 2023 05:10 )  PTT:26.6 sec    Fibrinogen Clauss (12.15.23 @ 05:10)    Fibrinogen Clauss: 229    Fibrinogen Clauss (23 @ 23:16)    Fibrinogen Clauss: 206    Fibrinogen Clauss (23 @ 17:00)    Fibrinogen Clauss: 229    Fibrinogen Clauss (23 @ 10:50)    Fibrinogen Clauss: 264    Type + Screen (23 @ 10:56)    ABO RH Interpretation: A POS    Type + Screen (23 @ 22:20)     ABO RH Interpretation: A POS  Antibody Elution and ID (23 @ 22:20)  Antibody Interpretation 1: Eluate Panagglutinin Antibody Identification (23 @ 22:20)  Antibody Interpretation 1: Serum Panagglutinin    23 MVP 2.3cm vtx [id number] [id number]  (25%)   MVP 2.03 x 2.4cm    TTE: WNL EF 65-70%                     MFM Consult  PGY3 Note  Gestational Age: 27w4d  HD: #2    ADMISSION HPI:  22 yo  @ 27w3d by LMP, ANIVAL 3/11/23 presenting due to vaginal bleeding. Woke up this AM and went to urinate, noticed small blood when she wiped and in the toilet. Denies contractions and pain, LOF, or heavy vaginal bleeding. Endorses good fetal movement. She sees an OBGYN in [place].     Pregnancy complicated by:  1.  Possible PPROM, discharged on  with low suscpision   - S/p  steroids -12/10  - C/w latency antibiotics, currently on PO Amoxacillin  - GBS neg. Gc/Ct negative.     2. Multiple antibodies in type and screen    3.  History of pericarditis with small pericardial effusion in February-2023. S/p prednisone and colchicine.  - Normal TTE  - Is followed by Dr. Behuria. Consult appreciated from recent admission.     4.  Dx of Lupus  - Followed by Rheumatology, patient refusing treatment  - Consult appreciated  from recent admission.     5. Crohn's in remission  - Diagnosed at 14yo, last colonoscopy then  - Lost to follow up, pt asymptomatic    6.  Pregnancy  - Increased risk of down syndrome, Increased NT, right pyelectasis, small pericardial effusion.  S/p fetal echo (otherwise normal). NIPTS inconclusive, amniocentesis karyotype and array are negative.  - Previous  delivery.      M Addendum this Morning:  She reports feeling well this morning, without abdominal pain/CTX or bleeding. Reports good FM. Denies HA, CP, SOB, changes in vision, RUQ/epigastric pain, edema.    PAST MEDICAL & SURGICAL HISTORY:  H/O pericarditis  H/O SLE  Possible Crohn's disease  She has had blood transfusions in the past.   delivery    Obstetric history:    #1 (): Vaginal delivery of 7-11 boy, at 40 weeks GA, after 3 days hours of labor . patient received anesthesia. Delivery occurred at [facility]. no pregnancy complications reported.    #2 ():  delivery of 5-10 boy, at 40 weeks GA . Delivery occurred at [facility]. no pregnancy complications reported. Pregnancy #2 Comments: (Abnormal fetal heart rate tracing in labor).    GYN history:  No abnormal pap smears, no STDs     Allergies  No Known Allergies    MEDICATIONS   Prenatal vitamins  Amoxacillin    Family history:  Mother:  DM2    Social history:  No alcohol use, drug use, or smoking.  She does not work outside the house.     Review of systems:  A complete review of systems was obtained and is negative except as described in the HPI.    Physical exam:  Vital Signs Last 24 Hrs  T(F): 98.78 (14 Dec 2023 19:24), Max: 98.8 (14 Dec 2023 14:44)  HR: 72 (15 Dec 2023 06:13) (62 - 90)  BP: 121/57 (15 Dec 2023 06:13) (106/66 - 149/68)  RR: 20 (14 Dec 2023 19:24) (20 - 20)    Gen: AAOx3, NAD  Heart: RRR, S1 S2 WNL  Lungs: CTAB  Abdomen: Soft, nontender, no distension, gravid  Ext: No calf tenderness, no swelling    EFM: 130/mod saul/pos accel  toco: none                 9.6    4.52  )-----------( 295      ( 12-15 @ 05:10 )             27.7                9.1    4.65  )-----------( 290      (  @ 23:16 )             26.0                11.0   5.31  )-----------( 353      (  @ 17:00 )             30.1                10.4   6.28  )-----------( 364      (  @ 10:50 )             29.7                8.7    5.97  )-----------( 293      (  @ 07:53 )             24.9     14 Dec 2023 23:16    138    |  108    |  6<L>   ----------------------------<  82     3.8     |  21     |  <0.5<L>  14 Dec 2023 17:00    136    |  105    |  7<L>   ----------------------------<  104<H>  5.6<H>   |  18     |  <0.5<L>    Ca    8.2<L>      14 Dec 2023 23:16  Ca    8.7        14 Dec 2023 17:00    TPro  6.5    /  Alb  3.0<L>  /  TBili  0.3    /  DBili  x      /  AST  12     /  ALT  10     /  AlkPhos  56     14 Dec 2023 23:16  TPro  8.1<H>  /  Alb  3.9    /  TBili  0.4    /  DBili  x      /  AST  74<H>  /  ALT  18     /  AlkPhos  51     14 Dec 2023 17:00    PT/INR - ( 15 Dec 2023 05:10 )   PT: 11.30 sec;   INR: 0.99 ratio    PTT - ( 15 Dec 2023 05:10 )  PTT:26.6 sec    Fibrinogen Clauss (12.15.23 @ 05:10)    Fibrinogen Clauss: 229    Fibrinogen Clauss (23 @ 23:16)    Fibrinogen Clauss: 206    Fibrinogen Clauss (23 @ 17:00)    Fibrinogen Clauss: 229    Fibrinogen Clauss (23 @ 10:50)    Fibrinogen Clauss: 264    Type + Screen (23 @ 10:56)    ABO RH Interpretation: A POS    Type + Screen (23 @ 22:20)     ABO RH Interpretation: A POS  Antibody Elution and ID (23 @ 22:20)  Antibody Interpretation 1: Eluate Panagglutinin Antibody Identification (23 @ 22:20)  Antibody Interpretation 1: Serum Panagglutinin    23 MVP 2.3cm vtx [id number]  (25%)   MVP 2.03 x 2.4cm    TTE: WNL EF 65-70%                     MFM Consult  PGY3 Note  Gestational Age: 27w4d  HD: #2    ADMISSION HPI:  24 yo  @ 27w3d by LMP, ANIVAL 3/11/23 presenting due to vaginal bleeding. Woke up this AM and went to urinate, noticed small blood when she wiped and in the toilet. Denies contractions and pain, LOF, or heavy vaginal bleeding. Endorses good fetal movement. She sees an OBGYN in [place].     MFM Addendum this Morning:  She reports feeling well this morning, without abdominal pain/CTX or bleeding. Reports good FM. Denies HA, CP, SOB, changes in vision, RUQ/epigastric pain, edema.    Pregnancy complicated by:  1.  Possible PPROM, discharged on  with low suspicion   - S/p  steroids -12/10  - C/w latency antibiotics, currently on PO Amoxacillin  - GBS neg. Gc/Ct negative.     2. Multiple antibodies in type and screen    3.  History of pericarditis with small pericardial effusion in February-2023. S/p prednisone and colchicine.  - Normal TTE  - Is followed by Dr. Behuria. Consult appreciated from recent admission.     4.  Dx of Lupus  - Followed by Rheumatology, patient refusing treatment  - Consult appreciated  from recent admission.     5. Crohn's in remission  - Diagnosed at 16yo, last colonoscopy then  - Lost to follow up, pt asymptomatic    6.  Pregnancy  - Increased risk of down syndrome, Increased NT, right pyelectasis, small pericardial effusion.  S/p fetal echo (otherwise normal). NIPTS inconclusive, amniocentesis karyotype and array are negative.  - Previous  delivery.      PAST MEDICAL & SURGICAL HISTORY:  H/O pericarditis  H/O SLE  Possible Crohn's disease  She has had blood transfusions in the past.   delivery    Obstetric history:    #1 (): Vaginal delivery of 7-11 boy, at 40 weeks GA, after 3 days hours of labor . patient received anesthesia. Delivery occurred at [facility]. no pregnancy complications reported.    #2 ():  delivery of 5-10 boy, at 40 weeks GA . Delivery occurred at [facility]. no pregnancy complications reported. Pregnancy #2 Comments: (Abnormal fetal heart rate tracing in labor).    GYN history:  No abnormal pap smears, no STDs     Allergies  No Known Allergies    MEDICATIONS   Prenatal vitamins  Amoxacillin    Family history:  Mother:  DM2    Social history:  No alcohol use, drug use, or smoking.  She does not work outside the house.     Review of systems:  A complete review of systems was obtained and is negative except as described in the HPI.    Physical exam:  Vital Signs Last 24 Hrs  T(F): 98.78 (14 Dec 2023 19:24), Max: 98.8 (14 Dec 2023 14:44)  HR: 72 (15 Dec 2023 06:13) (62 - 90)  BP: 121/57 (15 Dec 2023 06:13) (106/66 - 149/68)  RR: 20 (14 Dec 2023 19:24) (20 - 20)    Gen: AAOx3, NAD  Heart: RRR, S1 S2 WNL  Lungs: CTAB  Abdomen: Soft, nontender, no distension, gravid.  No fundal tenderness.  Ext: No calf tenderness, no swelling  SVE (resident exam 10/14): C/L/P  Speculum resident exam 10/14): cervix closed, no pooling, scant bloody mucus at cervical os, cervical ectropion noted    EFM: 130/mod saul/pos accel  toco: none                 9.6    4.52  )-----------( 295      ( 12-15 @ 05:10 )             27.7                9.1    4.65  )-----------( 290      (  @ 23:16 )             26.0                11.0   5.31  )-----------( 353      (  @ 17:00 )             30.1                10.4   6.28  )-----------( 364      (  @ 10:50 )             29.7                8.7    5.97  )-----------( 293      (  @ 07:53 )             24.9     14 Dec 2023 23:16    138    |  108    |  6<L>   ----------------------------<  82     3.8     |  21     |  <0.5<L>  14 Dec 2023 17:00    136    |  105    |  7<L>   ----------------------------<  104<H>  5.6<H>   |  18     |  <0.5<L>    Ca    8.2<L>      14 Dec 2023 23:16  Ca    8.7        14 Dec 2023 17:00    TPro  6.5    /  Alb  3.0<L>  /  TBili  0.3    /  DBili  x      /  AST  12     /  ALT  10     /  AlkPhos  56     14 Dec 2023 23:16  TPro  8.1<H>  /  Alb  3.9    /  TBili  0.4    /  DBili  x      /  AST  74<H>  /  ALT  18     /  AlkPhos  51     14 Dec 2023 17:00    PT/INR - ( 15 Dec 2023 05:10 )   PT: 11.30 sec;   INR: 0.99 ratio    PTT - ( 15 Dec 2023 05:10 )  PTT:26.6 sec    Fibrinogen Clauss (12.15.23 @ 05:10)    Fibrinogen Clauss: 229    Fibrinogen Clauss (12.14.23 @ 23:16)    Fibrinogen Clauss: 206    Fibrinogen Clauss (.14. @ 17:00)    Fibrinogen Clauss: 229    Fibrinogen Clauss (.14. @ 10:50)    Fibrinogen Clauss: 264    Type + Screen (.. @ 10:56)    ABO RH Interpretation: A POS    Type + Screen (23 @ 22:20)     ABO RH Interpretation: A POS  Antibody Elution and ID (23 @ 22:20)  Antibody Interpretation 1: Eluate Panagglutinin Antibody Identification (23 @ 22:20)  Antibody Interpretation 1: Serum Panagglutinin    23 MVP 2.3cm vtx [id number] [id number]  (25%)   MVP 2.03 x 2.4cm    TTE: WNL EF 65-70%                         MFM Consult  PGY3 Note  Gestational Age: 27w4d  HD: #2    ADMISSION HPI:  22 yo  @ 27w3d by LMP, ANIVAL 3/11/23 presenting due to vaginal bleeding. Woke up this AM and went to urinate, noticed small blood when she wiped and in the toilet. Denies contractions and pain, LOF, or heavy vaginal bleeding. Endorses good fetal movement. She sees an OBGYN in [place].     MFM Addendum this Morning:  She reports feeling well this morning, without abdominal pain/CTX or bleeding. Reports good FM. Denies HA, CP, SOB, changes in vision, RUQ/epigastric pain, edema.    Pregnancy complicated by:  1.  Possible PPROM, discharged on  with low suspicion   - S/p  steroids -12/10  - C/w latency antibiotics, currently on PO Amoxacillin  - GBS neg. Gc/Ct negative.     2. Multiple antibodies in type and screen    3.  History of pericarditis with small pericardial effusion in February-2023. S/p prednisone and colchicine.  - Normal TTE  - Is followed by Dr. Behuria. Consult appreciated from recent admission.     4.  Dx of Lupus  - Followed by Rheumatology, patient refusing treatment  - Consult appreciated  from recent admission.     5. Crohn's in remission  - Diagnosed at 16yo, last colonoscopy then  - Lost to follow up, pt asymptomatic    6.  Pregnancy  - Increased risk of down syndrome, Increased NT, right pyelectasis, small pericardial effusion.  S/p fetal echo (otherwise normal). NIPTS inconclusive, amniocentesis karyotype and array are negative.  - Previous  delivery.      PAST MEDICAL & SURGICAL HISTORY:  H/O pericarditis  H/O SLE  Possible Crohn's disease  She has had blood transfusions in the past.   delivery    Obstetric history:    #1 (): Vaginal delivery of 7-11 boy, at 40 weeks GA, after 3 days hours of labor . patient received anesthesia. Delivery occurred at [facility]. no pregnancy complications reported.    #2 ():  delivery of 5-10 boy, at 40 weeks GA . Delivery occurred at [facility]. no pregnancy complications reported. Pregnancy #2 Comments: (Abnormal fetal heart rate tracing in labor).    GYN history:  No abnormal pap smears, no STDs     Allergies  No Known Allergies    MEDICATIONS   Prenatal vitamins  Amoxacillin    Family history:  Mother:  DM2    Social history:  No alcohol use, drug use, or smoking.  She does not work outside the house.     Review of systems:  A complete review of systems was obtained and is negative except as described in the HPI.    Physical exam:  Vital Signs Last 24 Hrs  T(F): 98.78 (14 Dec 2023 19:24), Max: 98.8 (14 Dec 2023 14:44)  HR: 72 (15 Dec 2023 06:13) (62 - 90)  BP: 121/57 (15 Dec 2023 06:13) (106/66 - 149/68)  RR: 20 (14 Dec 2023 19:24) (20 - 20)    Gen: AAOx3, NAD  Heart: RRR, S1 S2 WNL  Lungs: CTAB  Abdomen: Soft, nontender, no distension, gravid.  No fundal tenderness.  Ext: No calf tenderness, no swelling  SVE (resident exam 10/14): C/L/P  Speculum resident exam 10/14): cervix closed, no pooling, scant bloody mucus at cervical os, cervical ectropion noted    EFM: 130/mod saul/pos accel  toco: none                 9.6    4.52  )-----------( 295      ( 12-15 @ 05:10 )             27.7                9.1    4.65  )-----------( 290      (  @ 23:16 )             26.0                11.0   5.31  )-----------( 353      (  @ 17:00 )             30.1                10.4   6.28  )-----------( 364      (  @ 10:50 )             29.7                8.7    5.97  )-----------( 293      (  @ 07:53 )             24.9     14 Dec 2023 23:16    138    |  108    |  6<L>   ----------------------------<  82     3.8     |  21     |  <0.5<L>  14 Dec 2023 17:00    136    |  105    |  7<L>   ----------------------------<  104<H>  5.6<H>   |  18     |  <0.5<L>    Ca    8.2<L>      14 Dec 2023 23:16  Ca    8.7        14 Dec 2023 17:00    TPro  6.5    /  Alb  3.0<L>  /  TBili  0.3    /  DBili  x      /  AST  12     /  ALT  10     /  AlkPhos  56     14 Dec 2023 23:16  TPro  8.1<H>  /  Alb  3.9    /  TBili  0.4    /  DBili  x      /  AST  74<H>  /  ALT  18     /  AlkPhos  51     14 Dec 2023 17:00    PT/INR - ( 15 Dec 2023 05:10 )   PT: 11.30 sec;   INR: 0.99 ratio    PTT - ( 15 Dec 2023 05:10 )  PTT:26.6 sec    Fibrinogen Clauss (12.15.23 @ 05:10)    Fibrinogen Clauss: 229    Fibrinogen Clauss (12.14.23 @ 23:16)    Fibrinogen Clauss: 206    Fibrinogen Clauss (.14. @ 17:00)    Fibrinogen Clauss: 229    Fibrinogen Clauss (.14. @ 10:50)    Fibrinogen Clauss: 264    Type + Screen (.. @ 10:56)    ABO RH Interpretation: A POS    Type + Screen (23 @ 22:20)     ABO RH Interpretation: A POS  Antibody Elution and ID (23 @ 22:20)  Antibody Interpretation 1: Eluate Panagglutinin Antibody Identification (23 @ 22:20)  Antibody Interpretation 1: Serum Panagglutinin    23 MVP 2.3cm vtx [id number] [id number]  (25%)   MVP 2.03 x 2.4cm    TTE: WNL EF 65-70%

## 2023-12-15 NOTE — CHART NOTE - NSCHARTNOTEFT_GEN_A_CORE
PGY4 Note    Patient seen at bedside and examined. Denies abdominal pain or contractions. Denies heavy vaginal bleeding. Denies leakage of fluid. Reports fetal movement. Labs resulted with downtrend in hemoglobin and fibrinogen. Consulted MFM attending,  and discussed goal of hgb >10, repeat labs, pad counts, and continuous fetal monitoring. On exam, no abdominal pain or contractions palpated. New pad examined with no bleeding stains noted. Discussed plan to transfuse 1u PRBC and monitor with patient. Patient amenable.      and  aware PGY4 Note    Patient seen at bedside and examined. Denies abdominal pain or contractions. Denies heavy vaginal bleeding. Denies leakage of fluid. Reports fetal movement. Labs resulted with downtrend in hemoglobin and fibrinogen. Consulted MFM attending,  and discussed goal of hgb >10, repeat labs, pad counts, and continuous fetal monitoring. On exam, no abdominal pain or contractions palpated. New pad examined with no bleeding stains noted. Discussed plan to transfuse 1u PRBC and monitor with patient. Patient amenable.      and  aware    ADDENDUM: Due to incompatibility with blood units available, will defer transfusion after repeat cbc at 0500. If hgb downtrends, will give 1 unit.     Dr. Jones PGY4 Note    Patient seen at bedside and examined. Denies abdominal pain or contractions. Denies heavy vaginal bleeding. Denies leakage of fluid. Reports fetal movement. Labs resulted with downtrend in hemoglobin and fibrinogen. Consulted MFM attending,  and discussed goal of hgb >10, repeat labs, pad counts, and continuous fetal monitoring. On exam, no abdominal pain or contractions palpated. New pad examined with no bleeding stains noted. Discussed plan to transfuse 1u PRBC and monitor with patient. Patient amenable.      and  aware    ADDENDUM: Due to incompatibility with blood units available, will defer transfusion after repeat cbc at 0500. If hgb downtrends, will give 1 unit.     Dr. Jones    Attending:  I was physically present for the key portions of the evaluation and management (e/m) service provided. I agree with the above history,physical and plan which I have reviewed and edited where appropriate  Patient seen face to face and case reviewed, precautions given to patient    Patient has had continuous fetal tracing starting 00:00. The fetal heart rate continuous monitoring was eavaluated by me until end of my shift 08:00. I spent 480 minutes caring for the patient. It included  continuously monitoring the fetus and interpretation of the fetal heart rate strip, ordering and reviewing labs, documenting in the medical record and a discussion with the patient.  M evaluation to follow

## 2023-12-15 NOTE — CHART NOTE - NSCHARTNOTEFT_GEN_A_CORE
PGY-4 Note    Patient seen and examined at bedside. At this time patient is resting comfortably. No acute complaints. Minimal spotting. Denies cramping, leakage of fluid. Normal fetal movement.     Vital Signs Last 24 Hrs  T(C): 37.1 (14 Dec 2023 19:24), Max: 37.1 (14 Dec 2023 14:44)  T(F): 98.78 (14 Dec 2023 19:24), Max: 98.8 (14 Dec 2023 14:44)  HR: 80 (15 Dec 2023 12:13) (62 - 90)  BP: 124/59 (15 Dec 2023 12:13) (107/59 - 149/68)  RR: 20 (14 Dec 2023 19:24) (20 - 20)    Gen: AOx3, NAD  EFM: 140/mod  [place]: none  Abd: soft, gravid, nontender, no palpable ctx     @1100 6.28>10.4/29.7<364, PT/PTT/INR 11/25.9/0.97, fibrinogen 264, A pos, antibody screen positive  @1700 5.31>11/30.1<353, PT/PTT/INR 10.9/28.9/0.96, fibrinogen 229, 136/5.6/105/7/<0.5<104, AST/ALT 74/18 (hemolyzed)  @2300 4.65>9.1/26<290, coags 11.2/0.98/24.6, fibrinogen 206, 138/3.8/108/21/6/0.5<82, AST/ALT 12/10  12/15 4.52>9.6/27.7<295, PT/INR/PTT 11.30/0.99/26.6, fibrinogen 229  @1100 4.46>9.6/27.5<293, 11.0/27.5/0.97 fibrinogen: 223    Ms. Dumont is a 24 yo  @27w4d, history of pericarditis, SLE, Crohns in remission, anemia, s/p admission for possible PPROM, now with minimal vaginal bleeding.    1.  Possible PPROM  - S/p  steroids -12/10  - C/w latency antibiotics, currently on PO Amoxacillin  - NICU consult appreciated  - F/u GBS. Gc/Ct negative.     2. Anemia, stable  - Elevated MCV, B12 and folate wnl    3. Low fibrinogen  - 264 --> 229 --> 209 --> 229 --> 223    4. Multiple antibodies in type and screen  - Crossed 2u PRBCs, per blood bank it is not perfectly matched and patient will need to sign emergency release form if needed    5.  History of pericarditis with small pericardial effusion in February-2023. S/p prednisone and colchicine.  - Normal TTE  - Consult appreciated by Dr Behuria from last admission    6.  Dx of SLE  - Followed by Rheumatology, patient refusing treatment  - Consult appreciated by Dr Behuria from last admission    7. Chron's in remission  - Diagnosed at 16yo, last colonoscopy then  - Lost to follow up, pt asymptomatic    8 Possible cHTN  - Patient denies history of elevated BPs, never on meds.  - Elevated, non-severe BPs outpatient. Similar while in the hospital   - UA 30 protein  - LFTs normal  - F/u Upr/cr    9.  Pregnancy  - Increased risk of down syndrome, Increased NT, right pyelectasis, small pericardial effusion.  S/p fetal echo (otherwise normal). NIPTS inconclusive, amniocentesis karyotype and array are negative.   - Previous  delivery.  - PNVs  - Ambulate as tolerated  - Regular PO diet  - GBS negative  - NST BID  - transfer to  PGY-4 Note    Patient seen and examined at bedside. At this time patient is resting comfortably. No acute complaints. Minimal spotting. Denies cramping, leakage of fluid. Normal fetal movement.     Vital Signs Last 24 Hrs  T(C): 37.1 (14 Dec 2023 19:24), Max: 37.1 (14 Dec 2023 14:44)  T(F): 98.78 (14 Dec 2023 19:24), Max: 98.8 (14 Dec 2023 14:44)  HR: 80 (15 Dec 2023 12:13) (62 - 90)  BP: 124/59 (15 Dec 2023 12:13) (107/59 - 149/68)  RR: 20 (14 Dec 2023 19:24) (20 - 20)    Gen: AOx3, NAD  EFM: 140/mod  [place]: none  Abd: soft, gravid, nontender, no palpable ctx     @1100 6.28>10.4/29.7<364, PT/PTT/INR 11/25.9/0.97, fibrinogen 264, A pos, antibody screen positive  @1700 5.31>11/30.1<353, PT/PTT/INR 10.9/28.9/0.96, fibrinogen 229, 136/5.6/105/7/<0.5<104, AST/ALT 74/18 (hemolyzed)  @2300 4.65>9.1/26<290, coags 11.2/0.98/24.6, fibrinogen 206, 138/3.8/108/21/6/0.5<82, AST/ALT 12/10  12/15 4.52>9.6/27.7<295, PT/INR/PTT 11.30/0.99/26.6, fibrinogen 229  @1100 4.46>9.6/27.5<293, 11.0/27.5/0.97 fibrinogen: 223    Ms. Dumont is a 22 yo  @27w4d, history of pericarditis, SLE, Crohns in remission, anemia, s/p admission for possible PPROM, now with minimal vaginal bleeding.    1.  Possible PPROM  - S/p  steroids -12/10  - C/w latency antibiotics, currently on PO Amoxacillin  - NICU consult appreciated  - F/u GBS. Gc/Ct negative.     2. Anemia, stable  - Elevated MCV, B12 and folate wnl    3. Low fibrinogen  - 264 --> 229 --> 209 --> 229 --> 223    4. Multiple antibodies in type and screen  - Crossed 2u PRBCs, per blood bank it is not perfectly matched and patient will need to sign emergency release form if needed    5.  History of pericarditis with small pericardial effusion in February-2023. S/p prednisone and colchicine.  - Normal TTE  - Consult appreciated by Dr Behuria from last admission    6.  Dx of SLE  - Followed by Rheumatology, patient refusing treatment  - Consult appreciated by Dr Behuria from last admission    7. Chron's in remission  - Diagnosed at 14yo, last colonoscopy then  - Lost to follow up, pt asymptomatic    8 Possible cHTN  - Patient denies history of elevated BPs, never on meds.  - Elevated, non-severe BPs outpatient. Similar while in the hospital   - UA 30 protein  - LFTs normal  - F/u Upr/cr    9.  Pregnancy  - Increased risk of down syndrome, Increased NT, right pyelectasis, small pericardial effusion.  S/p fetal echo (otherwise normal). NIPTS inconclusive, amniocentesis karyotype and array are negative.   - Previous  delivery.  - PNVs  - Ambulate as tolerated  - Regular PO diet  - GBS negative  - NST BID  - transfer to

## 2023-12-15 NOTE — CONSULT NOTE ADULT - ASSESSMENT
Ms. Dumont is a 22 yo  @ 26w5d by LMP with possible PPROM.    1.  Possible PPROM    We discussed that the diagnosis is not 100% clear because of the lapse between the first episode of leakage of fluid and the present time.  That being said we will treat her as if she has  premature rupture of membranes.    We discussed the increased risks of chorioamnionitis (infection in the amniotic fluid) in the context of PPROM.  We will closely monitor her vital signs, temperature, and WBC (which will initially be elevated due to  steroids), as well as her physical exam, especially fundal tenderness.  If there are concerns for chorioamnionitis we will most likely proceed towards delivery. Sometimes the signs of infection can be subtle and the diagnosis can be difficult to make.  Rarely chorioamnionitis can be life threatening requiring ICU admission. Under most circumstances we deliver at around 34 weeks gestation in the absence of chorioamnionitis.   We discussed that around 50% of patients with PPROM will deliver within seven days.    In addition there are increased risks of placental abruption, as well as the baby being bruised in utero due to reduction of the amniotic fluid.  There is also a possibility of cord prolapse which usually results in an emergency  delivery.    Currently there are no signs or symptoms of chorioamnionitis.    Recommend:    1.   steroids (first dose already received)  2.  Ampicillin followed by Amoxicillin  3.  Azithromycin   4.  NICU consult  5.  Continuous external fetal monitoring at the present time.  6.  Follow up GBS culture.    2. Anemia  - Elevated MCV  - Check B12 and folate levels  - Consider Hematology consult.    3. Multiple antibodies in type and Screen  - May be difficult to crossmatch blood.    4.  History of pericarditis in 2023.  - Echocardiogram  - Cardiology consult.  (Is followed by Dr. Behuria)    5.  Possible Lupus?  - Followed by Rheumatology, consider inpatient follow up.    6.  Pregnancy  - Increased risk of down syndrome, Increased NT, right pyelectasis, small pericardial effusion.  NIPTS inconclusive, amniocentesis karyotype and array are negative. She is GBS unknown.  - Previous  delivery.    Ms. Dumont expressed understanding and all of her questions were answered to her satisfaction.  Thank you for this consult.    Maurice Haney MD   Ms. Dumont is a 24 yo  @ 26w5d by LMP with possible PPROM.    1.  Possible PPROM    We discussed that the diagnosis is not 100% clear because of the lapse between the first episode of leakage of fluid and the present time.  That being said we will treat her as if she has  premature rupture of membranes.    We discussed the increased risks of chorioamnionitis (infection in the amniotic fluid) in the context of PPROM.  We will closely monitor her vital signs, temperature, and WBC (which will initially be elevated due to  steroids), as well as her physical exam, especially fundal tenderness.  If there are concerns for chorioamnionitis we will most likely proceed towards delivery. Sometimes the signs of infection can be subtle and the diagnosis can be difficult to make.  Rarely chorioamnionitis can be life threatening requiring ICU admission. Under most circumstances we deliver at around 34 weeks gestation in the absence of chorioamnionitis.   We discussed that around 50% of patients with PPROM will deliver within seven days.    In addition there are increased risks of placental abruption, as well as the baby being bruised in utero due to reduction of the amniotic fluid.  There is also a possibility of cord prolapse which usually results in an emergency  delivery.    Currently there are no signs or symptoms of chorioamnionitis.    Recommend:    1.   steroids (first dose already received)  2.  Ampicillin followed by Amoxicillin  3.  Azithromycin   4.  NICU consult  5.  Continuous external fetal monitoring at the present time.  6.  Follow up GBS culture.    2. Anemia  - Elevated MCV  - Check B12 and folate levels  - Consider Hematology consult.    3. Multiple antibodies in type and Screen  - May be difficult to crossmatch blood.    4.  History of pericarditis in 2023.  - Echocardiogram  - Cardiology consult.  (Is followed by Dr. Behuria)    5.  Possible Lupus?  - Followed by Rheumatology, consider inpatient follow up.    6.  Pregnancy  - Increased risk of down syndrome, Increased NT, right pyelectasis, small pericardial effusion.  NIPTS inconclusive, amniocentesis karyotype and array are negative. She is GBS unknown.  - Previous  delivery.    Ms. Dumont expressed understanding and all of her questions were answered to her satisfaction.  Thank you for this consult.    Maurice Haney MD   Ms. Dumont is a 24 yo  @ 27w4d, history of pericarditis, SLE, Crohns in remission, anemia, s/p admission for possible PPROM, now with vaginal bleeding.    1.  Possible PPROM  - S/p  steroids -12/10  - C/w latency antibiotics, currently on PO Amoxacillin  - NICU consult appreciated  - F/u GBS. Gc/Ct negative.     2. Anemia, stable  - Elevated MCV, B12 and folate wnl    3. Multiple antibodies in type and screen  - Crossed 2u PRBCs, per blood bank it is not perfectly matched and patient will need to sign emergency release form if needed    4.  History of pericarditis with small pericardial effusion in February-2023. S/p prednisone and colchicine.  - Normal TTE  - Consult appreciated by Dr Behuria from last admission    5.  Dx of SLE  - Followed by Rheumatology, patient refusing treatment  - Consult appreciated by Dr Behuria from last admission    6. Chron's in remission  - Diagnosed at 14yo, last colonoscopy then  - Lost to follow up, pt asymptomatic    7. Possible cHTN  - Patient denies history of elevated BPs, never on meds.  - Elevated, non-severe BPs outpatient. Similar while in the hospital   - UA 30 protein  - LFTs normal  - F/u Upr/cr    8.  Pregnancy  - Increased risk of down syndrome, Increased NT, right pyelectasis, small pericardial effusion.  S/p fetal echo (otherwise normal). NIPTS inconclusive, amniocentesis karyotype and array are negative.   - Previous  delivery.  - PNVs  - Ambulate as tolerated  - Regular PO diet  - GBS negative  - NST BID Ms. Dumont is a 22 yo  @ 27w4d, history of pericarditis, SLE, Crohns in remission, anemia, s/p admission for possible PPROM, now with vaginal bleeding.    1.  Possible PPROM  - S/p  steroids -12/10  - C/w latency antibiotics, currently on PO Amoxacillin  - NICU consult appreciated  - F/u GBS. Gc/Ct negative.     2. Anemia, stable  - Elevated MCV, B12 and folate wnl    3. Multiple antibodies in type and screen  - Crossed 2u PRBCs, per blood bank it is not perfectly matched and patient will need to sign emergency release form if needed    4.  History of pericarditis with small pericardial effusion in February-2023. S/p prednisone and colchicine.  - Normal TTE  - Consult appreciated by Dr Behuria from last admission    5.  Dx of SLE  - Followed by Rheumatology, patient refusing treatment  - Consult appreciated by Dr Behuria from last admission    6. Chron's in remission  - Diagnosed at 14yo, last colonoscopy then  - Lost to follow up, pt asymptomatic    7. Possible cHTN  - Patient denies history of elevated BPs, never on meds.  - Elevated, non-severe BPs outpatient. Similar while in the hospital   - UA 30 protein  - LFTs normal  - F/u Upr/cr    8.  Pregnancy  - Increased risk of down syndrome, Increased NT, right pyelectasis, small pericardial effusion.  S/p fetal echo (otherwise normal). NIPTS inconclusive, amniocentesis karyotype and array are negative.   - Previous  delivery.  - PNVs  - Ambulate as tolerated  - Regular PO diet  - GBS negative  - NST BID Ms. Dumont is a 22 yo  @ 27w4d, history of pericarditis, SLE, Crohns in remission, anemia, s/p admission for possible PPROM, now with vaginal bleeding.    1.  Possible PPROM  - S/p  steroids -12/10  - C/w latency antibiotics, currently on PO Amoxacillin  - NICU consult appreciated  - F/u GBS. Gc/Ct negative.     2. Anemia, stable  - Elevated MCV, B12 and folate wnl    3. Low fibrinogen  - 264 --> 229 --> 209 --> 229  - Repeat ordered for 1100    4. Multiple antibodies in type and screen  - Crossed 2u PRBCs, per blood bank it is not perfectly matched and patient will need to sign emergency release form if needed    5.  History of pericarditis with small pericardial effusion in February-2023. S/p prednisone and colchicine.  - Normal TTE  - Consult appreciated by Dr Behuria from last admission    6.  Dx of SLE  - Followed by Rheumatology, patient refusing treatment  - Consult appreciated by Dr Behuria from last admission    7. Chron's in remission  - Diagnosed at 14yo, last colonoscopy then  - Lost to follow up, pt asymptomatic    8 Possible cHTN  - Patient denies history of elevated BPs, never on meds.  - Elevated, non-severe BPs outpatient. Similar while in the hospital   - UA 30 protein  - LFTs normal  - F/u Upr/cr    9.  Pregnancy  - Increased risk of down syndrome, Increased NT, right pyelectasis, small pericardial effusion.  S/p fetal echo (otherwise normal). NIPTS inconclusive, amniocentesis karyotype and array are negative.   - Previous  delivery.  - PNVs  - Ambulate as tolerated  - Regular PO diet  - GBS negative  - NST BID Ms. Dumont is a 22 yo  @ 27w4d, history of pericarditis, SLE, Crohns in remission, anemia, s/p admission for possible PPROM, now with vaginal bleeding.    1.  Possible PPROM  - S/p  steroids -12/10  - C/w latency antibiotics, currently on PO Amoxacillin  - NICU consult appreciated  - F/u GBS. Gc/Ct negative.     2. Anemia, stable  - Elevated MCV, B12 and folate wnl    3. Low fibrinogen  - 264 --> 229 --> 209 --> 229  - Repeat ordered for 1100    4. Multiple antibodies in type and screen  - Crossed 2u PRBCs, per blood bank it is not perfectly matched and patient will need to sign emergency release form if needed    5.  History of pericarditis with small pericardial effusion in February-2023. S/p prednisone and colchicine.  - Normal TTE  - Consult appreciated by Dr Behuria from last admission    6.  Dx of SLE  - Followed by Rheumatology, patient refusing treatment  - Consult appreciated by Dr Behuria from last admission    7. Chron's in remission  - Diagnosed at 16yo, last colonoscopy then  - Lost to follow up, pt asymptomatic    8 Possible cHTN  - Patient denies history of elevated BPs, never on meds.  - Elevated, non-severe BPs outpatient. Similar while in the hospital   - UA 30 protein  - LFTs normal  - F/u Upr/cr    9.  Pregnancy  - Increased risk of down syndrome, Increased NT, right pyelectasis, small pericardial effusion.  S/p fetal echo (otherwise normal). NIPTS inconclusive, amniocentesis karyotype and array are negative.   - Previous  delivery.  - PNVs  - Ambulate as tolerated  - Regular PO diet  - GBS negative  - NST BID Ms. Dumont is a 22 yo  @ 27w4d, history of pericarditis, SLE, Crohns in remission, anemia, s/p admission for possible PPROM, now with vaginal bleeding.    1.  Possible PPROM  - S/p  steroids -12/10  - C/w latency antibiotics, currently on PO Amoxacillin  - NICU consult appreciated  - F/u GBS. Gc/Ct negative.     2. Anemia, stable  - Elevated MCV, B12 and folate wnl  - Hematology consult.    3. Low fibrinogen  - 264 --> 229 --> 209 --> 229  - Repeat ordered for 1100    4. Multiple antibodies in type and screen  - Crossed 2u PRBCs, per blood bank it is not perfectly matched and patient will need to sign emergency release form if needed  - Transfusion medicine consult.    5.  History of pericarditis with small pericardial effusion in February-2023. S/p prednisone and colchicine.  - Normal TTE  - Consult appreciated by Dr Behuria from last admission    6.  Dx of SLE  - Followed by Rheumatology, patient refusing treatment  - Consult appreciated by Dr Behuria from last admission    7. Crohns disease in remission  - Diagnosed at 16yo, last colonoscopy then  - Lost to follow up, pt asymptomatic    8 Possible cHTN  - Patient denies history of elevated BPs, never on meds.  - Elevated, non-severe BPs outpatient. Similar while in the hospital   - UA 30 protein  - LFTs normal  - F/u Upr/cr    9.  Pregnancy  - Increased risk of down syndrome, Increased NT, right pyelectasis, small pericardial effusion.  S/p fetal echo (otherwise normal). NIPS inconclusive, amniocentesis karyotype and array are negative.   - Previous  delivery.  - PNVs  - Ambulate as tolerated  - Regular PO diet  - GBS negative  - NST BID    10. Vaginal bleeding  - No bleeding overnight.  - We will not transfuse PRBCs at this time.

## 2023-12-15 NOTE — PROGRESS NOTE ADULT - SUBJECTIVE AND OBJECTIVE BOX
PGY3 NOTE  Indication - Vaginal Bleeding    EFM: 140bpm baseline. moderate variability. positive accelerations. no decelerations.   [place]: noncontractile    Bedside sonogram: FHR 159bpm, cephalic, posterior placenta w/o signs of abruption, MVP 4.32cm, BPP 8/8    Dr Haney at bedside PGY3 NOTE  Indication - Vaginal Bleeding    EFM: 140bpm baseline. moderate variability. positive accelerations. no decelerations.   [place]: noncontractile    Bedside sonogram: FHR 159bpm, cephalic, posterior placenta w/o signs of abruption, MVP 4.32cm, BPP 8/8    Dr Haney at bedside

## 2023-12-15 NOTE — CHART NOTE - NSCHARTNOTEFT_GEN_A_CORE
PGY-4 Note    EFM: 140/mod/+accels  [place]: none    Reactive NST PGY-4 Note    EFM: 140/mod/+accels  [place]: none    Reactive NST

## 2023-12-15 NOTE — CONSULT NOTE ADULT - ATTENDING COMMENTS
[facility] Staff    I saw and evaluated Ms. JASMINE BRAVO with Dr. Purvis.   I modified the note and I agree with the documentation above.  Continue inpatient management.  We will not transfuse PRBCs at this time.    Maurice Haney MD [facility] Staff    I saw and evaluated Ms. JASMINE BRAVO with Dr. Purvis.   I modified the note and I agree with the documentation above.  Continue inpatient management.  We will not transfuse PRBCs at this time.    Maurice Haney MD

## 2023-12-15 NOTE — PROGRESS NOTE ADULT - ATTENDING COMMENTS
[facility] Staff      I was present for the ultrasound and I agree with the documentation above.  Reassuring  testing.    Maurice Haney MD [facility] Staff      I was present for the ultrasound and I agree with the documentation above.  Reassuring  testing.    Maurice Haney MD

## 2023-12-16 LAB
A VAGINAE DNA VAG QL NAA+PROBE: SIGNIFICANT CHANGE UP
A VAGINAE DNA VAG QL NAA+PROBE: SIGNIFICANT CHANGE UP
APTT BLD: 27.2 SEC — SIGNIFICANT CHANGE UP (ref 27–39.2)
APTT BLD: 27.2 SEC — SIGNIFICANT CHANGE UP (ref 27–39.2)
BASOPHILS # BLD AUTO: 0.01 K/UL — SIGNIFICANT CHANGE UP (ref 0–0.2)
BASOPHILS # BLD AUTO: 0.01 K/UL — SIGNIFICANT CHANGE UP (ref 0–0.2)
BASOPHILS NFR BLD AUTO: 0.2 % — SIGNIFICANT CHANGE UP (ref 0–1)
BASOPHILS NFR BLD AUTO: 0.2 % — SIGNIFICANT CHANGE UP (ref 0–1)
BVAB2 DNA VAG QL NAA+PROBE: SIGNIFICANT CHANGE UP
BVAB2 DNA VAG QL NAA+PROBE: SIGNIFICANT CHANGE UP
C ALBICANS DNA VAG QL NAA+PROBE: NEGATIVE — SIGNIFICANT CHANGE UP
C ALBICANS DNA VAG QL NAA+PROBE: NEGATIVE — SIGNIFICANT CHANGE UP
C GLABRATA DNA VAG QL NAA+PROBE: NEGATIVE — SIGNIFICANT CHANGE UP
C GLABRATA DNA VAG QL NAA+PROBE: NEGATIVE — SIGNIFICANT CHANGE UP
C KRUSEI DNA VAG QL NAA+PROBE: NEGATIVE — SIGNIFICANT CHANGE UP
C KRUSEI DNA VAG QL NAA+PROBE: NEGATIVE — SIGNIFICANT CHANGE UP
C LUSITANIAE DNA VAG QL NAA+PROBE: NEGATIVE — SIGNIFICANT CHANGE UP
C LUSITANIAE DNA VAG QL NAA+PROBE: NEGATIVE — SIGNIFICANT CHANGE UP
C TRACH RRNA SPEC QL NAA+PROBE: NEGATIVE — SIGNIFICANT CHANGE UP
C TRACH RRNA SPEC QL NAA+PROBE: NEGATIVE — SIGNIFICANT CHANGE UP
CANDIDA DNA VAG QL NAA+PROBE: NEGATIVE — SIGNIFICANT CHANGE UP
CANDIDA DNA VAG QL NAA+PROBE: NEGATIVE — SIGNIFICANT CHANGE UP
CREAT ?TM UR-MCNC: 83 MG/DL — SIGNIFICANT CHANGE UP
CREAT ?TM UR-MCNC: 83 MG/DL — SIGNIFICANT CHANGE UP
EOSINOPHIL # BLD AUTO: 0.02 K/UL — SIGNIFICANT CHANGE UP (ref 0–0.7)
EOSINOPHIL # BLD AUTO: 0.02 K/UL — SIGNIFICANT CHANGE UP (ref 0–0.7)
EOSINOPHIL NFR BLD AUTO: 0.5 % — SIGNIFICANT CHANGE UP (ref 0–8)
EOSINOPHIL NFR BLD AUTO: 0.5 % — SIGNIFICANT CHANGE UP (ref 0–8)
FIBRINOGEN PPP-MCNC: 304 MG/DL — SIGNIFICANT CHANGE UP (ref 200–435)
FIBRINOGEN PPP-MCNC: 304 MG/DL — SIGNIFICANT CHANGE UP (ref 200–435)
HCT VFR BLD CALC: 29.5 % — LOW (ref 37–47)
HCT VFR BLD CALC: 29.5 % — LOW (ref 37–47)
HGB BLD-MCNC: 10.3 G/DL — LOW (ref 12–16)
HGB BLD-MCNC: 10.3 G/DL — LOW (ref 12–16)
IMM GRANULOCYTES NFR BLD AUTO: 0.5 % — HIGH (ref 0.1–0.3)
IMM GRANULOCYTES NFR BLD AUTO: 0.5 % — HIGH (ref 0.1–0.3)
INR BLD: 0.95 RATIO — SIGNIFICANT CHANGE UP (ref 0.65–1.3)
INR BLD: 0.95 RATIO — SIGNIFICANT CHANGE UP (ref 0.65–1.3)
LYMPHOCYTES # BLD AUTO: 0.7 K/UL — LOW (ref 1.2–3.4)
LYMPHOCYTES # BLD AUTO: 0.7 K/UL — LOW (ref 1.2–3.4)
LYMPHOCYTES # BLD AUTO: 16.9 % — LOW (ref 20.5–51.1)
LYMPHOCYTES # BLD AUTO: 16.9 % — LOW (ref 20.5–51.1)
MCHC RBC-ENTMCNC: 34.9 G/DL — SIGNIFICANT CHANGE UP (ref 32–37)
MCHC RBC-ENTMCNC: 34.9 G/DL — SIGNIFICANT CHANGE UP (ref 32–37)
MCHC RBC-ENTMCNC: 35.2 PG — HIGH (ref 27–31)
MCHC RBC-ENTMCNC: 35.2 PG — HIGH (ref 27–31)
MCV RBC AUTO: 100.7 FL — HIGH (ref 81–99)
MCV RBC AUTO: 100.7 FL — HIGH (ref 81–99)
MEGA1 DNA VAG QL NAA+PROBE: SIGNIFICANT CHANGE UP
MEGA1 DNA VAG QL NAA+PROBE: SIGNIFICANT CHANGE UP
MONOCYTES # BLD AUTO: 0.59 K/UL — SIGNIFICANT CHANGE UP (ref 0.1–0.6)
MONOCYTES # BLD AUTO: 0.59 K/UL — SIGNIFICANT CHANGE UP (ref 0.1–0.6)
MONOCYTES NFR BLD AUTO: 14.3 % — HIGH (ref 1.7–9.3)
MONOCYTES NFR BLD AUTO: 14.3 % — HIGH (ref 1.7–9.3)
N GONORRHOEA RRNA SPEC QL NAA+PROBE: NEGATIVE — SIGNIFICANT CHANGE UP
N GONORRHOEA RRNA SPEC QL NAA+PROBE: NEGATIVE — SIGNIFICANT CHANGE UP
NEUTROPHILS # BLD AUTO: 2.79 K/UL — SIGNIFICANT CHANGE UP (ref 1.4–6.5)
NEUTROPHILS # BLD AUTO: 2.79 K/UL — SIGNIFICANT CHANGE UP (ref 1.4–6.5)
NEUTROPHILS NFR BLD AUTO: 67.6 % — SIGNIFICANT CHANGE UP (ref 42.2–75.2)
NEUTROPHILS NFR BLD AUTO: 67.6 % — SIGNIFICANT CHANGE UP (ref 42.2–75.2)
NRBC # BLD: 0 /100 WBCS — SIGNIFICANT CHANGE UP (ref 0–0)
NRBC # BLD: 0 /100 WBCS — SIGNIFICANT CHANGE UP (ref 0–0)
PLATELET # BLD AUTO: 297 K/UL — SIGNIFICANT CHANGE UP (ref 130–400)
PLATELET # BLD AUTO: 297 K/UL — SIGNIFICANT CHANGE UP (ref 130–400)
PMV BLD: 9.8 FL — SIGNIFICANT CHANGE UP (ref 7.4–10.4)
PMV BLD: 9.8 FL — SIGNIFICANT CHANGE UP (ref 7.4–10.4)
PROT ?TM UR-MCNC: 13 MG/DLG/24H — SIGNIFICANT CHANGE UP
PROT ?TM UR-MCNC: 13 MG/DLG/24H — SIGNIFICANT CHANGE UP
PROT/CREAT UR-RTO: 0.2 RATIO — SIGNIFICANT CHANGE UP (ref 0–0.2)
PROT/CREAT UR-RTO: 0.2 RATIO — SIGNIFICANT CHANGE UP (ref 0–0.2)
PROTHROM AB SERPL-ACNC: 10.8 SEC — SIGNIFICANT CHANGE UP (ref 9.95–12.87)
PROTHROM AB SERPL-ACNC: 10.8 SEC — SIGNIFICANT CHANGE UP (ref 9.95–12.87)
RBC # BLD: 2.93 M/UL — LOW (ref 4.2–5.4)
RBC # BLD: 2.93 M/UL — LOW (ref 4.2–5.4)
RBC # FLD: 13.7 % — SIGNIFICANT CHANGE UP (ref 11.5–14.5)
RBC # FLD: 13.7 % — SIGNIFICANT CHANGE UP (ref 11.5–14.5)
T VAGINALIS RRNA SPEC QL NAA+PROBE: NEGATIVE — SIGNIFICANT CHANGE UP
T VAGINALIS RRNA SPEC QL NAA+PROBE: NEGATIVE — SIGNIFICANT CHANGE UP
WBC # BLD: 4.13 K/UL — LOW (ref 4.8–10.8)
WBC # BLD: 4.13 K/UL — LOW (ref 4.8–10.8)
WBC # FLD AUTO: 4.13 K/UL — LOW (ref 4.8–10.8)
WBC # FLD AUTO: 4.13 K/UL — LOW (ref 4.8–10.8)

## 2023-12-16 PROCEDURE — 99223 1ST HOSP IP/OBS HIGH 75: CPT

## 2023-12-16 RX ADMIN — Medication 500 MILLIGRAM(S): at 13:34

## 2023-12-16 RX ADMIN — Medication 500 MILLIGRAM(S): at 23:26

## 2023-12-16 RX ADMIN — Medication 1 TABLET(S): at 13:34

## 2023-12-16 RX ADMIN — Medication 500 MILLIGRAM(S): at 05:46

## 2023-12-16 NOTE — CONSULT NOTE ADULT - ASSESSMENT
Ms. Dumont is a 22 yo  @ 27w5d, history of pericarditis, SLE, Crohns in remission, anemia, s/p admission for possible PPROM, now with vaginal bleeding.    Hematology consulted for Anemia.    # Macrocytic anemia Multifactorial r/o Vitamin deficiencies ; pregnancy related; Anemia of chronic disease given Hx of SLE (Not on any rx)  and Hx of chron' s disease(remission).    -hb on admission ~8.5. S/P 2 prbc hb ~10.  -no evidence of active bleeding.  - Hb on HIE labs in  is around 8 to 9. Macrocytosis noted since . No prior available for comparison.  - B12 borderline low.  - Folate Normal.  - Hemoglobin electrophoresis done by ob team was done as an out patient and it was Normal.   --Positive DCT since . Hemolysis less likely given stable hemoglobin and normal bilirubin.  --Polyspecicfic antibodies can occur due to pregnancies.    Plan  -please obtain Iron profile and ferritin.  -please obtain reticulocyte count, LDH and Haptoglobin.  -Given Borderline b12 would recs checking MMA.  -Please consult blood bank given prescence of Poly specific antibodies for recs regarding  PRBC transfusions if needed. Ms. Dumont is a 24 yo  @ 27w5d, history of pericarditis, SLE, Crohns in remission, anemia, s/p admission for possible PPROM, now with vaginal bleeding.    Hematology consulted for Anemia.    # Macrocytic anemia Multifactorial r/o Vitamin deficiencies ; pregnancy related; Anemia of chronic disease given Hx of SLE (Not on any rx)  and Hx of chron' s disease(remission).    -hb on admission ~8.5. S/P 2 prbc hb ~10.  -no evidence of active bleeding.  - Hb on HIE labs in  is around 8 to 9. Macrocytosis noted since . No prior available for comparison.  - B12 borderline low.  - Folate Normal.  - Hemoglobin electrophoresis done by ob team was done as an out patient and it was Normal.   --Positive DCT since . Hemolysis less likely given stable hemoglobin and normal bilirubin.  --Polyspecicfic antibodies can occur due to pregnancies.    Plan  -please obtain Iron profile and ferritin.  -please obtain reticulocyte count, LDH and Haptoglobin.  -Given Borderline b12 would recs checking MMA.  -Please consult blood bank given prescence of Poly specific antibodies for recs regarding  PRBC transfusions if needed. Ms. Dumont is a 22 yo  @ 27w5d, history of pericarditis, SLE, Crohns in remission, anemia, s/p admission for possible PPROM, now with vaginal bleeding.    Hematology consulted for Anemia.    # Macrocytic anemia Multifactorial r/o Vitamin deficiencies ; pregnancy related; Anemia of chronic disease given Hx of SLE (Not on any rx)  and Hx of chron' s disease(remission).    -hb on admission ~8.5. S/P 2 prbc hb ~10.  -no evidence of active bleeding.  - Hb on HIE labs in  is around 8 to 9. Macrocytosis noted since . No prior available for comparison.  - B12 borderline low.  - Folate Normal.  - Hemoglobin electrophoresis done by ob team was done as an out patient and it was Normal.   --Positive DCT since . Hemolysis less likely given stable hemoglobin and normal bilirubin.  --Polyspecific antibodies in type and screen can occur due to pregnancies.    Plan  -please obtain Iron profile and ferritin.  -please obtain reticulocyte count, LDH and Haptoglobin.  -Given Borderline b12 would recs checking MMA.  -Please consult blood bank given prescence of Poly specific antibodies for recs regarding  PRBC transfusions if needed. Ms. Dumont is a 24 yo  @ 27w5d, history of pericarditis, SLE, Crohns in remission, anemia, s/p admission for possible PPROM, now with vaginal bleeding.    Hematology consulted for Anemia.    # Macrocytic anemia Multifactorial r/o Vitamin deficiencies ; pregnancy related; Anemia of chronic disease given Hx of SLE (Not on any rx)  and Hx of chron' s disease(remission).    -hb on admission ~8.5. S/P 2 prbc hb ~10.  -no evidence of active bleeding.  - Hb on HIE labs in  is around 8 to 9. Macrocytosis noted since . No prior available for comparison.  - B12 borderline low.  - Folate Normal.  - Hemoglobin electrophoresis done by ob team was done as an out patient and it was Normal.   --Positive DCT since . Hemolysis less likely given stable hemoglobin and normal bilirubin.  --Polyspecific antibodies in type and screen can occur due to pregnancies.    Plan  -please obtain Iron profile and ferritin.  -please obtain reticulocyte count, LDH and Haptoglobin.  -Given Borderline b12 would recs checking MMA.  -Please consult blood bank given prescence of Poly specific antibodies for recs regarding  PRBC transfusions if needed. Ms. Dumont is a 22 yo  @ 27w5d, history of pericarditis, SLE, Crohns in remission, anemia, s/p admission for possible PPROM, now with vaginal bleeding.    Hematology consulted for Anemia.    # Macrocytic anemia Multifactorial r/o Vitamin deficiencies ; pregnancy related; Anemia of chronic disease given Hx of SLE (Not on any rx)  and Hx of chron' s disease(remission).    -hb on admission ~8.5. currently at ~10(didn't receive any transfusions)  - no evidence of active bleeding.  - Hx of blood transfusions x2 this year as per the patient.  - Hb on HIE labs in  is around 8 to 9. Macrocytosis noted since . No prior available for comparison.  - B12 borderline low.  - Folate Normal.  - Hemoglobin electrophoresis done by ob team was done as an out patient and it was Normal.   --Positive DCT since . Hemolysis less likely given stable hemoglobin and normal bilirubin.  --Polyspecific antibodies in type and screen can occur due to pregnancies.    Plan  -please obtain Iron profile and ferritin.  -please obtain reticulocyte count, LDH and Haptoglobin.  -Given Borderline b12 would recs checking MMA.  -Please consult blood bank given prescence of Poly specific antibodies for recs regarding  PRBC transfusions if needed. Ms. Dumont is a 24 yo  @ 27w5d, history of pericarditis, SLE, Crohns in remission, anemia, s/p admission for possible PPROM, now with vaginal bleeding.    Hematology consulted for Anemia.    # Macrocytic anemia, multifactorial, r/o Vitamin/mineral deficiencies ; pregnancy related; Anemia of chronic disease given Hx of SLE (Not on any rx)  and Hx of Crohn' s disease (remission).    - Hb on admission ~8.5. currently at ~10 (didn't receive any transfusions)  - No evidence of active bleeding.  - Hx of blood transfusions x2 this year as per the patient.  - Hb on HIE labs in  is around 8 to 9. Macrocytosis noted since . No prior available for comparison.  - B12 borderline low.  - Folate Normal.  - Hemoglobin electrophoresis done by Ob team as an out patient and it was normal.   --Positive DCT since . Hemolysis less likely given stable hemoglobin and normal bilirubin.  --Polyspecific antibodies in type and screen can occur due to pregnancies.    Plan  -Please obtain Iron profile and ferritin.  -Please obtain reticulocyte count, LDH and Haptoglobin.  -Given borderline b12, would recommend checking MMA (methylmalonic acid).  -Please consult blood bank, given presence of poly specific antibodies for recommendation regarding  PRBC transfusions if needed. In the meantime, avoid transfusions if not absolutely necessary. Ms. Dumont is a 22 yo  @ 27w5d, history of pericarditis, SLE, Crohns in remission, anemia, s/p admission for possible PPROM, now with vaginal bleeding.    Hematology consulted for Anemia.    # Macrocytic anemia, multifactorial, r/o Vitamin/mineral deficiencies ; pregnancy related; Anemia of chronic disease given Hx of SLE (Not on any rx)  and Hx of Crohn' s disease (remission).    - Hb on admission ~8.5. currently at ~10 (didn't receive any transfusions)  - No evidence of active bleeding.  - Hx of blood transfusions x2 this year as per the patient.  - Hb on HIE labs in  is around 8 to 9. Macrocytosis noted since . No prior available for comparison.  - B12 borderline low.  - Folate Normal.  - Hemoglobin electrophoresis done by Ob team as an out patient and it was normal.   --Positive DCT since . Hemolysis less likely given stable hemoglobin and normal bilirubin.  --Polyspecific antibodies in type and screen can occur due to pregnancies.    Plan  -Please obtain Iron profile and ferritin.  -Please obtain reticulocyte count, LDH and Haptoglobin.  -Given borderline b12, would recommend checking MMA (methylmalonic acid).  -Please consult blood bank, given presence of poly specific antibodies for recommendation regarding  PRBC transfusions if needed. In the meantime, avoid transfusions if not absolutely necessary.

## 2023-12-16 NOTE — PROGRESS NOTE ADULT - ASSESSMENT
INCOMPLETE NOTE Ms. Dumont is a 22 yo  @ 27w5d, history of pericarditis, SLE, Crohns in remission, anemia, s/p admission for possible PPROM, now with vaginal bleeding.    1.  Possible PPROM  - S/p  steroids -12/10  - C/w latency antibiotics, currently on PO Amoxacillin  - NICU consult appreciated  - F/u GBS. Gc/Ct negative.     2. Anemia, stable  - Elevated MCV, B12 and folate wnl    3. Low fibrinogen  - 264 --> 229 --> 209 --> 229  - Repeat ordered for AM    4. Multiple antibodies in type and screen  - Crossed 2u PRBCs, per blood bank it is not perfectly matched and patient will need to sign emergency release form if needed  - hematology consult    5.  History of pericarditis with small pericardial effusion in February-2023. S/p prednisone and colchicine.  - Normal TTE  - Consult appreciated by Dr Behuria from last admission    6.  Dx of SLE  - Followed by Rheumatology, patient refusing treatment  - Consult appreciated by Dr Behuria from last admission    7. Chron's in remission  - Diagnosed at 16yo, last colonoscopy then  - Lost to follow up, pt asymptomatic    8 Possible cHTN  - Patient denies history of elevated BPs, never on meds.  - Elevated, non-severe BPs outpatient. Similar while in the hospital   - UA 30 protein  - LFTs normal  - F/u Upr/cr    9.  Pregnancy  - Increased risk of down syndrome, Increased NT, right pyelectasis, small pericardial effusion.  S/p fetal echo (otherwise normal). NIPTS inconclusive, amniocentesis karyotype and array are negative.   - Previous  delivery.  - PNVs  - Ambulate as tolerated  - Regular PO diet  - GBS negative  - NST BID     Ms. Dumont is a 24 yo  @ 27w5d, history of pericarditis, SLE, Crohns in remission, anemia, s/p admission for possible PPROM, now with vaginal bleeding.    1.  Possible PPROM  - S/p  steroids -12/10  - C/w latency antibiotics, currently on PO Amoxacillin  - NICU consult appreciated  - F/u GBS. Gc/Ct negative.     2. Anemia, stable  - Elevated MCV, B12 and folate wnl    3. Low fibrinogen  - 264 --> 229 --> 209 --> 229  - Repeat ordered for AM    4. Multiple antibodies in type and screen  - Crossed 2u PRBCs, per blood bank it is not perfectly matched and patient will need to sign emergency release form if needed  - hematology consult    5.  History of pericarditis with small pericardial effusion in February-2023. S/p prednisone and colchicine.  - Normal TTE  - Consult appreciated by Dr Behuria from last admission    6.  Dx of SLE  - Followed by Rheumatology, patient refusing treatment  - Consult appreciated by Dr Behuria from last admission    7. Chron's in remission  - Diagnosed at 14yo, last colonoscopy then  - Lost to follow up, pt asymptomatic    8 Possible cHTN  - Patient denies history of elevated BPs, never on meds.  - Elevated, non-severe BPs outpatient. Similar while in the hospital   - UA 30 protein  - LFTs normal  - F/u Upr/cr    9.  Pregnancy  - Increased risk of down syndrome, Increased NT, right pyelectasis, small pericardial effusion.  S/p fetal echo (otherwise normal). NIPTS inconclusive, amniocentesis karyotype and array are negative.   - Previous  delivery.  - PNVs  - Ambulate as tolerated  - Regular PO diet  - GBS negative  - NST BID     Ms. Dumont is a 22 yo  @ 27w5d, history of pericarditis, SLE (which she doubts), Crohns in remission, anemia, s/p admission for possible PPROM, now with vaginal bleeding.    1.  Possible PPROM  - S/p  steroids -12/10  - C/w latency antibiotics, currently on PO Amoxacillin  - NICU consult appreciated  - F/u GBS. Gc/Ct negative.     2. Macrocytic Anemia, stable  - Elevated MCV, B12 and folate wnl.  We appreciate hematology eval and await results of recommended labs.     3. Low fibrinogen: ASx.  - 264 --> 229 --> 209 --> 229  - Repeat ordered for AM    4. Multiple antibodies in type and screen  - Crossed 2u PRBCs, per blood bank it is not perfectly matched and patient will need to sign emergency release form if needed  - hematology consult  - Telephone coordination with transfusion medicine:  Severe hemolytic reaction unlikely.  If transfusion reaction, begin w/u and call blood bank att'g.   - Transfusion is not indicated at this time, but treatment of anemia is urgenly needed to avoid transfusion iin the future.  We await hematology recommendations.     5.  History of pericarditis with small pericardial effusion in February-2023. S/p prednisone and colchicine.  - Normal TTE  - Consult appreciated by Dr Behuria from last admission    6.  Dx of SLE: Pt does not believe she has SLE, "Only the antibodies are abnormal."  - Followed by Rheumatology, patient declining treatment  - Consult appreciated by Dr Behuria from last admission    7. Crohn's in remission  - Diagnosed at 14yo, last colonoscopy then  - Lost to follow up, pt asymptomatic    8 Possible cHTN  - Patient denies history of elevated BPs, never on meds.  - Elevated, non-severe BPs outpatient. Similar while in the hospital   - UA 30 protein  - LFTs normal  - F/u Upr/cr    9.  Pregnancy with incr risk of down syndrome, Increased NT, right pyelectasis, small pericardial effusion.  S/p fetal echo (otherwise normal). NIPTS inconclusive, amniocentesis karyotype and array are negative.   - Previous  delivery.  - Ambulate as tolerated  - NST BID    MD Bryce, FACOG with ILDEFONSO Cortes MD, PGY-3   Ms. Dumont is a 24 yo  @ 27w5d, history of pericarditis, SLE (which she doubts), Crohns in remission, anemia, s/p admission for possible PPROM, now with vaginal bleeding.    1.  Possible PPROM  - S/p  steroids -12/10  - C/w latency antibiotics, currently on PO Amoxacillin  - NICU consult appreciated  - F/u GBS. Gc/Ct negative.     2. Macrocytic Anemia, stable  - Elevated MCV, B12 and folate wnl.  We appreciate hematology eval and await results of recommended labs.     3. Low fibrinogen: ASx.  - 264 --> 229 --> 209 --> 229  - Repeat ordered for AM    4. Multiple antibodies in type and screen  - Crossed 2u PRBCs, per blood bank it is not perfectly matched and patient will need to sign emergency release form if needed  - hematology consult  - Telephone coordination with transfusion medicine:  Severe hemolytic reaction unlikely.  If transfusion reaction, begin w/u and call blood bank att'g.   - Transfusion is not indicated at this time, but treatment of anemia is urgenly needed to avoid transfusion iin the future.  We await hematology recommendations.     5.  History of pericarditis with small pericardial effusion in February-2023. S/p prednisone and colchicine.  - Normal TTE  - Consult appreciated by Dr Behuria from last admission    6.  Dx of SLE: Pt does not believe she has SLE, "Only the antibodies are abnormal."  - Followed by Rheumatology, patient declining treatment  - Consult appreciated by Dr Behuria from last admission    7. Crohn's in remission  - Diagnosed at 16yo, last colonoscopy then  - Lost to follow up, pt asymptomatic    8 Possible cHTN  - Patient denies history of elevated BPs, never on meds.  - Elevated, non-severe BPs outpatient. Similar while in the hospital   - UA 30 protein  - LFTs normal  - F/u Upr/cr    9.  Pregnancy with incr risk of down syndrome, Increased NT, right pyelectasis, small pericardial effusion.  S/p fetal echo (otherwise normal). NIPTS inconclusive, amniocentesis karyotype and array are negative.   - Previous  delivery.  - Ambulate as tolerated  - NST BID    MD Bryce, FACOG with ILDEFONSO Cortes MD, PGY-3

## 2023-12-16 NOTE — CONSULT NOTE ADULT - SUBJECTIVE AND OBJECTIVE BOX
Patient is a 23y old  Female who presents with a chief complaint of r/o abruption (16 Dec 2023 07:02)      HPI:  24 yo  @ 27w3d by LMP, ANIVAL 3/11/23 presenting due to vaginal bleeding. Woke up this AM and went to urinate, noticed small blood when she wiped and in the toilet. Denies contracitons/abdominal pain, LOF, or heavy vaginal bleeding. Endorses good fetal movement. [place]BGYN in [place].     Last BM: yesterday  Last intercourse: months  Last PO: this AM    Pregnancy complicated by:  1.  Possible PPROM, discharged on   - S/p  steroids -12/10  - C/w latency antibiotics, currently on PO Amoxacillin  - GBS neg. Gc/Ct negative.     2. Multiple antibodies in type and screen    3.  History of pericarditis with small pericardial effusion in February-2023. S/p prednisone and colchicine.  - Normal TTE  - Is followed by Dr. Behuria. Last seen in 2023    4.  Dx of Lupus  - Followed by Rheumatology, patient refusing treatment  - Consult appreciated     5. Crohn's in remission  - Diagnosed at 16yo, last colonoscopy then  - Lost to follow up, pt asymptomatic    6.  Pregnancy  - Increased risk of down syndrome, Increased NT, right pyelectasis, small pericardial effusion.  S/p fetal echo (otherwise normal). NIPTS inconclusive, amniocentesis karyotype and array are negative.  - Previous  delivery. (14 Dec 2023 15:31)       ROS:  Negative except for above.    PAST MEDICAL & SURGICAL HISTORY:  H/O pericarditis      No significant past surgical history        FAMILY HISTORY:  No pertinent family history in first degree relatives        MEDICATIONS  (STANDING):  amoxicillin 500 milliGRAM(s) Oral every 8 hours  prenatal multivitamin 1 Tablet(s) Oral daily    MEDICATIONS  (PRN):      Allergies    No Known Allergies    Intolerances        Vital Signs Last 24 Hrs  T(C): 37.1 (16 Dec 2023 08:12), Max: 37.2 (15 Dec 2023 23:52)  T(F): 98.8 (16 Dec 2023 08:12), Max: 98.9 (15 Dec 2023 23:52)  HR: 83 (16 Dec 2023 08:12) (80 - 98)  BP: 114/65 (16 Dec 2023 08:12) (110/56 - 126/59)  BP(mean): --  RR: 19 (16 Dec 2023 08:12) (16 - 20)  SpO2: --        PHYSICAL EXAM  General: adult in NAD  HEENT: clear oropharynx, anicteric sclera, pink conjunctiva  Neck: supple  CV: normal S1/S2 with no murmur rubs or gallops  Lungs: positive air movement b/l ant lungs,clear to auscultation, no wheezes, no rales  Abdomen: soft non-tender non-distended, no hepatosplenomegaly  Ext: no clubbing cyanosis or edema  Skin: no rashes and no petechiae  Neuro: alert and oriented X 4, no focal deficits      LABS:                          10.3   4.13  )-----------( 297      ( 16 Dec 2023 07:10 )             29.5         Mean Cell Volume : 100.7 fL  Mean Cell Hemoglobin : 35.2 pg  Mean Cell Hemoglobin Concentration : 34.9 g/dL  Auto Neutrophil # : 2.79 K/uL  Auto Lymphocyte # : 0.70 K/uL  Auto Monocyte # : 0.59 K/uL  Auto Eosinophil # : 0.02 K/uL  Auto Basophil # : 0.01 K/uL  Auto Neutrophil % : 67.6 %  Auto Lymphocyte % : 16.9 %  Auto Monocyte % : 14.3 %  Auto Eosinophil % : 0.5 %  Auto Basophil % : 0.2 %      Serial CBC's   @ 07:10  Hct-29.5 / Hgb-10.3 / Plat-297 / RBC-2.93 / WBC-4.13  Serial CBC's  12-15 @ 10:52  Hct-27.5 / Hgb-9.6 / Plat-293 / RBC-2.78 / WBC-4.46  Serial CBC's  12-15 @ 05:10  Hct-27.7 / Hgb-9.6 / Plat-295 / RBC-2.72 / WBC-4.52  Serial CBC's   @ 23:16  Hct-26.0 / Hgb-9.1 / Plat-290 / RBC-2.65 / WBC-4.65  Serial CBC's   @ 17:00  Hct-30.1 / Hgb-11.0 / Plat-353 / RBC-2.98 / WBC-5.31  Serial CBC's   @ 10:50  Hct-29.7 / Hgb-10.4 / Plat-364 / RBC-3.07 / WBC-6.28          138  |  108  |  6<L>  ----------------------------<  82  3.8   |  21  |  <0.5<L>    Ca    8.2<L>      14 Dec 2023 23:16    TPro  6.5  /  Alb  3.0<L>  /  TBili  0.3  /  DBili  x   /  AST  12  /  ALT  10  /  AlkPhos  56        PT/INR - ( 16 Dec 2023 07:10 )   PT: 10.80 sec;   INR: 0.95 ratio         PTT - ( 16 Dec 2023 07:10 )  PTT:27.2 sec    Folate, Serum: 16.6 ng/mL ( @ 07:50)  Vitamin B12, Serum: 285 pg/mL ( @ 07:50)                 Patient is a 23y old  Female who presents with a chief complaint of r/o abruption (16 Dec 2023 07:02)      HPI:  22 yo  @ 27w3d by LMP, ANIVAL 3/11/23 presenting due to vaginal bleeding. Woke up this AM and went to urinate, noticed small blood when she wiped and in the toilet. Denies contracitons/abdominal pain, LOF, or heavy vaginal bleeding. Endorses good fetal movement. She sees an OBGYN in [place].     Last BM: yesterday  Last intercourse: months  Last PO: this AM    Pregnancy complicated by:  1.  Possible PPROM, discharged on   - S/p  steroids -12/10  - C/w latency antibiotics, currently on PO Amoxacillin  - GBS neg. Gc/Ct negative.     2. Multiple antibodies in type and screen    3.  History of pericarditis with small pericardial effusion in February-2023. S/p prednisone and colchicine.  - Normal TTE  - Is followed by Dr. Behuria. Last seen in 2023    4.  Dx of Lupus  - Followed by Rheumatology, patient refusing treatment  - Consult appreciated     5. Crohn's in remission  - Diagnosed at 14yo, last colonoscopy then  - Lost to follow up, pt asymptomatic    6.  Pregnancy  - Increased risk of down syndrome, Increased NT, right pyelectasis, small pericardial effusion.  S/p fetal echo (otherwise normal). NIPTS inconclusive, amniocentesis karyotype and array are negative.  - Previous  delivery. (14 Dec 2023 15:31)       ROS:  Negative except for above.    PAST MEDICAL & SURGICAL HISTORY:  H/O pericarditis      No significant past surgical history        FAMILY HISTORY:  No pertinent family history in first degree relatives        MEDICATIONS  (STANDING):  amoxicillin 500 milliGRAM(s) Oral every 8 hours  prenatal multivitamin 1 Tablet(s) Oral daily    MEDICATIONS  (PRN):      Allergies    No Known Allergies    Intolerances        Vital Signs Last 24 Hrs  T(C): 37.1 (16 Dec 2023 08:12), Max: 37.2 (15 Dec 2023 23:52)  T(F): 98.8 (16 Dec 2023 08:12), Max: 98.9 (15 Dec 2023 23:52)  HR: 83 (16 Dec 2023 08:12) (80 - 98)  BP: 114/65 (16 Dec 2023 08:12) (110/56 - 126/59)  BP(mean): --  RR: 19 (16 Dec 2023 08:12) (16 - 20)  SpO2: --        PHYSICAL EXAM  General: adult in NAD  HEENT: clear oropharynx, anicteric sclera, pink conjunctiva  Neck: supple  CV: normal S1/S2 with no murmur rubs or gallops  Lungs: positive air movement b/l ant lungs,clear to auscultation, no wheezes, no rales  Abdomen: soft non-tender non-distended, no hepatosplenomegaly  Ext: no clubbing cyanosis or edema  Skin: no rashes and no petechiae  Neuro: alert and oriented X 4, no focal deficits      LABS:                          10.3   4.13  )-----------( 297      ( 16 Dec 2023 07:10 )             29.5         Mean Cell Volume : 100.7 fL  Mean Cell Hemoglobin : 35.2 pg  Mean Cell Hemoglobin Concentration : 34.9 g/dL  Auto Neutrophil # : 2.79 K/uL  Auto Lymphocyte # : 0.70 K/uL  Auto Monocyte # : 0.59 K/uL  Auto Eosinophil # : 0.02 K/uL  Auto Basophil # : 0.01 K/uL  Auto Neutrophil % : 67.6 %  Auto Lymphocyte % : 16.9 %  Auto Monocyte % : 14.3 %  Auto Eosinophil % : 0.5 %  Auto Basophil % : 0.2 %      Serial CBC's   @ 07:10  Hct-29.5 / Hgb-10.3 / Plat-297 / RBC-2.93 / WBC-4.13  Serial CBC's  12-15 @ 10:52  Hct-27.5 / Hgb-9.6 / Plat-293 / RBC-2.78 / WBC-4.46  Serial CBC's  12-15 @ 05:10  Hct-27.7 / Hgb-9.6 / Plat-295 / RBC-2.72 / WBC-4.52  Serial CBC's   @ 23:16  Hct-26.0 / Hgb-9.1 / Plat-290 / RBC-2.65 / WBC-4.65  Serial CBC's   @ 17:00  Hct-30.1 / Hgb-11.0 / Plat-353 / RBC-2.98 / WBC-5.31  Serial CBC's   @ 10:50  Hct-29.7 / Hgb-10.4 / Plat-364 / RBC-3.07 / WBC-6.28          138  |  108  |  6<L>  ----------------------------<  82  3.8   |  21  |  <0.5<L>    Ca    8.2<L>      14 Dec 2023 23:16    TPro  6.5  /  Alb  3.0<L>  /  TBili  0.3  /  DBili  x   /  AST  12  /  ALT  10  /  AlkPhos  56        PT/INR - ( 16 Dec 2023 07:10 )   PT: 10.80 sec;   INR: 0.95 ratio         PTT - ( 16 Dec 2023 07:10 )  PTT:27.2 sec    Folate, Serum: 16.6 ng/mL ( @ 07:50)  Vitamin B12, Serum: 285 pg/mL ( @ 07:50)                 Patient is a 23y old  Female who presents with a chief complaint of r/o abruption (16 Dec 2023 07:02)      HPI:  22 yo  @ 27w3d by LMP, ANIVAL 3/11/23 presenting due to vaginal bleeding. Woke up this AM and went to urinate, noticed small blood when she wiped and in the toilet. Denies contracitons/abdominal pain, LOF, or heavy vaginal bleeding. Endorses good fetal movement. She sees an OBGYN in [place].     Last BM: yesterday  Last intercourse: months  Last PO: this AM    Pregnancy complicated by:  1.  Possible PPROM, discharged on   - S/p  steroids -12/10  - C/w latency antibiotics, currently on PO Amoxacillin  - GBS neg. Gc/Ct negative.     2. Multiple antibodies in type and screen    3.  History of pericarditis with small pericardial effusion in February-2023. S/p prednisone and colchicine.  - Normal TTE  - Is followed by Dr. Behuria. Last seen in 2023    4.  Dx of Lupus  - Followed by Rheumatology, patient refusing treatment  - Consult appreciated     5. Crohn's in remission  - Diagnosed at 14yo, last colonoscopy then  - Lost to follow up, pt asymptomatic    6.  Pregnancy  - Increased risk of down syndrome, Increased NT, right pyelectasis, small pericardial effusion.  S/p fetal echo (otherwise normal). NIPTS inconclusive, amniocentesis karyotype and array are negative.  - Previous  delivery. (14 Dec 2023 15:31)       ROS:  Negative except for above.    PAST MEDICAL & SURGICAL HISTORY:  H/O pericarditis      No significant past surgical history        FAMILY HISTORY:  No pertinent family history in first degree relatives        MEDICATIONS  (STANDING):  amoxicillin 500 milliGRAM(s) Oral every 8 hours  prenatal multivitamin 1 Tablet(s) Oral daily    MEDICATIONS  (PRN):      Allergies    No Known Allergies    Intolerances        Vital Signs Last 24 Hrs  T(C): 37.1 (16 Dec 2023 08:12), Max: 37.2 (15 Dec 2023 23:52)  T(F): 98.8 (16 Dec 2023 08:12), Max: 98.9 (15 Dec 2023 23:52)  HR: 83 (16 Dec 2023 08:12) (80 - 98)  BP: 114/65 (16 Dec 2023 08:12) (110/56 - 126/59)  BP(mean): --  RR: 19 (16 Dec 2023 08:12) (16 - 20)  SpO2: --        PHYSICAL EXAM  General: adult in NAD  HEENT: clear oropharynx, anicteric sclera, pink conjunctiva  Neck: supple  CV: normal S1/S2 with no murmur rubs or gallops  Lungs: positive air movement b/l ant lungs,clear to auscultation, no wheezes, no rales  Abdomen: soft, gravid, nontender, no palpable contractions   Ext: no clubbing cyanosis or edema  Skin: no rashes and no petechiae  Neuro: alert and oriented X 4, no focal deficits      LABS:                          10.3   4.13  )-----------( 297      ( 16 Dec 2023 07:10 )             29.5         Mean Cell Volume : 100.7 fL  Mean Cell Hemoglobin : 35.2 pg  Mean Cell Hemoglobin Concentration : 34.9 g/dL  Auto Neutrophil # : 2.79 K/uL  Auto Lymphocyte # : 0.70 K/uL  Auto Monocyte # : 0.59 K/uL  Auto Eosinophil # : 0.02 K/uL  Auto Basophil # : 0.01 K/uL  Auto Neutrophil % : 67.6 %  Auto Lymphocyte % : 16.9 %  Auto Monocyte % : 14.3 %  Auto Eosinophil % : 0.5 %  Auto Basophil % : 0.2 %      Serial CBC's   @ 07:10  Hct-29.5 / Hgb-10.3 / Plat-297 / RBC-2.93 / WBC-4.13  Serial CBC's  12-15 @ 10:52  Hct-27.5 / Hgb-9.6 / Plat-293 / RBC-2.78 / WBC-4.46  Serial CBC's  12-15 @ 05:10  Hct-27.7 / Hgb-9.6 / Plat-295 / RBC-2.72 / WBC-4.52  Serial CBC's   @ 23:16  Hct-26.0 / Hgb-9.1 / Plat-290 / RBC-2.65 / WBC-4.65  Serial CBC's   @ 17:00  Hct-30.1 / Hgb-11.0 / Plat-353 / RBC-2.98 / WBC-5.31  Serial CBC's   @ 10:50  Hct-29.7 / Hgb-10.4 / Plat-364 / RBC-3.07 / WBC-6.28          138  |  108  |  6<L>  ----------------------------<  82  3.8   |  21  |  <0.5<L>    Ca    8.2<L>      14 Dec 2023 23:16    TPro  6.5  /  Alb  3.0<L>  /  TBili  0.3  /  DBili  x   /  AST  12  /  ALT  10  /  AlkPhos  56        PT/INR - ( 16 Dec 2023 07:10 )   PT: 10.80 sec;   INR: 0.95 ratio         PTT - ( 16 Dec 2023 07:10 )  PTT:27.2 sec    Folate, Serum: 16.6 ng/mL ( @ 07:50)  Vitamin B12, Serum: 285 pg/mL ( @ 07:50)                 Patient is a 23y old  Female who presents with a chief complaint of r/o abruption (16 Dec 2023 07:02)      HPI:  24 yo  @ 27w3d by LMP, ANIVAL 3/11/23 presenting due to vaginal bleeding. Woke up this AM and went to urinate, noticed small blood when she wiped and in the toilet. Denies contracitons/abdominal pain, LOF, or heavy vaginal bleeding. Endorses good fetal movement. She sees an OBGYN in [place].     Last BM: yesterday  Last intercourse: months  Last PO: this AM    Pregnancy complicated by:  1.  Possible PPROM, discharged on   - S/p  steroids -12/10  - C/w latency antibiotics, currently on PO Amoxacillin  - GBS neg. Gc/Ct negative.     2. Multiple antibodies in type and screen    3.  History of pericarditis with small pericardial effusion in February-2023. S/p prednisone and colchicine.  - Normal TTE  - Is followed by Dr. Behuria. Last seen in 2023    4.  Dx of Lupus  - Followed by Rheumatology, patient refusing treatment  - Consult appreciated     5. Crohn's in remission  - Diagnosed at 14yo, last colonoscopy then  - Lost to follow up, pt asymptomatic    6.  Pregnancy  - Increased risk of down syndrome, Increased NT, right pyelectasis, small pericardial effusion.  S/p fetal echo (otherwise normal). NIPTS inconclusive, amniocentesis karyotype and array are negative.  - Previous  delivery. (14 Dec 2023 15:31)       ROS:  Negative except for above.    PAST MEDICAL & SURGICAL HISTORY:  H/O pericarditis      No significant past surgical history        FAMILY HISTORY:  No pertinent family history in first degree relatives        MEDICATIONS  (STANDING):  amoxicillin 500 milliGRAM(s) Oral every 8 hours  prenatal multivitamin 1 Tablet(s) Oral daily    MEDICATIONS  (PRN):      Allergies    No Known Allergies    Intolerances        Vital Signs Last 24 Hrs  T(C): 37.1 (16 Dec 2023 08:12), Max: 37.2 (15 Dec 2023 23:52)  T(F): 98.8 (16 Dec 2023 08:12), Max: 98.9 (15 Dec 2023 23:52)  HR: 83 (16 Dec 2023 08:12) (80 - 98)  BP: 114/65 (16 Dec 2023 08:12) (110/56 - 126/59)  BP(mean): --  RR: 19 (16 Dec 2023 08:12) (16 - 20)  SpO2: --        PHYSICAL EXAM  General: adult in NAD  HEENT: clear oropharynx, anicteric sclera, pink conjunctiva  Neck: supple  CV: normal S1/S2 with no murmur rubs or gallops  Lungs: positive air movement b/l ant lungs,clear to auscultation, no wheezes, no rales  Abdomen: soft, gravid, nontender, no palpable contractions   Ext: no clubbing cyanosis or edema  Skin: no rashes and no petechiae  Neuro: alert and oriented X 4, no focal deficits      LABS:                          10.3   4.13  )-----------( 297      ( 16 Dec 2023 07:10 )             29.5         Mean Cell Volume : 100.7 fL  Mean Cell Hemoglobin : 35.2 pg  Mean Cell Hemoglobin Concentration : 34.9 g/dL  Auto Neutrophil # : 2.79 K/uL  Auto Lymphocyte # : 0.70 K/uL  Auto Monocyte # : 0.59 K/uL  Auto Eosinophil # : 0.02 K/uL  Auto Basophil # : 0.01 K/uL  Auto Neutrophil % : 67.6 %  Auto Lymphocyte % : 16.9 %  Auto Monocyte % : 14.3 %  Auto Eosinophil % : 0.5 %  Auto Basophil % : 0.2 %      Serial CBC's   @ 07:10  Hct-29.5 / Hgb-10.3 / Plat-297 / RBC-2.93 / WBC-4.13  Serial CBC's  12-15 @ 10:52  Hct-27.5 / Hgb-9.6 / Plat-293 / RBC-2.78 / WBC-4.46  Serial CBC's  12-15 @ 05:10  Hct-27.7 / Hgb-9.6 / Plat-295 / RBC-2.72 / WBC-4.52  Serial CBC's   @ 23:16  Hct-26.0 / Hgb-9.1 / Plat-290 / RBC-2.65 / WBC-4.65  Serial CBC's   @ 17:00  Hct-30.1 / Hgb-11.0 / Plat-353 / RBC-2.98 / WBC-5.31  Serial CBC's   @ 10:50  Hct-29.7 / Hgb-10.4 / Plat-364 / RBC-3.07 / WBC-6.28          138  |  108  |  6<L>  ----------------------------<  82  3.8   |  21  |  <0.5<L>    Ca    8.2<L>      14 Dec 2023 23:16    TPro  6.5  /  Alb  3.0<L>  /  TBili  0.3  /  DBili  x   /  AST  12  /  ALT  10  /  AlkPhos  56        PT/INR - ( 16 Dec 2023 07:10 )   PT: 10.80 sec;   INR: 0.95 ratio         PTT - ( 16 Dec 2023 07:10 )  PTT:27.2 sec    Folate, Serum: 16.6 ng/mL ( @ 07:50)  Vitamin B12, Serum: 285 pg/mL ( @ 07:50)                 Patient is a 23y old AA Female who was admitted with a chief complaint of "r/o abruptio placenta" (16 Dec 2023 07:02)      HPI:  24 yo  @ 27w3d by LMP, ANIVAL 3/11/23 presenting due to vaginal bleeding. Woke up this AM and went to urinate, noticed small blood when she wiped and in the toilet. Denies contractions/abdominal pain, LOF, or heavy vaginal bleeding. Endorses good fetal movement. She sees an OBGYN in [place].     Last BM: yesterday  Last intercourse: months  Last PO: this AM    Pregnancy complicated by:  1.  Possible PPROM, discharged on   - S/p  steroids -12/10  - C/w latency antibiotics, currently on PO Amoxacillin  - GBS neg. Gc/Ct negative.     2. Multiple antibodies in type and screen    3.  History of pericarditis with small pericardial effusion in February-2023. S/p prednisone and colchicine.  - Normal TTE  - Is followed by Dr. Behuria. Last seen in 2023    4.  Dx of Lupus  - Followed by Rheumatology, patient refusing treatment  - Consult appreciated     5. Crohn's in remission  - Diagnosed at 14yo, last colonoscopy then  - Lost to follow up, pt asymptomatic    6.  Pregnancy  - Increased risk of Down syndrome, Increased NT, right pyelectasis, small pericardial effusion.  S/p fetal echo (otherwise normal). NIPTS inconclusive, amniocentesis karyotype and array are negative.  - Previous  delivery. (14 Dec 2023 15:31)       ROS:  Negative except for above.    PAST MEDICAL & SURGICAL HISTORY:  H/O pericarditis      No significant past surgical history        FAMILY HISTORY:  No pertinent family history in first degree relatives        MEDICATIONS  (STANDING):  amoxicillin 500 milliGRAM(s) Oral every 8 hours  prenatal multivitamin 1 Tablet(s) Oral daily    MEDICATIONS  (PRN):      Allergies    No Known Allergies    Intolerances        Vital Signs Last 24 Hrs  T(C): 37.1 (16 Dec 2023 08:12), Max: 37.2 (15 Dec 2023 23:52)  T(F): 98.8 (16 Dec 2023 08:12), Max: 98.9 (15 Dec 2023 23:52)  HR: 83 (16 Dec 2023 08:12) (80 - 98)  BP: 114/65 (16 Dec 2023 08:12) (110/56 - 126/59)  BP(mean): --  RR: 19 (16 Dec 2023 08:12) (16 - 20)  SpO2: --        PHYSICAL EXAM  General: adult in NAD  HEENT: clear oropharynx, anicteric sclera, pink conjunctiva  Neck: supple  CV: normal S1/S2 with no murmur rubs or gallops  Lungs: positive air movement b/l ant, clear to auscultation, no wheezes, no rales  Abdomen: soft, gravid, nontender, no palpable contractions   Ext: no clubbing cyanosis or edema  Skin: no rashes and no petechiae  Neuro: alert and oriented X 4, no focal deficits      LABS:                          10.3   4.13  )-----------( 297      ( 16 Dec 2023 07:10 )             29.5         Mean Cell Volume : 100.7 fL  Mean Cell Hemoglobin : 35.2 pg  Mean Cell Hemoglobin Concentration : 34.9 g/dL  Auto Neutrophil # : 2.79 K/uL  Auto Lymphocyte # : 0.70 K/uL  Auto Monocyte # : 0.59 K/uL  Auto Eosinophil # : 0.02 K/uL  Auto Basophil # : 0.01 K/uL  Auto Neutrophil % : 67.6 %  Auto Lymphocyte % : 16.9 %  Auto Monocyte % : 14.3 %  Auto Eosinophil % : 0.5 %  Auto Basophil % : 0.2 %      Serial CBC's   @ 07:10  Hct-29.5 / Hgb-10.3 / Plat-297 / RBC-2.93 / WBC-4.13  Serial CBC's  12-15 @ 10:52  Hct-27.5 / Hgb-9.6 / Plat-293 / RBC-2.78 / WBC-4.46  Serial CBC's  12-15 @ 05:10  Hct-27.7 / Hgb-9.6 / Plat-295 / RBC-2.72 / WBC-4.52  Serial CBC's   @ 23:16  Hct-26.0 / Hgb-9.1 / Plat-290 / RBC-2.65 / WBC-4.65  Serial CBC's   @ 17:00  Hct-30.1 / Hgb-11.0 / Plat-353 / RBC-2.98 / WBC-5.31  Serial CBC's   @ 10:50  Hct-29.7 / Hgb-10.4 / Plat-364 / RBC-3.07 / WBC-6.28          138  |  108  |  6<L>  ----------------------------<  82  3.8   |  21  |  <0.5<L>    Ca    8.2<L>      14 Dec 2023 23:16    TPro  6.5  /  Alb  3.0<L>  /  TBili  0.3  /  DBili  x   /  AST  12  /  ALT  10  /  AlkPhos  56        PT/INR - ( 16 Dec 2023 07:10 )   PT: 10.80 sec;   INR: 0.95 ratio         PTT - ( 16 Dec 2023 07:10 )  PTT:27.2 sec    Folate, Serum: 16.6 ng/mL ( @ 07:50)  Vitamin B12, Serum: 285 pg/mL ( @ 07:50)                 Patient is a 23y old AA Female who was admitted with a chief complaint of "r/o abruptio placenta" (16 Dec 2023 07:02)      HPI:  24 yo  @ 27w3d by LMP, ANIVAL 3/11/23 presenting due to vaginal bleeding. Woke up this AM and went to urinate, noticed small blood when she wiped and in the toilet. Denies contractions/abdominal pain, LOF, or heavy vaginal bleeding. Endorses good fetal movement. She sees an OBGYN in [place].     Last BM: yesterday  Last intercourse: months  Last PO: this AM    Pregnancy complicated by:  1.  Possible PPROM, discharged on   - S/p  steroids -12/10  - C/w latency antibiotics, currently on PO Amoxacillin  - GBS neg. Gc/Ct negative.     2. Multiple antibodies in type and screen    3.  History of pericarditis with small pericardial effusion in February-2023. S/p prednisone and colchicine.  - Normal TTE  - Is followed by Dr. Behuria. Last seen in 2023    4.  Dx of Lupus  - Followed by Rheumatology, patient refusing treatment  - Consult appreciated     5. Crohn's in remission  - Diagnosed at 14yo, last colonoscopy then  - Lost to follow up, pt asymptomatic    6.  Pregnancy  - Increased risk of Down syndrome, Increased NT, right pyelectasis, small pericardial effusion.  S/p fetal echo (otherwise normal). NIPTS inconclusive, amniocentesis karyotype and array are negative.  - Previous  delivery. (14 Dec 2023 15:31)       ROS:  Negative except for above.    PAST MEDICAL & SURGICAL HISTORY:  H/O pericarditis      No significant past surgical history        FAMILY HISTORY:  No pertinent family history in first degree relatives        MEDICATIONS  (STANDING):  amoxicillin 500 milliGRAM(s) Oral every 8 hours  prenatal multivitamin 1 Tablet(s) Oral daily    MEDICATIONS  (PRN):      Allergies    No Known Allergies    Intolerances        Vital Signs Last 24 Hrs  T(C): 37.1 (16 Dec 2023 08:12), Max: 37.2 (15 Dec 2023 23:52)  T(F): 98.8 (16 Dec 2023 08:12), Max: 98.9 (15 Dec 2023 23:52)  HR: 83 (16 Dec 2023 08:12) (80 - 98)  BP: 114/65 (16 Dec 2023 08:12) (110/56 - 126/59)  BP(mean): --  RR: 19 (16 Dec 2023 08:12) (16 - 20)  SpO2: --        PHYSICAL EXAM  General: adult in NAD  HEENT: clear oropharynx, anicteric sclera, pink conjunctiva  Neck: supple  CV: normal S1/S2 with no murmur rubs or gallops  Lungs: positive air movement b/l ant, clear to auscultation, no wheezes, no rales  Abdomen: soft, gravid, nontender, no palpable contractions   Ext: no clubbing cyanosis or edema  Skin: no rashes and no petechiae  Neuro: alert and oriented X 4, no focal deficits      LABS:                          10.3   4.13  )-----------( 297      ( 16 Dec 2023 07:10 )             29.5         Mean Cell Volume : 100.7 fL  Mean Cell Hemoglobin : 35.2 pg  Mean Cell Hemoglobin Concentration : 34.9 g/dL  Auto Neutrophil # : 2.79 K/uL  Auto Lymphocyte # : 0.70 K/uL  Auto Monocyte # : 0.59 K/uL  Auto Eosinophil # : 0.02 K/uL  Auto Basophil # : 0.01 K/uL  Auto Neutrophil % : 67.6 %  Auto Lymphocyte % : 16.9 %  Auto Monocyte % : 14.3 %  Auto Eosinophil % : 0.5 %  Auto Basophil % : 0.2 %      Serial CBC's   @ 07:10  Hct-29.5 / Hgb-10.3 / Plat-297 / RBC-2.93 / WBC-4.13  Serial CBC's  12-15 @ 10:52  Hct-27.5 / Hgb-9.6 / Plat-293 / RBC-2.78 / WBC-4.46  Serial CBC's  12-15 @ 05:10  Hct-27.7 / Hgb-9.6 / Plat-295 / RBC-2.72 / WBC-4.52  Serial CBC's   @ 23:16  Hct-26.0 / Hgb-9.1 / Plat-290 / RBC-2.65 / WBC-4.65  Serial CBC's   @ 17:00  Hct-30.1 / Hgb-11.0 / Plat-353 / RBC-2.98 / WBC-5.31  Serial CBC's   @ 10:50  Hct-29.7 / Hgb-10.4 / Plat-364 / RBC-3.07 / WBC-6.28          138  |  108  |  6<L>  ----------------------------<  82  3.8   |  21  |  <0.5<L>    Ca    8.2<L>      14 Dec 2023 23:16    TPro  6.5  /  Alb  3.0<L>  /  TBili  0.3  /  DBili  x   /  AST  12  /  ALT  10  /  AlkPhos  56        PT/INR - ( 16 Dec 2023 07:10 )   PT: 10.80 sec;   INR: 0.95 ratio         PTT - ( 16 Dec 2023 07:10 )  PTT:27.2 sec    Folate, Serum: 16.6 ng/mL ( @ 07:50)  Vitamin B12, Serum: 285 pg/mL ( @ 07:50)

## 2023-12-16 NOTE — CHART NOTE - NSCHARTNOTEFT_GEN_A_CORE
PGY 3 NST Note    Non stress test    Date:  12/16 PM    Baseline: 150 beats per minute  Variability:  mod  Accelerations: yes  Decelerations: no    Tocometer: none MFM and PGY 3 NST Note    Non stress test was required in order to assess fetal status in setting of third trimester vaginal bleeding.    Date:  12/16 PM    Baseline: 150 beats per minute  Variability:  mod  Accelerations: yes  Decelerations: no    Tocometer: no contractions    Reactive NST, reassuring testing.   MD Bryce, FACOG with ILDEFONSO Cortes MD, PGY-3

## 2023-12-16 NOTE — CONSULT NOTE ADULT - CONSULT REASON
Anemia and Presence of Polyspecific antibodies and Direct jodi test. Anemia and Presence of polyspecific antibodies and Direct jodi test.

## 2023-12-16 NOTE — PROGRESS NOTE ADULT - SUBJECTIVE AND OBJECTIVE BOX
PGY 3 Note    Reason for Admission: r/o abruption    Gestational Age: 27.5  Hospital Day: 2      Pt seen and examined at bedside, denies vagina bleeding currently. States she no longer has bleeding with wiping. Denies abdominal pain    Greater than 20 weeks:   Reports good fetal movement. LOF? Vaginal bleeding? Contractions       gHTN/pre-eclampsia: Pt denies/complains of headache, vision changes, chest pain, shortness of breath, RUQ or epigastric pain, swelling of lower extremities.     Diabetes: Pt denies/complains of headache, dizziness, weakness, abdominal pain, palpitations, or diaphoresis.     Ambulating: Yes  Voiding: Yes  Flatus: Yes  Bowel movements: Yes   Diet: Regular    MEDICATIONS  (STANDING):  amoxicillin 500 milliGRAM(s) Oral every 8 hours  prenatal multivitamin 1 Tablet(s) Oral daily    MEDICATIONS  (PRN):      PAST MEDICAL & SURGICAL HISTORY:  H/O pericarditis      No significant past surgical history          Physical Exam:   Vital Signs Last 24 Hrs  T(C): 37.1 (16 Dec 2023 03:51), Max: 37.2 (15 Dec 2023 23:52)  T(F): 98.7 (16 Dec 2023 03:51), Max: 98.9 (15 Dec 2023 23:52)  HR: 83 (16 Dec 2023 03:51) (80 - 98)  BP: 120/56 (16 Dec 2023 03:51) (110/56 - 126/59)  BP(mean): --  RR: 18 (16 Dec 2023 03:51) (16 - 20)  SpO2: --      Gen: AOx3, NAD   Cardio: RRR, S1S2, no m/r/g  Pulm: CTA b/l  Abdomen: soft, gravid, nontender, no palpable contractions   MSK: normal passive and active range of motion   Neuro: CN2-12 intact   Extremities: no swelling or erythema noted   Psych: normal mood and affect, no suicidal or homicidal ideations       EFM:   [place]:  SVE:     Labs:                        9.6    4.46  )-----------( 293      ( 15 Dec 2023 10:52 )             27.5                         9.6    4.52  )-----------( 295      ( 15 Dec 2023 05:10 )             27.7            A/P:  PGY 3 Note    Reason for Admission: r/o abruption    Gestational Age: 27.5  Hospital Day: 2      Pt seen and examined at bedside, denies vagina bleeding currently. States she no longer has bleeding with wiping. Denies abdominal pain    Greater than 20 weeks:   Reports good fetal movement. LOF? Vaginal bleeding? Contractions       gHTN/pre-eclampsia: Pt denies/complains of headache, vision changes, chest pain, shortness of breath, RUQ or epigastric pain, swelling of lower extremities.     Diabetes: Pt denies/complains of headache, dizziness, weakness, abdominal pain, palpitations, or diaphoresis.     Ambulating: Yes  Voiding: Yes  Flatus: Yes  Bowel movements: Yes   Diet: Regular    MEDICATIONS  (STANDING):  amoxicillin 500 milliGRAM(s) Oral every 8 hours  prenatal multivitamin 1 Tablet(s) Oral daily    MEDICATIONS  (PRN):      PAST MEDICAL & SURGICAL HISTORY:  H/O pericarditis      No significant past surgical history          Physical Exam:   Vital Signs Last 24 Hrs  T(C): 37.1 (16 Dec 2023 03:51), Max: 37.2 (15 Dec 2023 23:52)  T(F): 98.7 (16 Dec 2023 03:51), Max: 98.9 (15 Dec 2023 23:52)  HR: 83 (16 Dec 2023 03:51) (80 - 98)  BP: 120/56 (16 Dec 2023 03:51) (110/56 - 126/59)  BP(mean): --  RR: 18 (16 Dec 2023 03:51) (16 - 20)  SpO2: --      Gen: AOx3, NAD   Cardio: RRR, S1S2, no m/r/g  Pulm: CTA b/l  Abdomen: soft, gravid, nontender, no palpable contractions   MSK: normal passive and active range of motion   Neuro: CN2-12 intact   Extremities: no swelling or erythema noted   Psych: normal mood and affect, no suicidal or homicidal ideations       EFM:   [place]:  SVE:     Labs:                        9.6    4.46  )-----------( 293      ( 15 Dec 2023 10:52 )             27.5                         9.6    4.52  )-----------( 295      ( 15 Dec 2023 05:10 )             27.7            A/P:  PGY 3 Note    Reason for Admission: r/o abruption    Gestational Age: 27.5  Hospital Day: 2    Pt seen and examined at bedside, denies vaginal bleeding currently. States she no longer has bleeding with wiping. Denies abdominal pain  Reports good fetal movement. Denies LOF or contractions.    Ambulating: Yes  Voiding: Yes  Flatus: Yes  Bowel movements: Yes   Diet: Regular    MEDICATIONS  (STANDING):  amoxicillin 500 milliGRAM(s) Oral every 8 hours  prenatal multivitamin 1 Tablet(s) Oral daily    MEDICATIONS  (PRN):    PAST MEDICAL & SURGICAL HISTORY:  H/O pericarditis  No significant past surgical history    Physical Exam:   Vital Signs Last 24 Hrs  T(C): 37.1 (16 Dec 2023 03:51), Max: 37.2 (15 Dec 2023 23:52)  T(F): 98.7 (16 Dec 2023 03:51), Max: 98.9 (15 Dec 2023 23:52)  HR: 83 (16 Dec 2023 03:51) (80 - 98)  BP: 120/56 (16 Dec 2023 03:51) (110/56 - 126/59)  RR: 18 (16 Dec 2023 03:51) (16 - 20)    Gen: AOx3, NAD   Cardio: RRR, S1S2, no m/r/g  Pulm: CTA b/l  Abdomen: soft, gravid, nontender, no palpable contractions   MSK: normal passive and active range of motion   Extremities: no swelling or erythema noted   Psych: normal mood and affect, no suicidal or homicidal ideations     Labs:                        9.6    4.46  )-----------( 293      ( 15 Dec 2023 10:52 )             27.5                         9.6    4.52  )-----------( 295      ( 15 Dec 2023 05:10 )             27.7             12/16/23  MFM Att'g and PGY 3 Note  Admission #2 this pregnancy, HD#2; Gestational Age: 27.5  We examined counseled Ms Dumont at bedside.  She denies further vaginal bleeding and is no longer has bleeding with wiping. Denies abdominal pain.  She perceives fetal movement. Denies LOF or contractions.  Ambulating: Yes; Voiding: Yes; Flatus: Yes; Bowel movements: Yes; Diet: Regular    MEDICATIONS:  amoxicillin 500 milliGRAM(s) Oral every 8 hours  prenatal multivitamin 1 Tablet(s) Oral daily  PAST MEDICAL & SURGICAL HISTORY:  Anemia with Positive anti RBC antibodies.   H/O pericarditis  No significant past surgical history    Physical Exam: Reclining comfortably in bed, moving easily.   VS:  T(C): Max: 37.2  HR: 83  BP (110/56 - 126/59)  RR: 18   Cardio: RRR, S1S2, no m/r/g  Pulm: CTA b/l  Abdomen: soft, gravid, nontender, no palpable contractions   MSK: normal passive and active range of motion   Extremities: no swelling or erythema noted   Psych: normal mood and affect   Labs:  Apos/AntibPOS.  HbEP: Not found.  GBS neg.     12/16: 4K>10/30%<297k   mcv 101

## 2023-12-16 NOTE — CONSULT NOTE ADULT - ATTENDING COMMENTS
Patient also seen and examined by myself. I agree with the Hem-Onc fellow's (Dr. Dhulipalla) note above. Situation discussed with her and the patient.  All questions answered.

## 2023-12-17 ENCOUNTER — TRANSCRIPTION ENCOUNTER (OUTPATIENT)
Age: 23
End: 2023-12-17

## 2023-12-17 VITALS
RESPIRATION RATE: 18 BRPM | SYSTOLIC BLOOD PRESSURE: 123 MMHG | TEMPERATURE: 99 F | HEART RATE: 96 BPM | DIASTOLIC BLOOD PRESSURE: 56 MMHG

## 2023-12-17 LAB
APTT BLD: 26.7 SEC — LOW (ref 27–39.2)
APTT BLD: 26.7 SEC — LOW (ref 27–39.2)
BASOPHILS # BLD AUTO: 0 K/UL — SIGNIFICANT CHANGE UP (ref 0–0.2)
BASOPHILS # BLD AUTO: 0 K/UL — SIGNIFICANT CHANGE UP (ref 0–0.2)
BASOPHILS NFR BLD AUTO: 0 % — SIGNIFICANT CHANGE UP (ref 0–1)
BASOPHILS NFR BLD AUTO: 0 % — SIGNIFICANT CHANGE UP (ref 0–1)
EOSINOPHIL # BLD AUTO: 0.02 K/UL — SIGNIFICANT CHANGE UP (ref 0–0.7)
EOSINOPHIL # BLD AUTO: 0.02 K/UL — SIGNIFICANT CHANGE UP (ref 0–0.7)
EOSINOPHIL NFR BLD AUTO: 0.5 % — SIGNIFICANT CHANGE UP (ref 0–8)
EOSINOPHIL NFR BLD AUTO: 0.5 % — SIGNIFICANT CHANGE UP (ref 0–8)
HCT VFR BLD CALC: 28.8 % — LOW (ref 37–47)
HCT VFR BLD CALC: 28.8 % — LOW (ref 37–47)
HGB BLD-MCNC: 10 G/DL — LOW (ref 12–16)
HGB BLD-MCNC: 10 G/DL — LOW (ref 12–16)
IMM GRANULOCYTES NFR BLD AUTO: 0.5 % — HIGH (ref 0.1–0.3)
IMM GRANULOCYTES NFR BLD AUTO: 0.5 % — HIGH (ref 0.1–0.3)
INR BLD: 0.96 RATIO — SIGNIFICANT CHANGE UP (ref 0.65–1.3)
INR BLD: 0.96 RATIO — SIGNIFICANT CHANGE UP (ref 0.65–1.3)
IRON SATN MFR SERPL: 21 % — SIGNIFICANT CHANGE UP (ref 15–50)
IRON SATN MFR SERPL: 21 % — SIGNIFICANT CHANGE UP (ref 15–50)
IRON SATN MFR SERPL: 65 UG/DL — SIGNIFICANT CHANGE UP (ref 35–150)
IRON SATN MFR SERPL: 65 UG/DL — SIGNIFICANT CHANGE UP (ref 35–150)
LDH SERPL L TO P-CCNC: 161 — SIGNIFICANT CHANGE UP (ref 50–242)
LDH SERPL L TO P-CCNC: 161 — SIGNIFICANT CHANGE UP (ref 50–242)
LYMPHOCYTES # BLD AUTO: 0.64 K/UL — LOW (ref 1.2–3.4)
LYMPHOCYTES # BLD AUTO: 0.64 K/UL — LOW (ref 1.2–3.4)
LYMPHOCYTES # BLD AUTO: 14.4 % — LOW (ref 20.5–51.1)
LYMPHOCYTES # BLD AUTO: 14.4 % — LOW (ref 20.5–51.1)
MCHC RBC-ENTMCNC: 34.5 PG — HIGH (ref 27–31)
MCHC RBC-ENTMCNC: 34.5 PG — HIGH (ref 27–31)
MCHC RBC-ENTMCNC: 34.7 G/DL — SIGNIFICANT CHANGE UP (ref 32–37)
MCHC RBC-ENTMCNC: 34.7 G/DL — SIGNIFICANT CHANGE UP (ref 32–37)
MCV RBC AUTO: 99.3 FL — HIGH (ref 81–99)
MCV RBC AUTO: 99.3 FL — HIGH (ref 81–99)
MONOCYTES # BLD AUTO: 0.51 K/UL — SIGNIFICANT CHANGE UP (ref 0.1–0.6)
MONOCYTES # BLD AUTO: 0.51 K/UL — SIGNIFICANT CHANGE UP (ref 0.1–0.6)
MONOCYTES NFR BLD AUTO: 11.5 % — HIGH (ref 1.7–9.3)
MONOCYTES NFR BLD AUTO: 11.5 % — HIGH (ref 1.7–9.3)
NEUTROPHILS # BLD AUTO: 3.25 K/UL — SIGNIFICANT CHANGE UP (ref 1.4–6.5)
NEUTROPHILS # BLD AUTO: 3.25 K/UL — SIGNIFICANT CHANGE UP (ref 1.4–6.5)
NEUTROPHILS NFR BLD AUTO: 73.1 % — SIGNIFICANT CHANGE UP (ref 42.2–75.2)
NEUTROPHILS NFR BLD AUTO: 73.1 % — SIGNIFICANT CHANGE UP (ref 42.2–75.2)
NRBC # BLD: 0 /100 WBCS — SIGNIFICANT CHANGE UP (ref 0–0)
NRBC # BLD: 0 /100 WBCS — SIGNIFICANT CHANGE UP (ref 0–0)
PLATELET # BLD AUTO: 286 K/UL — SIGNIFICANT CHANGE UP (ref 130–400)
PLATELET # BLD AUTO: 286 K/UL — SIGNIFICANT CHANGE UP (ref 130–400)
PMV BLD: 10 FL — SIGNIFICANT CHANGE UP (ref 7.4–10.4)
PMV BLD: 10 FL — SIGNIFICANT CHANGE UP (ref 7.4–10.4)
PROTHROM AB SERPL-ACNC: 10.9 SEC — SIGNIFICANT CHANGE UP (ref 9.95–12.87)
PROTHROM AB SERPL-ACNC: 10.9 SEC — SIGNIFICANT CHANGE UP (ref 9.95–12.87)
RBC # BLD: 2.9 M/UL — LOW (ref 4.2–5.4)
RBC # FLD: 13.4 % — SIGNIFICANT CHANGE UP (ref 11.5–14.5)
RBC # FLD: 13.4 % — SIGNIFICANT CHANGE UP (ref 11.5–14.5)
RETICS #: 183.9 K/UL — HIGH (ref 25–125)
RETICS #: 183.9 K/UL — HIGH (ref 25–125)
RETICS/RBC NFR: 6.3 % — HIGH (ref 0.5–1.5)
RETICS/RBC NFR: 6.3 % — HIGH (ref 0.5–1.5)
TIBC SERPL-MCNC: 306 UG/DL — SIGNIFICANT CHANGE UP (ref 220–430)
TIBC SERPL-MCNC: 306 UG/DL — SIGNIFICANT CHANGE UP (ref 220–430)
UIBC SERPL-MCNC: 241 UG/DL — SIGNIFICANT CHANGE UP (ref 110–370)
UIBC SERPL-MCNC: 241 UG/DL — SIGNIFICANT CHANGE UP (ref 110–370)
WBC # BLD: 4.44 K/UL — LOW (ref 4.8–10.8)
WBC # BLD: 4.44 K/UL — LOW (ref 4.8–10.8)
WBC # FLD AUTO: 4.44 K/UL — LOW (ref 4.8–10.8)
WBC # FLD AUTO: 4.44 K/UL — LOW (ref 4.8–10.8)

## 2023-12-17 PROCEDURE — 76818 FETAL BIOPHYS PROFILE W/NST: CPT | Mod: 26

## 2023-12-17 PROCEDURE — 99233 SBSQ HOSP IP/OBS HIGH 50: CPT | Mod: 25

## 2023-12-17 RX ADMIN — Medication 500 MILLIGRAM(S): at 06:39

## 2023-12-17 RX ADMIN — Medication 1 TABLET(S): at 11:39

## 2023-12-17 NOTE — PROGRESS NOTE ADULT - ATTENDING COMMENTS
[facility] Staff    I saw and evaluated Ms. JASMINE BRAVO  with Dr. Everett.  Ms. JASMINE BRAVO reports positive fetal movement and no vaginal bleeding.  She has not had any vaginal bleeding since her admission.  She also has not had any leakage of fluid vaginally since 12/10/2023. She desires to be discharged.    General:  Ms. JASMINE BRAVO is lying in bed.  She appears comfortable.    Vital Signs Last 24 Hrs  T(C): 37.1 (17 Dec 2023 15:10), Max: 37.2 (16 Dec 2023 19:21)  T(F): 98.8 (17 Dec 2023 15:10), Max: 98.9 (16 Dec 2023 19:21)  HR: 96 (17 Dec 2023 15:10) (82 - 98)  BP: 123/56 (17 Dec 2023 15:10) (106/63 - 129/63)  BP(mean): --  RR: 18 (17 Dec 2023 15:10) (18 - 18)  SpO2: --    Abdomen:  Soft, nontender, nondistended, no rebound, no guarding.  Extremities:  Nontender calves.    Labs as above.    Ms. Bravo is a 22 yo  @ 27w6d, with vaginal bleeding on 2023, s/p admission 12/10 -  for possible PPROM She has a history of pericarditis, SLE, Crohns in remission, anemia.    I modified and agree with the plan above with the following additions:    From a vaginal bleeding standpoint, she had a one time episode last week and her hemoglobin is stable.  She should ambulate and if she does not have any more vaginal bleeding she is stable for discharge from an Obstetric standpoint.  Fetal movement, bleeding, leakage of fluid, and labor precautions were discussed.  She should follow up with her primary Obstetrician this week and follow up at [facility] within the next two weeks.    She has anemia and is being followed by Hematology.  She also has antibodies in her blood and as far as I understand was evaluated by Transfusion medicine.  If Hematology is willing to follow up with Ms. Bravo as an outpatient she can be discharged, otherwise she should stay.    She should follow up with Cardiology as scheduled and should be seen by GI given her history of Crohn's disease.     Ms. Bravo expressed understanding and all of her questions were answered to her satisfaction.    Maurice Haney MD [facility] Staff    I saw and evaluated Ms. JASMINE BRAVO  with Dr. Everett.  Ms. JASMINE BRAVO reports positive fetal movement and no vaginal bleeding.  She has not had any vaginal bleeding since her admission.  She also has not had any leakage of fluid vaginally since 12/10/2023. She desires to be discharged.    General:  Ms. JASMINE BRAVO is lying in bed.  She appears comfortable.    Vital Signs Last 24 Hrs  T(C): 37.1 (17 Dec 2023 15:10), Max: 37.2 (16 Dec 2023 19:21)  T(F): 98.8 (17 Dec 2023 15:10), Max: 98.9 (16 Dec 2023 19:21)  HR: 96 (17 Dec 2023 15:10) (82 - 98)  BP: 123/56 (17 Dec 2023 15:10) (106/63 - 129/63)  BP(mean): --  RR: 18 (17 Dec 2023 15:10) (18 - 18)  SpO2: --    Abdomen:  Soft, nontender, nondistended, no rebound, no guarding.  Extremities:  Nontender calves.    Labs as above.    Ms. Bravo is a 22 yo  @ 27w6d, with vaginal bleeding on 2023, s/p admission 12/10 -  for possible PPROM She has a history of pericarditis, SLE, Crohns in remission, anemia.    I modified and agree with the plan above with the following additions:    From a vaginal bleeding standpoint, she had a one time episode last week and her hemoglobin is stable.  She should ambulate and if she does not have any more vaginal bleeding she is stable for discharge from an Obstetric standpoint.  Fetal movement, bleeding, leakage of fluid, and labor precautions were discussed.  She should follow up with her primary Obstetrician this week and follow up at [facility] within the next two weeks.    She has anemia and is being followed by Hematology.  She also has antibodies in her blood and as far as I understand was evaluated by Transfusion medicine.  If Hematology is willing to follow up with Ms. Bravo as an outpatient she can be discharged, otherwise she should stay.    She should follow up with Cardiology as scheduled and should be seen by GI given her history of Crohn's disease.     Ms. Bravo expressed understanding and all of her questions were answered to her satisfaction.    Maurice Haney MD [facility] Staff    I saw and evaluated Ms. JASMINE BRAVO  with Dr. Everett.  Ms. JASMINE BRAVO reports positive fetal movement and no vaginal bleeding.  She has not had any vaginal bleeding since her admission.  She also has not had any leakage of fluid vaginally since 12/10/2023. She desires to be discharged.    General:  Ms. JASMINE BRAVO is lying in bed.  She appears comfortable.    Vital Signs Last 24 Hrs  T(C): 37.1 (17 Dec 2023 15:10), Max: 37.2 (16 Dec 2023 19:21)  T(F): 98.8 (17 Dec 2023 15:10), Max: 98.9 (16 Dec 2023 19:21)  HR: 96 (17 Dec 2023 15:10) (82 - 98)  BP: 123/56 (17 Dec 2023 15:10) (106/63 - 129/63)  BP(mean): --  RR: 18 (17 Dec 2023 15:10) (18 - 18)  SpO2: --    Abdomen:  Soft, nontender, nondistended, no rebound, no guarding.  Extremities:  Nontender calves.    Labs as above.    Ms. Bravo is a 22 yo  @ 27w6d, with vaginal bleeding on 2023, s/p admission 12/10 -  for possible PPROM She has a history of pericarditis, SLE, Crohns in remission, anemia.    I modified and agree with the plan above with the following additions:    From a vaginal bleeding standpoint, she had a one time episode last week and her hemoglobin is stable.  She should ambulate and if she does not have any more vaginal bleeding she is stable for discharge from an Obstetric standpoint.  Fetal movement, bleeding, leakage of fluid, and labor precautions were discussed.  She should follow up with her primary Obstetrician this week and follow up at [facility] within the next two weeks.    She has anemia and is being followed by Hematology.  She also has antibodies in her blood and as far as I understand was evaluated by Transfusion medicine.  If Hematology is willing to follow up with Ms. Bravo as an outpatient she can be discharged, otherwise she should stay.    She should follow up with Cardiology as scheduled and should be seen by GI given her history of Crohn's disease.     Ms. Bravo expressed understanding and all of her questions were answered to her satisfaction.    Maurice Haney MD    One hour was spent on this encounter. [facility] Staff    I saw and evaluated Ms. JASMINE BRAVO  with Dr. Everett.  Ms. JASMINE BRAVO reports positive fetal movement and no vaginal bleeding.  She has not had any vaginal bleeding since her admission.  She also has not had any leakage of fluid vaginally since 12/10/2023. She desires to be discharged.    General:  Ms. JASMINE BRAVO is lying in bed.  She appears comfortable.    Vital Signs Last 24 Hrs  T(C): 37.1 (17 Dec 2023 15:10), Max: 37.2 (16 Dec 2023 19:21)  T(F): 98.8 (17 Dec 2023 15:10), Max: 98.9 (16 Dec 2023 19:21)  HR: 96 (17 Dec 2023 15:10) (82 - 98)  BP: 123/56 (17 Dec 2023 15:10) (106/63 - 129/63)  BP(mean): --  RR: 18 (17 Dec 2023 15:10) (18 - 18)  SpO2: --    Abdomen:  Soft, nontender, nondistended, no rebound, no guarding.  Extremities:  Nontender calves.    Labs as above.    Ms. Bravo is a 24 yo  @ 27w6d, with vaginal bleeding on 2023, s/p admission 12/10 -  for possible PPROM She has a history of pericarditis, SLE, Crohns in remission, anemia.    I modified and agree with the plan above with the following additions:    From a vaginal bleeding standpoint, she had a one time episode last week and her hemoglobin is stable.  She should ambulate and if she does not have any more vaginal bleeding she is stable for discharge from an Obstetric standpoint.  Fetal movement, bleeding, leakage of fluid, and labor precautions were discussed.  She should follow up with her primary Obstetrician this week and follow up at [facility] within the next two weeks.    She has anemia and is being followed by Hematology.  She also has antibodies in her blood and as far as I understand was evaluated by Transfusion medicine.  If Hematology is willing to follow up with Ms. Bravo as an outpatient she can be discharged, otherwise she should stay.    She should follow up with Cardiology as scheduled and should be seen by GI given her history of Crohn's disease.     Ms. Bravo expressed understanding and all of her questions were answered to her satisfaction.    Maurice Haney MD    One hour was spent on this encounter.

## 2023-12-17 NOTE — PROGRESS NOTE ADULT - ATTENDING COMMENTS
[facility] Staff    I reviewed the non stress test and I agree with the documentation above.    Bedside ultrasound:  Vertex.  Deepest vertical pocket 3.23 cm (it appears similar to the ultrasound 2023). Fetal heart rate 148 beats per minute.  Biophysical profile .    Reassuring  testing.    Maurice Haney MD [facility] Staff    I reviewed the non stress test and I agree with the documentation above.    Bedside ultrasound:  Vertex.  Deepest vertical pocket 3.23 cm (it appears similar to the ultrasound 2023). Fetal heart rate 148 beats per minute.  Biophysical profile .    Reassuring  testing.    Maurice Haney MD

## 2023-12-17 NOTE — DISCHARGE NOTE ANTEPARTUM - PLAN OF CARE
If you have contractions every few minutes, vaginal bleeding, leakage of fluid or you don't feel the baby move please call your doctor or come to labor and delivery. If you do not feel the baby move, lay down and count the number of times you feel any movement. You should count 10 kicks over the course of 2 hours, if you do not count 10 prior to reaching the 2 hour time, call your doctor or return immediately to labor and delivery.

## 2023-12-17 NOTE — CHART NOTE - NSCHARTNOTEFT_GEN_A_CORE
Per discussion yesterday with transfusion medicine, Dr Hammonds:    She is low risk for reaction. Important to have risk/benefits discussion, and transfuse if needed, but run blood slowly. Should stop transfusion if a reaction is seen, then do reaction work up and call transfusion medicine.   Dr. Hammonds [phone] or Dr. Hardy [phone].    D/w Dr Haney. Per discussion yesterday with transfusion medicine, Dr Hammonds:    She is low risk for reaction. Important to have risk/benefits discussion, and transfuse if needed, but run blood slowly. Should stop transfusion if a reaction is seen, then do reaction work up and call transfusion medicine.   Dr. Hammonds [phone] or Dr. Hardy [phone].    D/w Dr Haney.

## 2023-12-17 NOTE — PROGRESS NOTE ADULT - ASSESSMENT
Ms. Dumont is a 22 yo  @ 27w6d, history of pericarditis, SLE, Crohns in remission, anemia, s/p admission for possible PPROM, now with vaginal bleeding r/o placental abruption.     1.  Possible PPROM  - S/p  steroids -12/10  - s/p latency abx  - NICU consult appreciated  - GBS negative. Gc/Ct negative.     2. Anemia, stable  - Elevated MCV, B12 and folate wnl    3. Low fibrinogen  - 264 --> 229 --> 209 --> 229 --> 304  - Repeat ordered for AM    4. Multiple antibodies in type and screen  - Crossed 2u PRBCs, per blood bank it is not perfectly matched and patient will need to sign emergency release form if needed  - hematology consult - unlikely hemolytic anemia, Given borderline b12, would recommend checking MMA (methylmalonic acid) (ordered)    5.  History of pericarditis with small pericardial effusion in February-2023. S/p prednisone and colchicine.  - Normal TTE  - Consult appreciated by Dr Behuria from last admission    6.  Dx of SLE  - Followed by Rheumatology, patient refusing treatment  - Consult appreciated by Dr Behuria from last admission    7. Chron's in remission  - Diagnosed at 16yo, last colonoscopy then  - Lost to follow up, pt asymptomatic    8 Possible cHTN  - Patient denies history of elevated BPs, never on meds.  - Elevated, non-severe BPs outpatient. Similar while in the hospital   - UA 30 protein  - LFTs normal    9.  Pregnancy  - Increased risk of down syndrome, Increased NT, right pyelectasis, small pericardial effusion.  S/p fetal echo (otherwise normal). NIPTS inconclusive, amniocentesis karyotype and array are negative.   - Previous  delivery.  - PNVs  - Ambulate as tolerated  - Regular PO diet  - GBS negative  - NST BID     Ms. Dumont is a 24 yo  @ 27w6d, history of pericarditis, SLE, Crohns in remission, anemia, s/p admission for possible PPROM, now with vaginal bleeding r/o placental abruption.     1.  Possible PPROM  - S/p  steroids -12/10  - s/p latency abx  - NICU consult appreciated  - GBS negative. Gc/Ct negative.     2. Anemia, stable  - Elevated MCV, B12 and folate wnl    3. Low fibrinogen  - 264 --> 229 --> 209 --> 229 --> 304  - Repeat ordered for AM    4. Multiple antibodies in type and screen  - Crossed 2u PRBCs, per blood bank it is not perfectly matched and patient will need to sign emergency release form if needed  - hematology consult - unlikely hemolytic anemia, Given borderline b12, would recommend checking MMA (methylmalonic acid) (ordered)    5.  History of pericarditis with small pericardial effusion in February-2023. S/p prednisone and colchicine.  - Normal TTE  - Consult appreciated by Dr Behuria from last admission    6.  Dx of SLE  - Followed by Rheumatology, patient refusing treatment  - Consult appreciated by Dr Behuria from last admission    7. Chron's in remission  - Diagnosed at 14yo, last colonoscopy then  - Lost to follow up, pt asymptomatic    8 Possible cHTN  - Patient denies history of elevated BPs, never on meds.  - Elevated, non-severe BPs outpatient. Similar while in the hospital   - UA 30 protein  - LFTs normal    9.  Pregnancy  - Increased risk of down syndrome, Increased NT, right pyelectasis, small pericardial effusion.  S/p fetal echo (otherwise normal). NIPTS inconclusive, amniocentesis karyotype and array are negative.   - Previous  delivery.  - PNVs  - Ambulate as tolerated  - Regular PO diet  - GBS negative  - NST BID     Ms. Dumont is a 24 yo  @ 27w6d, history of pericarditis, SLE, Crohns in remission, anemia, s/p admission 12/10 -  for possible PPROM, now with vaginal bleeding r/o placental abruption.     1.  Possible PPROM  - S/p  steroids -12/10  - s/p latency abx  - NICU consult appreciated  - GBS negative. Gc/Ct negative.     2. Anemia, stable  - Elevated MCV, B12 and folate wnl  - Iron studies are within normal limits.  - Appreciate Hematology input.  - Methylmalonic acid, Vitamin B12, Haptoglobin are pending.      3. Low fibrinogen  - 264 --> 229 --> 209 --> 229 --> 304    4. Multiple antibodies in type and screen  - Crossed 2u PRBCs, per blood bank it is not perfectly matched and patient will need to sign emergency release form if needed  - Hematology consult - Given borderline B12, would recommend checking MMA (methylmalonic acid) (ordered)  - Transfusion medicine consult (as far as I understand this was obtained last week, a note is still pending)  - Reticulocyte count 6.3%.      5.  History of pericarditis with small pericardial effusion in February-2023. S/p prednisone and colchicine.  - Normal TTE  - Consult appreciated by Dr Behuria from last admission    6.  Dx of SLE  - Followed by Rheumatology, patient refusing treatment  -    7. Crohn's disease in remission  - Diagnosed at 16yo, last colonoscopy then  - Lost to follow up, pt asymptomatic  - She should follow up with GI as an outpatient    8 Possible cHTN  - Patient denies history of elevated BPs, never on meds.  - Elevated, non-severe BPs outpatient. Similar while in the hospital   - UA 30 protein  - LFTs normal  - Urine protein:creatinine 0.2.    9.  Pregnancy  - Increased risk of down syndrome, Increased NT, right pyelectasis, small pericardial effusion.  S/p fetal echo (otherwise normal). NIPTS inconclusive, amniocentesis karyotype and array are negative.   - Previous  delivery.  - PNVs  - Ambulate as tolerated  - Regular PO diet  - GBS negative  - NST BID     Ms. Dumont is a 24 yo  @ 27w6d, history of pericarditis, SLE, Crohns in remission, anemia, s/p admission 12/10 -  for possible PPROM, now with vaginal bleeding r/o placental abruption.     1.  Possible PPROM  - S/p  steroids -12/10  - s/p latency abx  - NICU consult appreciated  - GBS negative. Gc/Ct negative.     2. Anemia, stable  - Elevated MCV, B12 and folate wnl  - Iron studies are within normal limits.  - Appreciate Hematology input.  - Methylmalonic acid, Vitamin B12, Haptoglobin are pending.      3. Low fibrinogen  - 264 --> 229 --> 209 --> 229 --> 304    4. Multiple antibodies in type and screen  - Crossed 2u PRBCs, per blood bank it is not perfectly matched and patient will need to sign emergency release form if needed  - Hematology consult - Given borderline B12, would recommend checking MMA (methylmalonic acid) (ordered)  - Transfusion medicine consult (as far as I understand this was obtained last week, a note is still pending)  - Reticulocyte count 6.3%.      5.  History of pericarditis with small pericardial effusion in February-2023. S/p prednisone and colchicine.  - Normal TTE  - Consult appreciated by Dr Behuria from last admission    6.  Dx of SLE  - Followed by Rheumatology, patient refusing treatment  -    7. Crohn's disease in remission  - Diagnosed at 14yo, last colonoscopy then  - Lost to follow up, pt asymptomatic  - She should follow up with GI as an outpatient    8 Possible cHTN  - Patient denies history of elevated BPs, never on meds.  - Elevated, non-severe BPs outpatient. Similar while in the hospital   - UA 30 protein  - LFTs normal  - Urine protein:creatinine 0.2.    9.  Pregnancy  - Increased risk of down syndrome, Increased NT, right pyelectasis, small pericardial effusion.  S/p fetal echo (otherwise normal). NIPTS inconclusive, amniocentesis karyotype and array are negative.   - Previous  delivery.  - PNVs  - Ambulate as tolerated  - Regular PO diet  - GBS negative  - NST BID

## 2023-12-17 NOTE — PROGRESS NOTE ADULT - SUBJECTIVE AND OBJECTIVE BOX
pgy3 note  Non stress test    Date:  12/17/2023  Start: 1000  End: 1025    Baseline:  150 beats per minute  Variability:  moderate  Accelerations: present 10x10  Decelerations: absent    Tocometer: noncontractile    To be d/w MFM   pgy3 note  Non stress test    Vaginal bleeding    Date:  12/17/2023  Start: 1000  End: 1025    Baseline:  150 beats per minute  Variability:  moderate  Accelerations: present 10x10  Decelerations: absent    Tocometer: noncontractile    To be d/w MFM

## 2023-12-17 NOTE — DISCHARGE NOTE ANTEPARTUM - NS MD DC FALL RISK RISK
For information on Fall & Injury Prevention, visit: [email or web address] OR  [email or web address] OR  https://www.cdc.gov/steadi/patient.html For information on Fall & Injury Prevention, visit: [email or web address]://[email or web address].[email or web address] OR  https://www.[facility]/[email or web address] OR  https://www.cdc.gov/steadi/patient.html

## 2023-12-17 NOTE — DISCHARGE NOTE ANTEPARTUM - NPI NUMBER (FOR SYSADMIN USE ONLY) :
[[id number]] [[id number]] [[id number]],[[id number]] [[id number]],[[id number]] [[id number]],[[id number]],[[id number]] [[id number]],[[id number]],[[id number]]

## 2023-12-17 NOTE — DISCHARGE NOTE ANTEPARTUM - CARE PROVIDERS DIRECT ADDRESSES
,DirectAddress_Unknown ,DirectAddress_Unknown,DirectAddress_Unknown ,DirectAddress_Unknown,DirectAddress_Unknown,[email or web address] ,DirectAddress_Unknown,DirectAddress_Unknown,[email or web address]

## 2023-12-17 NOTE — DISCHARGE NOTE ANTEPARTUM - PROVIDER TOKENS
PROVIDER:[TOKEN:[[id number]]] PROVIDER:[TOKEN:[[id number]]] PROVIDER:[TOKEN:[[id number]]],PROVIDER:[TOKEN:[[id number]],FOLLOWUP:[1 month]] PROVIDER:[TOKEN:[[id number]]],PROVIDER:[TOKEN:[[id number]],FOLLOWUP:[1 month]] PROVIDER:[TOKEN:[[id number]]],PROVIDER:[TOKEN:[[id number]],FOLLOWUP:[1 month]],PROVIDER:[TOKEN:[[id number]]] PROVIDER:[TOKEN:[[id number]]],PROVIDER:[TOKEN:[[id number]],FOLLOWUP:[1 month]],PROVIDER:[TOKEN:[[id number]]]

## 2023-12-17 NOTE — PROGRESS NOTE ADULT - SUBJECTIVE AND OBJECTIVE BOX
PGY 3 Note    Reason for Admission: r/o abruption    Gestational Age: 27.6  Hospital Day: 3    Pt seen and examined at bedside, doing well, no overnight events. Denies vaginal bleeding. Denies LOF, CTX, abdominal pain. Endorses good fetal movement.     Ambulating: Yes  Voiding: Yes  Flatus: Yes  Bowel movements: Yes   Diet: Regular    PAST MEDICAL & SURGICAL HISTORY:  H/O pericarditis  No significant past surgical history    Physical Exam:   Vital Signs Last 24 Hrs  T(C): 36.8 (17 Dec 2023 03:10), Max: 37.2 (16 Dec 2023 12:48)  T(F): 98.2 (17 Dec 2023 03:10), Max: 98.9 (16 Dec 2023 12:48)  HR: 89 (17 Dec 2023 03:10) (82 - 98)  BP: 110/53 (17 Dec 2023 03:10) (102/60 - 129/63)  RR: 18 (17 Dec 2023 03:10) (18 - 19)    Gen: AOx3, NAD   Cardio: RRR, S1S2, no m/r/g  Pulm: CTA b/l  Abdomen: soft, gravid, nontender, no palpable contractions   MSK: normal passive and active range of motion   Extremities: no swelling or erythema noted     Labs:    12/9 4.74>8.5/23.1<335 133/3.8/104/20/6/0.6<72 AST/ALT 18/11 UA 30 protein, A pos antibody screen pos  Ab interpretation: serum panagglutin, jodi positive, Igg positive, complement positive, eluate panagglutinin  12/10 @0930 3.8>8.6/24.3<305, 133/4.5/106/18/9/4/0.5<115, Mag 4.7  @1500 3.18>8.2/23.6<318,136/4.0/95522/5/0.5<144, mag 5.3  urine culture; GCCT neg  12/11 @0750 4.78>9.1/25<330, 135/4.5/108/19/7/0.5<119, B12 285 (N), folate 16.6 (N)  5.97>8.7/24.9<293, 136/4.4/107/6/<0.5<92, GBS neg, vaginitis neg  ---  12/14 @1100 6.28>10.4/29.7<364, PT/PTT/INR 11/25.9/0.97, fibrinogen 264, A pos, antibody screen positive  @1700 5.31>11/30.1<353, PT/PTT/INR 10.9/28.9/0.96, fibrinogen 229, 136/5.6/105/7/<0.5<104, AST/ALT 74/18 (hemolyzed)  @2300 4.65>9.1/26<290, coags 11.2/0.98/24.6, fibrinogen 206, 138/3.8/108/21/6/0.5<82, AST/ALT 12/10  12/15 4.52>9.6/27.7<295, PT/INR/PTT 11.30/0.99/26.6, fibrinogen 229  @1100 4.46>9.6/27.5<293, 11.0/27.5/0.97 fibrinogen: 223  12/16 4.13>10.3/29.5<297, coags 10.8/0.95/27.2, fib 304    UprCr 0.2 PGY 3 Note    Reason for Admission: r/o abruption    Gestational Age: 27.6  Hospital Day: 3    Pt seen and examined at bedside, doing well, no overnight events. Denies vaginal bleeding. Denies LOF, CTX, abdominal pain. Endorses good fetal movement.     Ambulating: Yes  Voiding: Yes  Flatus: Yes  Bowel movements: Yes   Diet: Regular    PAST MEDICAL & SURGICAL HISTORY:  H/O pericarditis  No significant past surgical history    Physical Exam:   Vital Signs Last 24 Hrs  T(C): 36.8 (17 Dec 2023 03:10), Max: 37.2 (16 Dec 2023 12:48)  T(F): 98.2 (17 Dec 2023 03:10), Max: 98.9 (16 Dec 2023 12:48)  HR: 89 (17 Dec 2023 03:10) (82 - 98)  BP: 110/53 (17 Dec 2023 03:10) (102/60 - 129/63)  RR: 18 (17 Dec 2023 03:10) (18 - 19)    Gen: AOx3, NAD   Cardio: RRR, S1S2, no m/r/g  Pulm: CTA b/l  Abdomen: soft, gravid, nontender, no palpable contractions   MSK: normal passive and active range of motion   Extremities: no swelling or erythema noted     Labs:    12/9 4.74>8.5/23.1<335 133/3.8/104/20/6/0.6<72 AST/ALT 18/11 UA 30 protein, A pos antibody screen pos  Ab interpretation: serum panagglutin, jodi positive, Igg positive, complement positive, eluate panagglutinin  12/10 @0930 3.8>8.6/24.3<305, 133/4.5/106/18/9/4/0.5<115, Mag 4.7  @1500 3.18>8.2/23.6<318,136/4.0/04024/5/0.5<144, mag 5.3  urine culture; GCCT neg  12/11 @0750 4.78>9.1/25<330, 135/4.5/108/19/7/0.5<119, B12 285 (N), folate 16.6 (N)  5.97>8.7/24.9<293, 136/4.4/107/6/<0.5<92, GBS neg, vaginitis neg  ---  12/14 @1100 6.28>10.4/29.7<364, PT/PTT/INR 11/25.9/0.97, fibrinogen 264, A pos, antibody screen positive  @1700 5.31>11/30.1<353, PT/PTT/INR 10.9/28.9/0.96, fibrinogen 229, 136/5.6/105/7/<0.5<104, AST/ALT 74/18 (hemolyzed)  @2300 4.65>9.1/26<290, coags 11.2/0.98/24.6, fibrinogen 206, 138/3.8/108/21/6/0.5<82, AST/ALT 12/10  12/15 4.52>9.6/27.7<295, PT/INR/PTT 11.30/0.99/26.6, fibrinogen 229  @1100 4.46>9.6/27.5<293, 11.0/27.5/0.97 fibrinogen: 223  12/16 4.13>10.3/29.5<297, coags 10.8/0.95/27.2, fib 304    UprCr 0.2 PGY 3 Note    Reason for Admission: r/o abruption    Gestational Age: 27w3d  Hospital Day: 3    Pt seen and examined at bedside, doing well, no overnight events. Denies vaginal bleeding. Denies LOF, CTX, abdominal pain. Endorses good fetal movement.     Ambulating: Yes  Voiding: Yes  Flatus: Yes  Bowel movements: Yes   Diet: Regular    PAST MEDICAL & SURGICAL HISTORY:  H/O pericarditis  No significant past surgical history    Physical Exam:   Vital Signs Last 24 Hrs  T(C): 36.8 (17 Dec 2023 03:10), Max: 37.2 (16 Dec 2023 12:48)  T(F): 98.2 (17 Dec 2023 03:10), Max: 98.9 (16 Dec 2023 12:48)  HR: 89 (17 Dec 2023 03:10) (82 - 98)  BP: 110/53 (17 Dec 2023 03:10) (102/60 - 129/63)  RR: 18 (17 Dec 2023 03:10) (18 - 19)    Gen: AOx3, NAD   Cardio: RRR, S1S2, no m/r/g  Pulm: CTA b/l  Abdomen: soft, gravid, nontender, no palpable contractions   MSK: normal passive and active range of motion   Extremities: no swelling or erythema noted     Labs:    12/9 4.74>8.5/23.1<335 133/3.8/104/20/6/0.6<72 AST/ALT 18/11 UA 30 protein, A pos antibody screen pos  Ab interpretation: serum panagglutin, jodi positive, Igg positive, complement positive, eluate panagglutinin  12/10 @0930 3.8>8.6/24.3<305, 133/4.5/106/18/9/4/0.5<115, Mag 4.7  @1500 3.18>8.2/23.6<318,136/4.0/03585/5/0.5<144, mag 5.3  urine culture; GCCT neg  12/11 @0750 4.78>9.1/25<330, 135/4.5/108/19/7/0.5<119, B12 285 (N), folate 16.6 (N)  5.97>8.7/24.9<293, 136/4.4/107/6/<0.5<92, GBS neg, vaginitis neg  ---  12/14 @1100 6.28>10.4/29.7<364, PT/PTT/INR 11/25.9/0.97, fibrinogen 264, A pos, antibody screen positive  @1700 5.31>11/30.1<353, PT/PTT/INR 10.9/28.9/0.96, fibrinogen 229, 136/5.6/105/7/<0.5<104, AST/ALT 74/18 (hemolyzed)  @2300 4.65>9.1/26<290, coags 11.2/0.98/24.6, fibrinogen 206, 138/3.8/108/21/6/0.5<82, AST/ALT 12/10  12/15 4.52>9.6/27.7<295, PT/INR/PTT 11.30/0.99/26.6, fibrinogen 229  @1100 4.46>9.6/27.5<293, 11.0/27.5/0.97 fibrinogen: 223  12/16 4.13>10.3/29.5<297, coags 10.8/0.95/27.2, fib 304    UprCr 0.2               10.0   4.44  )-----------( 286      ( 17 Dec 2023 07:45 )             28.8                         10.3   4.13  )-----------( 297      ( 16 Dec 2023 07:10 )             29.5                         9.6    4.46  )-----------( 293      ( 15 Dec 2023 10:52 )             27.5                         9.6    4.52  )-----------( 295      ( 15 Dec 2023 05:10 )             27.7                         9.1    4.65  )-----------( 290      ( 14 Dec 2023 23:16 )             26.0                         11.0   5.31  )-----------( 353      ( 14 Dec 2023 17:00 )             30.1       Lactate dehydrogenase  161    12-17-23        PT 10.90  PTT   26.7  INR   0.96  12-17-23 @ 07:45  PT 10.80  PTT   27.2  INR   0.95 12-16-23 @ 07:10    Reticulocyte Count in AM (12.17.23 @ 07:45)    RBC Count: 2.90 M/uL   Reticulocyte Percent: 6.3 %   Absolute Reticulocytes: 183.9 K/uL    Iron with Total Binding Capacity in AM (12.17.23 @ 07:45)    Iron - Total Binding Capacity.: 306 ug/dL   % Saturation, Iron: 21 %   Iron Total: 65 ug/dL   Unsaturated Iron Binding Capacity: 241 ug/dL    Chlamydia/GC Nucleic Acid Amplification (12.10.23 @ 01:31)    Source Amp: Endocervical Swab   Chlamydia Amplification Result: NotDetec:   GC Amplification Result: NotDetec:     Group B Strep Detection by PCR (12.10.23 @ 01:31)    Source Group B Strep: Vag/Rectal   Group B Beta Strep DNA (PCR): NotDetec:          PGY 3 Note    Reason for Admission: r/o abruption    Gestational Age: 27w3d  Hospital Day: 3    Pt seen and examined at bedside, doing well, no overnight events. Denies vaginal bleeding. Denies LOF, CTX, abdominal pain. Endorses good fetal movement.     Ambulating: Yes  Voiding: Yes  Flatus: Yes  Bowel movements: Yes   Diet: Regular    PAST MEDICAL & SURGICAL HISTORY:  H/O pericarditis  No significant past surgical history    Physical Exam:   Vital Signs Last 24 Hrs  T(C): 36.8 (17 Dec 2023 03:10), Max: 37.2 (16 Dec 2023 12:48)  T(F): 98.2 (17 Dec 2023 03:10), Max: 98.9 (16 Dec 2023 12:48)  HR: 89 (17 Dec 2023 03:10) (82 - 98)  BP: 110/53 (17 Dec 2023 03:10) (102/60 - 129/63)  RR: 18 (17 Dec 2023 03:10) (18 - 19)    Gen: AOx3, NAD   Cardio: RRR, S1S2, no m/r/g  Pulm: CTA b/l  Abdomen: soft, gravid, nontender, no palpable contractions   MSK: normal passive and active range of motion   Extremities: no swelling or erythema noted     Labs:    12/9 4.74>8.5/23.1<335 133/3.8/104/20/6/0.6<72 AST/ALT 18/11 UA 30 protein, A pos antibody screen pos  Ab interpretation: serum panagglutin, jodi positive, Igg positive, complement positive, eluate panagglutinin  12/10 @0930 3.8>8.6/24.3<305, 133/4.5/106/18/9/4/0.5<115, Mag 4.7  @1500 3.18>8.2/23.6<318,136/4.0/26127/5/0.5<144, mag 5.3  urine culture; GCCT neg  12/11 @0750 4.78>9.1/25<330, 135/4.5/108/19/7/0.5<119, B12 285 (N), folate 16.6 (N)  5.97>8.7/24.9<293, 136/4.4/107/6/<0.5<92, GBS neg, vaginitis neg  ---  12/14 @1100 6.28>10.4/29.7<364, PT/PTT/INR 11/25.9/0.97, fibrinogen 264, A pos, antibody screen positive  @1700 5.31>11/30.1<353, PT/PTT/INR 10.9/28.9/0.96, fibrinogen 229, 136/5.6/105/7/<0.5<104, AST/ALT 74/18 (hemolyzed)  @2300 4.65>9.1/26<290, coags 11.2/0.98/24.6, fibrinogen 206, 138/3.8/108/21/6/0.5<82, AST/ALT 12/10  12/15 4.52>9.6/27.7<295, PT/INR/PTT 11.30/0.99/26.6, fibrinogen 229  @1100 4.46>9.6/27.5<293, 11.0/27.5/0.97 fibrinogen: 223  12/16 4.13>10.3/29.5<297, coags 10.8/0.95/27.2, fib 304    UprCr 0.2               10.0   4.44  )-----------( 286      ( 17 Dec 2023 07:45 )             28.8                         10.3   4.13  )-----------( 297      ( 16 Dec 2023 07:10 )             29.5                         9.6    4.46  )-----------( 293      ( 15 Dec 2023 10:52 )             27.5                         9.6    4.52  )-----------( 295      ( 15 Dec 2023 05:10 )             27.7                         9.1    4.65  )-----------( 290      ( 14 Dec 2023 23:16 )             26.0                         11.0   5.31  )-----------( 353      ( 14 Dec 2023 17:00 )             30.1       Lactate dehydrogenase  161    12-17-23        PT 10.90  PTT   26.7  INR   0.96  12-17-23 @ 07:45  PT 10.80  PTT   27.2  INR   0.95 12-16-23 @ 07:10    Reticulocyte Count in AM (12.17.23 @ 07:45)    RBC Count: 2.90 M/uL   Reticulocyte Percent: 6.3 %   Absolute Reticulocytes: 183.9 K/uL    Iron with Total Binding Capacity in AM (12.17.23 @ 07:45)    Iron - Total Binding Capacity.: 306 ug/dL   % Saturation, Iron: 21 %   Iron Total: 65 ug/dL   Unsaturated Iron Binding Capacity: 241 ug/dL    Chlamydia/GC Nucleic Acid Amplification (12.10.23 @ 01:31)    Source Amp: Endocervical Swab   Chlamydia Amplification Result: NotDetec:   GC Amplification Result: NotDetec:     Group B Strep Detection by PCR (12.10.23 @ 01:31)    Source Group B Strep: Vag/Rectal   Group B Beta Strep DNA (PCR): NotDetec:

## 2023-12-17 NOTE — DISCHARGE NOTE ANTEPARTUM - CARE PLAN
Principal Discharge DX:	Vaginal bleeding  Assessment and plan of treatment:	If you have contractions every few minutes, vaginal bleeding, leakage of fluid or you don't feel the baby move please call your doctor or come to labor and delivery. If you do not feel the baby move, lay down and count the number of times you feel any movement. You should count 10 kicks over the course of 2 hours, if you do not count 10 prior to reaching the 2 hour time, call your doctor or return immediately to labor and delivery.   1

## 2023-12-17 NOTE — DISCHARGE NOTE ANTEPARTUM - PATIENT PORTAL LINK FT
You can access the FollowMyHealth Patient Portal offered by [facility] by registering at the following website: [email or web address]. By joining [employer]’s FollowMyHealth portal, you will also be able to view your health information using other applications (apps) compatible with our system. You can access the FollowMyHealth Patient Portal offered by [facility] by registering at the following website: [email or web address]. By joining [employer]’s FollowMyHealth portal, you will also be able to view your health information using other applications (apps) compatible with our system.

## 2023-12-17 NOTE — DISCHARGE NOTE ANTEPARTUM - CARE PROVIDER_API CALL
Maura Oliveros  Maternal/Fetal Medicine  [address] [place] [address]  Phone: [phone]  Fax: [phone]  Follow Up Time:    Maura Oliveros  Maternal/Fetal Medicine  [address] [place] [address]  Phone: [phone]  Fax: [phone]  Follow Up Time:    Maura Oliveros  Maternal/Fetal Medicine  [address] [place] [address]  Phone: [phone]  Fax: [phone]  Follow Up Time:     Shawn Leonard  Gastroenterology  [address] [place] [address]  Phone: [phone]  Fax: [phone]  Follow Up Time: 1 month   Maura Oliveros  Maternal/Fetal Medicine  [address] [place] [address]  Phone: [phone]  Fax: [phone]  Follow Up Time:     Shawn Leonard  Gastroenterology  [address] [place] [address]  Phone: [phone]  Fax: [phone]  Follow Up Time: 1 month   Maura Oliveros  Maternal/Fetal Medicine  [address] [place] [address]  Phone: [phone]  Fax: [phone]  Follow Up Time:     Shawn Leonard  Gastroenterology  [address] [place] [address]  Phone: [phone]  Fax: [phone]  Follow Up Time: 1 month    Murali Jordan)  [address] [address]  Phone: [phone]  Fax: [phone]  Follow Up Time:    Maura Oliveros  Maternal/Fetal Medicine  [address] [place] [address]  Phone: [phone]  Fax: [phone]  Follow Up Time:     Shawn Leonard  Gastroenterology  [address] [place] [address]  Phone: [phone]  Fax: [phone]  Follow Up Time: 1 month    Murali Jordan)  [address] [address]  Phone: [phone]  Fax: [phone]  Follow Up Time:

## 2023-12-17 NOTE — DISCHARGE NOTE ANTEPARTUM - HOSPITAL COURSE
Ms. Dumont is a 22 yo  @ 27w6d, history of pericarditis, SLE, Crohns in remission, anemia, s/p admission for possible PPROM, who presented to L&D with vaginal bleeding and was admitted for concern for abruption. Hemoglobin and coagulation tests were within normal limits and patient stopped bleeding. She is stable for discharge with reassuring fetal and maternal status.     1.  Possible PPROM  - S/p  steroids -12/10  - s/p latency abx   - NICU consult appreciated  - GBS negative. Gc/Ct negative.     2. Anemia, stable  - Elevated MCV, B12 and folate wnl    3. Low fibrinogen  - 264 --> 229 --> 209 --> 229 --> 304    4. Multiple antibodies in type and screen  - Crossed 2u PRBCs, per blood bank it is not perfectly matched and patient will need to sign emergency release form if needed  - hematology consult - unlikely hemolytic anemia, Given borderline b12, would recommend checking MMA (methylmalonic acid) - labs pending    5.  History of pericarditis with small pericardial effusion in February-2023. S/p prednisone and colchicine.  - Normal TTE  - Consult appreciated by Dr Behuria from last admission    6.  Dx of SLE  - Followed by Rheumatology, patient refusing treatment  - Consult appreciated by Dr Behuria from last admission    7. Chron's in remission  - Diagnosed at 14yo, last colonoscopy then  - Lost to follow up, pt asymptomatic    8 Possible cHTN  - Patient denies history of elevated BPs, never on meds.  - Elevated, non-severe BPs outpatient. Similar while in the hospital   - UA 30 protein  - LFTs normal    9.  Pregnancy  - Increased risk of down syndrome, Increased NT, right pyelectasis, small pericardial effusion.  S/p fetal echo (otherwise normal). NIPTS inconclusive, amniocentesis karyotype and array are negative.    Ms. Dumont is a 22 yo  @ 27w6d, history of pericarditis, SLE, Crohns in remission, anemia, s/p admission for possible PPROM, who presented to L&D with vaginal bleeding and was admitted for concern for abruption. Hemoglobin and coagulation tests were within normal limits and patient stopped bleeding. She is stable for discharge with reassuring fetal and maternal status.     1.  Possible PPROM  - S/p  steroids -12/10  - s/p latency abx   - NICU consult appreciated  - GBS negative. Gc/Ct negative.     2. Anemia, stable  - Elevated MCV, B12 and folate wnl    3. Low fibrinogen  - 264 --> 229 --> 209 --> 229 --> 304    4. Multiple antibodies in type and screen  - Crossed 2u PRBCs, per blood bank it is not perfectly matched and patient will need to sign emergency release form if needed  - hematology consult - unlikely hemolytic anemia, Given borderline b12, would recommend checking MMA (methylmalonic acid) - labs pending    5.  History of pericarditis with small pericardial effusion in February-2023. S/p prednisone and colchicine.  - Normal TTE  - Consult appreciated by Dr Behuria from last admission    6.  Dx of SLE  - Followed by Rheumatology, patient refusing treatment  - Consult appreciated by Dr Behuria from last admission    7. Chron's in remission  - Diagnosed at 16yo, last colonoscopy then  - Lost to follow up, pt asymptomatic    8 Possible cHTN  - Patient denies history of elevated BPs, never on meds.  - Elevated, non-severe BPs outpatient. Similar while in the hospital   - UA 30 protein  - LFTs normal    9.  Pregnancy  - Increased risk of down syndrome, Increased NT, right pyelectasis, small pericardial effusion.  S/p fetal echo (otherwise normal). NIPTS inconclusive, amniocentesis karyotype and array are negative.    Ms. Dumont is a 22 yo  @ 27w6d, history of pericarditis, SLE, Crohns in remission, anemia, s/p admission for possible PPROM, who presented to L&D with vaginal bleeding and was admitted for concern for abruption. Hemoglobin and coagulation tests were within normal limits and patient stopped bleeding. She is stable for discharge with reassuring fetal and maternal status.     1.  Possible PPROM  - S/p  steroids -12/10  - s/p latency abx   - NICU consult appreciated  - GBS negative. Gc/Ct negative.     2. Anemia, stable  - Elevated MCV, B12 and folate wnl    3. Low fibrinogen  - 264 --> 229 --> 209 --> 229 --> 304    4. Multiple antibodies in type and screen  - Crossed 2u PRBCs, per blood bank it is not perfectly matched and patient will need to sign emergency release form if needed  - hematology consult - unlikely hemolytic anemia, Given borderline b12, would recommend checking MMA (methylmalonic acid) - labs pending  - Follow up with Hematology as an outpatient.    5.  History of pericarditis with small pericardial effusion in February-2023. S/p prednisone and colchicine.  - Normal TTE  - Consult appreciated by Dr Behuria from last admission    6.  Dx of SLE  - Followed by Rheumatology, patient refusing treatment  - Consult appreciated by Dr Behuria from last admission    7. Crohn's in remission  - Diagnosed at 16yo, last colonoscopy then  - Lost to follow up, pt asymptomatic    8 Possible cHTN  - Patient denies history of elevated BPs, never on meds.  - Elevated, non-severe BPs outpatient. Similar while in the hospital   - UA 30 protein  - LFTs normal    9.  Pregnancy  - Increased risk of down syndrome, Increased NT, right pyelectasis, small pericardial effusion.  S/p fetal echo (otherwise normal). NIPTS inconclusive, amniocentesis karyotype and array are negative.    Ms. Dumont is a 24 yo  @ 27w6d, history of pericarditis, SLE, Crohns in remission, anemia, s/p admission for possible PPROM, who presented to L&D with vaginal bleeding and was admitted for concern for abruption. Hemoglobin and coagulation tests were within normal limits and patient stopped bleeding. She is stable for discharge with reassuring fetal and maternal status.     1.  Possible PPROM  - S/p  steroids -12/10  - s/p latency abx   - NICU consult appreciated  - GBS negative. Gc/Ct negative.     2. Anemia, stable  - Elevated MCV, B12 and folate wnl    3. Low fibrinogen  - 264 --> 229 --> 209 --> 229 --> 304    4. Multiple antibodies in type and screen  - Crossed 2u PRBCs, per blood bank it is not perfectly matched and patient will need to sign emergency release form if needed  - hematology consult - unlikely hemolytic anemia, Given borderline b12, would recommend checking MMA (methylmalonic acid) - labs pending  - Follow up with Hematology as an outpatient.    5.  History of pericarditis with small pericardial effusion in February-2023. S/p prednisone and colchicine.  - Normal TTE  - Consult appreciated by Dr Behuria from last admission    6.  Dx of SLE  - Followed by Rheumatology, patient refusing treatment  - Consult appreciated by Dr Behuria from last admission    7. Crohn's in remission  - Diagnosed at 16yo, last colonoscopy then  - Lost to follow up, pt asymptomatic    8 Possible cHTN  - Patient denies history of elevated BPs, never on meds.  - Elevated, non-severe BPs outpatient. Similar while in the hospital   - UA 30 protein  - LFTs normal    9.  Pregnancy  - Increased risk of down syndrome, Increased NT, right pyelectasis, small pericardial effusion.  S/p fetal echo (otherwise normal). NIPTS inconclusive, amniocentesis karyotype and array are negative.

## 2023-12-17 NOTE — DISCHARGE NOTE ANTEPARTUM - MEDICATION SUMMARY - MEDICATIONS TO STOP TAKING
I will STOP taking the medications listed below when I get home from the hospital:    amoxicillin 500 mg oral capsule  -- 1 cap(s) by mouth every 8 hours

## 2023-12-17 NOTE — CHART NOTE - NSCHARTNOTEFT_GEN_A_CORE
PGY3 BPP Note    Position: vertex  Placenta: posterior  FHR: 148BPM  MVP: 3.23cm  BPP: 8/8  With reactive NST: 10/10

## 2023-12-18 LAB
FERRITIN SERPL-MCNC: 139 NG/ML — SIGNIFICANT CHANGE UP (ref 15–150)
FERRITIN SERPL-MCNC: 139 NG/ML — SIGNIFICANT CHANGE UP (ref 15–150)
HAPTOGLOB SERPL-MCNC: 29 MG/DL — LOW (ref 34–200)
HAPTOGLOB SERPL-MCNC: 29 MG/DL — LOW (ref 34–200)
VIT B12 SERPL-MCNC: 214 PG/ML — LOW (ref 232–1245)
VIT B12 SERPL-MCNC: 214 PG/ML — LOW (ref 232–1245)

## 2023-12-19 ENCOUNTER — NON-APPOINTMENT (OUTPATIENT)
Age: 23
End: 2023-12-19

## 2023-12-20 DIAGNOSIS — O35.8XX0 MATERNAL CARE FOR OTHER (SUSPECTED) FETAL ABNORMALITY AND DAMAGE, NOT APPLICABLE OR UNSPECIFIED: ICD-10-CM

## 2023-12-20 DIAGNOSIS — M32.9 SYSTEMIC LUPUS ERYTHEMATOSUS, UNSPECIFIED: ICD-10-CM

## 2023-12-20 DIAGNOSIS — O46.92 ANTEPARTUM HEMORRHAGE, UNSPECIFIED, SECOND TRIMESTER: ICD-10-CM

## 2023-12-20 DIAGNOSIS — Z28.21 IMMUNIZATION NOT CARRIED OUT BECAUSE OF PATIENT REFUSAL: ICD-10-CM

## 2023-12-20 DIAGNOSIS — R03.0 ELEVATED BLOOD-PRESSURE READING, WITHOUT DIAGNOSIS OF HYPERTENSION: ICD-10-CM

## 2023-12-20 DIAGNOSIS — O99.612 DISEASES OF THE DIGESTIVE SYSTEM COMPLICATING PREGNANCY, SECOND TRIMESTER: ICD-10-CM

## 2023-12-20 DIAGNOSIS — K50.90 CROHN'S DISEASE, UNSPECIFIED, WITHOUT COMPLICATIONS: ICD-10-CM

## 2023-12-20 DIAGNOSIS — Z28.09 IMMUNIZATION NOT CARRIED OUT BECAUSE OF OTHER CONTRAINDICATION: ICD-10-CM

## 2023-12-20 DIAGNOSIS — O99.891 OTHER SPECIFIED DISEASES AND CONDITIONS COMPLICATING PREGNANCY: ICD-10-CM

## 2023-12-20 DIAGNOSIS — O26.892 OTHER SPECIFIED PREGNANCY RELATED CONDITIONS, SECOND TRIMESTER: ICD-10-CM

## 2023-12-20 DIAGNOSIS — Z3A.27 27 WEEKS GESTATION OF PREGNANCY: ICD-10-CM

## 2023-12-20 DIAGNOSIS — Z53.8 PROCEDURE AND TREATMENT NOT CARRIED OUT FOR OTHER REASONS: ICD-10-CM

## 2023-12-20 DIAGNOSIS — O99.112 OTHER DISEASES OF THE BLOOD AND BLOOD-FORMING ORGANS AND CERTAIN DISORDERS INVOLVING THE IMMUNE MECHANISM COMPLICATING PREGNANCY, SECOND TRIMESTER: ICD-10-CM

## 2023-12-20 DIAGNOSIS — O42.912 PRETERM PREMATURE RUPTURE OF MEMBRANES, UNSPECIFIED AS TO LENGTH OF TIME BETWEEN RUPTURE AND ONSET OF LABOR, SECOND TRIMESTER: ICD-10-CM

## 2023-12-20 DIAGNOSIS — O34.211 MATERNAL CARE FOR LOW TRANSVERSE SCAR FROM PREVIOUS CESAREAN DELIVERY: ICD-10-CM

## 2023-12-20 DIAGNOSIS — D75.89 OTHER SPECIFIED DISEASES OF BLOOD AND BLOOD-FORMING ORGANS: ICD-10-CM

## 2023-12-21 LAB
FIBRINOGEN AG PPP IA-MCNC: 325 MG/DL — SIGNIFICANT CHANGE UP (ref 206–478)
FIBRINOGEN AG PPP IA-MCNC: 325 MG/DL — SIGNIFICANT CHANGE UP (ref 206–478)

## 2023-12-23 LAB
METHYLMALONATE SERPL-SCNC: 110 NMOL/L — SIGNIFICANT CHANGE UP (ref 0–378)
METHYLMALONATE SERPL-SCNC: 110 NMOL/L — SIGNIFICANT CHANGE UP (ref 0–378)

## 2023-12-28 ENCOUNTER — INPATIENT (INPATIENT)
Facility: HOSPITAL | Age: 23
LOS: 2 days | Discharge: HOME | End: 2023-12-31
Attending: OBSTETRICS & GYNECOLOGY | Admitting: OBSTETRICS & GYNECOLOGY
Payer: COMMERCIAL

## 2023-12-28 VITALS — HEART RATE: 95 BPM | DIASTOLIC BLOOD PRESSURE: 72 MMHG | SYSTOLIC BLOOD PRESSURE: 133 MMHG

## 2023-12-28 DIAGNOSIS — O60.00 PRETERM LABOR WITHOUT DELIVERY, UNSPECIFIED TRIMESTER: ICD-10-CM

## 2023-12-28 DIAGNOSIS — Z3A.29 29 WEEKS GESTATION OF PREGNANCY: ICD-10-CM

## 2023-12-28 DIAGNOSIS — O47.02 FALSE LABOR BEFORE 37 COMPLETED WEEKS OF GESTATION, SECOND TRIMESTER: ICD-10-CM

## 2023-12-28 DIAGNOSIS — O26.899 OTHER SPECIFIED PREGNANCY RELATED CONDITIONS, UNSPECIFIED TRIMESTER: ICD-10-CM

## 2023-12-28 LAB
ALBUMIN SERPL ELPH-MCNC: 3.8 G/DL — SIGNIFICANT CHANGE UP (ref 3.5–5.2)
ALBUMIN SERPL ELPH-MCNC: 3.8 G/DL — SIGNIFICANT CHANGE UP (ref 3.5–5.2)
ALLERGY+IMMUNOLOGY DIAG STUDY NOTE: SIGNIFICANT CHANGE UP
ALP SERPL-CCNC: 104 U/L — SIGNIFICANT CHANGE UP (ref 30–115)
ALP SERPL-CCNC: 104 U/L — SIGNIFICANT CHANGE UP (ref 30–115)
ALT FLD-CCNC: 16 U/L — SIGNIFICANT CHANGE UP (ref 0–41)
ALT FLD-CCNC: 16 U/L — SIGNIFICANT CHANGE UP (ref 0–41)
ANION GAP SERPL CALC-SCNC: 12 MMOL/L — SIGNIFICANT CHANGE UP (ref 7–14)
ANION GAP SERPL CALC-SCNC: 12 MMOL/L — SIGNIFICANT CHANGE UP (ref 7–14)
APPEARANCE UR: ABNORMAL
APPEARANCE UR: ABNORMAL
APTT BLD: 26.2 SEC — LOW (ref 27–39.2)
APTT BLD: 26.2 SEC — LOW (ref 27–39.2)
APTT BLD: 28.4 SEC — SIGNIFICANT CHANGE UP (ref 27–39.2)
APTT BLD: 28.4 SEC — SIGNIFICANT CHANGE UP (ref 27–39.2)
APTT BLD: 30.2 SEC — SIGNIFICANT CHANGE UP (ref 27–39.2)
APTT BLD: 30.2 SEC — SIGNIFICANT CHANGE UP (ref 27–39.2)
AST SERPL-CCNC: 21 U/L — SIGNIFICANT CHANGE UP (ref 0–41)
AST SERPL-CCNC: 21 U/L — SIGNIFICANT CHANGE UP (ref 0–41)
BACTERIA # UR AUTO: NEGATIVE /HPF — SIGNIFICANT CHANGE UP
BACTERIA # UR AUTO: NEGATIVE /HPF — SIGNIFICANT CHANGE UP
BASOPHILS # BLD AUTO: 0 K/UL — SIGNIFICANT CHANGE UP (ref 0–0.2)
BASOPHILS # BLD AUTO: 0.01 K/UL — SIGNIFICANT CHANGE UP (ref 0–0.2)
BASOPHILS # BLD AUTO: 0.01 K/UL — SIGNIFICANT CHANGE UP (ref 0–0.2)
BASOPHILS NFR BLD AUTO: 0 % — SIGNIFICANT CHANGE UP (ref 0–1)
BASOPHILS NFR BLD AUTO: 0.1 % — SIGNIFICANT CHANGE UP (ref 0–1)
BASOPHILS NFR BLD AUTO: 0.1 % — SIGNIFICANT CHANGE UP (ref 0–1)
BILIRUB SERPL-MCNC: 0.4 MG/DL — SIGNIFICANT CHANGE UP (ref 0.2–1.2)
BILIRUB SERPL-MCNC: 0.4 MG/DL — SIGNIFICANT CHANGE UP (ref 0.2–1.2)
BILIRUB UR-MCNC: ABNORMAL
BILIRUB UR-MCNC: ABNORMAL
BLD GP AB SCN SERPL QL: SIGNIFICANT CHANGE UP
BLD GP AB SCN SERPL QL: SIGNIFICANT CHANGE UP
BUN SERPL-MCNC: 7 MG/DL — LOW (ref 10–20)
BUN SERPL-MCNC: 7 MG/DL — LOW (ref 10–20)
CALCIUM SERPL-MCNC: 8.7 MG/DL — SIGNIFICANT CHANGE UP (ref 8.4–10.5)
CALCIUM SERPL-MCNC: 8.7 MG/DL — SIGNIFICANT CHANGE UP (ref 8.4–10.5)
CAST: 7 /LPF — HIGH (ref 0–4)
CAST: 7 /LPF — HIGH (ref 0–4)
CHLORIDE SERPL-SCNC: 104 MMOL/L — SIGNIFICANT CHANGE UP (ref 98–110)
CHLORIDE SERPL-SCNC: 104 MMOL/L — SIGNIFICANT CHANGE UP (ref 98–110)
CO2 SERPL-SCNC: 19 MMOL/L — SIGNIFICANT CHANGE UP (ref 17–32)
CO2 SERPL-SCNC: 19 MMOL/L — SIGNIFICANT CHANGE UP (ref 17–32)
COLOR SPEC: SIGNIFICANT CHANGE UP
COLOR SPEC: SIGNIFICANT CHANGE UP
CREAT ?TM UR-MCNC: 252 MG/DL — SIGNIFICANT CHANGE UP
CREAT ?TM UR-MCNC: 252 MG/DL — SIGNIFICANT CHANGE UP
CREAT SERPL-MCNC: 0.5 MG/DL — LOW (ref 0.7–1.5)
CREAT SERPL-MCNC: 0.5 MG/DL — LOW (ref 0.7–1.5)
DAT IGG-SP REAG RBC-IMP: ABNORMAL
DAT IGG-SP REAG RBC-IMP: ABNORMAL
DIFF PNL FLD: NEGATIVE — SIGNIFICANT CHANGE UP
DIFF PNL FLD: NEGATIVE — SIGNIFICANT CHANGE UP
DIR ANTIGLOB POLYSPECIFIC INTERPRETATION: ABNORMAL
DIR ANTIGLOB POLYSPECIFIC INTERPRETATION: ABNORMAL
EGFR: 134 ML/MIN/1.73M2 — SIGNIFICANT CHANGE UP
EGFR: 134 ML/MIN/1.73M2 — SIGNIFICANT CHANGE UP
EOSINOPHIL # BLD AUTO: 0 K/UL — SIGNIFICANT CHANGE UP (ref 0–0.7)
EOSINOPHIL NFR BLD AUTO: 0 % — SIGNIFICANT CHANGE UP (ref 0–8)
FIBRINOGEN PPP-MCNC: 499 MG/DL — HIGH (ref 200–435)
FIBRINOGEN PPP-MCNC: 499 MG/DL — HIGH (ref 200–435)
FIBRINOGEN PPP-MCNC: 536 MG/DL — HIGH (ref 200–435)
FIBRINOGEN PPP-MCNC: 536 MG/DL — HIGH (ref 200–435)
FIBRINOGEN PPP-MCNC: 542 MG/DL — HIGH (ref 200–435)
FIBRINOGEN PPP-MCNC: 542 MG/DL — HIGH (ref 200–435)
GLUCOSE SERPL-MCNC: 92 MG/DL — SIGNIFICANT CHANGE UP (ref 70–99)
GLUCOSE SERPL-MCNC: 92 MG/DL — SIGNIFICANT CHANGE UP (ref 70–99)
GLUCOSE UR QL: NEGATIVE MG/DL — SIGNIFICANT CHANGE UP
GLUCOSE UR QL: NEGATIVE MG/DL — SIGNIFICANT CHANGE UP
HCT VFR BLD CALC: 27.3 % — LOW (ref 37–47)
HCT VFR BLD CALC: 27.3 % — LOW (ref 37–47)
HCT VFR BLD CALC: 27.4 % — LOW (ref 37–47)
HCT VFR BLD CALC: 27.4 % — LOW (ref 37–47)
HCT VFR BLD CALC: 28.1 % — LOW (ref 37–47)
HCT VFR BLD CALC: 28.1 % — LOW (ref 37–47)
HGB BLD-MCNC: 10 G/DL — LOW (ref 12–16)
HGB BLD-MCNC: 10 G/DL — LOW (ref 12–16)
HGB BLD-MCNC: 9.4 G/DL — LOW (ref 12–16)
HGB BLD-MCNC: 9.4 G/DL — LOW (ref 12–16)
HGB BLD-MCNC: 9.5 G/DL — LOW (ref 12–16)
HGB BLD-MCNC: 9.5 G/DL — LOW (ref 12–16)
HIV 1 & 2 AB SERPL IA.RAPID: SIGNIFICANT CHANGE UP
HIV 1 & 2 AB SERPL IA.RAPID: SIGNIFICANT CHANGE UP
IAT COMP-SP REAG SERPL QL: ABNORMAL
IAT COMP-SP REAG SERPL QL: ABNORMAL
IMM GRANULOCYTES NFR BLD AUTO: 0.4 % — HIGH (ref 0.1–0.3)
IMM GRANULOCYTES NFR BLD AUTO: 0.4 % — HIGH (ref 0.1–0.3)
IMM GRANULOCYTES NFR BLD AUTO: 0.5 % — HIGH (ref 0.1–0.3)
IMM GRANULOCYTES NFR BLD AUTO: 0.5 % — HIGH (ref 0.1–0.3)
IMM GRANULOCYTES NFR BLD AUTO: 1.2 % — HIGH (ref 0.1–0.3)
IMM GRANULOCYTES NFR BLD AUTO: 1.2 % — HIGH (ref 0.1–0.3)
INR BLD: 0.87 RATIO — SIGNIFICANT CHANGE UP (ref 0.65–1.3)
INR BLD: 0.87 RATIO — SIGNIFICANT CHANGE UP (ref 0.65–1.3)
INR BLD: 0.9 RATIO — SIGNIFICANT CHANGE UP (ref 0.65–1.3)
INR BLD: 0.9 RATIO — SIGNIFICANT CHANGE UP (ref 0.65–1.3)
INR BLD: 0.91 RATIO — SIGNIFICANT CHANGE UP (ref 0.65–1.3)
INR BLD: 0.91 RATIO — SIGNIFICANT CHANGE UP (ref 0.65–1.3)
KETONES UR-MCNC: 15 MG/DL
KETONES UR-MCNC: 15 MG/DL
LDH SERPL L TO P-CCNC: 191 — SIGNIFICANT CHANGE UP (ref 50–242)
LDH SERPL L TO P-CCNC: 191 — SIGNIFICANT CHANGE UP (ref 50–242)
LEUKOCYTE ESTERASE UR-ACNC: NEGATIVE — SIGNIFICANT CHANGE UP
LEUKOCYTE ESTERASE UR-ACNC: NEGATIVE — SIGNIFICANT CHANGE UP
LYMPHOCYTES # BLD AUTO: 0.29 K/UL — LOW (ref 1.2–3.4)
LYMPHOCYTES # BLD AUTO: 0.29 K/UL — LOW (ref 1.2–3.4)
LYMPHOCYTES # BLD AUTO: 0.31 K/UL — LOW (ref 1.2–3.4)
LYMPHOCYTES # BLD AUTO: 0.31 K/UL — LOW (ref 1.2–3.4)
LYMPHOCYTES # BLD AUTO: 0.41 K/UL — LOW (ref 1.2–3.4)
LYMPHOCYTES # BLD AUTO: 0.41 K/UL — LOW (ref 1.2–3.4)
LYMPHOCYTES # BLD AUTO: 3.3 % — LOW (ref 20.5–51.1)
LYMPHOCYTES # BLD AUTO: 3.3 % — LOW (ref 20.5–51.1)
LYMPHOCYTES # BLD AUTO: 3.7 % — LOW (ref 20.5–51.1)
LYMPHOCYTES # BLD AUTO: 3.7 % — LOW (ref 20.5–51.1)
LYMPHOCYTES # BLD AUTO: 4.1 % — LOW (ref 20.5–51.1)
LYMPHOCYTES # BLD AUTO: 4.1 % — LOW (ref 20.5–51.1)
MAGNESIUM SERPL-MCNC: 4.3 MG/DL — CRITICAL HIGH (ref 1.8–2.4)
MAGNESIUM SERPL-MCNC: 4.3 MG/DL — CRITICAL HIGH (ref 1.8–2.4)
MAGNESIUM SERPL-MCNC: 4.5 MG/DL — CRITICAL HIGH (ref 1.8–2.4)
MAGNESIUM SERPL-MCNC: 4.5 MG/DL — CRITICAL HIGH (ref 1.8–2.4)
MCHC RBC-ENTMCNC: 33.7 PG — HIGH (ref 27–31)
MCHC RBC-ENTMCNC: 33.7 PG — HIGH (ref 27–31)
MCHC RBC-ENTMCNC: 34 PG — HIGH (ref 27–31)
MCHC RBC-ENTMCNC: 34 PG — HIGH (ref 27–31)
MCHC RBC-ENTMCNC: 34.1 PG — HIGH (ref 27–31)
MCHC RBC-ENTMCNC: 34.1 PG — HIGH (ref 27–31)
MCHC RBC-ENTMCNC: 34.3 G/DL — SIGNIFICANT CHANGE UP (ref 32–37)
MCHC RBC-ENTMCNC: 34.3 G/DL — SIGNIFICANT CHANGE UP (ref 32–37)
MCHC RBC-ENTMCNC: 34.8 G/DL — SIGNIFICANT CHANGE UP (ref 32–37)
MCHC RBC-ENTMCNC: 34.8 G/DL — SIGNIFICANT CHANGE UP (ref 32–37)
MCHC RBC-ENTMCNC: 35.6 G/DL — SIGNIFICANT CHANGE UP (ref 32–37)
MCHC RBC-ENTMCNC: 35.6 G/DL — SIGNIFICANT CHANGE UP (ref 32–37)
MCV RBC AUTO: 95.6 FL — SIGNIFICANT CHANGE UP (ref 81–99)
MCV RBC AUTO: 95.6 FL — SIGNIFICANT CHANGE UP (ref 81–99)
MCV RBC AUTO: 97.8 FL — SIGNIFICANT CHANGE UP (ref 81–99)
MCV RBC AUTO: 97.8 FL — SIGNIFICANT CHANGE UP (ref 81–99)
MCV RBC AUTO: 98.2 FL — SIGNIFICANT CHANGE UP (ref 81–99)
MCV RBC AUTO: 98.2 FL — SIGNIFICANT CHANGE UP (ref 81–99)
MONOCYTES # BLD AUTO: 0.12 K/UL — SIGNIFICANT CHANGE UP (ref 0.1–0.6)
MONOCYTES # BLD AUTO: 0.12 K/UL — SIGNIFICANT CHANGE UP (ref 0.1–0.6)
MONOCYTES # BLD AUTO: 0.33 K/UL — SIGNIFICANT CHANGE UP (ref 0.1–0.6)
MONOCYTES # BLD AUTO: 0.33 K/UL — SIGNIFICANT CHANGE UP (ref 0.1–0.6)
MONOCYTES # BLD AUTO: 0.44 K/UL — SIGNIFICANT CHANGE UP (ref 0.1–0.6)
MONOCYTES # BLD AUTO: 0.44 K/UL — SIGNIFICANT CHANGE UP (ref 0.1–0.6)
MONOCYTES NFR BLD AUTO: 1.3 % — LOW (ref 1.7–9.3)
MONOCYTES NFR BLD AUTO: 1.3 % — LOW (ref 1.7–9.3)
MONOCYTES NFR BLD AUTO: 4 % — SIGNIFICANT CHANGE UP (ref 1.7–9.3)
MONOCYTES NFR BLD AUTO: 4 % — SIGNIFICANT CHANGE UP (ref 1.7–9.3)
MONOCYTES NFR BLD AUTO: 4.4 % — SIGNIFICANT CHANGE UP (ref 1.7–9.3)
MONOCYTES NFR BLD AUTO: 4.4 % — SIGNIFICANT CHANGE UP (ref 1.7–9.3)
NEUTROPHILS # BLD AUTO: 7.65 K/UL — HIGH (ref 1.4–6.5)
NEUTROPHILS # BLD AUTO: 7.65 K/UL — HIGH (ref 1.4–6.5)
NEUTROPHILS # BLD AUTO: 8.39 K/UL — HIGH (ref 1.4–6.5)
NEUTROPHILS # BLD AUTO: 8.39 K/UL — HIGH (ref 1.4–6.5)
NEUTROPHILS # BLD AUTO: 9.08 K/UL — HIGH (ref 1.4–6.5)
NEUTROPHILS # BLD AUTO: 9.08 K/UL — HIGH (ref 1.4–6.5)
NEUTROPHILS NFR BLD AUTO: 90.9 % — HIGH (ref 42.2–75.2)
NEUTROPHILS NFR BLD AUTO: 90.9 % — HIGH (ref 42.2–75.2)
NEUTROPHILS NFR BLD AUTO: 91.9 % — HIGH (ref 42.2–75.2)
NEUTROPHILS NFR BLD AUTO: 91.9 % — HIGH (ref 42.2–75.2)
NEUTROPHILS NFR BLD AUTO: 94.2 % — HIGH (ref 42.2–75.2)
NEUTROPHILS NFR BLD AUTO: 94.2 % — HIGH (ref 42.2–75.2)
NITRITE UR-MCNC: NEGATIVE — SIGNIFICANT CHANGE UP
NITRITE UR-MCNC: NEGATIVE — SIGNIFICANT CHANGE UP
NRBC # BLD: 0 /100 WBCS — SIGNIFICANT CHANGE UP (ref 0–0)
PH UR: 6 — SIGNIFICANT CHANGE UP (ref 5–8)
PH UR: 6 — SIGNIFICANT CHANGE UP (ref 5–8)
PLATELET # BLD AUTO: 288 K/UL — SIGNIFICANT CHANGE UP (ref 130–400)
PLATELET # BLD AUTO: 288 K/UL — SIGNIFICANT CHANGE UP (ref 130–400)
PLATELET # BLD AUTO: 298 K/UL — SIGNIFICANT CHANGE UP (ref 130–400)
PLATELET # BLD AUTO: 298 K/UL — SIGNIFICANT CHANGE UP (ref 130–400)
PLATELET # BLD AUTO: 303 K/UL — SIGNIFICANT CHANGE UP (ref 130–400)
PLATELET # BLD AUTO: 303 K/UL — SIGNIFICANT CHANGE UP (ref 130–400)
PMV BLD: 9.6 FL — SIGNIFICANT CHANGE UP (ref 7.4–10.4)
PMV BLD: 9.6 FL — SIGNIFICANT CHANGE UP (ref 7.4–10.4)
PMV BLD: 9.7 FL — SIGNIFICANT CHANGE UP (ref 7.4–10.4)
PMV BLD: 9.7 FL — SIGNIFICANT CHANGE UP (ref 7.4–10.4)
PMV BLD: 9.8 FL — SIGNIFICANT CHANGE UP (ref 7.4–10.4)
PMV BLD: 9.8 FL — SIGNIFICANT CHANGE UP (ref 7.4–10.4)
POTASSIUM SERPL-MCNC: 4.3 MMOL/L — SIGNIFICANT CHANGE UP (ref 3.5–5)
POTASSIUM SERPL-MCNC: 4.3 MMOL/L — SIGNIFICANT CHANGE UP (ref 3.5–5)
POTASSIUM SERPL-SCNC: 4.3 MMOL/L — SIGNIFICANT CHANGE UP (ref 3.5–5)
POTASSIUM SERPL-SCNC: 4.3 MMOL/L — SIGNIFICANT CHANGE UP (ref 3.5–5)
PRENATAL SYPHILIS TEST: ABNORMAL
PRENATAL SYPHILIS TEST: ABNORMAL
PROT ?TM UR-MCNC: 70 MG/DLG/24H — SIGNIFICANT CHANGE UP
PROT ?TM UR-MCNC: 70 MG/DLG/24H — SIGNIFICANT CHANGE UP
PROT SERPL-MCNC: 7.9 G/DL — SIGNIFICANT CHANGE UP (ref 6–8)
PROT SERPL-MCNC: 7.9 G/DL — SIGNIFICANT CHANGE UP (ref 6–8)
PROT UR-MCNC: 30 MG/DL
PROT UR-MCNC: 30 MG/DL
PROT/CREAT UR-RTO: 0.3 RATIO — HIGH (ref 0–0.2)
PROT/CREAT UR-RTO: 0.3 RATIO — HIGH (ref 0–0.2)
PROTHROM AB SERPL-ACNC: 10.3 SEC — SIGNIFICANT CHANGE UP (ref 9.95–12.87)
PROTHROM AB SERPL-ACNC: 10.3 SEC — SIGNIFICANT CHANGE UP (ref 9.95–12.87)
PROTHROM AB SERPL-ACNC: 10.4 SEC — SIGNIFICANT CHANGE UP (ref 9.95–12.87)
PROTHROM AB SERPL-ACNC: 10.4 SEC — SIGNIFICANT CHANGE UP (ref 9.95–12.87)
PROTHROM AB SERPL-ACNC: 9.9 SEC — LOW (ref 9.95–12.87)
PROTHROM AB SERPL-ACNC: 9.9 SEC — LOW (ref 9.95–12.87)
RBC # BLD: 2.79 M/UL — LOW (ref 4.2–5.4)
RBC # BLD: 2.94 M/UL — LOW (ref 4.2–5.4)
RBC # BLD: 2.94 M/UL — LOW (ref 4.2–5.4)
RBC # FLD: 13 % — SIGNIFICANT CHANGE UP (ref 11.5–14.5)
RBC # FLD: 13 % — SIGNIFICANT CHANGE UP (ref 11.5–14.5)
RBC # FLD: 13.2 % — SIGNIFICANT CHANGE UP (ref 11.5–14.5)
RBC CASTS # UR COMP ASSIST: 2 /HPF — SIGNIFICANT CHANGE UP (ref 0–4)
RBC CASTS # UR COMP ASSIST: 2 /HPF — SIGNIFICANT CHANGE UP (ref 0–4)
SODIUM SERPL-SCNC: 135 MMOL/L — SIGNIFICANT CHANGE UP (ref 135–146)
SODIUM SERPL-SCNC: 135 MMOL/L — SIGNIFICANT CHANGE UP (ref 135–146)
SP GR SPEC: >1.03 — HIGH (ref 1–1.03)
SP GR SPEC: >1.03 — HIGH (ref 1–1.03)
SQUAMOUS # UR AUTO: 5 /HPF — SIGNIFICANT CHANGE UP (ref 0–5)
SQUAMOUS # UR AUTO: 5 /HPF — SIGNIFICANT CHANGE UP (ref 0–5)
URATE SERPL-MCNC: 3.4 MG/DL — SIGNIFICANT CHANGE UP (ref 2.5–7)
URATE SERPL-MCNC: 3.4 MG/DL — SIGNIFICANT CHANGE UP (ref 2.5–7)
UROBILINOGEN FLD QL: 1 MG/DL — SIGNIFICANT CHANGE UP (ref 0.2–1)
UROBILINOGEN FLD QL: 1 MG/DL — SIGNIFICANT CHANGE UP (ref 0.2–1)
WBC # BLD: 8.32 K/UL — SIGNIFICANT CHANGE UP (ref 4.8–10.8)
WBC # BLD: 8.32 K/UL — SIGNIFICANT CHANGE UP (ref 4.8–10.8)
WBC # BLD: 8.91 K/UL — SIGNIFICANT CHANGE UP (ref 4.8–10.8)
WBC # BLD: 8.91 K/UL — SIGNIFICANT CHANGE UP (ref 4.8–10.8)
WBC # BLD: 9.99 K/UL — SIGNIFICANT CHANGE UP (ref 4.8–10.8)
WBC # BLD: 9.99 K/UL — SIGNIFICANT CHANGE UP (ref 4.8–10.8)
WBC # FLD AUTO: 8.32 K/UL — SIGNIFICANT CHANGE UP (ref 4.8–10.8)
WBC # FLD AUTO: 8.32 K/UL — SIGNIFICANT CHANGE UP (ref 4.8–10.8)
WBC # FLD AUTO: 8.91 K/UL — SIGNIFICANT CHANGE UP (ref 4.8–10.8)
WBC # FLD AUTO: 8.91 K/UL — SIGNIFICANT CHANGE UP (ref 4.8–10.8)
WBC # FLD AUTO: 9.99 K/UL — SIGNIFICANT CHANGE UP (ref 4.8–10.8)
WBC # FLD AUTO: 9.99 K/UL — SIGNIFICANT CHANGE UP (ref 4.8–10.8)
WBC UR QL: 3 /HPF — SIGNIFICANT CHANGE UP (ref 0–5)
WBC UR QL: 3 /HPF — SIGNIFICANT CHANGE UP (ref 0–5)

## 2023-12-28 PROCEDURE — 82570 ASSAY OF URINE CREATININE: CPT

## 2023-12-28 PROCEDURE — 85730 THROMBOPLASTIN TIME PARTIAL: CPT

## 2023-12-28 PROCEDURE — 86900 BLOOD TYPING SEROLOGIC ABO: CPT

## 2023-12-28 PROCEDURE — 99418 PROLNG IP/OBS E/M EA 15 MIN: CPT | Mod: 25

## 2023-12-28 PROCEDURE — 99221 1ST HOSP IP/OBS SF/LOW 40: CPT

## 2023-12-28 PROCEDURE — 86880 COOMBS TEST DIRECT: CPT

## 2023-12-28 PROCEDURE — 86902 BLOOD TYPE ANTIGEN DONOR EA: CPT

## 2023-12-28 PROCEDURE — 86592 SYPHILIS TEST NON-TREP QUAL: CPT

## 2023-12-28 PROCEDURE — 88307 TISSUE EXAM BY PATHOLOGIST: CPT

## 2023-12-28 PROCEDURE — 85025 COMPLETE CBC W/AUTO DIFF WBC: CPT

## 2023-12-28 PROCEDURE — 36415 COLL VENOUS BLD VENIPUNCTURE: CPT

## 2023-12-28 PROCEDURE — 80053 COMPREHEN METABOLIC PANEL: CPT

## 2023-12-28 PROCEDURE — 59050 FETAL MONITOR W/REPORT: CPT

## 2023-12-28 PROCEDURE — 85384 FIBRINOGEN ACTIVITY: CPT

## 2023-12-28 PROCEDURE — 86860 RBC ANTIBODY ELUTION: CPT

## 2023-12-28 PROCEDURE — 86870 RBC ANTIBODY IDENTIFICATION: CPT

## 2023-12-28 PROCEDURE — 86922 COMPATIBILITY TEST ANTIGLOB: CPT

## 2023-12-28 PROCEDURE — 85610 PROTHROMBIN TIME: CPT

## 2023-12-28 PROCEDURE — 84550 ASSAY OF BLOOD/URIC ACID: CPT

## 2023-12-28 PROCEDURE — 86780 TREPONEMA PALLIDUM: CPT

## 2023-12-28 PROCEDURE — 84156 ASSAY OF PROTEIN URINE: CPT

## 2023-12-28 PROCEDURE — 86850 RBC ANTIBODY SCREEN: CPT

## 2023-12-28 PROCEDURE — 86077 PHYS BLOOD BANK SERV XMATCH: CPT

## 2023-12-28 PROCEDURE — 99222 1ST HOSP IP/OBS MODERATE 55: CPT

## 2023-12-28 PROCEDURE — 81001 URINALYSIS AUTO W/SCOPE: CPT

## 2023-12-28 PROCEDURE — 99223 1ST HOSP IP/OBS HIGH 75: CPT | Mod: 25

## 2023-12-28 PROCEDURE — 86901 BLOOD TYPING SEROLOGIC RH(D): CPT

## 2023-12-28 PROCEDURE — 83615 LACTATE (LD) (LDH) ENZYME: CPT

## 2023-12-28 PROCEDURE — 86703 HIV-1/HIV-2 1 RESULT ANTBDY: CPT

## 2023-12-28 PROCEDURE — 83735 ASSAY OF MAGNESIUM: CPT

## 2023-12-28 RX ORDER — FENTANYL/BUPIVACAINE/NS/PF 2MCG/ML-.1
250 PLASTIC BAG, INJECTION (ML) INJECTION
Refills: 0 | Status: DISCONTINUED | OUTPATIENT
Start: 2023-12-28 | End: 2023-12-31

## 2023-12-28 RX ORDER — MAGNESIUM SULFATE 500 MG/ML
4 VIAL (ML) INJECTION ONCE
Refills: 0 | Status: COMPLETED | OUTPATIENT
Start: 2023-12-28 | End: 2023-12-28

## 2023-12-28 RX ORDER — ACETAMINOPHEN 500 MG
1000 TABLET ORAL ONCE
Refills: 0 | Status: COMPLETED | OUTPATIENT
Start: 2023-12-28 | End: 2023-12-28

## 2023-12-28 RX ORDER — NALOXONE HYDROCHLORIDE 4 MG/.1ML
0.1 SPRAY NASAL
Refills: 0 | Status: DISCONTINUED | OUTPATIENT
Start: 2023-12-28 | End: 2023-12-31

## 2023-12-28 RX ORDER — MAGNESIUM SULFATE 500 MG/ML
2 VIAL (ML) INJECTION
Qty: 40 | Refills: 0 | Status: DISCONTINUED | OUTPATIENT
Start: 2023-12-28 | End: 2023-12-29

## 2023-12-28 RX ORDER — ONDANSETRON 8 MG/1
4 TABLET, FILM COATED ORAL EVERY 6 HOURS
Refills: 0 | Status: DISCONTINUED | OUTPATIENT
Start: 2023-12-28 | End: 2023-12-31

## 2023-12-28 RX ORDER — DEXAMETHASONE 0.5 MG/5ML
4 ELIXIR ORAL EVERY 6 HOURS
Refills: 0 | Status: DISCONTINUED | OUTPATIENT
Start: 2023-12-28 | End: 2023-12-31

## 2023-12-28 RX ORDER — SODIUM CHLORIDE 9 MG/ML
1000 INJECTION, SOLUTION INTRAVENOUS
Refills: 0 | Status: COMPLETED | OUTPATIENT
Start: 2023-12-28 | End: 2023-12-28

## 2023-12-28 RX ADMIN — Medication 50 GM/HR: at 10:43

## 2023-12-28 RX ADMIN — Medication 300 GRAM(S): at 10:20

## 2023-12-28 RX ADMIN — Medication 400 MILLIGRAM(S): at 10:13

## 2023-12-28 RX ADMIN — SODIUM CHLORIDE 75 MILLILITER(S): 9 INJECTION, SOLUTION INTRAVENOUS at 10:21

## 2023-12-28 RX ADMIN — Medication 12 MILLIGRAM(S): at 10:14

## 2023-12-28 NOTE — OB PROVIDER H&P - NSHPROSALLOTHERNEGRD_GEN_ALL_CORE
All other review of systems negative, except as noted in HPI Drysol Pregnancy And Lactation Text: This medication is considered safe during pregnancy and breast feeding.

## 2023-12-28 NOTE — OB RN TRIAGE NOTE - NS_VISITREASON1_OBGYN_ALL_OB
Labor Acitretin Counseling:  I discussed with the patient the risks of acitretin including but not limited to hair loss, dry lips/skin/eyes, liver damage, hyperlipidemia, depression/suicidal ideation, photosensitivity.  Serious rare side effects can include but are not limited to pancreatitis, pseudotumor cerebri, bony changes, clot formation/stroke/heart attack.  Patient understands that alcohol is contraindicated since it can result in liver toxicity and significantly prolong the elimination of the drug by many years.

## 2023-12-28 NOTE — OB PROVIDER H&P - HISTORY OF PRESENT ILLNESS
24 yo  @ 29w3d by LMP, ANIVAL 3/11/23 presenting due to vaginal bleeding and abdominal pain. Reports bleeding since , not spotting but not soaking pads. Abdominal pain is on and off, every few minutes, rated 10/10. Denies LOF. Good FM. Reports she did not seek care because it was [place]. She sees an OBGYN in [place].   Pregnancy complicated by:  1.  Possible PPROM, discharged on   - S/p  steroids -12/10  - s/p latency antibiotic course  - GBS neg. Gc/Ct negative.   2. Multiple antibodies in type and screen  3.  History of pericarditis with small pericardial effusion in February-2023. S/p prednisone and colchicine.  - Normal TTE  - Is followed by Dr. Behuria. Last seen in 2023  4.  Dx of Lupus  - Followed by Rheumatology, patient refusing treatment  5. Crohn's in remission  - Diagnosed at 16yo, last colonoscopy then  - Lost to follow up, pt asymptomatic  6. cHTN?  7.  Pregnancy  - Increased risk of down syndrome, Increased NT, right pyelectasis, small pericardial effusion.  S/p fetal echo (otherwise normal). NIPTS inconclusive, amniocentesis karyotype and array are negative.  - Previous  delivery.   24 yo  @ 29w3d by LMP, ANIVAL 3/11/23 presenting due to vaginal bleeding and abdominal pain. Reports bleeding since , not spotting but not soaking pads. Abdominal pain is on and off, every few minutes, rated 10/10. Denies LOF. Good FM. Reports she did not seek care because it was [place]. She sees an OBGYN in [place].   Pregnancy complicated by:  1.  Possible PPROM, discharged on   - S/p  steroids -12/10  - s/p latency antibiotic course  - GBS neg. Gc/Ct negative.   2. Multiple antibodies in type and screen  3.  History of pericarditis with small pericardial effusion in February-2023. S/p prednisone and colchicine.  - Normal TTE  - Is followed by Dr. Behuria. Last seen in 2023  4.  Dx of Lupus  - Followed by Rheumatology, patient refusing treatment  5. Crohn's in remission  - Diagnosed at 16yo, last colonoscopy then  - Lost to follow up, pt asymptomatic  6. cHTN?  7.  Pregnancy  - Increased risk of down syndrome, Increased NT, right pyelectasis, small pericardial effusion.  S/p fetal echo (otherwise normal). NIPTS inconclusive, amniocentesis karyotype and array are negative.  - Previous  delivery.

## 2023-12-28 NOTE — PROCEDURE NOTE - ADDITIONAL PROCEDURE DETAILS
Epidural performed under sterile conditions with RN at bedside. In sitting position, Tuohy needle advanced until loss of resistance obtained, Flores needle inserted through Tuohy, positive csf, Flores needle removed, and epidural catheter threaded easily.  After negative aspiration of epidural catheter, test dose administered, which was negative. Patient supine, VSS, bolus of 10ml 0.125% bupivacaine administered in increments. Vital signs stable, Level T10 R=L, patient awake and comfortable.     Epidural PCEA Fentanyl 2mcg/ml + Bupivacaine 0.0625% started  continuous 10 ml/hr  bolus dose 5 ml  lockout 15 mins  max dose 30ml/hr Epidural performed under sterile conditions with RN at bedside. In sitting position, Tuohy needle advanced until loss of resistance obtained, Flores needle inserted through Tuohy, positive csf, Flores needle removed, and epidural catheter threaded easily.  After negative aspiration of epidural catheter, test dose administered, which was negative. Patient supine, VSS, bolus of 10ml 0.125% bupivacaine administered in increments. Vital signs stable, Level T10 R=L, patient awake and comfortable.     Epidural PCEA Fentanyl 2mcg/ml + Bupivacaine 0.0625% started  continuous 10 ml/hr  bolus dose 5 ml  lockout 15 mins  max dose 30ml/hr    Post Labor  Epidural/ Delivery  Evaluation Note:    Uncomplicated anesthetic for Vaginal Delivery.    Patient seen at bedside. Epidural to be removed by RN before patient transfer.  Patient moving B/L lower extremities.  Motor block appropriate and resolving. Vital Signs are stable. Pain well controlled.     Reagan Score greater than 9    Mental Status:  __x__ Awake   ___x__ Alert   _____ Drowsy   _____ Sedated    Nausea/Vomiting:  __x__ NO  ______Yes,   See Post - Op Orders          Pain Scale (0-10):  _____    Treatment: __X__ None       Plan: Discharge:   ____Home       __X___Floor     _____Critical Care    _____

## 2023-12-28 NOTE — CONSULT NOTE ADULT - SUBJECTIVE AND OBJECTIVE BOX
23  [facility] Att'g Initial Consult Note:  Admission #3 this pregnancy; HD #1;  EGA 29w3d.   CC: Recurrent 3rd trimester bleeding "soaking pads x3d", painful contractions, with cervical change.   Ms Dumont is known to us from her previous admissions for vaginal bleeding and likely SLE.   Briefly, she is a 24 yo  @ 29w3d (liu 3/11 by LMP = early US), history of pericarditis, SLE (which she doubts), Crohns in remission, anemia, s/p admission #1 for possible PPROM, admission #2 for vaginal bleeding/macrocytic anemia/low fibrinogen and returns to [facility] L&D today with a history of vag bleeding x 4d, and abdominal pain/cramping x24h.  She received BMZ x2 on 12/10.  Her pregnancy is also complicated intermittently incr bp and by a positive Ab screen, rendering a "perfect" cross-match impossible.   PMHx: , Mar: Pericarditis with small pericardial effusion. Rx'd with prednisone & colchicine. Dr Behuria.             ?        SLE per rheumatology.  Pt declines treatment.  Please see consult from recent admission            : Crohn's Dz, in remission.  Dx at 15yoa.            Positive Ab screen as above, past transfusions.   Surg:   delivery   Meds: PNV  Allergies: NKDA  OBHx:  Ms Dumont receives prenatal care in [place].   #1 (): Vaginal delivery of 7-11 boy, at 40 weeks GA, after 3 days hours of labor . patient received anesthesia. Delivery occurred at [facility]. no pregnancy complications reported.    #2 ():  delivery of 5-10 boy, at 40 weeks GA . Delivery occurred at [facility]. no pregnancy complications reported. Pregnancy #2 Comments: (Abnormal fetal heart rate tracing in labor).  GYN history:  No abnormal pap smears, no STDs   FHx: M DM2.   SocHx:  Homemaker.  Denies subst use x3.   Family Planning: Not yet determined.   ROS:  As above.     Px: Concerned woman, c/o contraction pain.  Oriented x3, NAD when not mone.   VS:  36.6C, HR 88, bp 139/80, R18; O2sat 99%.   HEENT: NC/AT  Lungs: Clear to ausc bilat  Cor: S1, S2 nl, no mrb. rrr.   Abd: Fundus soft, difficult to palpate due to body habitus.   Extr:  SSE: NEFG, vag nl, scant blood, no pooling Cx not dilated visually.   SVE: admission scant blood /-2; 2h later: 3/70/-2 same examiner.     LAB:        RPR 1;32; FTA NR. HIV/HBSAg/HCAb } NR.   ECG: 3/15/23: nsr, nonspecific ST and T wave changes   Echo: 23: 1. Left ventricular ejection fraction, by visual estimation, is 60 to 65%. 2. Normal left ventricular size and wall thicknesses, with normal systolic and diastolic function. 3. Mild tricuspid regurgitation. 4. Small circumferential pericardial effusion.    TTE: WNL EF 65-70%    OBUS & Monitorin/28 BSUS: Vertex PRESENTATION, MVP 3.2cm,            BIOPHYSICAL PROFILE WAS PERFORMED TO ASCERTAIN FETAL WELL BEING.             BPP 10/10; NST baseline 135, mod vblty, accels to 145, no decels.  No contractions.   23  [facility] Att'g Initial Consult Note:  Admission #3 this pregnancy; HD #1;  EGA 29w3d.   CC: Recurrent 3rd trimester bleeding "soaking pads x3d", painful contractions, with cervical change.   Ms Dumont is known to us from her previous admissions for vaginal bleeding and likely SLE.   Briefly, she is a 24 yo  @ 29w3d (liu 3/11 by LMP = early US), history of pericarditis, SLE (which she doubts), Crohns in remission, anemia, s/p admission #1 for possible PPROM, admission #2 for vaginal bleeding/macrocytic anemia/low fibrinogen and returns to [facility] L&D today with a history of vag bleeding x 4d, and abdominal pain/cramping x24h.  She received BMZ x2 on 12/10.  Her pregnancy is also complicated intermittently incr bp and by a positive Ab screen, rendering a "perfect" cross-match impossible.   PMHx: , Mar: Pericarditis with small pericardial effusion. Rx'd with prednisone & colchicine. Dr Behuria.             ?        SLE per rheumatology.  Pt declines treatment.  Please see consult from recent admission            : Crohn's Dz, in remission.  Dx at 15yoa.            Positive Ab screen as above, past transfusions.   Surg:   delivery   Meds: PNV  Allergies: NKDA  OBHx:  Ms Dumont receives prenatal care in [place].   #1 (): Vaginal delivery of 7-11 boy, at 40 weeks GA, after 3 days hours of labor . patient received anesthesia. Delivery occurred at [facility]. no pregnancy complications reported.    #2 ():  delivery of 5-10 boy, at 40 weeks GA . Delivery occurred at [facility]. no pregnancy complications reported. Pregnancy #2 Comments: (Abnormal fetal heart rate tracing in labor).  GYN history:  No abnormal pap smears, no STDs   FHx: M DM2.   SocHx:  Homemaker.  Denies subst use x3.   Family Planning: Not yet determined.   ROS:  As above.     Px: Concerned woman, c/o contraction pain.  Oriented x3, NAD when not mone.   VS:  36.6C, HR 88, bp 139/80, R18; O2sat 99%.   HEENT: NC/AT  Lungs: Clear to ausc bilat  Cor: S1, S2 nl, no mrb. rrr.   Abd: Fundus soft, difficult to palpate due to body habitus.   Extr:  SSE: NEFG, vag nl, scant blood, no pooling Cx not dilated visually.   SVE: admission scant blood /-2; 2h later: 3/70/-2 same examiner.     LAB:        RPR 1;32; FTA NR. HIV/HBSAg/HCAb } NR.   ECG: 3/15/23: nsr, nonspecific ST and T wave changes   Echo: 23: 1. Left ventricular ejection fraction, by visual estimation, is 60 to 65%. 2. Normal left ventricular size and wall thicknesses, with normal systolic and diastolic function. 3. Mild tricuspid regurgitation. 4. Small circumferential pericardial effusion.    TTE: WNL EF 65-70%    OBUS & Monitorin/28 BSUS: Vertex PRESENTATION, MVP 3.2cm,            BIOPHYSICAL PROFILE WAS PERFORMED TO ASCERTAIN FETAL WELL BEING.             BPP 10/10; NST baseline 135, mod vblty, accels to 145, no decels.  No contractions.

## 2023-12-28 NOTE — OB PROVIDER H&P - ATTENDING COMMENTS
I was physically present for the key portions of the evaluation and management (e/m) service provided. I agree with the above history,physical and plan which I have reviewed and edited where appropriate  Patient seen face to face and case reviewed, precautions given to patient    Patient presented to triage and my evaluation/fhr monitoring started 08:00. The fetal heart rate monitor was continuous until midnight 00:00 . I spent 960 minutes caring for the patient. It included obtaining a history, performing an examination, continuously monitoring the fetus and interpretation of the fetal heart rate strip, ordering and reviewing labs, documenting in the medical record and a discussion with the patient.

## 2023-12-28 NOTE — OB PROVIDER H&P - ASSESSMENT
22 yo  @ 29w3d, s/p latency abx and celestone 12/10 for suspected PPROM, positive antibody screen, increased risk for T21 with negative amniocentesis, lupus and crohn's disease, with vaginal bleeding and cervical dilation, likely 2/2 placental abruption and  labor.    1.  Possible PPROM  - S/p  steroids -12/10  - s/p latency abx  - NICU consult appreciated  - GBS negative. Gc/Ct negative.     2. Anemia, stable  - Iron studies are within normal limits.  - Appreciate Hematology input.    3. Multiple antibodies in type and screen  - Crossed 2u PRBCs, per blood bank it is not perfectly matched and patient will need to sign emergency release form if needed  - per transfusion medicine transfuse as needed with slow infusion rate and monitor for reaction    5.  History of pericarditis with small pericardial effusion in February-2023. S/p prednisone and colchicine.  - Normal TTE  - Consult appreciated by Dr Behuria from last admission    6.  Dx of SLE  - Followed by Rheumatology, patient refusing treatment    7. Crohn's disease in remission  - Diagnosed at 16yo, last colonoscopy then  - Lost to follow up, pt asymptomatic  - She should follow up with GI as an outpatient    8 Possible cHTN  - Patient denies history of elevated BPs, never on meds.  - Elevated, non-severe BPs outpatient. Similar while in the hospital   - UA 30 protein  - LFTs normal  - Urine protein: creatinine 0.2    9.  Pregnancy  - Increased risk of down syndrome, Increased NT, right pyelectasis, small pericardial effusion.  S/p fetal echo (otherwise normal). NIPTS inconclusive, amniocentesis karyotype and array are negative.   - Previous  delivery.  - PNVs  - Ambulate as tolerated  - Regular PO diet  - GBS negative  - continuous EFM/TOCO   24 yo  @ 29w3d, s/p latency abx and celestone 12/10 for suspected PPROM, positive antibody screen, increased risk for T21 with negative amniocentesis, lupus and crohn's disease, with vaginal bleeding and cervical dilation, likely 2/2 placental abruption and  labor.    1.  Possible PPROM  - S/p  steroids -12/10  - s/p latency abx  - NICU consult appreciated  - GBS negative. Gc/Ct negative.     2. Anemia, stable  - Iron studies are within normal limits.  - Appreciate Hematology input.    3. Multiple antibodies in type and screen  - Crossed 2u PRBCs, per blood bank it is not perfectly matched and patient will need to sign emergency release form if needed  - per transfusion medicine transfuse as needed with slow infusion rate and monitor for reaction    5.  History of pericarditis with small pericardial effusion in February-2023. S/p prednisone and colchicine.  - Normal TTE  - Consult appreciated by Dr Behuria from last admission    6.  Dx of SLE  - Followed by Rheumatology, patient refusing treatment    7. Crohn's disease in remission  - Diagnosed at 14yo, last colonoscopy then  - Lost to follow up, pt asymptomatic  - She should follow up with GI as an outpatient    8 Possible cHTN  - Patient denies history of elevated BPs, never on meds.  - Elevated, non-severe BPs outpatient. Similar while in the hospital   - UA 30 protein  - LFTs normal  - Urine protein: creatinine 0.2    9.  Pregnancy  - Increased risk of down syndrome, Increased NT, right pyelectasis, small pericardial effusion.  S/p fetal echo (otherwise normal). NIPTS inconclusive, amniocentesis karyotype and array are negative.   - Previous  delivery.  - PNVs  - Ambulate as tolerated  - Regular PO diet  - GBS negative  - continuous EFM/TOCO   24 yo  @ 29w3d, s/p latency abx and celestone 12/10 for suspected PPROM, positive antibody screen, increased risk for T21 with negative amniocentesis, lupus and crohn's disease, with vaginal bleeding and cervical dilation, possibly due to  placental abruption and/or  labor.    1.  Possible PPROM  - S/p  steroids -12/10  - s/p latency abx  - NICU consult appreciated  - GBS negative. Gc/Ct negative.     2. Anemia, stable  - Iron studies are within normal limits.  - Appreciate Hematology input.    3. Multiple antibodies in type and screen  - Crossed 2u PRBCs, per blood bank it is not perfectly matched and patient will need to sign emergency release form if needed  - per transfusion medicine transfuse as needed with slow infusion rate and monitor for reaction    5.  History of pericarditis with small pericardial effusion in February-2023. S/p prednisone and colchicine.  - Normal TTE  - Consult appreciated by Dr Behuria from last admission    6.  Dx of SLE  - Followed by Rheumatology, patient refusing treatment    7. Crohn's disease in remission  - Diagnosed at 14yo, last colonoscopy then  - Lost to follow up, pt asymptomatic  - She should follow up with GI as an outpatient    8 Possible cHTN  - Patient denies history of elevated BPs, never on meds.  - Elevated, non-severe BPs outpatient. Similar while in the hospital   - UA 30 protein  - LFTs normal  - Urine protein: creatinine 0.2    9.  Pregnancy  - Increased risk of down syndrome, Increased NT, right pyelectasis, small pericardial effusion.  S/p fetal echo (otherwise normal). NIPTS inconclusive, amniocentesis karyotype and array are negative.   - Previous  delivery.  - PNVs  - Ambulate as tolerated  - Regular PO diet  - GBS negative  - continuous EFM/TOCO   22 yo  @ 29w3d, s/p latency abx and celestone 12/10 for suspected PPROM, positive antibody screen, increased risk for T21 with negative amniocentesis, lupus and crohn's disease, with vaginal bleeding and cervical dilation, possibly due to  placental abruption and/or  labor.    1.  Possible PPROM  - S/p  steroids -12/10  - s/p latency abx  - NICU consult appreciated  - GBS negative. Gc/Ct negative.     2. Anemia, stable  - Iron studies are within normal limits.  - Appreciate Hematology input.    3. Multiple antibodies in type and screen  - Crossed 2u PRBCs, per blood bank it is not perfectly matched and patient will need to sign emergency release form if needed  - per transfusion medicine transfuse as needed with slow infusion rate and monitor for reaction    5.  History of pericarditis with small pericardial effusion in February-2023. S/p prednisone and colchicine.  - Normal TTE  - Consult appreciated by Dr Behuria from last admission    6.  Dx of SLE  - Followed by Rheumatology, patient refusing treatment    7. Crohn's disease in remission  - Diagnosed at 14yo, last colonoscopy then  - Lost to follow up, pt asymptomatic  - She should follow up with GI as an outpatient    8 Possible cHTN  - Patient denies history of elevated BPs, never on meds.  - Elevated, non-severe BPs outpatient. Similar while in the hospital   - UA 30 protein  - LFTs normal  - Urine protein: creatinine 0.2    9.  Pregnancy  - Increased risk of down syndrome, Increased NT, right pyelectasis, small pericardial effusion.  S/p fetal echo (otherwise normal). NIPTS inconclusive, amniocentesis karyotype and array are negative.   - Previous  delivery.  - PNVs  - Ambulate as tolerated  - Regular PO diet  - GBS negative  - continuous EFM/TOCO

## 2023-12-28 NOTE — CONSULT NOTE PEDS - ASSESSMENT
24yo  female at 29w3d gestation admitted for vaginal bleeding and  labor 22yo  female at 29w3d gestation admitted for vaginal bleeding and  labor

## 2023-12-28 NOTE — OB RN PATIENT PROFILE - IF RESULT GREATER THAN 35 DAYS OLD SELECT STATUS
"Chief Complaint   Patient presents with     Prenatal Care     38w2d       Initial /71  Pulse 100  Temp 97.3  F (36.3  C) (Oral)  Ht 5' 4.25\" (1.632 m)  Wt 190 lb 9.6 oz (86.5 kg)  LMP 09/08/2017 (Approximate)  SpO2 96%  BMI 32.46 kg/m2 Estimated body mass index is 32.46 kg/(m^2) as calculated from the following:    Height as of this encounter: 5' 4.25\" (1.632 m).    Weight as of this encounter: 190 lb 9.6 oz (86.5 kg)..  BP completed using cuff size: nikia Cassidy CMA    " N/A

## 2023-12-28 NOTE — PROGRESS NOTE ADULT - SUBJECTIVE AND OBJECTIVE BOX
Subjective:   Pt evaluated at bedside for magnesium check. Denies nausea/vomiting/chest pain/epigastric pain/shortness of breath. Pain well controlled s/p epidural.     Objective:   T(F): 97.9 ( @ 09:40), Max: 98.2 ( @ 07:55)  HR: 83 ( @ 13:02)  BP: 129/62 ( @ 12:53) (120/84 - 139/80)  RR: 18 ( @ 11:30)  SpO2: 99% ( @ 13:02)  Vital Signs Last 24 Hrs  BP: 129/62 (28 Dec 2023 12:53) (120/84 - 139/80)  Daily Height in cm: 160.02 (28 Dec 2023 11:30)    Daily Weight Pre-pregnancy in k (28 Dec 2023 09:40)      Gen: NAD, AAOx3  CV: S1S2, RRR, no M/R/G  Pulm: CTAB, no R/R/W  Abd: soft, GRAVID, nontender, no rebound or guarding, no epigastric tenderness, liver nonpalpable +BS.   : [place]   Ext: no calf tenderness or edema SCDs in place  Neuro: Reflexes 2+ in bilateral upper extremities  SVE: 3/70/-2, no blood noted on chucks, no active bleeding    EFM: 135BPM/mod saul/accels pos  [place]: none noted  SVE: 3/70/-2    Medications:  betamethasone Injectable 12 milliGRAM(s) IntraMuscular every 24 hours  dexAMETHasone  Injectable 4 milliGRAM(s) IV Push every 6 hours PRN  fentanyl (2 MICROgram(s)/mL) + bupivacaine 0.0625%  in 0.9% Sodium Chloride PCEA 250 milliLiter(s) Epidural PCA Continuous  magnesium sulfate Infusion 2 Gm/Hr IV Continuous <Continuous>  naloxone Injectable 0.1 milliGRAM(s) IV Push every 3 minutes PRN  ondansetron Injectable 4 milliGRAM(s) IV Push every 6 hours PRN    No Known Allergies      Labs:                        10.0   9.99  )-----------( 303      ( 28 Dec 2023 09:45 )             28.1         135  |  104  |  7<L>  ----------------------------<  92  4.3   |  19  |  0.5<L>    Ca    8.7      28 Dec 2023 09:45    TPro  7.9  /  Alb  3.8  /  TBili  0.4  /  DBili  x   /  AST  21  /  ALT  16  /  AlkPhos  104      Uric Acid: 3.4 mg/dL ( @ 09:45)         Subjective:   Pt evaluated at bedside for magnesium check. Denies nausea/vomiting/chest pain/epigastric pain/shortness of breath. Pain well controlled s/p epidural.     Objective:   T(F): 97.9 ( @ 09:40), Max: 98.2 ( @ 07:55)  HR: 83 ( @ 13:02)  BP: 129/62 ( @ 12:53) (120/84 - 139/80)  RR: 18 ( @ 11:30)  SpO2: 99% ( @ 13:02)  Vital Signs Last 24 Hrs  BP: 129/62 (28 Dec 2023 12:53) (120/84 - 139/80)  Daily Height in cm: 160.02 (28 Dec 2023 11:30)    Daily Weight Pre-pregnancy in k (28 Dec 2023 09:40)      Gen: NAD, AAOx3  CV: S1S2, RRR, no M/R/G  Pulm: CTAB, no R/R/W  Abd: soft, GRAVID, nontender, no rebound or guarding, no epigastric tenderness, liver nonpalpable +BS.   : [place]   Ext: no calf tenderness or edema SCDs in place  Neuro: Reflexes 2+ in bilateral upper extremities  SVE: 3/70/-2, no blood noted on chucks, no active bleeding    EFM: 135BPM/mod saul/accels pos  [place]: none noted  SVE: 3/70/-2    Medications:  betamethasone Injectable 12 milliGRAM(s) IntraMuscular every 24 hours  dexAMETHasone  Injectable 4 milliGRAM(s) IV Push every 6 hours PRN  fentanyl (2 MICROgram(s)/mL) + bupivacaine 0.0625%  in 0.9% Sodium Chloride PCEA 250 milliLiter(s) Epidural PCA Continuous  magnesium sulfate Infusion 2 Gm/Hr IV Continuous <Continuous>  naloxone Injectable 0.1 milliGRAM(s) IV Push every 3 minutes PRN  ondansetron Injectable 4 milliGRAM(s) IV Push every 6 hours PRN    No Known Allergies      Labs:                        10.0   9.99  )-----------( 303      ( 28 Dec 2023 09:45 )             28.1         135  |  104  |  7<L>  ----------------------------<  92  4.3   |  19  |  0.5<L>    Ca    8.7      28 Dec 2023 09:45    TPro  7.9  /  Alb  3.8  /  TBili  0.4  /  DBili  x   /  AST  21  /  ALT  16  /  AlkPhos  104      Uric Acid: 3.4 mg/dL ( @ 09:45)

## 2023-12-28 NOTE — PROGRESS NOTE ADULT - SUBJECTIVE AND OBJECTIVE BOX
PGY1 Magnesium Note     Subjective:   Pt evaluated at bedside for magnesium check. She denies headache, vision changes, dizziness, SOB, chest pain, RUQ/epigastric pain.    Objective:   Vital signs: T(F): 97.9 (12-28-23 @ 09:40), Max: 98.2 (12-28-23 @ 07:55)  HR: 77 (12-28-23 @ 18:28) (72 - 96)  BP: 120/58 (12-28-23 @ 18:28) (116/59 - 139/80)  RR: 18 (12-28-23 @ 11:30) (18 - 78)  SpO2: 98% (12-28-23 @ 18:27) (97% - 100%)    12-28-23 @ 07:01  -  12-28-23 @ 18:32  --------------------------------------------------------  IN: 500 mL / OUT: 900 mL / NET: -400 mL        Gen: NAD, AAOx3  CV: S1S2, RRR, no M/R/G  Pulm: CTAB, no rhonchi or rales or wheezing  Ext: no calf tenderness or edema SCDs in place  Neuro: 2+ DTR bilaterally  Abd: soft, GRAVId, nontender, no rebound or guarding, no epigastric tenderness    EFM: 120/mod/pos accels  [place]: q10min  SVE: deferred; 3/70/-2 @1302    Medications:  acetaminophen   IVPB ..: 400 (12-28-23 @ 10:13)  betamethasone Injectable: 12 (12-28-23 @ 10:14)  lactated ringers: 75 (12-28-23 @ 09:35)  magnesium sulfate  IVPB: 300 (12-28-23 @ 10:20)  magnesium sulfate Infusion: 50 (12-28-23 @ 09:33)      Labs:                         9.5    8.91  )-----------( 298      ( 28 Dec 2023 17:41 )             27.3   12-28    135  |  104  |  7<L>  ----------------------------<  92  4.3   |  19  |  0.5<L>    Ca    8.7      28 Dec 2023 09:45  Mg     4.5     12-28    TPro  7.9  /  Alb  3.8  /  TBili  0.4  /  DBili  x   /  AST  21  /  ALT  16  /  AlkPhos  104  12-28      Urinalysis Basic - ( 28 Dec 2023 12:30 )    Color: Dark Yellow / Appearance: Cloudy / SG: >1.030 / pH: x  Gluc: x / Ketone: 15 mg/dL  / Bili: Small / Urobili: 1.0 mg/dL   Blood: x / Protein: 30 mg/dL / Nitrite: Negative   Leuk Esterase: Negative / RBC: 2 /HPF / WBC 3 /HPF   Sq Epi: x / Non Sq Epi: 5 /HPF / Bacteria: Negative /HPF         PGY1 Magnesium Note     Subjective:   Pt evaluated at bedside for magnesium check. She denies headache, vision changes, dizziness, SOB, chest pain, RUQ/epigastric pain.    Objective:   Vital signs: T(F): 97.9 (12-28-23 @ 09:40), Max: 98.2 (12-28-23 @ 07:55)  HR: 77 (12-28-23 @ 18:28) (72 - 96)  BP: 120/58 (12-28-23 @ 18:28) (116/59 - 139/80)  RR: 18 (12-28-23 @ 11:30) (18 - 78)  SpO2: 98% (12-28-23 @ 18:27) (97% - 100%)    12-28-23 @ 07:01  -  12-28-23 @ 18:32  --------------------------------------------------------  IN: 500 mL / OUT: 900 mL / NET: -400 mL        Gen: NAD, AAOx3  CV: S1S2, RRR, no M/R/G  Pulm: CTAB, no rhonchi or rales or wheezing  Ext: no calf tenderness or edema SCDs in place  Neuro: 2+ DTR bilaterally  Abd: soft, GRAVId, nontender, no rebound or guarding, no epigastric tenderness    EFM: 120/mod/pos accels  [place]: q10min  SVE: deferred; 3/70/-2 @1302    Medications:  acetaminophen   IVPB ..: 400 (12-28-23 @ 10:13)  betamethasone Injectable: 12 (12-28-23 @ 10:14)  lactated ringers: 75 (12-28-23 @ 09:35)  magnesium sulfate  IVPB: 300 (12-28-23 @ 10:20)  magnesium sulfate Infusion: 50 (12-28-23 @ 09:33)      Labs:                         9.5    8.91  )-----------( 298      ( 28 Dec 2023 17:41 )             27.3   12-28    135  |  104  |  7<L>  ----------------------------<  92  4.3   |  19  |  0.5<L>    Ca    8.7      28 Dec 2023 09:45  Mg     4.5     12-28    TPro  7.9  /  Alb  3.8  /  TBili  0.4  /  DBili  x   /  AST  21  /  ALT  16  /  AlkPhos  104  12-28      Urinalysis Basic - ( 28 Dec 2023 12:30 )    Color: Dark Yellow / Appearance: Cloudy / SG: >1.030 / pH: x  Gluc: x / Ketone: 15 mg/dL  / Bili: Small / Urobili: 1.0 mg/dL   Blood: x / Protein: 30 mg/dL / Nitrite: Negative   Leuk Esterase: Negative / RBC: 2 /HPF / WBC 3 /HPF   Sq Epi: x / Non Sq Epi: 5 /HPF / Bacteria: Negative /HPF

## 2023-12-28 NOTE — PROGRESS NOTE ADULT - ASSESSMENT
24 yo  @ 29w3d, s/p latency abx and celestone 12/10 for suspected PPROM, positive antibody screen, increased risk for T21 with negative amniocentesis, lupus and crohn's disease, with vaginal bleeding and cervical dilation, likely 2/2 placental abruption and  labor, on magnesium for neuroprotection    Plan:  -Continue with magnesium  -Repeat Mag level and labs @1600  -Neuro checks q2h  -strict I/Os  -Pain management prn  -Cont seizure precautions  -Cont efm/toco   22 yo  @ 29w3d, s/p latency abx and celestone 12/10 for suspected PPROM, positive antibody screen, increased risk for T21 with negative amniocentesis, lupus and crohn's disease, with vaginal bleeding and cervical dilation, possibly 2/2 placental abruption and/or  labor, on magnesium for neuroprotection    Plan:  -Continue with magnesium  -Repeat Mag level and labs @1600  -Neuro checks q2h  -strict I/Os  -Pain management prn  -Cont seizure precautions  -Cont efm/toco

## 2023-12-28 NOTE — OB RN PATIENT PROFILE - FUNCTIONAL ASSESSMENT - BASIC MOBILITY 6.
4-calculated by average/Not able to assess (calculate score using [facility] averaging method)  4-calculated by average/Not able to assess (calculate score using [facility] averaging method)

## 2023-12-28 NOTE — OB RN PATIENT PROFILE - FALL HARM RISK - UNIVERSAL INTERVENTIONS
Bed in lowest position, wheels locked, appropriate side rails in place/Call bell, personal items and telephone in reach/Instruct patient to call for assistance before getting out of bed or chair/Non-slip footwear when patient is out of bed/[place] to call system/Physically safe environment - no spills, clutter or unnecessary equipment/Purposeful Proactive Rounding/Room/bathroom lighting operational, light cord in reach Bed in lowest position, wheels locked, appropriate side rails in place/Call bell, personal items and telephone in reach/Instruct patient to call for assistance before getting out of bed or chair/Non-slip footwear when patient is out of bed/[place] to call system/Physically safe environment - no spills, clutter or unnecessary equipment/Purposeful Proactive Rounding/Room/bathroom lighting operational, light cord in reach

## 2023-12-28 NOTE — OB RN PATIENT PROFILE - THE IMPORTANCE OF INITIATING BREASTFEEDING WITHIN THE FIRST HOUR OF BIRTH.
Isotretinoin Counseling: Patient should get monthly blood tests, not donate blood, not drive at night if vision affected, not share medication, and not undergo elective surgery for 6 months after tx completed. Side effects reviewed, pt to contact office should one occur. Statement Selected

## 2023-12-28 NOTE — OB PROVIDER H&P - NSHPPHYSICALEXAM_GEN_ALL_CORE
T(C): 36.6 (12-28-23 @ 09:40), Max: 36.8 (12-28-23 @ 07:55)  HR: 88 (12-28-23 @ 09:40) (88 - 95)  BP: 139/80 (12-28-23 @ 09:40) (133/72 - 139/80)  RR: 78 (12-28-23 @ 09:40) (18 - 78)  BMI (kg/m2): 35.4 (12-28-23 @ 09:40)    Gen: A+OX3. NAD  Cardio: RRR  Resp: CTAB  Abd: Soft, Nontender. Gravid. Palpable contractions. No incisional tenderness.   SVE: 2/50/-2  12/28 Speculum: cervix not significantly dilated, scant blood, no pooling.  12/28 BSS: vtx, BPP 8/8, MVP 3.2cm, TVCL 1.81cm    FHR: 125BPM/mod saul/accels pos  TOCO: none noted

## 2023-12-28 NOTE — PROGRESS NOTE ADULT - SUBJECTIVE AND OBJECTIVE BOX
PGY1 Magnesium Note     Subjective:   Pt evaluated at bedside for magnesium check. She denies headache, vision changes, dizziness, SOB, chest pain, RUQ/epigastric pain.    Objective:   T(C): 37.2 (12-28-23 @ 18:29), Max: 37.2 (12-28-23 @ 18:29)  T(F): 98.96 (12-28-23 @ 18:29), Max: 98.96 (12-28-23 @ 18:29)  HR: 79 (12-28-23 @ 23:22) (72 - 96)  BP: 118/58 (12-28-23 @ 23:12) (108/59 - 139/80)  RR: 18 (12-28-23 @ 11:30) (18 - 78)  SpO2: 99% (12-28-23 @ 23:22) (97% - 100%)      12-28-23 @ 07:01  -  12-28-23 @ 18:32  --------------------------------------------------------  IN: 500 mL / OUT: 900 mL / NET: -400 mL        Gen: NAD, AAOx3  CV: S1S2, RRR, no M/R/G  Pulm: CTAB, no rhonchi or rales or wheezing  Ext: no calf tenderness or edema SCDs in place  Neuro: 2+ DTR bilaterally  Abd: soft, GRAVId, nontender, no rebound or guarding, no epigastric tenderness    EFM: 120/mod/pos accels  [place]: q10min  SVE: deferred; 3/70/-2 @1302    Medications:  acetaminophen   IVPB ..: 400 (12-28-23 @ 10:13)  betamethasone Injectable: 12 (12-28-23 @ 10:14)  lactated ringers: 75 (12-28-23 @ 09:35)  magnesium sulfate  IVPB: 300 (12-28-23 @ 10:20)  magnesium sulfate Infusion: 50 (12-28-23 @ 09:33)      Labs:                         9.4    8.32  )-----------( 288      ( 28 Dec 2023 22:28 )             27.4       12-28    135  |  104  |  7<L>  ----------------------------<  92  4.3   |  19  |  0.5<L>    Ca    8.7      28 Dec 2023 09:45  Mg     4.5     12-28    TPro  7.9  /  Alb  3.8  /  TBili  0.4  /  DBili  x   /  AST  21  /  ALT  16  /  AlkPhos  104  12-28              Urinalysis Basic - ( 28 Dec 2023 12:30 )    Color: Dark Yellow / Appearance: Cloudy / SG: >1.030 / pH: x  Gluc: x / Ketone: 15 mg/dL  / Bili: Small / Urobili: 1.0 mg/dL   Blood: x / Protein: 30 mg/dL / Nitrite: Negative   Leuk Esterase: Negative / RBC: 2 /HPF / WBC 3 /HPF   Sq Epi: x / Non Sq Epi: 5 /HPF / Bacteria: Negative /HPF        PT/INR - ( 28 Dec 2023 22:28 )   PT: 10.30 sec;   INR: 0.90 ratio         PTT - ( 28 Dec 2023 22:28 )  PTT:28.4 sec          CAPILLARY BLOOD GLUCOSE                        9.5    8.91  )-----------( 298      ( 28 Dec 2023 17:41 )             27.3   12-28    135  |  104  |  7<L>  ----------------------------<  92  4.3   |  19  |  0.5<L>    Ca    8.7      28 Dec 2023 09:45  Mg     4.5     12-28    TPro  7.9  /  Alb  3.8  /  TBili  0.4  /  DBili  x   /  AST  21  /  ALT  16  /  AlkPhos  104  12-28      Urinalysis Basic - ( 28 Dec 2023 12:30 )    Color: Dark Yellow / Appearance: Cloudy / SG: >1.030 / pH: x  Gluc: x / Ketone: 15 mg/dL  / Bili: Small / Urobili: 1.0 mg/dL   Blood: x / Protein: 30 mg/dL / Nitrite: Negative   Leuk Esterase: Negative / RBC: 2 /HPF / WBC 3 /HPF   Sq Epi: x / Non Sq Epi: 5 /HPF / Bacteria: Negative /HPF         PGY1 Magnesium Note     Subjective:   Pt evaluated at bedside for magnesium check. She denies headache, vision changes, dizziness, SOB, chest pain, RUQ/epigastric pain.    Objective:   T(C): 37.2 (12-28-23 @ 18:29), Max: 37.2 (12-28-23 @ 18:29)  T(F): 98.96 (12-28-23 @ 18:29), Max: 98.96 (12-28-23 @ 18:29)  HR: 79 (12-28-23 @ 23:22) (72 - 96)  BP: 118/58 (12-28-23 @ 23:12) (108/59 - 139/80)  RR: 18 (12-28-23 @ 11:30) (18 - 78)  SpO2: 99% (12-28-23 @ 23:22) (97% - 100%)      12-28-23 @ 07:01  -  12-28-23 @ 18:32  --------------------------------------------------------  IN: 500 mL / OUT: 900 mL / NET: -400 mL        Gen: NAD, AAOx3  CV: S1S2, RRR, no M/R/G  Pulm: CTAB, no rhonchi or rales or wheezing  Ext: no calf tenderness or edema SCDs in place  Neuro: 2+ DTR bilaterally  Abd: soft, GRAVId, nontender, no rebound or guarding, no epigastric tenderness    EFM: 120/mod/pos accels  [place]: q10min  SVE: deferred; 3/70/-2 @1302    Medications:  acetaminophen   IVPB ..: 400 (12-28-23 @ 10:13)  betamethasone Injectable: 12 (12-28-23 @ 10:14)  lactated ringers: 75 (12-28-23 @ 09:35)  magnesium sulfate  IVPB: 300 (12-28-23 @ 10:20)  magnesium sulfate Infusion: 50 (12-28-23 @ 09:33)      Labs:                         9.4    8.32  )-----------( 288      ( 28 Dec 2023 22:28 )             27.4       12-28    135  |  104  |  7<L>  ----------------------------<  92  4.3   |  19  |  0.5<L>    Ca    8.7      28 Dec 2023 09:45  Mg     4.5     12-28    TPro  7.9  /  Alb  3.8  /  TBili  0.4  /  DBili  x   /  AST  21  /  ALT  16  /  AlkPhos  104  12-28              Urinalysis Basic - ( 28 Dec 2023 12:30 )    Color: Dark Yellow / Appearance: Cloudy / SG: >1.030 / pH: x  Gluc: x / Ketone: 15 mg/dL  / Bili: Small / Urobili: 1.0 mg/dL   Blood: x / Protein: 30 mg/dL / Nitrite: Negative   Leuk Esterase: Negative / RBC: 2 /HPF / WBC 3 /HPF   Sq Epi: x / Non Sq Epi: 5 /HPF / Bacteria: Negative /HPF        PT/INR - ( 28 Dec 2023 22:28 )   PT: 10.30 sec;   INR: 0.90 ratio         PTT - ( 28 Dec 2023 22:28 )  PTT:28.4 sec          CAPILLARY BLOOD GLUCOSE                        9.5    8.91  )-----------( 298      ( 28 Dec 2023 17:41 )             27.3   12-28    135  |  104  |  7<L>  ----------------------------<  92  4.3   |  19  |  0.5<L>    Ca    8.7      28 Dec 2023 09:45  Mg     4.5     12-28    TPro  7.9  /  Alb  3.8  /  TBili  0.4  /  DBili  x   /  AST  21  /  ALT  16  /  AlkPhos  104  12-28      Urinalysis Basic - ( 28 Dec 2023 12:30 )    Color: Dark Yellow / Appearance: Cloudy / SG: >1.030 / pH: x  Gluc: x / Ketone: 15 mg/dL  / Bili: Small / Urobili: 1.0 mg/dL   Blood: x / Protein: 30 mg/dL / Nitrite: Negative   Leuk Esterase: Negative / RBC: 2 /HPF / WBC 3 /HPF   Sq Epi: x / Non Sq Epi: 5 /HPF / Bacteria: Negative /HPF

## 2023-12-28 NOTE — PRE-ANESTHESIA EVALUATION ADULT - NSANTHOSAYNRD_GEN_A_CORE
denies/No. AYAAN screening performed.  STOP BANG Legend: 0-2 = LOW Risk; 3-4 = INTERMEDIATE Risk; 5-8 = HIGH Risk

## 2023-12-28 NOTE — OB RN TRIAGE NOTE - NS_SISCREENINGSR_GEN_ALL_ED
[Follow-Up] : a follow-up visit [TextBox_[id number]] : Abnormal CT, Sarcoidosis, abnormal CT, allergy, RADS, +OSAS  Negative

## 2023-12-28 NOTE — PROGRESS NOTE ADULT - ASSESSMENT
24 yo  @ 29w3d, s/p latency abx and celestone 12/10 for suspected PPROM, positive antibody screen, increased risk for T21 with negative amniocentesis, lupus and crohn's disease, with vaginal bleeding and cervical dilation, likely 2/2 placental abruption and  labor, on magnesium for neuroprotection.    Plan:  -Continue with magnesium  -Repeat Mag level and labs @2300  -Neuro checks q2h  -strict I/Os  -Pain management prn  -Cont seizure precautions  -Cont efm/toco   22 yo  @ 29w3d, s/p latency abx and celestone 12/10 for suspected PPROM, positive antibody screen, increased risk for T21 with negative amniocentesis, lupus and crohn's disease, with vaginal bleeding and cervical dilation, possibly 2/2 placental abruption and/or  labor, on magnesium for neuroprotection.    Plan:  -Continue with magnesium  -Repeat Mag level and labs @2300  -Neuro checks q2h  -strict I/Os  -Pain management prn  -Cont seizure precautions  -Cont efm/toco

## 2023-12-28 NOTE — OB RN TRIAGE NOTE - FALL HARM RISK - UNIVERSAL INTERVENTIONS
Bed in lowest position, wheels locked, appropriate side rails in place/Call bell, personal items and telephone in reach/Instruct patient to call for assistance before getting out of bed or chair/Non-slip footwear when patient is out of bed/[place] to call system/Physically safe environment - no spills, clutter or unnecessary equipment/Purposeful Proactive Rounding/Room/bathroom lighting operational, light cord in reach Bed in lowest position, wheels locked, appropriate side rails in place/Call bell, personal items and telephone in reach/Instruct patient to call for assistance before getting out of bed or chair/Non-slip footwear when patient is out of bed/[place] to call system/Physically safe environment - no spills, clutter or unnecessary equipment/Purposeful Proactive Rounding/Room/bathroom lighting operational, light cord in reach

## 2023-12-28 NOTE — PRE-ANESTHESIA EVALUATION ADULT - NSANTHPMHFT_GEN_ALL_CORE
22 yo  @ 29w3d, s/p latency abx and celestone 12/10 for suspected PPROM, positive antibody screen, increased risk for T21 with negative amniocentesis, lupus and crohn's disease, with vaginal bleeding and cervical dilation, likely 2/2 placental abruption and  labor.

## 2023-12-28 NOTE — CONSULT NOTE PEDS - SUBJECTIVE AND OBJECTIVE BOX
Pt is a 24yo  female currently 29w3d gestation admitted for vaginal bleeding and suspected  labor. Pregnancy was complicated by an admission earlier in pregnancy for possible PPROM and she received  steroids on -12/10 and latency antibiotics. She also has a past history of pericarditis in 2023 for which she received prednisone and cholchicine. She has history of lupus and Crohn's Disease but not receiving any treatment currently. She has multiple antibodies on type and screen. This pregnancy has increased risk of trisomy 21, NIPTS inconclusive but fetal echo normal and amniocentesis karyotype negative. She is currently admitted and receiving rescue celestone and magnesium. I spoke to pt at bedside.     I discussed complications of prematurity as below:      1. Pulmonary immaturity: The baby will be assessed at the time of delivery and likely support will be provided in DR - In the event that the baby has poor respiratory effort, intubation with breathing tube may be necessary, given the GA. Betamethasone given will have some benefit for lung maturity prior to delivery.     2. Infection: The infant will be assessed for infection at time of delivery and will likely be started on antibiotics during that evaluation.  An Umbilical venous catheter and umbilical artery catheter would be placed in sterile conditions for IV nutrition and blood draws. They will be removed when no longer necessary.     3. Neurological complications: The increased risk of IVH due to the fragility of the blood vessels and the need for screening HUS was discussed. It is impossible to predict neurodevelopmental outcome with certainty and will depend on the NICU course. Early intervention may be necessary and is very beneficial if the infant qualifies for it after discharge. We also talked about screening for Retinopathy of prematurity (ROP). Also due to immaturity, the infant will not be able to regulate temperature and will need to be placed in a humidified, heated isolette initially.     4. Feeding intolerance: Due to immaturity of the gut, there is an increased risk of feeding intolerance. We will start feeds as soon as able and will provide only breast milk and increase the volume slowly, as well as giving IVF or TPN for IV nutrition. I discussed the importance of providing breast milk and colostrum oral care. If there is difficulty in providing maternal breast milk, donor breast milk will be available. We will also provide mother with a breast pump prescription after delivery.     5. We will screen for other complications as necessary.       Discharge criteria: tolerating feeds, consistent weight gain, breathing room air, maintaining body temperature in an open crib

## 2023-12-28 NOTE — PRE-ANESTHESIA EVALUATION ADULT - NSRADCARDRESULTSFT_GEN_ALL_CORE
TTE: 12/11/2023:  1. Left ventricular ejection fraction, by visual estimation, is 65 to 70%.   2. Normal left ventricular size and wall thicknesses, with normal systolic and diastolic function.   3. Mild tricuspid regurgitation.   4. No pericardial effusion.

## 2023-12-28 NOTE — PROGRESS NOTE ADULT - ASSESSMENT
22 yo  @ 29w3d, s/p latency abx and celestone 12/10 for suspected PPROM, positive antibody screen, increased risk for T21 with negative amniocentesis, lupus and crohn's disease, with vaginal bleeding and cervical dilation, likely 2/2 placental abruption and  labor, on magnesium for neuroprotection.    Plan:  -Continue with magnesium  -Repeat Mag level and labs @0400  -Neuro checks q2h  -strict I/Os  -Pain management prn  -Cont seizure precautions  -Cont efm/toco   24 yo  @ 29w3d, s/p latency abx and celestone 12/10 for suspected PPROM, positive antibody screen, increased risk for T21 with negative amniocentesis, lupus and crohn's disease, with vaginal bleeding and cervical dilation, likely 2/2 placental abruption and  labor, on magnesium for neuroprotection.    Plan:  -Continue with magnesium  -Repeat Mag level and labs @0400  -Neuro checks q2h  -strict I/Os  -Pain management prn  -Cont seizure precautions  -Cont efm/toco   24 yo  @ 29w3d, s/p latency abx and celestone 12/10 for suspected PPROM, positive antibody screen, increased risk for T21 with negative amniocentesis, lupus and crohn's disease, with vaginal bleeding and cervical dilation, possibly 2/2 placental abruption and/or  labor, on magnesium for neuroprotection.    Plan:  -Continue with magnesium  -Repeat Mag level and labs @0400  -Neuro checks q2h  -strict I/Os  -Pain management prn  -Cont seizure precautions  -Cont efm/toco   22 yo  @ 29w3d, s/p latency abx and celestone 12/10 for suspected PPROM, positive antibody screen, increased risk for T21 with negative amniocentesis, lupus and crohn's disease, with vaginal bleeding and cervical dilation, possibly 2/2 placental abruption and/or  labor, on magnesium for neuroprotection.    Plan:  -Continue with magnesium  -Repeat Mag level and labs @0400  -Neuro checks q2h  -strict I/Os  -Pain management prn  -Cont seizure precautions  -Cont efm/toco

## 2023-12-28 NOTE — CONSULT NOTE ADULT - ASSESSMENT
[id number] @ 29w3d, s/p latency abx and celestone 12/10 for suspected PPROM, positive antibody screen, increased risk for T21 with negative amniocentesis, lupus and crohn's disease, with vaginal bleeding and cervical dilation, likely 2/2 chronic placental abruption causing  labor.    1.  Possible PPROM  - S/p  steroids -12/10  - s/p latency abx  - NICU consult appreciated  - GBS negative. Gc/Ct negative.     2. Anemia, stable  - Iron studies are within normal limits.  - Appreciate Hematology input.    3. Multiple antibodies in type and screen  - Crossed 2u PRBCs, per blood bank it is not perfectly matched and patient will need to sign emergency release form if needed  - per transfusion medicine transfuse as needed with slow infusion rate and monitor for reaction    5.  History of pericarditis with small pericardial effusion in February-2023. S/p prednisone and colchicine.  - Normal TTE  - Consult appreciated by Dr Behuria from recent admission    6.  Dx of SLE  - Followed by Rheumatology, patient declining  treatment    7. Crohn's disease in remission  - Diagnosed at 14yo, last colonoscopy then  - Lost to follow up, pt asymptomatic  - She should follow up with GI as an outpatient    8 Possible cHTN  - Patient denies history of elevated BPs, never on meds.  - Elevated, non-severe BPs outpatient. Similar while in the hospital   - UA 30 protein  - LFTs normal  - Urine protein: creatinine 0.2    9.  Pregnancy  - Increased risk of down syndrome, Increased NT, right pyelectasis, small pericardial effusion.  S/p fetal echo (otherwise normal). NIPTS inconclusive, amniocentesis karyotype and array are negative.   - Previous  delivery.  - Regular PO diet  - GBS negative  - continuous EFM/TOCO    MD Bryce, FACOG [id number] @ 29w3d, s/p latency abx and celestone 12/10 for suspected PPROM, positive antibody screen, increased risk for T21 with negative amniocentesis, lupus and crohn's disease, with vaginal bleeding and cervical dilation, likely 2/2 chronic placental abruption causing  labor.    1.  Possible PPROM  - S/p  steroids -12/10  - s/p latency abx  - NICU consult appreciated  - GBS negative. Gc/Ct negative.     2. Anemia, stable  - Iron studies are within normal limits.  - Appreciate Hematology input.    3. Multiple antibodies in type and screen  - Crossed 2u PRBCs, per blood bank it is not perfectly matched and patient will need to sign emergency release form if needed  - per transfusion medicine transfuse as needed with slow infusion rate and monitor for reaction    5.  History of pericarditis with small pericardial effusion in February-2023. S/p prednisone and colchicine.  - Normal TTE  - Consult appreciated by Dr Behuria from recent admission    6.  Dx of SLE  - Followed by Rheumatology, patient declining  treatment    7. Crohn's disease in remission  - Diagnosed at 16yo, last colonoscopy then  - Lost to follow up, pt asymptomatic  - She should follow up with GI as an outpatient    8 Possible cHTN  - Patient denies history of elevated BPs, never on meds.  - Elevated, non-severe BPs outpatient. Similar while in the hospital   - UA 30 protein  - LFTs normal  - Urine protein: creatinine 0.2    9.  Pregnancy  - Increased risk of down syndrome, Increased NT, right pyelectasis, small pericardial effusion.  S/p fetal echo (otherwise normal). NIPTS inconclusive, amniocentesis karyotype and array are negative.   - Previous  delivery.  - Regular PO diet  - GBS negative  - continuous EFM/TOCO    MD Bryce, FACOG [id number] @ 29w3d, s/p latency abx and celestone 12/10 for suspected PPROM, positive antibody screen, increased risk for T21 with negative amniocentesis, lupus and crohn's disease, with vaginal bleeding and cervical dilation, likely 2/2 chronic placental abruption causing  labor.    1.  Possible PPROM  - S/p  steroids -12/10  - s/p latency abx  - NICU consult appreciated  - GBS negative. Gc/Ct negative.     2. Anemia, stable  - Iron studies are within normal limits.  - Appreciate Hematology input.    3. Multiple antibodies in type and screen  - Crossed 2u PRBCs, per blood bank it is not perfectly matched and patient will need to sign emergency release form if needed  - per transfusion medicine transfuse as needed with slow infusion rate and monitor for reaction    5.  History of pericarditis with small pericardial effusion in February-2023. S/p prednisone and colchicine.  - Normal TTE  - Consult appreciated by Dr Behuria from recent admission    6.  Dx of SLE  - Followed by Rheumatology, patient declining  treatment    7. Crohn's disease in remission  - Diagnosed at 16yo, last colonoscopy then  - Lost to follow up, pt asymptomatic  - She should follow up with GI as an outpatient    8 Possible cHTN  - Patient denies history of elevated BPs, never on meds.  - Elevated, non-severe BPs outpatient. Similar while in the hospital   - UA 30 protein  - LFTs normal  - Urine protein: creatinine 0.2    9.  Pregnancy  - Increased risk of down syndrome, Increased NT, right pyelectasis, small pericardial effusion.  S/p fetal echo (otherwise normal). NIPTS inconclusive, amniocentesis karyotype and array are negative.   - Previous  delivery.  - Regular PO diet  - GBS negative  - continuous EFM/TOCO    10. r/o  Labor:  Agree with BMZ and Magnesium but would limit dose to 1g/h.    Tocolysis is not appropriate in this setting.  If the team decides to augment labor please call me.   With documented Hx of LTCSx 4y ago, pt can be allowed to labor after appropriate counseling.   CSx for usual obstetric indications.      MD Bryce, FACOG    MD Bryce, FACOG [id number] @ 29w3d, s/p latency abx and celestone 12/10 for suspected PPROM, positive antibody screen, increased risk for T21 with negative amniocentesis, lupus and crohn's disease, with vaginal bleeding and cervical dilation, likely 2/2 chronic placental abruption causing  labor.    1.  Possible PPROM  - S/p  steroids -12/10  - s/p latency abx  - NICU consult appreciated  - GBS negative. Gc/Ct negative.     2. Anemia, stable  - Iron studies are within normal limits.  - Appreciate Hematology input.    3. Multiple antibodies in type and screen  - Crossed 2u PRBCs, per blood bank it is not perfectly matched and patient will need to sign emergency release form if needed  - per transfusion medicine transfuse as needed with slow infusion rate and monitor for reaction    5.  History of pericarditis with small pericardial effusion in February-2023. S/p prednisone and colchicine.  - Normal TTE  - Consult appreciated by Dr Behuria from recent admission    6.  Dx of SLE  - Followed by Rheumatology, patient declining  treatment    7. Crohn's disease in remission  - Diagnosed at 14yo, last colonoscopy then  - Lost to follow up, pt asymptomatic  - She should follow up with GI as an outpatient    8 Possible cHTN  - Patient denies history of elevated BPs, never on meds.  - Elevated, non-severe BPs outpatient. Similar while in the hospital   - UA 30 protein  - LFTs normal  - Urine protein: creatinine 0.2    9.  Pregnancy  - Increased risk of down syndrome, Increased NT, right pyelectasis, small pericardial effusion.  S/p fetal echo (otherwise normal). NIPTS inconclusive, amniocentesis karyotype and array are negative.   - Previous  delivery.  - Regular PO diet  - GBS negative  - continuous EFM/TOCO    10. r/o  Labor:  Agree with BMZ and Magnesium but would limit dose to 1g/h.    Tocolysis is not appropriate in this setting.  If the team decides to augment labor please call me.   With documented Hx of LTCSx 4y ago, pt can be allowed to labor after appropriate counseling.   CSx for usual obstetric indications.      MD Bryce, FACOG    MD Bryce, FACOG

## 2023-12-28 NOTE — PRE-ANESTHESIA EVALUATION ADULT - NSANTHADDINFOFT_GEN_ALL_CORE
risks, benefits, alternatives, general anesthesia as a backup discussed with the patient and she agrees to proceed as planned. IF  indicated it was also discussed the possibility of doing a spinal or CSE. Patient understands and agrees to proceed as planned. Patient seen, examined, consent obtained prior to Dural Puncture Epidural placement

## 2023-12-29 ENCOUNTER — RESULT REVIEW (OUTPATIENT)
Age: 23
End: 2023-12-29

## 2023-12-29 LAB
APTT BLD: 26.7 SEC — LOW (ref 27–39.2)
APTT BLD: 26.7 SEC — LOW (ref 27–39.2)
BASOPHILS # BLD AUTO: 0 K/UL — SIGNIFICANT CHANGE UP (ref 0–0.2)
BASOPHILS # BLD AUTO: 0 K/UL — SIGNIFICANT CHANGE UP (ref 0–0.2)
BASOPHILS # BLD AUTO: 0.01 K/UL — SIGNIFICANT CHANGE UP (ref 0–0.2)
BASOPHILS # BLD AUTO: 0.01 K/UL — SIGNIFICANT CHANGE UP (ref 0–0.2)
BASOPHILS NFR BLD AUTO: 0 % — SIGNIFICANT CHANGE UP (ref 0–1)
BASOPHILS NFR BLD AUTO: 0 % — SIGNIFICANT CHANGE UP (ref 0–1)
BASOPHILS NFR BLD AUTO: 0.1 % — SIGNIFICANT CHANGE UP (ref 0–1)
BASOPHILS NFR BLD AUTO: 0.1 % — SIGNIFICANT CHANGE UP (ref 0–1)
EOSINOPHIL # BLD AUTO: 0 K/UL — SIGNIFICANT CHANGE UP (ref 0–0.7)
EOSINOPHIL NFR BLD AUTO: 0 % — SIGNIFICANT CHANGE UP (ref 0–8)
FIBRINOGEN PPP-MCNC: 494 MG/DL — HIGH (ref 200–435)
FIBRINOGEN PPP-MCNC: 494 MG/DL — HIGH (ref 200–435)
HCT VFR BLD CALC: 24.3 % — LOW (ref 37–47)
HCT VFR BLD CALC: 24.3 % — LOW (ref 37–47)
HCT VFR BLD CALC: 25.5 % — LOW (ref 37–47)
HCT VFR BLD CALC: 25.5 % — LOW (ref 37–47)
HGB BLD-MCNC: 8.5 G/DL — LOW (ref 12–16)
HGB BLD-MCNC: 8.5 G/DL — LOW (ref 12–16)
HGB BLD-MCNC: 8.8 G/DL — LOW (ref 12–16)
HGB BLD-MCNC: 8.8 G/DL — LOW (ref 12–16)
IMM GRANULOCYTES NFR BLD AUTO: 0.5 % — HIGH (ref 0.1–0.3)
IMM GRANULOCYTES NFR BLD AUTO: 0.5 % — HIGH (ref 0.1–0.3)
IMM GRANULOCYTES NFR BLD AUTO: 0.6 % — HIGH (ref 0.1–0.3)
IMM GRANULOCYTES NFR BLD AUTO: 0.6 % — HIGH (ref 0.1–0.3)
INR BLD: 0.92 RATIO — SIGNIFICANT CHANGE UP (ref 0.65–1.3)
INR BLD: 0.92 RATIO — SIGNIFICANT CHANGE UP (ref 0.65–1.3)
LYMPHOCYTES # BLD AUTO: 0.39 K/UL — LOW (ref 1.2–3.4)
LYMPHOCYTES # BLD AUTO: 0.39 K/UL — LOW (ref 1.2–3.4)
LYMPHOCYTES # BLD AUTO: 0.63 K/UL — LOW (ref 1.2–3.4)
LYMPHOCYTES # BLD AUTO: 0.63 K/UL — LOW (ref 1.2–3.4)
LYMPHOCYTES # BLD AUTO: 4.4 % — LOW (ref 20.5–51.1)
LYMPHOCYTES # BLD AUTO: 4.4 % — LOW (ref 20.5–51.1)
LYMPHOCYTES # BLD AUTO: 5.8 % — LOW (ref 20.5–51.1)
LYMPHOCYTES # BLD AUTO: 5.8 % — LOW (ref 20.5–51.1)
MAGNESIUM SERPL-MCNC: 4.5 MG/DL — CRITICAL HIGH (ref 1.8–2.4)
MAGNESIUM SERPL-MCNC: 4.5 MG/DL — CRITICAL HIGH (ref 1.8–2.4)
MCHC RBC-ENTMCNC: 33.8 PG — HIGH (ref 27–31)
MCHC RBC-ENTMCNC: 33.8 PG — HIGH (ref 27–31)
MCHC RBC-ENTMCNC: 34.5 G/DL — SIGNIFICANT CHANGE UP (ref 32–37)
MCHC RBC-ENTMCNC: 34.5 G/DL — SIGNIFICANT CHANGE UP (ref 32–37)
MCHC RBC-ENTMCNC: 34.6 PG — HIGH (ref 27–31)
MCHC RBC-ENTMCNC: 34.6 PG — HIGH (ref 27–31)
MCHC RBC-ENTMCNC: 35 G/DL — SIGNIFICANT CHANGE UP (ref 32–37)
MCHC RBC-ENTMCNC: 35 G/DL — SIGNIFICANT CHANGE UP (ref 32–37)
MCV RBC AUTO: 98.1 FL — SIGNIFICANT CHANGE UP (ref 81–99)
MCV RBC AUTO: 98.1 FL — SIGNIFICANT CHANGE UP (ref 81–99)
MCV RBC AUTO: 98.8 FL — SIGNIFICANT CHANGE UP (ref 81–99)
MCV RBC AUTO: 98.8 FL — SIGNIFICANT CHANGE UP (ref 81–99)
MONOCYTES # BLD AUTO: 0.55 K/UL — SIGNIFICANT CHANGE UP (ref 0.1–0.6)
MONOCYTES # BLD AUTO: 0.55 K/UL — SIGNIFICANT CHANGE UP (ref 0.1–0.6)
MONOCYTES # BLD AUTO: 0.91 K/UL — HIGH (ref 0.1–0.6)
MONOCYTES # BLD AUTO: 0.91 K/UL — HIGH (ref 0.1–0.6)
MONOCYTES NFR BLD AUTO: 6.2 % — SIGNIFICANT CHANGE UP (ref 1.7–9.3)
MONOCYTES NFR BLD AUTO: 6.2 % — SIGNIFICANT CHANGE UP (ref 1.7–9.3)
MONOCYTES NFR BLD AUTO: 8.4 % — SIGNIFICANT CHANGE UP (ref 1.7–9.3)
MONOCYTES NFR BLD AUTO: 8.4 % — SIGNIFICANT CHANGE UP (ref 1.7–9.3)
NEUTROPHILS # BLD AUTO: 7.83 K/UL — HIGH (ref 1.4–6.5)
NEUTROPHILS # BLD AUTO: 7.83 K/UL — HIGH (ref 1.4–6.5)
NEUTROPHILS # BLD AUTO: 9.16 K/UL — HIGH (ref 1.4–6.5)
NEUTROPHILS # BLD AUTO: 9.16 K/UL — HIGH (ref 1.4–6.5)
NEUTROPHILS NFR BLD AUTO: 85.1 % — HIGH (ref 42.2–75.2)
NEUTROPHILS NFR BLD AUTO: 85.1 % — HIGH (ref 42.2–75.2)
NEUTROPHILS NFR BLD AUTO: 88.9 % — HIGH (ref 42.2–75.2)
NEUTROPHILS NFR BLD AUTO: 88.9 % — HIGH (ref 42.2–75.2)
NRBC # BLD: 0 /100 WBCS — SIGNIFICANT CHANGE UP (ref 0–0)
PLATELET # BLD AUTO: 274 K/UL — SIGNIFICANT CHANGE UP (ref 130–400)
PLATELET # BLD AUTO: 274 K/UL — SIGNIFICANT CHANGE UP (ref 130–400)
PLATELET # BLD AUTO: 301 K/UL — SIGNIFICANT CHANGE UP (ref 130–400)
PLATELET # BLD AUTO: 301 K/UL — SIGNIFICANT CHANGE UP (ref 130–400)
PMV BLD: 9.7 FL — SIGNIFICANT CHANGE UP (ref 7.4–10.4)
PMV BLD: 9.7 FL — SIGNIFICANT CHANGE UP (ref 7.4–10.4)
PMV BLD: 9.8 FL — SIGNIFICANT CHANGE UP (ref 7.4–10.4)
PMV BLD: 9.8 FL — SIGNIFICANT CHANGE UP (ref 7.4–10.4)
PROTHROM AB SERPL-ACNC: 10.5 SEC — SIGNIFICANT CHANGE UP (ref 9.95–12.87)
PROTHROM AB SERPL-ACNC: 10.5 SEC — SIGNIFICANT CHANGE UP (ref 9.95–12.87)
RBC # BLD: 2.46 M/UL — LOW (ref 4.2–5.4)
RBC # BLD: 2.46 M/UL — LOW (ref 4.2–5.4)
RBC # BLD: 2.6 M/UL — LOW (ref 4.2–5.4)
RBC # BLD: 2.6 M/UL — LOW (ref 4.2–5.4)
RBC # FLD: 13.2 % — SIGNIFICANT CHANGE UP (ref 11.5–14.5)
RBC # FLD: 13.2 % — SIGNIFICANT CHANGE UP (ref 11.5–14.5)
RBC # FLD: 13.4 % — SIGNIFICANT CHANGE UP (ref 11.5–14.5)
RBC # FLD: 13.4 % — SIGNIFICANT CHANGE UP (ref 11.5–14.5)
WBC # BLD: 10.77 K/UL — SIGNIFICANT CHANGE UP (ref 4.8–10.8)
WBC # BLD: 10.77 K/UL — SIGNIFICANT CHANGE UP (ref 4.8–10.8)
WBC # BLD: 8.81 K/UL — SIGNIFICANT CHANGE UP (ref 4.8–10.8)
WBC # BLD: 8.81 K/UL — SIGNIFICANT CHANGE UP (ref 4.8–10.8)
WBC # FLD AUTO: 10.77 K/UL — SIGNIFICANT CHANGE UP (ref 4.8–10.8)
WBC # FLD AUTO: 10.77 K/UL — SIGNIFICANT CHANGE UP (ref 4.8–10.8)
WBC # FLD AUTO: 8.81 K/UL — SIGNIFICANT CHANGE UP (ref 4.8–10.8)
WBC # FLD AUTO: 8.81 K/UL — SIGNIFICANT CHANGE UP (ref 4.8–10.8)

## 2023-12-29 PROCEDURE — 88307 TISSUE EXAM BY PATHOLOGIST: CPT | Mod: 26

## 2023-12-29 PROCEDURE — 99233 SBSQ HOSP IP/OBS HIGH 50: CPT

## 2023-12-29 RX ORDER — KETOROLAC TROMETHAMINE 30 MG/ML
30 SYRINGE (ML) INJECTION ONCE
Refills: 0 | Status: DISCONTINUED | OUTPATIENT
Start: 2023-12-29 | End: 2023-12-29

## 2023-12-29 RX ORDER — TETANUS TOXOID, REDUCED DIPHTHERIA TOXOID AND ACELLULAR PERTUSSIS VACCINE, ADSORBED 5; 2.5; 8; 8; 2.5 [IU]/.5ML; [IU]/.5ML; UG/.5ML; UG/.5ML; UG/.5ML
0.5 SUSPENSION INTRAMUSCULAR ONCE
Refills: 0 | Status: DISCONTINUED | OUTPATIENT
Start: 2023-12-29 | End: 2023-12-31

## 2023-12-29 RX ORDER — IBUPROFEN 200 MG
600 TABLET ORAL EVERY 6 HOURS
Refills: 0 | Status: DISCONTINUED | OUTPATIENT
Start: 2023-12-29 | End: 2023-12-31

## 2023-12-29 RX ORDER — OXYCODONE HYDROCHLORIDE 5 MG/1
5 TABLET ORAL ONCE
Refills: 0 | Status: DISCONTINUED | OUTPATIENT
Start: 2023-12-29 | End: 2023-12-31

## 2023-12-29 RX ORDER — DIBUCAINE 1 %
1 OINTMENT (GRAM) RECTAL EVERY 6 HOURS
Refills: 0 | Status: DISCONTINUED | OUTPATIENT
Start: 2023-12-29 | End: 2023-12-31

## 2023-12-29 RX ORDER — LANOLIN
1 OINTMENT (GRAM) TOPICAL EVERY 6 HOURS
Refills: 0 | Status: DISCONTINUED | OUTPATIENT
Start: 2023-12-29 | End: 2023-12-31

## 2023-12-29 RX ORDER — MAGNESIUM HYDROXIDE 400 MG/1
30 TABLET, CHEWABLE ORAL
Refills: 0 | Status: DISCONTINUED | OUTPATIENT
Start: 2023-12-29 | End: 2023-12-31

## 2023-12-29 RX ORDER — SODIUM CHLORIDE 9 MG/ML
3 INJECTION INTRAMUSCULAR; INTRAVENOUS; SUBCUTANEOUS EVERY 8 HOURS
Refills: 0 | Status: DISCONTINUED | OUTPATIENT
Start: 2023-12-29 | End: 2023-12-31

## 2023-12-29 RX ORDER — BENZOCAINE 10 %
1 GEL (GRAM) MUCOUS MEMBRANE EVERY 6 HOURS
Refills: 0 | Status: DISCONTINUED | OUTPATIENT
Start: 2023-12-29 | End: 2023-12-31

## 2023-12-29 RX ORDER — DIPHENHYDRAMINE HCL 50 MG
25 CAPSULE ORAL EVERY 6 HOURS
Refills: 0 | Status: DISCONTINUED | OUTPATIENT
Start: 2023-12-29 | End: 2023-12-31

## 2023-12-29 RX ORDER — SODIUM CHLORIDE 9 MG/ML
1000 INJECTION, SOLUTION INTRAVENOUS
Refills: 0 | Status: DISCONTINUED | OUTPATIENT
Start: 2023-12-29 | End: 2023-12-29

## 2023-12-29 RX ORDER — OXYCODONE HYDROCHLORIDE 5 MG/1
5 TABLET ORAL
Refills: 0 | Status: DISCONTINUED | OUTPATIENT
Start: 2023-12-29 | End: 2023-12-31

## 2023-12-29 RX ORDER — AER TRAVELER 0.5 G/1
1 SOLUTION RECTAL; TOPICAL EVERY 4 HOURS
Refills: 0 | Status: DISCONTINUED | OUTPATIENT
Start: 2023-12-29 | End: 2023-12-31

## 2023-12-29 RX ORDER — ACETAMINOPHEN 500 MG
975 TABLET ORAL
Refills: 0 | Status: DISCONTINUED | OUTPATIENT
Start: 2023-12-29 | End: 2023-12-31

## 2023-12-29 RX ORDER — SIMETHICONE 80 MG/1
80 TABLET, CHEWABLE ORAL EVERY 4 HOURS
Refills: 0 | Status: DISCONTINUED | OUTPATIENT
Start: 2023-12-29 | End: 2023-12-31

## 2023-12-29 RX ORDER — IBUPROFEN 200 MG
600 TABLET ORAL EVERY 6 HOURS
Refills: 0 | Status: COMPLETED | OUTPATIENT
Start: 2023-12-29 | End: 2024-11-26

## 2023-12-29 RX ADMIN — Medication 975 MILLIGRAM(S): at 15:53

## 2023-12-29 RX ADMIN — Medication 600 MILLIGRAM(S): at 11:36

## 2023-12-29 RX ADMIN — Medication 600 MILLIGRAM(S): at 18:40

## 2023-12-29 RX ADMIN — Medication 975 MILLIGRAM(S): at 06:38

## 2023-12-29 RX ADMIN — Medication 600 MILLIGRAM(S): at 18:09

## 2023-12-29 RX ADMIN — Medication 1 TABLET(S): at 12:59

## 2023-12-29 RX ADMIN — Medication 600 MILLIGRAM(S): at 12:10

## 2023-12-29 RX ADMIN — Medication 30 MILLIGRAM(S): at 05:55

## 2023-12-29 RX ADMIN — SODIUM CHLORIDE 3 MILLILITER(S): 9 INJECTION INTRAMUSCULAR; INTRAVENOUS; SUBCUTANEOUS at 14:41

## 2023-12-29 RX ADMIN — Medication 975 MILLIGRAM(S): at 16:30

## 2023-12-29 NOTE — PROGRESS NOTE ADULT - SUBJECTIVE AND OBJECTIVE BOX
PGY1 Magnesium Note     Subjective:   Pt evaluated at bedside for magnesium check. She denies headache, vision changes, dizziness, SOB, chest pain, RUQ/epigastric pain. No complaints, pain well controlled with epidural.     Objective:   T(C): 37.2 (12-28-23 @ 18:29), Max: 37.2 (12-28-23 @ 18:29)  T(F): 98.96 (12-28-23 @ 18:29), Max: 98.96 (12-28-23 @ 18:29)  HR: 83 (12-29-23 @ 04:07) (72 - 96)  BP: 112/57 (12-29-23 @ 03:42) (104/54 - 139/80)  RR: 18 (12-28-23 @ 11:30) (18 - 78)  SpO2: 99% (12-29-23 @ 04:07) (97% - 100%)      12-28-23 @ 07:01  -  12-28-23 @ 18:32  --------------------------------------------------------  IN: 500 mL / OUT: 900 mL / NET: -400 mL        Gen: NAD, AAOx3  CV: S1S2, RRR, no M/R/G  Pulm: CTAB, no rhonchi or rales or wheezing  Ext: no calf tenderness or edema SCDs in place  Neuro: 2+ DTR bilaterally  Abd: soft, GRAVId, nontender, no rebound or guarding, no epigastric tenderness    EFM: 120/mod/pos accels  [place]: q10min  SVE: deferred; 3/70/-2 @1302    Medications:  acetaminophen   IVPB ..: 400 (12-28-23 @ 10:13)  betamethasone Injectable: 12 (12-28-23 @ 10:14)  lactated ringers: 75 (12-28-23 @ 09:35)  magnesium sulfate  IVPB: 300 (12-28-23 @ 10:20)  magnesium sulfate Infusion: 50 (12-28-23 @ 09:33)      Labs:                         9.4    8.32  )-----------( 288      ( 28 Dec 2023 22:28 )             27.4       12-28    135  |  104  |  7<L>  ----------------------------<  92  4.3   |  19  |  0.5<L>    Ca    8.7      28 Dec 2023 09:45  Mg     4.5     12-28    TPro  7.9  /  Alb  3.8  /  TBili  0.4  /  DBili  x   /  AST  21  /  ALT  16  /  AlkPhos  104  12-28              Urinalysis Basic - ( 28 Dec 2023 12:30 )    Color: Dark Yellow / Appearance: Cloudy / SG: >1.030 / pH: x  Gluc: x / Ketone: 15 mg/dL  / Bili: Small / Urobili: 1.0 mg/dL   Blood: x / Protein: 30 mg/dL / Nitrite: Negative   Leuk Esterase: Negative / RBC: 2 /HPF / WBC 3 /HPF   Sq Epi: x / Non Sq Epi: 5 /HPF / Bacteria: Negative /HPF        PT/INR - ( 28 Dec 2023 22:28 )   PT: 10.30 sec;   INR: 0.90 ratio         PTT - ( 28 Dec 2023 22:28 )  PTT:28.4 sec          CAPILLARY BLOOD GLUCOSE                        9.5    8.91  )-----------( 298      ( 28 Dec 2023 17:41 )             27.3   12-28    135  |  104  |  7<L>  ----------------------------<  92  4.3   |  19  |  0.5<L>    Ca    8.7      28 Dec 2023 09:45  Mg     4.5     12-28    TPro  7.9  /  Alb  3.8  /  TBili  0.4  /  DBili  x   /  AST  21  /  ALT  16  /  AlkPhos  104  12-28      Urinalysis Basic - ( 28 Dec 2023 12:30 )    Color: Dark Yellow / Appearance: Cloudy / SG: >1.030 / pH: x  Gluc: x / Ketone: 15 mg/dL  / Bili: Small / Urobili: 1.0 mg/dL   Blood: x / Protein: 30 mg/dL / Nitrite: Negative   Leuk Esterase: Negative / RBC: 2 /HPF / WBC 3 /HPF   Sq Epi: x / Non Sq Epi: 5 /HPF / Bacteria: Negative /HPF         PGY1 Magnesium Note     Subjective:   Pt evaluated at bedside for magnesium check. She denies headache, vision changes, dizziness, SOB, chest pain, RUQ/epigastric pain. No complaints, pain well controlled with epidural.     Objective:   T(C): 37.2 (12-28-23 @ 18:29), Max: 37.2 (12-28-23 @ 18:29)  T(F): 98.96 (12-28-23 @ 18:29), Max: 98.96 (12-28-23 @ 18:29)  HR: 83 (12-29-23 @ 04:07) (72 - 96)  BP: 112/57 (12-29-23 @ 03:42) (104/54 - 139/80)  RR: 18 (12-28-23 @ 11:30) (18 - 78)  SpO2: 99% (12-29-23 @ 04:07) (97% - 100%)      12-28-23 @ 07:01  -  12-28-23 @ 18:32  --------------------------------------------------------  IN: 500 mL / OUT: 900 mL / NET: -400 mL        Gen: NAD, AAOx3  CV: S1S2, RRR, no M/R/G  Pulm: CTAB, no rhonchi or rales or wheezing  Ext: no calf tenderness or edema SCDs in place  Neuro: 2+ DTR bilaterally  Abd: soft, GRAVId, nontender, no rebound or guarding, no epigastric tenderness    EFM: 120/mod/pos accels  [place]: q10min  SVE: deferred; 3/70/-2 @1302    Medications:  acetaminophen   IVPB ..: 400 (12-28-23 @ 10:13)  betamethasone Injectable: 12 (12-28-23 @ 10:14)  lactated ringers: 75 (12-28-23 @ 09:35)  magnesium sulfate  IVPB: 300 (12-28-23 @ 10:20)  magnesium sulfate Infusion: 50 (12-28-23 @ 09:33)      Labs:                         9.4    8.32  )-----------( 288      ( 28 Dec 2023 22:28 )             27.4       12-28    135  |  104  |  7<L>  ----------------------------<  92  4.3   |  19  |  0.5<L>    Ca    8.7      28 Dec 2023 09:45  Mg     4.5     12-28    TPro  7.9  /  Alb  3.8  /  TBili  0.4  /  DBili  x   /  AST  21  /  ALT  16  /  AlkPhos  104  12-28              Urinalysis Basic - ( 28 Dec 2023 12:30 )    Color: Dark Yellow / Appearance: Cloudy / SG: >1.030 / pH: x  Gluc: x / Ketone: 15 mg/dL  / Bili: Small / Urobili: 1.0 mg/dL   Blood: x / Protein: 30 mg/dL / Nitrite: Negative   Leuk Esterase: Negative / RBC: 2 /HPF / WBC 3 /HPF   Sq Epi: x / Non Sq Epi: 5 /HPF / Bacteria: Negative /HPF        PT/INR - ( 28 Dec 2023 22:28 )   PT: 10.30 sec;   INR: 0.90 ratio         PTT - ( 28 Dec 2023 22:28 )  PTT:28.4 sec          CAPILLARY BLOOD GLUCOSE                        9.5    8.91  )-----------( 298      ( 28 Dec 2023 17:41 )             27.3   12-28    135  |  104  |  7<L>  ----------------------------<  92  4.3   |  19  |  0.5<L>    Ca    8.7      28 Dec 2023 09:45  Mg     4.5     12-28    TPro  7.9  /  Alb  3.8  /  TBili  0.4  /  DBili  x   /  AST  21  /  ALT  16  /  AlkPhos  104  12-28      Urinalysis Basic - ( 28 Dec 2023 12:30 )    Color: Dark Yellow / Appearance: Cloudy / SG: >1.030 / pH: x  Gluc: x / Ketone: 15 mg/dL  / Bili: Small / Urobili: 1.0 mg/dL   Blood: x / Protein: 30 mg/dL / Nitrite: Negative   Leuk Esterase: Negative / RBC: 2 /HPF / WBC 3 /HPF   Sq Epi: x / Non Sq Epi: 5 /HPF / Bacteria: Negative /HPF

## 2023-12-29 NOTE — OB NEONATOLOGY/PEDIATRICIAN DELIVERY SUMMARY - NSPEDSNEONOTESA_OBGYN_ALL_OB_FT
29.4 week male   born to mother with PMH of lupus, Crohn's disease, pericarditis with small pericardial effusion. Presented with vaginal bleeding, PPROM, and PTL. Mother treated with magnesium and betamethasone. . NICU PA arrived in L&D shortly after delivery. Baby brought to radiant warmer and placed on heated gel mattress. Baby noted to be breathing spontaneously with intermittent cry. SpO2 probe applied to right wrist, ECG electrodes and temperature probe applied to chest and abdomen. T-piece resuscitator face mask applied and CPAP+5/0.30 administered. Baby noted to have increased muscle tone. Baby became apneic and bradycardiac at approximately 5 minutes. PPV 40 x 20/5 by T-piece face mask. Inadequate chest rise; MR SOPA maneuvers performed without improvement. Baby intubated at approximately 7 minutes with 0 blade, 2.5 ETT inserted to 7.5 cm at lip. Rapid improvement in heart rate and saturation noted. ET placement confirmed by BL air entry, mist in tube, and CO2 detector color change. Baby transported to NICU with ET PPV in heated carrier.

## 2023-12-29 NOTE — OB PROVIDER DELIVERY SUMMARY - NSSELHIDDEN_OBGYN_ALL_OB_FT
[NS_DeliveryAttending1_OBGYN_ALL_OB_FT:[email or web address]],[NS_DeliveryAssist1_OBGYN_ALL_OB_FT:[id number]] [NS_DeliveryAttending1_OBGYN_ALL_OB_FT:[email or web address]=],[NS_DeliveryAssist1_OBGYN_ALL_OB_FT:[id number]]

## 2023-12-29 NOTE — OB RN DELIVERY SUMMARY - NS_GENERALBABYACOMMENTA_OBGYN_ALL_OB_FT
report given to maribel [facility] rn @ [id number] report given to maribel [facility] rn @ [id number]

## 2023-12-29 NOTE — OB RN DELIVERY SUMMARY - NSSELHIDDEN_OBGYN_ALL_OB_FT
[NS_DeliveryAttending1_OBGYN_ALL_OB_FT:[email or web address]=],[NS_DeliveryAssist1_OBGYN_ALL_OB_FT:[id number]] [NS_DeliveryAttending1_OBGYN_ALL_OB_FT:[email or web address]=],[NS_DeliveryAssist1_OBGYN_ALL_OB_FT:[id number]]

## 2023-12-29 NOTE — PROGRESS NOTE ADULT - ASSESSMENT
24 yo  @ 29w4d, s/p latency abx and celestone 12/10 for suspected PPROM, positive antibody screen, increased risk for T21 with negative amniocentesis, lupus and crohn's disease, with vaginal bleeding and cervical dilation, likely 2/2 placental abruption and  labor, on magnesium for neuroprotection.    Plan:  -Continue with magnesium  -Repeat Mag level and labs @0400  -Neuro checks q2h  -strict I/Os  -Pain management prn  -Cont seizure precautions  -Cont efm/toco   22 yo  @ 29w4d, s/p latency abx and celestone 12/10 for suspected PPROM, positive antibody screen, increased risk for T21 with negative amniocentesis, lupus and crohn's disease, with vaginal bleeding and cervical dilation, likely 2/2 placental abruption and  labor, on magnesium for neuroprotection.    Plan:  -Continue with magnesium  -Repeat Mag level and labs @0400  -Neuro checks q2h  -strict I/Os  -Pain management prn  -Cont seizure precautions  -Cont efm/toco   22 yo  @ 29w4d, s/p latency abx and celestone 12/10 for suspected PPROM, positive antibody screen, increased risk for T21 with negative amniocentesis, lupus and crohn's disease, with vaginal bleeding and cervical dilation, possibly 2/2 placental abruption and/or  labor, on magnesium for neuroprotection.    Plan:  -Continue with magnesium  -Repeat Mag level and labs @0400  -Neuro checks q2h  -strict I/Os  -Pain management prn  -Cont seizure precautions  -Cont efm/toco

## 2023-12-29 NOTE — PROGRESS NOTE ADULT - ASSESSMENT
22 yo  @ 29w3d, s/p latency abx and celestone 12/10 for suspected PPROM, positive antibody screen, increased risk for T21 with negative amniocentesis, lupus and crohn's disease, with vaginal bleeding and cervical dilation, likely 2/2 placental abruption and  labor, on magnesium for neuroprotection.    Plan:  -Continue with magnesium  -Repeat Mag level and labs @0400  -Neuro checks q2h  -strict I/Os  -Pain management prn  -Cont seizure precautions  -Cont efm/toco   24 yo  @ 29w4d, s/p latency abx and celestone 12/10 for suspected PPROM, positive antibody screen, increased risk for T21 with negative amniocentesis, lupus and crohn's disease, with vaginal bleeding and cervical dilation, likely 2/2 placental abruption and  labor, on magnesium for neuroprotection.    Plan:  -Continue with magnesium  -Repeat Mag level and labs @0400  -Neuro checks q2h  -strict I/Os  -Pain management prn  -Cont seizure precautions  -Cont efm/toco   22 yo  @ 29w4d, s/p latency abx and celestone 12/10 for suspected PPROM, positive antibody screen, increased risk for T21 with negative amniocentesis, lupus and crohn's disease, with vaginal bleeding and cervical dilation, likely 2/2 placental abruption and  labor, on magnesium for neuroprotection.    Plan:  -Continue with magnesium  -Repeat Mag level and labs @0400  -Neuro checks q2h  -strict I/Os  -Pain management prn  -Cont seizure precautions  -Cont efm/toco   24 yo  @ 29w4d, s/p latency abx and celestone 12/10 for suspected PPROM, positive antibody screen, increased risk for T21 with negative amniocentesis, lupus and crohn's disease, with vaginal bleeding and cervical dilation, possibly 2/2 placental abruption and/or  labor, on magnesium for neuroprotection.    Plan:  -Continue with magnesium  -Repeat Mag level and labs @0400  -Neuro checks q2h  -strict I/Os  -Pain management prn  -Cont seizure precautions  -Cont efm/toco   22 yo  @ 29w4d, s/p latency abx and celestone 12/10 for suspected PPROM, positive antibody screen, increased risk for T21 with negative amniocentesis, lupus and crohn's disease, with vaginal bleeding and cervical dilation, possibly 2/2 placental abruption and/or  labor, on magnesium for neuroprotection.    Plan:  -Continue with magnesium  -Repeat Mag level and labs @0400  -Neuro checks q2h  -strict I/Os  -Pain management prn  -Cont seizure precautions  -Cont efm/toco

## 2023-12-29 NOTE — CONSULT NOTE ADULT - SUBJECTIVE AND OBJECTIVE BOX
22  PPD#0  MFM f/u Consult Note, post partum.   Admission #3 this pregnancy; HD #2, PPD 0;  s/p  at 29w4d with placental abruption.   CC: Recurrent 3rd trimester bleeding "soaking pads x3d", painful contractions, with cervical change, delivered.   Ms Dumont is known to us from her previous admissions for vaginal bleeding and likely SLE.   22 yo  PPD#0  @ 29w4d.  History of pericarditis, SLE (which she doubts), Crohns in remission, anemia, s/p admission #1 for possible PPROM, admission #2 for vaginal bleeding/macrocytic anemia/low fibrinogen and returns to [facility] L&D today with a history of vag bleeding x 4d, and abdominal pain/cramping x24h.  She received BMZ x2 on 12/10.  Her pregnancy is also complicated intermittently incr bp and by a positive Ab screen, rendering a "perfect" cross-match impossible.   PMHx: , Mar: Pericarditis with small pericardial effusion. Rx'd with prednisone & colchicine. Dr Behuria.             ?        SLE per rheumatology.  Pt declines treatment.  Please see consult from recent admission            : Crohn's Dz, in remission.  Dx at 15yoa.            Positive Ab screen as above, past transfusions.   Surg:   delivery   Meds: PNV  Allergies: NKDA  OBHx:  Ms Dumont receives prenatal care in [place].   #1 (): Vaginal delivery of 7-11 boy, at 40 weeks GA, after 3 days hours of labor . patient received anesthesia. Delivery occurred at [facility]. no pregnancy complications reported.    #2 ():  delivery of 5-10 boy, at 40 weeks GA . Delivery occurred at [facility]. no pregnancy complications reported. Pregnancy #2 Comments: (Abnormal fetal heart rate tracing in labor).  GYN history:  No abnormal pap smears, no STDs   FHx: M DM2.   SocHx:  Homemaker.  Denies subst use x3.   Family Planning: Not yet determined.   ROS:  As above.     Px: Resting comfortably  VS:  VSS, Afeb.   HEENT: NC/AT  Lungs: Clear to ausc bilat  Cor: S1, S2 nl, no mrb. rrr.   Abd: Fundus firm, well contracted.    Extr: No CCE  LAB:  Apos/antib neg.   :  9k>9/26%<301k coags nl, fibr 494.       RPR 1;32; FTA NR. HIV/HBSAg/HCAb } NR.   ECG: 3/15/23: nsr, nonspecific ST and T wave changes   Echo: 23: 1. Left ventricular ejection fraction, by visual estimation, is 60 to 65%. 2. Normal left ventricular size and wall thicknesses, with normal systolic and diastolic function. 3. Mild tricuspid regurgitation. 4. Small circumferential pericardial effusion.    TTE: WNL EF 65-70%    Assessment and Recommendation:   [id number] PPD 0 s/p latency abx and celestone 12/10 for suspected PPROM, positive antibody screen, increased risk for T21 with negative amniocentesis, lupus and crohn's disease, presented yesterday am with vaginal bleeding and cervical dilation, likely 2/2 chronic placental abruption causing  labor.  Delivered at 0500 this morning, infant in NICU.     1.  Chronic abruption with anemia. - Iron studies are within normal limits.  -->Appreciate Hematology input, recommend consult for initiation of Fe infusions given difficulty with finding Xmatched blood. .  -->Check cbc tomorrow am.     2. Multiple antibodies in type and screen  - Crossed 2u PRBCs, per blood bank it is not perfectly matched and patient will need to sign emergency release form if needed  - per transfusion medicine transfuse as needed with slow infusion rate and monitor for reaction    5.  History of pericarditis with small pericardial effusion in February-2023. S/p prednisone and colchicine.  - Normal TTE  - Consult appreciated by Dr Behuria from recent admission    6.  Dx of SLE  - Followed by Rheumatology, patient declining  treatment  -->Rheum consult post partum.   7. Crohn's disease in remission  - Diagnosed at 14yo, last colonoscopy then  - Lost to follow up, pt asymptomatic  - She should follow up with GI as an outpatient    8 Possible cHTN  - Patient denies history of elevated BPs, never on meds.  -->f/u with Dr Behuria.   MD Bryce, FACOG   22  PPD#0  MFM f/u Consult Note, post partum.   Admission #3 this pregnancy; HD #2, PPD 0;  s/p  at 29w4d with placental abruption.   CC: Recurrent 3rd trimester bleeding "soaking pads x3d", painful contractions, with cervical change, delivered.   Ms Dumont is known to us from her previous admissions for vaginal bleeding and likely SLE.   22 yo  PPD#0  @ 29w4d.  History of pericarditis, SLE (which she doubts), Crohns in remission, anemia, s/p admission #1 for possible PPROM, admission #2 for vaginal bleeding/macrocytic anemia/low fibrinogen and returns to [facility] L&D today with a history of vag bleeding x 4d, and abdominal pain/cramping x24h.  She received BMZ x2 on 12/10.  Her pregnancy is also complicated intermittently incr bp and by a positive Ab screen, rendering a "perfect" cross-match impossible.   PMHx: , Mar: Pericarditis with small pericardial effusion. Rx'd with prednisone & colchicine. Dr Behuria.             ?        SLE per rheumatology.  Pt declines treatment.  Please see consult from recent admission            : Crohn's Dz, in remission.  Dx at 15yoa.            Positive Ab screen as above, past transfusions.   Surg:   delivery   Meds: PNV  Allergies: NKDA  OBHx:  Ms Dumont receives prenatal care in [place].   #1 (): Vaginal delivery of 7-11 boy, at 40 weeks GA, after 3 days hours of labor . patient received anesthesia. Delivery occurred at [facility]. no pregnancy complications reported.    #2 ():  delivery of 5-10 boy, at 40 weeks GA . Delivery occurred at [facility]. no pregnancy complications reported. Pregnancy #2 Comments: (Abnormal fetal heart rate tracing in labor).  GYN history:  No abnormal pap smears, no STDs   FHx: M DM2.   SocHx:  Homemaker.  Denies subst use x3.   Family Planning: Not yet determined.   ROS:  As above.     Px: Resting comfortably  VS:  VSS, Afeb.   HEENT: NC/AT  Lungs: Clear to ausc bilat  Cor: S1, S2 nl, no mrb. rrr.   Abd: Fundus firm, well contracted.    Extr: No CCE  LAB:  Apos/antib neg.   :  9k>9/26%<301k coags nl, fibr 494.       RPR 1;32; FTA NR. HIV/HBSAg/HCAb } NR.   ECG: 3/15/23: nsr, nonspecific ST and T wave changes   Echo: 23: 1. Left ventricular ejection fraction, by visual estimation, is 60 to 65%. 2. Normal left ventricular size and wall thicknesses, with normal systolic and diastolic function. 3. Mild tricuspid regurgitation. 4. Small circumferential pericardial effusion.    TTE: WNL EF 65-70%    Assessment and Recommendation:   [id number] PPD 0 s/p latency abx and celestone 12/10 for suspected PPROM, positive antibody screen, increased risk for T21 with negative amniocentesis, lupus and crohn's disease, presented yesterday am with vaginal bleeding and cervical dilation, likely 2/2 chronic placental abruption causing  labor.  Delivered at 0500 this morning, infant in NICU.     1.  Chronic abruption with anemia. - Iron studies are within normal limits.  -->Appreciate Hematology input, recommend consult for initiation of Fe infusions given difficulty with finding Xmatched blood. .  -->Check cbc tomorrow am.     2. Multiple antibodies in type and screen  - Crossed 2u PRBCs, per blood bank it is not perfectly matched and patient will need to sign emergency release form if needed  - per transfusion medicine transfuse as needed with slow infusion rate and monitor for reaction    5.  History of pericarditis with small pericardial effusion in February-2023. S/p prednisone and colchicine.  - Normal TTE  - Consult appreciated by Dr Behuria from recent admission    6.  Dx of SLE  - Followed by Rheumatology, patient declining  treatment  -->Rheum consult post partum.   7. Crohn's disease in remission  - Diagnosed at 16yo, last colonoscopy then  - Lost to follow up, pt asymptomatic  - She should follow up with GI as an outpatient    8 Possible cHTN  - Patient denies history of elevated BPs, never on meds.  -->f/u with Dr Behuria.   MD Bryce, FACOG

## 2023-12-29 NOTE — OB PROVIDER DELIVERY SUMMARY - NSPROVIDERDELIVERYNOTE_OBGYN_ALL_OB_FT
Called to bedside by RN. Patient complained of extreme vaginal pressure. Upon exam patient was fully dilated, ruptured and . Patient was instructed to push. Fetal head was OA and restituted to ROT. The anterior and posterior shoulders delivered, followed by the remaining body atraumatically. Cord clamping was immediately performed and cord was cut. Cord blood gases collected x2. The  was handed to the NICU team. The placenta delivered intact with membranes. Pitocin was administered. Uterus massaged, fundus found to be firm. Cervix, vagina and perineum inspected, no lacerations were noted.     Viable male infant delivered, APGARs 7/3/9.  Dr. Jones present for the delivery.    Lacteration:   EBL ####     present for the delivery

## 2023-12-29 NOTE — PROGRESS NOTE ADULT - SUBJECTIVE AND OBJECTIVE BOX
PGY1 Magnesium Note     Subjective:   Pt evaluated at bedside for magnesium check. She denies headache, vision changes, dizziness, SOB, chest pain, RUQ/epigastric pain. No complaints, pain well controlled with epidural.     Objective:   T(C): 37.2 (12-28-23 @ 18:29), Max: 37.2 (12-28-23 @ 18:29)  T(F): 98.96 (12-28-23 @ 18:29), Max: 98.96 (12-28-23 @ 18:29)  HR: 77 (12-29-23 @ 01:47) (72 - 96)  BP: 117/55 (12-29-23 @ 01:42) (108/59 - 139/80)  RR: 18 (12-28-23 @ 11:30) (18 - 78)  SpO2: 98% (12-29-23 @ 01:47) (97% - 100%)    12-28-23 @ 07:01  -  12-28-23 @ 18:32  --------------------------------------------------------  IN: 500 mL / OUT: 900 mL / NET: -400 mL        Gen: NAD, AAOx3  CV: S1S2, RRR, no M/R/G  Pulm: CTAB, no rhonchi or rales or wheezing  Ext: no calf tenderness or edema SCDs in place  Neuro: 2+ DTR bilaterally  Abd: soft, GRAVId, nontender, no rebound or guarding, no epigastric tenderness    EFM: 120/mod/pos accels  [place]: q10min  SVE: deferred; 3/70/-2 @1302    Medications:  acetaminophen   IVPB ..: 400 (12-28-23 @ 10:13)  betamethasone Injectable: 12 (12-28-23 @ 10:14)  lactated ringers: 75 (12-28-23 @ 09:35)  magnesium sulfate  IVPB: 300 (12-28-23 @ 10:20)  magnesium sulfate Infusion: 50 (12-28-23 @ 09:33)      Labs:                         9.4    8.32  )-----------( 288      ( 28 Dec 2023 22:28 )             27.4       12-28    135  |  104  |  7<L>  ----------------------------<  92  4.3   |  19  |  0.5<L>    Ca    8.7      28 Dec 2023 09:45  Mg     4.5     12-28    TPro  7.9  /  Alb  3.8  /  TBili  0.4  /  DBili  x   /  AST  21  /  ALT  16  /  AlkPhos  104  12-28              Urinalysis Basic - ( 28 Dec 2023 12:30 )    Color: Dark Yellow / Appearance: Cloudy / SG: >1.030 / pH: x  Gluc: x / Ketone: 15 mg/dL  / Bili: Small / Urobili: 1.0 mg/dL   Blood: x / Protein: 30 mg/dL / Nitrite: Negative   Leuk Esterase: Negative / RBC: 2 /HPF / WBC 3 /HPF   Sq Epi: x / Non Sq Epi: 5 /HPF / Bacteria: Negative /HPF        PT/INR - ( 28 Dec 2023 22:28 )   PT: 10.30 sec;   INR: 0.90 ratio         PTT - ( 28 Dec 2023 22:28 )  PTT:28.4 sec          CAPILLARY BLOOD GLUCOSE                        9.5    8.91  )-----------( 298      ( 28 Dec 2023 17:41 )             27.3   12-28    135  |  104  |  7<L>  ----------------------------<  92  4.3   |  19  |  0.5<L>    Ca    8.7      28 Dec 2023 09:45  Mg     4.5     12-28    TPro  7.9  /  Alb  3.8  /  TBili  0.4  /  DBili  x   /  AST  21  /  ALT  16  /  AlkPhos  104  12-28      Urinalysis Basic - ( 28 Dec 2023 12:30 )    Color: Dark Yellow / Appearance: Cloudy / SG: >1.030 / pH: x  Gluc: x / Ketone: 15 mg/dL  / Bili: Small / Urobili: 1.0 mg/dL   Blood: x / Protein: 30 mg/dL / Nitrite: Negative   Leuk Esterase: Negative / RBC: 2 /HPF / WBC 3 /HPF   Sq Epi: x / Non Sq Epi: 5 /HPF / Bacteria: Negative /HPF         PGY1 Magnesium Note     Subjective:   Pt evaluated at bedside for magnesium check. She denies headache, vision changes, dizziness, SOB, chest pain, RUQ/epigastric pain. No complaints, pain well controlled with epidural.     Objective:   T(C): 37.2 (12-28-23 @ 18:29), Max: 37.2 (12-28-23 @ 18:29)  T(F): 98.96 (12-28-23 @ 18:29), Max: 98.96 (12-28-23 @ 18:29)  HR: 77 (12-29-23 @ 01:47) (72 - 96)  BP: 117/55 (12-29-23 @ 01:42) (108/59 - 139/80)  RR: 18 (12-28-23 @ 11:30) (18 - 78)  SpO2: 98% (12-29-23 @ 01:47) (97% - 100%)    12-28-23 @ 07:01  -  12-28-23 @ 18:32  --------------------------------------------------------  IN: 500 mL / OUT: 900 mL / NET: -400 mL        Gen: NAD, AAOx3  CV: S1S2, RRR, no M/R/G  Pulm: CTAB, no rhonchi or rales or wheezing  Ext: no calf tenderness or edema SCDs in place  Neuro: 2+ DTR bilaterally  Abd: soft, GRAVId, nontender, no rebound or guarding, no epigastric tenderness    EFM: 120/mod/pos accels  [place]: q10min  SVE: deferred; 3/70/-2 @1302    Medications:  acetaminophen   IVPB ..: 400 (12-28-23 @ 10:13)  betamethasone Injectable: 12 (12-28-23 @ 10:14)  lactated ringers: 75 (12-28-23 @ 09:35)  magnesium sulfate  IVPB: 300 (12-28-23 @ 10:20)  magnesium sulfate Infusion: 50 (12-28-23 @ 09:33)      Labs:                         9.4    8.32  )-----------( 288      ( 28 Dec 2023 22:28 )             27.4       12-28    135  |  104  |  7<L>  ----------------------------<  92  4.3   |  19  |  0.5<L>    Ca    8.7      28 Dec 2023 09:45  Mg     4.5     12-28    TPro  7.9  /  Alb  3.8  /  TBili  0.4  /  DBili  x   /  AST  21  /  ALT  16  /  AlkPhos  104  12-28              Urinalysis Basic - ( 28 Dec 2023 12:30 )    Color: Dark Yellow / Appearance: Cloudy / SG: >1.030 / pH: x  Gluc: x / Ketone: 15 mg/dL  / Bili: Small / Urobili: 1.0 mg/dL   Blood: x / Protein: 30 mg/dL / Nitrite: Negative   Leuk Esterase: Negative / RBC: 2 /HPF / WBC 3 /HPF   Sq Epi: x / Non Sq Epi: 5 /HPF / Bacteria: Negative /HPF        PT/INR - ( 28 Dec 2023 22:28 )   PT: 10.30 sec;   INR: 0.90 ratio         PTT - ( 28 Dec 2023 22:28 )  PTT:28.4 sec          CAPILLARY BLOOD GLUCOSE                        9.5    8.91  )-----------( 298      ( 28 Dec 2023 17:41 )             27.3   12-28    135  |  104  |  7<L>  ----------------------------<  92  4.3   |  19  |  0.5<L>    Ca    8.7      28 Dec 2023 09:45  Mg     4.5     12-28    TPro  7.9  /  Alb  3.8  /  TBili  0.4  /  DBili  x   /  AST  21  /  ALT  16  /  AlkPhos  104  12-28      Urinalysis Basic - ( 28 Dec 2023 12:30 )    Color: Dark Yellow / Appearance: Cloudy / SG: >1.030 / pH: x  Gluc: x / Ketone: 15 mg/dL  / Bili: Small / Urobili: 1.0 mg/dL   Blood: x / Protein: 30 mg/dL / Nitrite: Negative   Leuk Esterase: Negative / RBC: 2 /HPF / WBC 3 /HPF   Sq Epi: x / Non Sq Epi: 5 /HPF / Bacteria: Negative /HPF

## 2023-12-30 LAB
APTT BLD: 23.9 SEC — CRITICAL LOW (ref 27–39.2)
APTT BLD: 23.9 SEC — CRITICAL LOW (ref 27–39.2)
APTT BLD: 25.5 SEC — LOW (ref 27–39.2)
APTT BLD: 25.5 SEC — LOW (ref 27–39.2)
BASOPHILS # BLD AUTO: 0.01 K/UL — SIGNIFICANT CHANGE UP (ref 0–0.2)
BASOPHILS # BLD AUTO: 0.01 K/UL — SIGNIFICANT CHANGE UP (ref 0–0.2)
BASOPHILS NFR BLD AUTO: 0.1 % — SIGNIFICANT CHANGE UP (ref 0–1)
BASOPHILS NFR BLD AUTO: 0.1 % — SIGNIFICANT CHANGE UP (ref 0–1)
EOSINOPHIL # BLD AUTO: 0.01 K/UL — SIGNIFICANT CHANGE UP (ref 0–0.7)
EOSINOPHIL # BLD AUTO: 0.01 K/UL — SIGNIFICANT CHANGE UP (ref 0–0.7)
EOSINOPHIL NFR BLD AUTO: 0.1 % — SIGNIFICANT CHANGE UP (ref 0–8)
EOSINOPHIL NFR BLD AUTO: 0.1 % — SIGNIFICANT CHANGE UP (ref 0–8)
FIBRINOGEN PPP-MCNC: 332 MG/DL — SIGNIFICANT CHANGE UP (ref 200–435)
FIBRINOGEN PPP-MCNC: 332 MG/DL — SIGNIFICANT CHANGE UP (ref 200–435)
FIBRINOGEN PPP-MCNC: 390 MG/DL — SIGNIFICANT CHANGE UP (ref 200–435)
FIBRINOGEN PPP-MCNC: 390 MG/DL — SIGNIFICANT CHANGE UP (ref 200–435)
HCT VFR BLD CALC: 26.5 % — LOW (ref 37–47)
HCT VFR BLD CALC: 26.5 % — LOW (ref 37–47)
HGB BLD-MCNC: 9 G/DL — LOW (ref 12–16)
HGB BLD-MCNC: 9 G/DL — LOW (ref 12–16)
IMM GRANULOCYTES NFR BLD AUTO: 0.8 % — HIGH (ref 0.1–0.3)
IMM GRANULOCYTES NFR BLD AUTO: 0.8 % — HIGH (ref 0.1–0.3)
INR BLD: 0.87 RATIO — SIGNIFICANT CHANGE UP (ref 0.65–1.3)
LYMPHOCYTES # BLD AUTO: 1.08 K/UL — LOW (ref 1.2–3.4)
LYMPHOCYTES # BLD AUTO: 1.08 K/UL — LOW (ref 1.2–3.4)
LYMPHOCYTES # BLD AUTO: 13 % — LOW (ref 20.5–51.1)
LYMPHOCYTES # BLD AUTO: 13 % — LOW (ref 20.5–51.1)
MCHC RBC-ENTMCNC: 34 G/DL — SIGNIFICANT CHANGE UP (ref 32–37)
MCHC RBC-ENTMCNC: 34 G/DL — SIGNIFICANT CHANGE UP (ref 32–37)
MCHC RBC-ENTMCNC: 34 PG — HIGH (ref 27–31)
MCHC RBC-ENTMCNC: 34 PG — HIGH (ref 27–31)
MCV RBC AUTO: 100 FL — HIGH (ref 81–99)
MCV RBC AUTO: 100 FL — HIGH (ref 81–99)
MONOCYTES # BLD AUTO: 1.08 K/UL — HIGH (ref 0.1–0.6)
MONOCYTES # BLD AUTO: 1.08 K/UL — HIGH (ref 0.1–0.6)
MONOCYTES NFR BLD AUTO: 13 % — HIGH (ref 1.7–9.3)
MONOCYTES NFR BLD AUTO: 13 % — HIGH (ref 1.7–9.3)
NEUTROPHILS # BLD AUTO: 6.07 K/UL — SIGNIFICANT CHANGE UP (ref 1.4–6.5)
NEUTROPHILS # BLD AUTO: 6.07 K/UL — SIGNIFICANT CHANGE UP (ref 1.4–6.5)
NEUTROPHILS NFR BLD AUTO: 73 % — SIGNIFICANT CHANGE UP (ref 42.2–75.2)
NEUTROPHILS NFR BLD AUTO: 73 % — SIGNIFICANT CHANGE UP (ref 42.2–75.2)
NRBC # BLD: 0 /100 WBCS — SIGNIFICANT CHANGE UP (ref 0–0)
NRBC # BLD: 0 /100 WBCS — SIGNIFICANT CHANGE UP (ref 0–0)
PLATELET # BLD AUTO: 308 K/UL — SIGNIFICANT CHANGE UP (ref 130–400)
PLATELET # BLD AUTO: 308 K/UL — SIGNIFICANT CHANGE UP (ref 130–400)
PMV BLD: 9.8 FL — SIGNIFICANT CHANGE UP (ref 7.4–10.4)
PMV BLD: 9.8 FL — SIGNIFICANT CHANGE UP (ref 7.4–10.4)
PROTHROM AB SERPL-ACNC: 9.9 SEC — LOW (ref 9.95–12.87)
RBC # BLD: 2.65 M/UL — LOW (ref 4.2–5.4)
RBC # BLD: 2.65 M/UL — LOW (ref 4.2–5.4)
RBC # FLD: 13.4 % — SIGNIFICANT CHANGE UP (ref 11.5–14.5)
RBC # FLD: 13.4 % — SIGNIFICANT CHANGE UP (ref 11.5–14.5)
WBC # BLD: 8.32 K/UL — SIGNIFICANT CHANGE UP (ref 4.8–10.8)
WBC # BLD: 8.32 K/UL — SIGNIFICANT CHANGE UP (ref 4.8–10.8)
WBC # FLD AUTO: 8.32 K/UL — SIGNIFICANT CHANGE UP (ref 4.8–10.8)
WBC # FLD AUTO: 8.32 K/UL — SIGNIFICANT CHANGE UP (ref 4.8–10.8)

## 2023-12-30 PROCEDURE — 99231 SBSQ HOSP IP/OBS SF/LOW 25: CPT

## 2023-12-30 PROCEDURE — 99232 SBSQ HOSP IP/OBS MODERATE 35: CPT

## 2023-12-30 RX ADMIN — Medication 600 MILLIGRAM(S): at 18:41

## 2023-12-30 RX ADMIN — Medication 1 TABLET(S): at 12:34

## 2023-12-30 RX ADMIN — Medication 975 MILLIGRAM(S): at 09:00

## 2023-12-30 RX ADMIN — SODIUM CHLORIDE 3 MILLILITER(S): 9 INJECTION INTRAMUSCULAR; INTRAVENOUS; SUBCUTANEOUS at 13:27

## 2023-12-30 RX ADMIN — Medication 975 MILLIGRAM(S): at 15:55

## 2023-12-30 RX ADMIN — Medication 975 MILLIGRAM(S): at 09:30

## 2023-12-30 RX ADMIN — Medication 600 MILLIGRAM(S): at 13:05

## 2023-12-30 RX ADMIN — Medication 975 MILLIGRAM(S): at 20:21

## 2023-12-30 RX ADMIN — Medication 975 MILLIGRAM(S): at 02:30

## 2023-12-30 RX ADMIN — Medication 975 MILLIGRAM(S): at 16:25

## 2023-12-30 RX ADMIN — Medication 600 MILLIGRAM(S): at 12:34

## 2023-12-30 NOTE — CONSULT NOTE ADULT - SUBJECTIVE AND OBJECTIVE BOX
22  PPD#1  MFM f/u Consult Note, post partum.   Admission #3 this pregnancy; HD #3, PPD 1;  s/p  at 29w4d with placental abruption.   CC: Resting comfortably with moderate postpartum locchia, and uterine cramping with breast stimulation.   Ms Dumont is known to us from her previous admissions for vaginal bleeding and likely SLE.   24 yo  PPD#0  @ 29w4d.  History of pericarditis, SLE (which she doubts), Crohns in remission, anemia, s/p admission #1 for possible PPROM, admission #2 for vaginal bleeding/macrocytic anemia/low fibrinogen and returns to [facility] L&D today with a history of vag bleeding x 4d, and abdominal pain/cramping x24h.  She received BMZ x2 on 12/10.  Her pregnancy is also complicated intermittently incr bp and by a positive Ab screen, rendering a "perfect" cross-match impossible.   PMHx: , Mar: Pericarditis with small pericardial effusion. Rx'd with prednisone & colchicine. Dr Behuria.             ?        SLE per rheumatology.  Pt declines treatment.  Please see consult from recent admission            : Crohn's Dz, in remission.  Dx at 15yoa.            Positive Ab screen as above, past transfusions.   Surg:   delivery   Meds: PNV  Allergies: NKDA  OBHx:  Ms Dumont receives prenatal care in [place].   #1 (): Vaginal delivery of 7-11 boy, at 40 weeks GA, after 3 days hours of labor . patient received anesthesia. Delivery occurred at [facility]. no pregnancy complications reported.    #2 ():  delivery of 5-10 boy, at 40 weeks GA . Delivery occurred at [facility]. no pregnancy complications reported. Pregnancy #2 Comments: (Abnormal fetal heart rate tracing in labor).  #3 ():  at 29w due to plac abruption, as above. Live male, doing well in NICU.   GYN history:  No abnormal pap smears, no STDs   FHx: M DM2.   SocHx:  Homemaker.  Denies subst use x3.   Family Planning: Not yet determined.   ROS:  As above.     Px: Resting comfortably  VS:  VSS, Afeb.   HEENT: NC/AT  Lungs: Clear to ausc bilat  Cor: S1, S2 nl, no mrb. rrr.   Abd: Fundus firm, well contracted.    Extr: No CCE  LAB:  Apos/antib neg.   :  9k>9/26%<301k coags nl, fibr 494.       RPR 1;32; FTA NR. HIV/HBSAg/HCAb } NR.   ECG: 3/15/23: nsr, nonspecific ST and T wave changes   Echo: 23: 1. Left ventricular ejection fraction, by visual estimation, is 60 to 65%. 2. Normal left ventricular size and wall thicknesses, with normal systolic and diastolic function. 3. Mild tricuspid regurgitation. 4. Small circumferential pericardial effusion.    TTE: WNL EF 65-70%    Assessment and Recommendation:   [id number] PPD 1 presented 2d ago w/vaginal bleeding and cervical dilation, likely 2/2 chronic placental abruption causing  labor.  Delivered 0500 yesterday. Infant in NICU on CPAP/IMV room air.     1.  Chronic abruption with anemia. - Iron studies are within normal limits.  -->Appreciate Hematology input, recommend consult for initiation of Fe infusions given difficulty with finding Xmatched blood. .  -->Check cbc tomorrow am.     2. Multiple antibodies in type and screen  - Crossed 2u PRBCs, per blood bank it is not perfectly matched and patient will need to sign emergency release form if needed  - per transfusion medicine transfuse as needed with slow infusion rate and monitor for reaction    5.  History of pericarditis with small pericardial effusion in February-2023. S/p prednisone and colchicine.  - Normal TTE  - Consult appreciated by Dr Behuria from recent admission    6.  Dx of SLE  - Followed by Rheumatology, patient declining  treatment  -->Rheum consult post partum.   7. Crohn's disease in remission  - Diagnosed at 16yo, last colonoscopy then  - Lost to follow up, pt asymptomatic  - She should follow up with GI as an outpatient    8 Possible cHTN  - Patient denies history of elevated BPs, never on meds.  -->f/u with Dr Behuria.   MD Bryce, FACOG     23  PPD#1  MFM f/u Consult Note, post partum.   Admission #3 this pregnancy; HD #3, PPD 1;  s/p  at 29w4d with placental abruption.   CC: Resting comfortably with moderate postpartum locchia, and uterine cramping with breast stimulation.   Ms Dumont is known to us from her previous admissions for vaginal bleeding and likely SLE.   24 yo  PPD#0  @ 29w4d.  History of pericarditis, SLE (which she doubts), Crohns in remission, anemia, s/p admission #1 for possible PPROM, admission #2 for vaginal bleeding/macrocytic anemia/low fibrinogen and returns to [facility] L&D today with a history of vag bleeding x 4d, and abdominal pain/cramping x24h.  She received BMZ x2 on 12/10.  Her pregnancy is also complicated intermittently incr bp and by a positive Ab screen, rendering a "perfect" cross-match impossible.   PMHx: , Mar: Pericarditis with small pericardial effusion. Rx'd with prednisone & colchicine. Dr Behuria.             ?        SLE per rheumatology.  Pt declines treatment.  Please see consult from recent admission            : Crohn's Dz, in remission.  Dx at 15yoa.            Positive Ab screen as above, past transfusions.   Surg:   delivery   Meds: PNV  Allergies: NKDA  OBHx:  Ms Dumont receives prenatal care in [place].   #1 (): Vaginal delivery of 7-11 boy, at 40 weeks GA, after 3 days hours of labor . patient received anesthesia. Delivery occurred at [facility]. no pregnancy complications reported.    #2 ():  delivery of 5-10 boy, at 40 weeks GA . Delivery occurred at [facility]. no pregnancy complications reported. Pregnancy #2 Comments: (Abnormal fetal heart rate tracing in labor).  #3 ():  at 29w due to plac abruption, as above. Live male, doing well in NICU.   GYN history:  No abnormal pap smears, no STDs   FHx: M DM2.   SocHx:  Homemaker.  Denies subst use x3.   Family Planning: Not yet determined.   ROS:  As above.     Px: Resting comfortably  VS:  VSS, Afeb.   HEENT: NC/AT  Lungs: Clear to ausc bilat  Cor: S1, S2 nl, no mrb. rrr.   Abd: Fundus firm, well contracted.    Extr: No CCE  LAB:  Apos/antib neg.   :  9k>9/26%<301k coags nl, fibr 494.       RPR 1;32; FTA NR. HIV/HBSAg/HCAb } NR.   ECG: 3/15/23: nsr, nonspecific ST and T wave changes   Echo: 23: 1. Left ventricular ejection fraction, by visual estimation, is 60 to 65%. 2. Normal left ventricular size and wall thicknesses, with normal systolic and diastolic function. 3. Mild tricuspid regurgitation. 4. Small circumferential pericardial effusion.    TTE: WNL EF 65-70%    Assessment and Recommendation:   [id number] PPD 1 presented 2d ago w/vaginal bleeding and cervical dilation, likely 2/2 chronic placental abruption causing  labor.  Delivered 0500 yesterday. Infant in NICU on CPAP/IMV room air.     1.  Chronic abruption with anemia. - Iron studies are within normal limits.  -->Appreciate Hematology input, recommend consult for initiation of Fe infusions given difficulty with finding Xmatched blood. .  -->Check cbc tomorrow am.     2. Multiple antibodies in type and screen  - Crossed 2u PRBCs, per blood bank it is not perfectly matched and patient will need to sign emergency release form if needed  - per transfusion medicine transfuse as needed with slow infusion rate and monitor for reaction    5.  History of pericarditis with small pericardial effusion in February-2023. S/p prednisone and colchicine.  - Normal TTE  - Consult appreciated by Dr Behuria from recent admission    6.  Dx of SLE  - Followed by Rheumatology, patient declining  treatment  -->Rheum consult post partum.   7. Crohn's disease in remission  - Diagnosed at 16yo, last colonoscopy then  - Lost to follow up, pt asymptomatic  - She should follow up with GI as an outpatient    8 Possible cHTN  - Patient denies history of elevated BPs, never on meds.  -->f/u with Dr Behuria.   MD Bryce, FACOG

## 2023-12-30 NOTE — CONSULT NOTE ADULT - CONSULT REASON
Systemic lupus.  Placental abruption at 29w gestation, delivered.  Postpartum f/u.
Pregnancy at 29w3d with chronic placental abruption, and recent increased vaginal bleeding.
Chronic placental abruption with labor at29w.

## 2023-12-30 NOTE — PROGRESS NOTE ADULT - SUBJECTIVE AND OBJECTIVE BOX
JASMINE BRAVO  23y  Female    PGY1 Note:   Pt is a 24yo PPD#1. Pt is recovering well, no acute complaints. Pt states her pain is well controlled, has some mild cramping no heavy bleeding. Pt denies fever, chills, nausea, vomiting, SOB, chest pain, extremity swelling or pain. Ambulating, tolerating PO, passing flatus.     MEDICATIONS  (STANDING):  acetaminophen     Tablet .. 975 milliGRAM(s) Oral <User Schedule>  diphtheria/tetanus/pertussis (acellular) Vaccine (Adacel) 0.5 milliLiter(s) IntraMuscular once  fentanyl (2 MICROgram(s)/mL) + bupivacaine 0.0625%  in 0.9% Sodium Chloride PCEA 250 milliLiter(s) Epidural PCA Continuous  ibuprofen  Tablet. 600 milliGRAM(s) Oral every 6 hours  prenatal multivitamin 1 Tablet(s) Oral daily  sodium chloride 0.9% lock flush 3 milliLiter(s) IV Push every 8 hours    MEDICATIONS  (PRN):  benzocaine 20%/menthol 0.5% Spray 1 Spray(s) Topical every 6 hours PRN for Perineal discomfort  dexAMETHasone  Injectable 4 milliGRAM(s) IV Push every 6 hours PRN Nausea  dibucaine 1% Ointment 1 Application(s) Topical every 6 hours PRN Perineal discomfort  diphenhydrAMINE 25 milliGRAM(s) Oral every 6 hours PRN Pruritus  lanolin Ointment 1 Application(s) Topical every 6 hours PRN nipple soreness  magnesium hydroxide Suspension 30 milliLiter(s) Oral two times a day PRN Constipation  naloxone Injectable 0.1 milliGRAM(s) IV Push every 3 minutes PRN For ANY of the following changes in patient status:  A. RR LESS THAN 10 breaths per minute, B. Oxygen saturation LESS THAN 90%, C. Sedation score of 6  ondansetron Injectable 4 milliGRAM(s) IV Push every 6 hours PRN Nausea  oxyCODONE    IR 5 milliGRAM(s) Oral once PRN Moderate to Severe Pain (4-10)  oxyCODONE    IR 5 milliGRAM(s) Oral every 3 hours PRN Moderate to Severe Pain (4-10)  simethicone 80 milliGRAM(s) Chew every 4 hours PRN Gas  witch hazel Pads 1 Application(s) Topical every 4 hours PRN Perineal discomfort      PAST MEDICAL & SURGICAL HISTORY:  H/O pericarditis  Lupus erythematosus  No significant past surgical history    Physical Exam  Vital Signs Last 24 Hrs  T(C): 37 (30 Dec 2023 07:41), Max: 37.3 (29 Dec 2023 15:33)  T(F): 98.6 (30 Dec 2023 07:41), Max: 99.1 (29 Dec 2023 15:33)  HR: 84 (30 Dec 2023 07:41) (75 - 88)  BP: 112/69 (30 Dec 2023 07:41) (111/54 - 127/67)  RR: 18 (30 Dec 2023 07:41) (16 - 18)    Gen: AAOx3, NAD  Fundus: firm, below umbilicus   Abd: Soft, nontender, nondistended, BS+  Lochia: Minimal  Ext: No calf tenderness, no swelling    Labs:                        8.5    10.77 )-----------( 274      ( 29 Dec 2023 17:06 )             24.3                         8.8    8.81  )-----------( 301      ( 29 Dec 2023 04:00 )             25.5    JASMINE BRAVO  23y  Female    PGY1 Note:   Pt is a 22yo PPD#1. Pt is recovering well, no acute complaints. Pt states her pain is well controlled, has some mild cramping no heavy bleeding. Pt denies fever, chills, nausea, vomiting, SOB, chest pain, extremity swelling or pain. Ambulating, tolerating PO, passing flatus.     MEDICATIONS  (STANDING):  acetaminophen     Tablet .. 975 milliGRAM(s) Oral <User Schedule>  diphtheria/tetanus/pertussis (acellular) Vaccine (Adacel) 0.5 milliLiter(s) IntraMuscular once  fentanyl (2 MICROgram(s)/mL) + bupivacaine 0.0625%  in 0.9% Sodium Chloride PCEA 250 milliLiter(s) Epidural PCA Continuous  ibuprofen  Tablet. 600 milliGRAM(s) Oral every 6 hours  prenatal multivitamin 1 Tablet(s) Oral daily  sodium chloride 0.9% lock flush 3 milliLiter(s) IV Push every 8 hours    MEDICATIONS  (PRN):  benzocaine 20%/menthol 0.5% Spray 1 Spray(s) Topical every 6 hours PRN for Perineal discomfort  dexAMETHasone  Injectable 4 milliGRAM(s) IV Push every 6 hours PRN Nausea  dibucaine 1% Ointment 1 Application(s) Topical every 6 hours PRN Perineal discomfort  diphenhydrAMINE 25 milliGRAM(s) Oral every 6 hours PRN Pruritus  lanolin Ointment 1 Application(s) Topical every 6 hours PRN nipple soreness  magnesium hydroxide Suspension 30 milliLiter(s) Oral two times a day PRN Constipation  naloxone Injectable 0.1 milliGRAM(s) IV Push every 3 minutes PRN For ANY of the following changes in patient status:  A. RR LESS THAN 10 breaths per minute, B. Oxygen saturation LESS THAN 90%, C. Sedation score of 6  ondansetron Injectable 4 milliGRAM(s) IV Push every 6 hours PRN Nausea  oxyCODONE    IR 5 milliGRAM(s) Oral once PRN Moderate to Severe Pain (4-10)  oxyCODONE    IR 5 milliGRAM(s) Oral every 3 hours PRN Moderate to Severe Pain (4-10)  simethicone 80 milliGRAM(s) Chew every 4 hours PRN Gas  witch hazel Pads 1 Application(s) Topical every 4 hours PRN Perineal discomfort      PAST MEDICAL & SURGICAL HISTORY:  H/O pericarditis  Lupus erythematosus  No significant past surgical history    Physical Exam  Vital Signs Last 24 Hrs  T(C): 37 (30 Dec 2023 07:41), Max: 37.3 (29 Dec 2023 15:33)  T(F): 98.6 (30 Dec 2023 07:41), Max: 99.1 (29 Dec 2023 15:33)  HR: 84 (30 Dec 2023 07:41) (75 - 88)  BP: 112/69 (30 Dec 2023 07:41) (111/54 - 127/67)  RR: 18 (30 Dec 2023 07:41) (16 - 18)    Gen: AAOx3, NAD  Fundus: firm, below umbilicus   Abd: Soft, nontender, nondistended, BS+  Lochia: Minimal  Ext: No calf tenderness, no swelling    Labs:                        8.5    10.77 )-----------( 274      ( 29 Dec 2023 17:06 )             24.3                         8.8    8.81  )-----------( 301      ( 29 Dec 2023 04:00 )             25.5

## 2023-12-30 NOTE — PROGRESS NOTE ADULT - ASSESSMENT
A/P: 24 y/o now P3 S/P  ex 29w for PPROM PPD#1, h/o crohn's disease in remission, h/o lupus and pericarditis recovering well.     - encourage ambulation, PO hydration  - monitor vitals, bleeding, normotensive  - f/u AM CBC/coags  - f/u FTA  - followed by rheum for lupus, will f/u outpatient  - routine postpartum course    Discussed with Dr. Cortes.    A/P: 22 y/o now P3 S/P  ex 29w for PPROM PPD#1, h/o crohn's disease in remission, h/o lupus and pericarditis recovering well.     - encourage ambulation, PO hydration  - monitor vitals, bleeding, normotensive  - f/u AM CBC/coags  - f/u FTA  - followed by rheum for lupus, will f/u outpatient  - routine postpartum course    Discussed with Dr. Cortes.

## 2023-12-30 NOTE — PROGRESS NOTE ADULT - ATTENDING COMMENTS
agree with above   22 y/o now P3 S/P  ex 29w for PPROM PPD#1, h/o crohn's disease in remission, h/o lupus and pericarditis recovering well.  pain is well controlled, has some mild cramping no heavy bleeding. Pt denies fever, chills, nausea, vomiting, SOB, chest pain, extremity swelling or pain. Ambulating, tolerating PO, passing flatus.       Vital Signs Last 24 Hrs  T(C): 37 (30 Dec 2023 07:41), Max: 37.3 (29 Dec 2023 15:33)  T(F): 98.6 (30 Dec 2023 07:41), Max: 99.1 (29 Dec 2023 15:33)  HR: 84 (30 Dec 2023 07:41) (77 - 88)  BP: 112/69 (30 Dec 2023 07:41) (111/54 - 121/58)  BP(mean): --  RR: 18 (30 Dec 2023 07:41) (16 - 18)  SpO2: --    abdomen soft, nontender, firm uterine fundus  normal lochia    1100 labs ordered  routine postpartum care agree with above   24 y/o now P3 S/P  ex 29w for PPROM PPD#1, h/o crohn's disease in remission, h/o lupus and pericarditis recovering well.  pain is well controlled, has some mild cramping no heavy bleeding. Pt denies fever, chills, nausea, vomiting, SOB, chest pain, extremity swelling or pain. Ambulating, tolerating PO, passing flatus.       Vital Signs Last 24 Hrs  T(C): 37 (30 Dec 2023 07:41), Max: 37.3 (29 Dec 2023 15:33)  T(F): 98.6 (30 Dec 2023 07:41), Max: 99.1 (29 Dec 2023 15:33)  HR: 84 (30 Dec 2023 07:41) (77 - 88)  BP: 112/69 (30 Dec 2023 07:41) (111/54 - 121/58)  BP(mean): --  RR: 18 (30 Dec 2023 07:41) (16 - 18)  SpO2: --    abdomen soft, nontender, firm uterine fundus  normal lochia    1100 labs ordered  routine postpartum care

## 2023-12-31 ENCOUNTER — TRANSCRIPTION ENCOUNTER (OUTPATIENT)
Age: 23
End: 2023-12-31

## 2023-12-31 VITALS
RESPIRATION RATE: 18 BRPM | DIASTOLIC BLOOD PRESSURE: 75 MMHG | HEART RATE: 80 BPM | TEMPERATURE: 98 F | SYSTOLIC BLOOD PRESSURE: 135 MMHG

## 2023-12-31 LAB
APTT BLD: 26.1 SEC — LOW (ref 27–39.2)
APTT BLD: 26.1 SEC — LOW (ref 27–39.2)
BASOPHILS # BLD AUTO: 0.01 K/UL — SIGNIFICANT CHANGE UP (ref 0–0.2)
BASOPHILS # BLD AUTO: 0.01 K/UL — SIGNIFICANT CHANGE UP (ref 0–0.2)
BASOPHILS NFR BLD AUTO: 0.1 % — SIGNIFICANT CHANGE UP (ref 0–1)
BASOPHILS NFR BLD AUTO: 0.1 % — SIGNIFICANT CHANGE UP (ref 0–1)
EOSINOPHIL # BLD AUTO: 0.09 K/UL — SIGNIFICANT CHANGE UP (ref 0–0.7)
EOSINOPHIL # BLD AUTO: 0.09 K/UL — SIGNIFICANT CHANGE UP (ref 0–0.7)
EOSINOPHIL NFR BLD AUTO: 1.1 % — SIGNIFICANT CHANGE UP (ref 0–8)
EOSINOPHIL NFR BLD AUTO: 1.1 % — SIGNIFICANT CHANGE UP (ref 0–8)
FIBRINOGEN PPP-MCNC: 359 MG/DL — SIGNIFICANT CHANGE UP (ref 200–435)
FIBRINOGEN PPP-MCNC: 359 MG/DL — SIGNIFICANT CHANGE UP (ref 200–435)
HCT VFR BLD CALC: 25.2 % — LOW (ref 37–47)
HCT VFR BLD CALC: 25.2 % — LOW (ref 37–47)
HGB BLD-MCNC: 8.9 G/DL — LOW (ref 12–16)
HGB BLD-MCNC: 8.9 G/DL — LOW (ref 12–16)
IMM GRANULOCYTES NFR BLD AUTO: 0.8 % — HIGH (ref 0.1–0.3)
IMM GRANULOCYTES NFR BLD AUTO: 0.8 % — HIGH (ref 0.1–0.3)
INR BLD: 0.88 RATIO — SIGNIFICANT CHANGE UP (ref 0.65–1.3)
INR BLD: 0.88 RATIO — SIGNIFICANT CHANGE UP (ref 0.65–1.3)
LYMPHOCYTES # BLD AUTO: 0.68 K/UL — LOW (ref 1.2–3.4)
LYMPHOCYTES # BLD AUTO: 0.68 K/UL — LOW (ref 1.2–3.4)
LYMPHOCYTES # BLD AUTO: 8.5 % — LOW (ref 20.5–51.1)
LYMPHOCYTES # BLD AUTO: 8.5 % — LOW (ref 20.5–51.1)
MCHC RBC-ENTMCNC: 34 PG — HIGH (ref 27–31)
MCHC RBC-ENTMCNC: 34 PG — HIGH (ref 27–31)
MCHC RBC-ENTMCNC: 35.3 G/DL — SIGNIFICANT CHANGE UP (ref 32–37)
MCHC RBC-ENTMCNC: 35.3 G/DL — SIGNIFICANT CHANGE UP (ref 32–37)
MCV RBC AUTO: 96.2 FL — SIGNIFICANT CHANGE UP (ref 81–99)
MCV RBC AUTO: 96.2 FL — SIGNIFICANT CHANGE UP (ref 81–99)
MONOCYTES # BLD AUTO: 0.7 K/UL — HIGH (ref 0.1–0.6)
MONOCYTES # BLD AUTO: 0.7 K/UL — HIGH (ref 0.1–0.6)
MONOCYTES NFR BLD AUTO: 8.8 % — SIGNIFICANT CHANGE UP (ref 1.7–9.3)
MONOCYTES NFR BLD AUTO: 8.8 % — SIGNIFICANT CHANGE UP (ref 1.7–9.3)
NEUTROPHILS # BLD AUTO: 6.43 K/UL — SIGNIFICANT CHANGE UP (ref 1.4–6.5)
NEUTROPHILS # BLD AUTO: 6.43 K/UL — SIGNIFICANT CHANGE UP (ref 1.4–6.5)
NEUTROPHILS NFR BLD AUTO: 80.7 % — HIGH (ref 42.2–75.2)
NEUTROPHILS NFR BLD AUTO: 80.7 % — HIGH (ref 42.2–75.2)
NRBC # BLD: 0 /100 WBCS — SIGNIFICANT CHANGE UP (ref 0–0)
NRBC # BLD: 0 /100 WBCS — SIGNIFICANT CHANGE UP (ref 0–0)
PLATELET # BLD AUTO: 308 K/UL — SIGNIFICANT CHANGE UP (ref 130–400)
PLATELET # BLD AUTO: 308 K/UL — SIGNIFICANT CHANGE UP (ref 130–400)
PMV BLD: 9.9 FL — SIGNIFICANT CHANGE UP (ref 7.4–10.4)
PMV BLD: 9.9 FL — SIGNIFICANT CHANGE UP (ref 7.4–10.4)
PROTHROM AB SERPL-ACNC: 10 SEC — SIGNIFICANT CHANGE UP (ref 9.95–12.87)
PROTHROM AB SERPL-ACNC: 10 SEC — SIGNIFICANT CHANGE UP (ref 9.95–12.87)
RBC # BLD: 2.62 M/UL — LOW (ref 4.2–5.4)
RBC # BLD: 2.62 M/UL — LOW (ref 4.2–5.4)
RBC # FLD: 13.4 % — SIGNIFICANT CHANGE UP (ref 11.5–14.5)
RBC # FLD: 13.4 % — SIGNIFICANT CHANGE UP (ref 11.5–14.5)
WBC # BLD: 7.97 K/UL — SIGNIFICANT CHANGE UP (ref 4.8–10.8)
WBC # BLD: 7.97 K/UL — SIGNIFICANT CHANGE UP (ref 4.8–10.8)
WBC # FLD AUTO: 7.97 K/UL — SIGNIFICANT CHANGE UP (ref 4.8–10.8)
WBC # FLD AUTO: 7.97 K/UL — SIGNIFICANT CHANGE UP (ref 4.8–10.8)

## 2023-12-31 PROCEDURE — 99239 HOSP IP/OBS DSCHRG MGMT >30: CPT

## 2023-12-31 RX ORDER — IBUPROFEN 200 MG
1 TABLET ORAL
Qty: 28 | Refills: 0
Start: 2023-12-31 | End: 2024-01-06

## 2023-12-31 RX ORDER — ACETAMINOPHEN 500 MG
2 TABLET ORAL
Qty: 56 | Refills: 0
Start: 2023-12-31 | End: 2024-01-06

## 2023-12-31 RX ADMIN — Medication 975 MILLIGRAM(S): at 03:20

## 2023-12-31 RX ADMIN — Medication 600 MILLIGRAM(S): at 12:25

## 2023-12-31 RX ADMIN — Medication 600 MILLIGRAM(S): at 05:50

## 2023-12-31 RX ADMIN — Medication 1 TABLET(S): at 12:25

## 2023-12-31 RX ADMIN — Medication 975 MILLIGRAM(S): at 16:05

## 2023-12-31 RX ADMIN — SODIUM CHLORIDE 3 MILLILITER(S): 9 INJECTION INTRAMUSCULAR; INTRAVENOUS; SUBCUTANEOUS at 15:29

## 2023-12-31 RX ADMIN — Medication 975 MILLIGRAM(S): at 09:05

## 2023-12-31 RX ADMIN — Medication 600 MILLIGRAM(S): at 00:16

## 2023-12-31 RX ADMIN — Medication 975 MILLIGRAM(S): at 08:34

## 2023-12-31 RX ADMIN — Medication 975 MILLIGRAM(S): at 15:33

## 2023-12-31 RX ADMIN — Medication 600 MILLIGRAM(S): at 13:00

## 2023-12-31 NOTE — DISCHARGE NOTE OB - MEDICATION SUMMARY - MEDICATIONS TO TAKE
I will START or STAY ON the medications listed below when I get home from the hospital:    ibuprofen 600 mg oral tablet  -- 1 tab(s) by mouth every 6 hours  -- Indication: For Pain    Tylenol 325 mg oral tablet  -- 2 tab(s) by mouth every 6 hours  -- Indication: For Pain

## 2023-12-31 NOTE — DISCHARGE NOTE OB - PLAN OF CARE
If you experience any of the following, please notify your provider:  -fever >100.4F  -increased vaginal bleeding or clotting (saturating a pad an hour)  -foul smelling discharge or bloody discharge from your incision site  -severe abdominal, vaginal, or rectal pain   -persistent headache or vision changes  -swollen areas on your legs that are red, hot, or painful   -swollen, hot, painful areas and/or streaks on your breasts  -cracked or bleeding nipples  -mood swings, depression, or crying spells lasting more than 3 days     Nothing in the vagina for 6 weeks. No intercourse for 6 weeks. No bath tubs, swimming pools, or hot tubs for 6 weeks. Please follow up in 6 weeks for a check up.

## 2023-12-31 NOTE — DISCHARGE NOTE OB - CARE PLAN
1 Principal Discharge DX:	Vaginal delivery  Assessment and plan of treatment:	If you experience any of the following, please notify your provider:  -fever >100.4F  -increased vaginal bleeding or clotting (saturating a pad an hour)  -foul smelling discharge or bloody discharge from your incision site  -severe abdominal, vaginal, or rectal pain   -persistent headache or vision changes  -swollen areas on your legs that are red, hot, or painful   -swollen, hot, painful areas and/or streaks on your breasts  -cracked or bleeding nipples  -mood swings, depression, or crying spells lasting more than 3 days     Nothing in the vagina for 6 weeks. No intercourse for 6 weeks. No bath tubs, swimming pools, or hot tubs for 6 weeks. Please follow up in 6 weeks for a check up.

## 2023-12-31 NOTE — DISCHARGE NOTE OB - CARE PROVIDERS DIRECT ADDRESSES
,[email or web address] ,DirectAddress_Unknown,DirectAddress_Unknown ,DirectAddress_Unknown,DirectAddress_Unknown,DirectAddress_Unknown

## 2023-12-31 NOTE — DISCHARGE NOTE OB - NS MD DC FALL RISK RISK
For information on Fall & Injury Prevention, visit: [email or web address].[facility]/news/fall-prevention-protects-[email or web address]-[email or web address]-health-[email or web address]-mobility OR  https://www.[facility]/news/fall-prevention-tips-[email or web address]-injury OR  https://www.cdc.gov/steadi/patient.html For information on Fall & Injury Prevention, visit: [email or web address]://www.[facility]/news/fall-prevention-protects-and-maintains-health-[email or web address]-mobility OR  https://www.[facility]/news/fall-prevention-tips-to-avoid-injury OR  https://www.cdc.gov/steadi/patient.html

## 2023-12-31 NOTE — PROGRESS NOTE ADULT - SUBJECTIVE AND OBJECTIVE BOX
22  PPD#2  MFM f/u Consult Note, post partum.   Admission #3 this pregnancy;;  s/p  at 29w4d with placental abruption.   CC: I saw Ms. Dumont with Dr. Everett.    She is ambulating and tolerating PO.  Her vaginal bleeding is minimal.  She desires to go home.  Ms Dumont is known to us from her previous admissions for vaginal bleeding and likely SLE.   24 yo  PPD#0  @ 29w4d.  History of pericarditis, SLE (which she doubts), Crohns in remission, anemia, s/p admission #1 for possible PPROM, admission #2 for vaginal bleeding/macrocytic anemia/low fibrinogen and returns to [facility] L&D today with a history of vag bleeding x 4d, and abdominal pain/cramping x24h.  She received BMZ x2 on 12/10.  Her pregnancy is also complicated intermittently incr bp and by a positive Ab screen, rendering a "perfect" cross-match impossible.   PMHx: , Mar: Pericarditis with small pericardial effusion. Rx'd with prednisone & colchicine. Dr Behuria.             ?        SLE per rheumatology.  Pt declines treatment.  Please see consult from recent admission            : Crohn's Dz, in remission.  Dx at 15yoa.            Positive Ab screen as above, past transfusions.   Surg:   delivery   Meds: PNV  Allergies: NKDA  OBHx:  Ms Dumont receives prenatal care in [place].   #1 (): Vaginal delivery of 7-11 boy, at 40 weeks GA, after 3 days hours of labor . patient received anesthesia. Delivery occurred at [facility]. no pregnancy complications reported.    #2 ():  delivery of 5-10 boy, at 40 weeks GA . Delivery occurred at [facility]. no pregnancy complications reported. Pregnancy #2 Comments: (Abnormal fetal heart rate tracing in labor).  #3 ():  at 29w due to plac abruption, as above. Live male, doing well in NICU.   GYN history:  No abnormal pap smears, no STDs   FHx: M DM2.   SocHx:  Homemaker.  Denies subst use x3.   Family Planning: Not yet determined.   ROS:  As above.     General:  Ms. JASMINE DUMONT is lying in bed.  She appears comfortable.    Vital Signs Last 24 Hrs  T(C): 36.5 (31 Dec 2023 07:43), Max: 36.9 (30 Dec 2023 23:52)  T(F): 97.7 (31 Dec 2023 07:43), Max: 98.5 (30 Dec 2023 23:52)  HR: 81 (31 Dec 2023 07:43) (80 - 81)  BP: 122/61 (31 Dec 2023 07:43) (122/61 - 127/60)  BP(mean): --  RR: 18 (31 Dec 2023 07:43) (18 - 18)  SpO2: --      Abdomen:  Soft, nontender, nondistended, no rebound, no guarding.  Extremities:  Nontender calves.                           9.0    8.32  )-----------( 308      ( 30 Dec 2023 12:07 )             26.5                         8.5    10.77 )-----------( 274      ( 29 Dec 2023 17:06 )             24.3                         8.8    8.81  )-----------( 301      ( 29 Dec 2023 04:00 )             25.5                         9.4    8.32  )-----------( 288      ( 28 Dec 2023 22:28 )             27.4                         9.5    8.91  )-----------( 298      ( 28 Dec 2023 17:41 )             27.3       PT 9.90  PTT   25.5  INR   0.87  Fibrinogen 332  23 @ 12:07  PT 9.90  PTT   23.9  INR   0.87  Fibrinogen 390  23 @ 22:16    Type + Screen (23 @ 09:45)  ABO RH Interpretation: A POS  Antibody Screen Interpretation (23 @ 09:45)  Antibody Screen: POS: 2023 12:28     Iron with Total Binding Capacity in AM (23 @ 07:45)    Iron - Total Binding Capacity.: 306 ug/dL   % Saturation, Iron: 21 %   Iron Total: 65 ug/dL   Unsaturated Iron Binding Capacity: 241 ug/dL      Assessment and Recommendation:   Ms. Dumont is a 23 year old  PPD 2 presented 3d ago w/vaginal bleeding and cervical dilation, likely 2/2 chronic placental abruption causing  labor.  Delivered 0500 . Infant in NICU.    1.  Anemia  - Iron studies are within normal limits.  - Check cbc.  - Fibrinogen was 332, we will repeat it.     2. Multiple antibodies in type and screen    3.  History of pericarditis with small pericardial effusion in February-2023. S/p prednisone and colchicine.  - Normal TTE  - Consult appreciated by Dr Behuria from recent admission    4.  Dx of SLE?  - Followed by Rheumatology, patient declining  treatment; she states that she was told by Rheumatology that she does not have to follow up with them.    5 Crohn's disease in remission  - Diagnosed at 14yo, last colonoscopy then  - Lost to follow up, pt asymptomatic  - She should follow up with GI as an outpatient    6. Possible cHTN  - Patient denies history of elevated BPs, never on meds.  - F/u with Dr Behuria.     Ms. Dumont desires to be discharged.  Assuming her CBC and coagulation profile is stable this afternoon, this is reasonable. She should follow up as an outpatient with Cardiology GI, Rheumatology, Hematology, and her primary Obstetrician in [place].  She should also follow up with MFM at [facility] in 1 - 2 weeks.  Postpartum precautions should be provided.    Maurice Haney MD 22  PPD#2  MFM f/u Consult Note, post partum.   Admission #3 this pregnancy;;  s/p  at 29w4d with placental abruption.   CC: I saw Ms. Dumont with Dr. Everett.    She is ambulating and tolerating PO.  Her vaginal bleeding is minimal.  She desires to go home.  Ms Dumont is known to us from her previous admissions for vaginal bleeding and likely SLE.   24 yo  PPD#0  @ 29w4d.  History of pericarditis, SLE (which she doubts), Crohns in remission, anemia, s/p admission #1 for possible PPROM, admission #2 for vaginal bleeding/macrocytic anemia/low fibrinogen and returns to [facility] L&D today with a history of vag bleeding x 4d, and abdominal pain/cramping x24h.  She received BMZ x2 on 12/10.  Her pregnancy is also complicated intermittently incr bp and by a positive Ab screen, rendering a "perfect" cross-match impossible.   PMHx: , Mar: Pericarditis with small pericardial effusion. Rx'd with prednisone & colchicine. Dr Behuria.             ?        SLE per rheumatology.  Pt declines treatment.  Please see consult from recent admission            : Crohn's Dz, in remission.  Dx at 15yoa.            Positive Ab screen as above, past transfusions.   Surg:   delivery   Meds: PNV  Allergies: NKDA  OBHx:  Ms Dumont receives prenatal care in [place].   #1 (): Vaginal delivery of 7-11 boy, at 40 weeks GA, after 3 days hours of labor . patient received anesthesia. Delivery occurred at [facility]. no pregnancy complications reported.    #2 ():  delivery of 5-10 boy, at 40 weeks GA . Delivery occurred at [facility]. no pregnancy complications reported. Pregnancy #2 Comments: (Abnormal fetal heart rate tracing in labor).  #3 ():  at 29w due to plac abruption, as above. Live male, doing well in NICU.   GYN history:  No abnormal pap smears, no STDs   FHx: M DM2.   SocHx:  Homemaker.  Denies subst use x3.   Family Planning: Not yet determined.   ROS:  As above.     General:  Ms. JASMINE DUMONT is lying in bed.  She appears comfortable.    Vital Signs Last 24 Hrs  T(C): 36.5 (31 Dec 2023 07:43), Max: 36.9 (30 Dec 2023 23:52)  T(F): 97.7 (31 Dec 2023 07:43), Max: 98.5 (30 Dec 2023 23:52)  HR: 81 (31 Dec 2023 07:43) (80 - 81)  BP: 122/61 (31 Dec 2023 07:43) (122/61 - 127/60)  BP(mean): --  RR: 18 (31 Dec 2023 07:43) (18 - 18)  SpO2: --      Abdomen:  Soft, nontender, nondistended, no rebound, no guarding.  Extremities:  Nontender calves.                           9.0    8.32  )-----------( 308      ( 30 Dec 2023 12:07 )             26.5                         8.5    10.77 )-----------( 274      ( 29 Dec 2023 17:06 )             24.3                         8.8    8.81  )-----------( 301      ( 29 Dec 2023 04:00 )             25.5                         9.4    8.32  )-----------( 288      ( 28 Dec 2023 22:28 )             27.4                         9.5    8.91  )-----------( 298      ( 28 Dec 2023 17:41 )             27.3       PT 9.90  PTT   25.5  INR   0.87  Fibrinogen 332  23 @ 12:07  PT 9.90  PTT   23.9  INR   0.87  Fibrinogen 390  23 @ 22:16    Type + Screen (23 @ 09:45)  ABO RH Interpretation: A POS  Antibody Screen Interpretation (23 @ 09:45)  Antibody Screen: POS: 2023 12:28     Iron with Total Binding Capacity in AM (23 @ 07:45)    Iron - Total Binding Capacity.: 306 ug/dL   % Saturation, Iron: 21 %   Iron Total: 65 ug/dL   Unsaturated Iron Binding Capacity: 241 ug/dL      Assessment and Recommendation:   Ms. Dumont is a 23 year old  PPD 2 presented 3d ago w/vaginal bleeding and cervical dilation, likely 2/2 chronic placental abruption causing  labor.  Delivered 0500 . Infant in NICU.    1.  Anemia  - Iron studies are within normal limits.  - Check cbc.  - Fibrinogen was 332, we will repeat it.     2. Multiple antibodies in type and screen    3.  History of pericarditis with small pericardial effusion in February-2023. S/p prednisone and colchicine.  - Normal TTE  - Consult appreciated by Dr Behuria from recent admission    4.  Dx of SLE?  - Followed by Rheumatology, patient declining  treatment; she states that she was told by Rheumatology that she does not have to follow up with them.    5 Crohn's disease in remission  - Diagnosed at 16yo, last colonoscopy then  - Lost to follow up, pt asymptomatic  - She should follow up with GI as an outpatient    6. Possible cHTN  - Patient denies history of elevated BPs, never on meds.  - F/u with Dr Behuria.     Ms. Dumont desires to be discharged.  Assuming her CBC and coagulation profile is stable this afternoon, this is reasonable. She should follow up as an outpatient with Cardiology GI, Rheumatology, Hematology, and her primary Obstetrician in [place].  She should also follow up with MFM at [facility] in 1 - 2 weeks.  Postpartum precautions should be provided.    Maurice Haney MD 22  PPD#2  MFM f/u Consult Note, post partum.   Admission #3 this pregnancy;;  s/p  at 29w4d with placental abruption.   CC: I saw Ms. Dumont with Dr. Everett.    She is ambulating and tolerating PO.  Her vaginal bleeding is minimal.  She desires to go home.  Ms Dumont is known to us from her previous admissions for vaginal bleeding and likely SLE.   24 yo  PPD#0  @ 29w4d.  History of pericarditis, SLE (which she doubts), Crohns in remission, anemia, s/p admission #1 for possible PPROM, admission #2 for vaginal bleeding/macrocytic anemia/low fibrinogen and returns to [facility] L&D today with a history of vag bleeding x 4d, and abdominal pain/cramping x24h.  She received BMZ x2 on 12/10.  Her pregnancy is also complicated intermittently incr bp and by a positive Ab screen, rendering a "perfect" cross-match impossible.   PMHx: , Mar: Pericarditis with small pericardial effusion. Rx'd with prednisone & colchicine. Dr Behuria.             ?        SLE per rheumatology.  Pt declines treatment.  Please see consult from recent admission            : Crohn's Dz, in remission.  Dx at 15yoa.            Positive Ab screen as above, past transfusions.   Surg:   delivery   Meds: PNV  Allergies: NKDA  OBHx:  Ms Dumont receives prenatal care in [place].   #1 (): Vaginal delivery of 7-11 boy, at 40 weeks GA, after 3 days hours of labor . patient received anesthesia. Delivery occurred at [facility]. no pregnancy complications reported.    #2 ():  delivery of 5-10 boy, at 40 weeks GA . Delivery occurred at [facility]. no pregnancy complications reported. Pregnancy #2 Comments: (Abnormal fetal heart rate tracing in labor).  #3 ():  at 29w due to plac abruption, as above. Live male, doing well in NICU.   GYN history:  No abnormal pap smears, no STDs   FHx: M DM2.   SocHx:  Homemaker.  Denies subst use x3.   Family Planning: Not yet determined.   ROS:  As above.     General:  Ms. JASMINE DUMONT is lying in bed.  She appears comfortable.    Vital Signs Last 24 Hrs  T(C): 36.5 (31 Dec 2023 07:43), Max: 36.9 (30 Dec 2023 23:52)  T(F): 97.7 (31 Dec 2023 07:43), Max: 98.5 (30 Dec 2023 23:52)  HR: 81 (31 Dec 2023 07:43) (80 - 81)  BP: 122/61 (31 Dec 2023 07:43) (122/61 - 127/60)  BP(mean): --  RR: 18 (31 Dec 2023 07:43) (18 - 18)  SpO2: --      Abdomen:  Soft, nontender, nondistended, no rebound, no guarding.  Extremities:  Nontender calves.                           9.0    8.32  )-----------( 308      ( 30 Dec 2023 12:07 )             26.5                         8.5    10.77 )-----------( 274      ( 29 Dec 2023 17:06 )             24.3                         8.8    8.81  )-----------( 301      ( 29 Dec 2023 04:00 )             25.5                         9.4    8.32  )-----------( 288      ( 28 Dec 2023 22:28 )             27.4                         9.5    8.91  )-----------( 298      ( 28 Dec 2023 17:41 )             27.3       PT 9.90  PTT   25.5  INR   0.87  Fibrinogen 332  23 @ 12:07  PT 9.90  PTT   23.9  INR   0.87  Fibrinogen 390  23 @ 22:16    Type + Screen (23 @ 09:45)  ABO RH Interpretation: A POS  Antibody Screen Interpretation (23 @ 09:45)  Antibody Screen: POS: 2023 12:28     Iron with Total Binding Capacity in AM (23 @ 07:45)    Iron - Total Binding Capacity.: 306 ug/dL   % Saturation, Iron: 21 %   Iron Total: 65 ug/dL   Unsaturated Iron Binding Capacity: 241 ug/dL      Assessment and Recommendation:   Ms. Dumont is a 23 year old  PPD 2 presented 3d ago w/vaginal bleeding and cervical dilation, likely 2/2 chronic placental abruption causing  labor.  Delivered 0500 . Infant in NICU.    1.  Anemia  - Iron studies are within normal limits.  - Check cbc.  - Fibrinogen was 332, we will repeat it.     2. Multiple antibodies in type and screen    3.  History of pericarditis with small pericardial effusion in February-2023. S/p prednisone and colchicine.  - Normal TTE  - Consult appreciated by Dr Behuria from recent admission    4.  Dx of SLE?  - Followed by Rheumatology, patient declining  treatment; she states that she was told by Rheumatology that she does not have to follow up with them.    5 Crohn's disease in remission  - Diagnosed at 16yo, last colonoscopy then  - Lost to follow up, pt asymptomatic  - She should follow up with GI as an outpatient    6. Possible cHTN  - Patient denies history of elevated BPs, never on meds.  - F/u with Dr Behuria.     Ms. Dumont desires to be discharged.  Assuming her CBC and coagulation profile is stable this afternoon, this is reasonable. She should follow up as an outpatient with Cardiology GI, Rheumatology, Hematology, and her primary Obstetrician in [place].  She should also follow up with MFM at [facility] in 1 - 2 weeks.  Postpartum precautions should be provided.    Maurice Haney MD    35 minutes were spent on this encounter 23  PPD#2  MFM f/u Consult Note, post partum.   Admission #3 this pregnancy;;  s/p  at 29w4d with placental abruption.   CC: I saw Ms. Dumont with Dr. Everett.    She is ambulating and tolerating PO.  Her vaginal bleeding is minimal.  She desires to go home.  Ms Dumont is known to us from her previous admissions for vaginal bleeding and likely SLE.   24 yo  PPD#0  @ 29w4d.  History of pericarditis, SLE (which she doubts), Crohns in remission, anemia, s/p admission #1 for possible PPROM, admission #2 for vaginal bleeding/macrocytic anemia/low fibrinogen and returns to [facility] L&D today with a history of vag bleeding x 4d, and abdominal pain/cramping x24h.  She received BMZ x2 on 12/10.  Her pregnancy is also complicated intermittently incr bp and by a positive Ab screen, rendering a "perfect" cross-match impossible.   PMHx: , Mar: Pericarditis with small pericardial effusion. Rx'd with prednisone & colchicine. Dr Behuria.             ?        SLE per rheumatology.  Pt declines treatment.  Please see consult from recent admission            : Crohn's Dz, in remission.  Dx at 15yoa.            Positive Ab screen as above, past transfusions.   Surg:   delivery   Meds: PNV  Allergies: NKDA  OBHx:  Ms Dumont receives prenatal care in [place].   #1 (): Vaginal delivery of 7-11 boy, at 40 weeks GA, after 3 days hours of labor . patient received anesthesia. Delivery occurred at [facility]. no pregnancy complications reported.    #2 ():  delivery of 5-10 boy, at 40 weeks GA . Delivery occurred at [facility]. no pregnancy complications reported. Pregnancy #2 Comments: (Abnormal fetal heart rate tracing in labor).  #3 ():  at 29w due to plac abruption, as above. Live male, doing well in NICU.   GYN history:  No abnormal pap smears, no STDs   FHx: M DM2.   SocHx:  Homemaker.  Denies subst use x3.   Family Planning: Not yet determined.   ROS:  As above.     General:  Ms. JASMINE DUMONT is lying in bed.  She appears comfortable.    Vital Signs Last 24 Hrs  T(C): 36.5 (31 Dec 2023 07:43), Max: 36.9 (30 Dec 2023 23:52)  T(F): 97.7 (31 Dec 2023 07:43), Max: 98.5 (30 Dec 2023 23:52)  HR: 81 (31 Dec 2023 07:43) (80 - 81)  BP: 122/61 (31 Dec 2023 07:43) (122/61 - 127/60)  BP(mean): --  RR: 18 (31 Dec 2023 07:43) (18 - 18)  SpO2: --      Abdomen:  Soft, nontender, nondistended, no rebound, no guarding.  Extremities:  Nontender calves.                           9.0    8.32  )-----------( 308      ( 30 Dec 2023 12:07 )             26.5                         8.5    10.77 )-----------( 274      ( 29 Dec 2023 17:06 )             24.3                         8.8    8.81  )-----------( 301      ( 29 Dec 2023 04:00 )             25.5                         9.4    8.32  )-----------( 288      ( 28 Dec 2023 22:28 )             27.4                         9.5    8.91  )-----------( 298      ( 28 Dec 2023 17:41 )             27.3       PT 9.90  PTT   25.5  INR   0.87  Fibrinogen 332  23 @ 12:07  PT 9.90  PTT   23.9  INR   0.87  Fibrinogen 390  23 @ 22:16    Type + Screen (23 @ 09:45)  ABO RH Interpretation: A POS  Antibody Screen Interpretation (23 @ 09:45)  Antibody Screen: POS: 2023 12:28     Iron with Total Binding Capacity in AM (23 @ 07:45)    Iron - Total Binding Capacity.: 306 ug/dL   % Saturation, Iron: 21 %   Iron Total: 65 ug/dL   Unsaturated Iron Binding Capacity: 241 ug/dL      Assessment and Recommendation:   Ms. Dumont is a 23 year old  PPD 2 presented 3d ago w/vaginal bleeding and cervical dilation, likely 2/2 chronic placental abruption causing  labor.  Delivered 0500 . Infant in NICU.    1.  Anemia  - Iron studies are within normal limits.  - Check cbc.  - Fibrinogen was 332, we will repeat it.     2. Multiple antibodies in type and screen    3.  History of pericarditis with small pericardial effusion in February-2023. S/p prednisone and colchicine.  - Normal TTE  - Consult appreciated by Dr Behuria from recent admission    4.  Dx of SLE?  - Followed by Rheumatology, patient declining  treatment; she states that she was told by Rheumatology that she does not have to follow up with them.    5 Crohn's disease in remission  - Diagnosed at 16yo, last colonoscopy then  - Lost to follow up, pt asymptomatic  - She should follow up with GI as an outpatient    6. Possible cHTN  - Patient denies history of elevated BPs, never on meds.  - F/u with Dr Behuria.     Ms. Dumont desires to be discharged.  Assuming her CBC and coagulation profile is stable this afternoon, this is reasonable. She should follow up as an outpatient with Cardiology GI, Rheumatology, Hematology, and her primary Obstetrician in [place].  She should also follow up with MFM at [facility] in 1 - 2 weeks.  Postpartum precautions should be provided.    Maurice Haney MD    35 minutes were spent on this encounter

## 2023-12-31 NOTE — DISCHARGE NOTE OB - NPI NUMBER (FOR SYSADMIN USE ONLY) :
[[id number]] [[id number]] [UNKNOWN],[[id number]] [UNKNOWN],[[id number]] [[id number]],[[id number]],[UNKNOWN] [[id number]],[[id number]],[UNKNOWN]

## 2023-12-31 NOTE — PROGRESS NOTE ADULT - ASSESSMENT
[facility] Staff    I saw and evaluated Ms. JASMINE BRAVO  with  *** and I agree with the documentation above.  Ms. JASMINE BRAVO reports positive fetal movement and no vaginal bleeding.                   [facility] Staff    I saw and evaluated Ms. JASMINE BRAVO  with  *** and I agree with the documentation above.  Ms. JASMINE BRAVO reports positive fetal movement and no vaginal bleeding.

## 2023-12-31 NOTE — PROGRESS NOTE ADULT - ASSESSMENT
A/P: 22 y/o now P3 S/P  ex 29w for PPROM PPD#2, h/o crohn's disease in remission, h/o lupus and pericarditis recovering well.     - encourage ambulation, PO hydration  - monitor vitals, bleeding, normotensive  - f/u FTA  - followed by rheum for lupus, will f/u outpatient  - routine postpartum course    Discussed with Dr. Everett  A/P: 24 y/o now P3 S/P  ex 29w for PPROM PPD#2, h/o crohn's disease in remission, h/o lupus and pericarditis recovering well.     - encourage ambulation, PO hydration  - monitor vitals, bleeding, normotensive  - f/u FTA  - followed by rheum for lupus, will f/u outpatient  - routine postpartum course    Discussed with Dr. Everett

## 2023-12-31 NOTE — DISCHARGE NOTE OB - CARE PROVIDER_API CALL
Maurice Haney  Medical Genetics  [address] [place] [address]  Phone: [phone]  Fax: [phone]  Follow Up Time: 1 week   Maurice Haney  Medical Genetics  [address] [place] [address]  Phone: [phone]  Fax: [phone]  Follow Up Time: 1 week   Maurice sparks  [address] [facility]  Phone: (   )    -  Fax: (   )    -  Follow Up Time:     Behuria, Supreeti  Cardiology  [address] [place] [address], [facility] [address]  [place] [address]  Phone: [phone]  Fax: [phone]  Follow Up Time:    Maurice sparks  [address] [facility]  Phone: (   )    -  Fax: (   )    -  Follow Up Time:     Behuria, Supreeti  Cardiology  [address] [place] [address], [facility] [address]  [place] [address]  Phone: [phone]  Fax: [phone]  Follow Up Time:    Behuria, Supreeti  Cardiology  [address] [facility], [address]  [place] [address]  Phone: [phone]  Fax: [phone]  Follow Up Time:     Carmina Kirby  Rheumatology  [address] [place] [address]  Phone: [phone]  Fax: [phone]  Follow Up Time:     Maurice sparks  [facility]  Phone: (   )    -  Fax: (   )    -  Follow Up Time:    Behuria, Supreeti  Cardiology  [address] [facility], [address]  [place] [address]  Phone: [phone]  Fax: [phone]  Follow Up Time:     Carmina Kirby  Rheumatology  [address] [place] [address]  Phone: [phone]  Fax: [phone]  Follow Up Time:     Maruice sparks  [address] [facility]  Phone: (   )    -  Fax: (   )    -  Follow Up Time:

## 2023-12-31 NOTE — PROGRESS NOTE ADULT - ATTENDING COMMENTS
[facility] Staff     Please see my documentation which overrides any discrepancy between this note and my note.    Maurice Haney MD [facility] Staff     Please see my documentation which overrides any discrepancy between this note and my note.    Maurice Haney MD

## 2023-12-31 NOTE — DISCHARGE NOTE OB - HOSPITAL COURSE
DATE OF DISCHARGE: 23 @ 13:19    HISTORY OF PRESENT ILLNESS/HOSPITAL COURSE: HPI:  22 yo  @ 29w3d by LMP, ANIVAL 3/11/23 presenting due to vaginal bleeding and abdominal pain. Reports bleeding since , not spotting but not soaking pads. Abdominal pain is on and off, every few minutes, rated 10/10. Denies LOF. Good FM. Reports she did not seek care because it was [place]. She sees an OBGYN in [place].   Pregnancy complicated by:  1.  Possible PPROM, discharged on   - S/p  steroids -12/10  - s/p latency antibiotic course  - GBS neg. Gc/Ct negative.   2. Multiple antibodies in type and screen  3.  History of pericarditis with small pericardial effusion in February-2023. S/p prednisone and colchicine.  - Normal TTE  - Is followed by Dr. Behuria. Last seen in 2023  4.  Dx of Lupus  - Followed by Rheumatology, patient refusing treatment  5. Crohn's in remission  - Diagnosed at 16yo, last colonoscopy then  - Lost to follow up, pt asymptomatic  6. cHTN?  7.  Pregnancy  - Increased risk of down syndrome, Increased NT, right pyelectasis, small pericardial effusion.  S/p fetal echo (otherwise normal). NIPTS inconclusive, amniocentesis karyotype and array are negative.  - Previous  delivery.   (28 Dec 2023 09:48)    PAST MEDICAL & SURGICAL HISTORY:  H/O pericarditis      Lupus erythematosus      No significant past surgical history          PROCEDURES PERFORMED: Term spontaneous vaginal delivery  COMPLICATIONS:  -----   POST PARTUM COURSE: uncomplicated, discharged home on PPD2  FINAL DIAGNOSIS:  Status post normal spontaneous vaginal delivery at Gestational Age    DISCHARGE CBC:                       9.0    8.32  )-----------( 308      ( 30 Dec 2023 12:07 )             26.5     DISCHARGE INSTRUCTIONS:  If you have severe abdominal pain, heavy vaginal bleeding, shortness of breath or chest pain please call your doctor or come to the emergency room.   DIET:  Advance as tolerated.  ACTIVITY:  Advance as tolerated.  Pelvic rest for 6 weeks.  Nothing to be inserted into the vagina for 6 weeks, i.e. no tampons, douching, sexual relations, or tub baths.   FOLLOW UP: Make an appointment to see your doctor as instructed within 1 week   PRESCRIPTIONS: Prescriptions:  ibuprofen 600 mg oral tablet: 1 tab(s) orally every 6 hours (31 Dec 2023 13:17)  Tylenol 325 mg oral tablet: 2 tab(s) orally every 6 hours (31 Dec 2023 13:17)         DATE OF DISCHARGE: 23 @ 13:19    HISTORY OF PRESENT ILLNESS/HOSPITAL COURSE: HPI:  24 yo  @ 29w3d by LMP, ANIVAL 3/11/23 presenting due to vaginal bleeding and abdominal pain. Reports bleeding since , not spotting but not soaking pads. Abdominal pain is on and off, every few minutes, rated 10/10. Denies LOF. Good FM. Reports she did not seek care because it was [place]. She sees an OBGYN in [place].   Pregnancy complicated by:  1.  Possible PPROM, discharged on   - S/p  steroids -12/10  - s/p latency antibiotic course  - GBS neg. Gc/Ct negative.   2. Multiple antibodies in type and screen  3.  History of pericarditis with small pericardial effusion in February-2023. S/p prednisone and colchicine.  - Normal TTE  - Is followed by Dr. Behuria. Last seen in 2023  4.  Dx of Lupus  - Followed by Rheumatology, patient refusing treatment  5. Crohn's in remission  - Diagnosed at 16yo, last colonoscopy then  - Lost to follow up, pt asymptomatic  6. cHTN?  7.  Pregnancy  - Increased risk of down syndrome, Increased NT, right pyelectasis, small pericardial effusion.  S/p fetal echo (otherwise normal). NIPTS inconclusive, amniocentesis karyotype and array are negative.  - Previous  delivery.   (28 Dec 2023 09:48)    PAST MEDICAL & SURGICAL HISTORY:  H/O pericarditis      Lupus erythematosus      No significant past surgical history          PROCEDURES PERFORMED: Term spontaneous vaginal delivery  COMPLICATIONS:  -----   POST PARTUM COURSE: uncomplicated, discharged home on PPD2  FINAL DIAGNOSIS:  Status post normal spontaneous vaginal delivery at Gestational Age    DISCHARGE CBC:                       9.0    8.32  )-----------( 308      ( 30 Dec 2023 12:07 )             26.5     DISCHARGE INSTRUCTIONS:  If you have severe abdominal pain, heavy vaginal bleeding, shortness of breath or chest pain please call your doctor or come to the emergency room.   DIET:  Advance as tolerated.  ACTIVITY:  Advance as tolerated.  Pelvic rest for 6 weeks.  Nothing to be inserted into the vagina for 6 weeks, i.e. no tampons, douching, sexual relations, or tub baths.   FOLLOW UP: Make an appointment to see your doctor as instructed within 1 week   PRESCRIPTIONS: Prescriptions:  ibuprofen 600 mg oral tablet: 1 tab(s) orally every 6 hours (31 Dec 2023 13:17)  Tylenol 325 mg oral tablet: 2 tab(s) orally every 6 hours (31 Dec 2023 13:17)         DATE OF DISCHARGE: 23 @ 13:19    HISTORY OF PRESENT ILLNESS/HOSPITAL COURSE: HPI:  24 yo  @ 29w3d by LMP, ANIVAL 3/11/23 presenting due to vaginal bleeding and abdominal pain. Reports bleeding since , not spotting but not soaking pads. Abdominal pain is on and off, every few minutes, rated 10/10. Denies LOF. Good FM. Reports she did not seek care because it was [place]. She sees an OBGYN in [place].   Pregnancy complicated by:  1.  Possible PPROM, discharged on   - S/p  steroids -12/10  - s/p latency antibiotic course  - GBS neg. Gc/Ct negative.   2. Multiple antibodies in type and screen  3.  History of pericarditis with small pericardial effusion in February-2023. S/p prednisone and colchicine.  - Normal TTE  - Is followed by Dr. Behuria. Last seen in 2023  4.  Dx of Lupus  - Followed by Rheumatology, patient refusing treatment  5. Crohn's in remission  - Diagnosed at 14yo, last colonoscopy then  - Lost to follow up, pt asymptomatic  6. cHTN?  7.  Pregnancy  - Increased risk of down syndrome, Increased NT, right pyelectasis, small pericardial effusion.  S/p fetal echo (otherwise normal). NIPTS inconclusive, amniocentesis karyotype and array are negative.  - Previous  delivery.   (28 Dec 2023 09:48)    PAST MEDICAL & SURGICAL HISTORY:  H/O pericarditis      Lupus erythematosus      No significant past surgical history          PROCEDURES PERFORMED: Term spontaneous vaginal delivery  COMPLICATIONS:  -----   POST PARTUM COURSE: uncomplicated, discharged home on PPD2  FINAL DIAGNOSIS:  Status post normal spontaneous vaginal delivery at Gestational Age    DISCHARGE CBC:                       9.0    8.32  )-----------( 308      ( 30 Dec 2023 12:07 )             26.5     DISCHARGE INSTRUCTIONS:  If you have severe abdominal pain, heavy vaginal bleeding, shortness of breath or chest pain please call your doctor or come to the emergency room.   DIET:  Advance as tolerated.  ACTIVITY:  Advance as tolerated.  Pelvic rest for 6 weeks.  Nothing to be inserted into the vagina for 6 weeks, i.e. no tampons, douching, sexual relations, or tub baths.   FOLLOW UP: Make an appointment to see your doctor as instructed within 1 week   PRESCRIPTIONS: Prescriptions:  ibuprofen 600 mg oral tablet: 1 tab(s) orally every 6 hours (31 Dec 2023 13:17)  Tylenol 325 mg oral tablet: 2 tab(s) orally every 6 hours (31 Dec 2023 13:17)      Please see Dr. Haney's note from .  Follow up with Cardiology, Rheumatology, GI, Hematology, and her primary Obstetricianin [place].  Also follow up with MFM at [facility],  If she has significant vaginal bleeding or doesnt feel well she should return to the hospital for evaluation.        DATE OF DISCHARGE: 23 @ 13:19    HISTORY OF PRESENT ILLNESS/HOSPITAL COURSE: HPI:  22 yo  @ 29w3d by LMP, ANIVAL 3/11/23 presenting due to vaginal bleeding and abdominal pain. Reports bleeding since , not spotting but not soaking pads. Abdominal pain is on and off, every few minutes, rated 10/10. Denies LOF. Good FM. Reports she did not seek care because it was [place]. She sees an OBGYN in [place].   Pregnancy complicated by:  1.  Possible PPROM, discharged on   - S/p  steroids -12/10  - s/p latency antibiotic course  - GBS neg. Gc/Ct negative.   2. Multiple antibodies in type and screen  3.  History of pericarditis with small pericardial effusion in February-2023. S/p prednisone and colchicine.  - Normal TTE  - Is followed by Dr. Behuria. Last seen in 2023  4.  Dx of Lupus  - Followed by Rheumatology, patient refusing treatment  5. Crohn's in remission  - Diagnosed at 14yo, last colonoscopy then  - Lost to follow up, pt asymptomatic  6. cHTN?  7.  Pregnancy  - Increased risk of down syndrome, Increased NT, right pyelectasis, small pericardial effusion.  S/p fetal echo (otherwise normal). NIPTS inconclusive, amniocentesis karyotype and array are negative.  - Previous  delivery.   (28 Dec 2023 09:48)    PAST MEDICAL & SURGICAL HISTORY:  H/O pericarditis      Lupus erythematosus      No significant past surgical history          PROCEDURES PERFORMED: Term spontaneous vaginal delivery  COMPLICATIONS:  -----   POST PARTUM COURSE: uncomplicated, discharged home on PPD2  FINAL DIAGNOSIS:  Status post normal spontaneous vaginal delivery at Gestational Age    DISCHARGE CBC:                       9.0    8.32  )-----------( 308      ( 30 Dec 2023 12:07 )             26.5     DISCHARGE INSTRUCTIONS:  If you have severe abdominal pain, heavy vaginal bleeding, shortness of breath or chest pain please call your doctor or come to the emergency room.   DIET:  Advance as tolerated.  ACTIVITY:  Advance as tolerated.  Pelvic rest for 6 weeks.  Nothing to be inserted into the vagina for 6 weeks, i.e. no tampons, douching, sexual relations, or tub baths.   FOLLOW UP: Make an appointment to see your doctor as instructed within 1 week   PRESCRIPTIONS: Prescriptions:  ibuprofen 600 mg oral tablet: 1 tab(s) orally every 6 hours (31 Dec 2023 13:17)  Tylenol 325 mg oral tablet: 2 tab(s) orally every 6 hours (31 Dec 2023 13:17)      Please see Dr. Haney's note from .  Follow up with Cardiology, Rheumatology, GI, Hematology, and her primary Obstetricianin [place].  Also follow up with MFM at [facility],  If she has significant vaginal bleeding or doesnt feel well she should return to the hospital for evaluation.        DATE OF DISCHARGE: 23 @ 13:19    HISTORY OF PRESENT ILLNESS/HOSPITAL COURSE: HPI:  24 yo  @ 29w3d by LMP, ANIVAL 3/11/23 presenting due to vaginal bleeding and abdominal pain. Reports bleeding since , not spotting but not soaking pads. Abdominal pain is on and off, every few minutes, rated 10/10. Denies LOF. Good FM. Reports she did not seek care because it was [place]. She sees an OBGYN in [place].   Pregnancy complicated by:  1.  Possible PPROM, discharged on   - S/p  steroids -12/10  - s/p latency antibiotic course  - GBS neg. Gc/Ct negative.   2. Multiple antibodies in type and screen  3.  History of pericarditis with small pericardial effusion in February-2023. S/p prednisone and colchicine.  - Normal TTE  - Is followed by Dr. Behuria. Last seen in 2023  4.  Dx of Lupus  - Followed by Rheumatology, patient refusing treatment  5. Crohn's in remission  - Diagnosed at 14yo, last colonoscopy then  - Lost to follow up, pt asymptomatic  6. cHTN?  7.  Pregnancy  - Increased risk of down syndrome, Increased NT, right pyelectasis, small pericardial effusion.  S/p fetal echo (otherwise normal). NIPTS inconclusive, amniocentesis karyotype and array are negative.  - Previous  delivery.   (28 Dec 2023 09:48)    PAST MEDICAL & SURGICAL HISTORY:  H/O pericarditis      Lupus erythematosus      No significant past surgical history          PROCEDURES PERFORMED: Term spontaneous vaginal delivery  COMPLICATIONS:  -----   POST PARTUM COURSE: uncomplicated, discharged home on PPD2  FINAL DIAGNOSIS:  Status post normal spontaneous vaginal delivery at Gestational Age    DISCHARGE CBC:                       9.0    8.32  )-----------( 308      ( 30 Dec 2023 12:07 )             26.5     DISCHARGE INSTRUCTIONS:  If you have severe abdominal pain, heavy vaginal bleeding, shortness of breath or chest pain please call your doctor or come to the emergency room.   DIET:  Advance as tolerated.  ACTIVITY:  Advance as tolerated.  Pelvic rest for 6 weeks.  Nothing to be inserted into the vagina for 6 weeks, i.e. no tampons, douching, sexual relations, or tub baths.   FOLLOW UP: Make an appointment to see your doctor as instructed within 1 week   PRESCRIPTIONS: Prescriptions:  ibuprofen 600 mg oral tablet: 1 tab(s) orally every 6 hours (31 Dec 2023 13:17)  Tylenol 325 mg oral tablet: 2 tab(s) orally every 6 hours (31 Dec 2023 13:17)      Please see Dr. Haney's note from .  Follow up with Cardiology, Rheumatology, GI, Hematology, and her primary Obstetrician in [place].  Also follow up with [facility],  If she has significant vaginal bleeding or doesnt feel well she should return to the hospital for evaluation.        DATE OF DISCHARGE: 23 @ 13:19    HISTORY OF PRESENT ILLNESS/HOSPITAL COURSE: HPI:  22 yo  @ 29w3d by LMP, ANIVAL 3/11/23 presenting due to vaginal bleeding and abdominal pain. Reports bleeding since , not spotting but not soaking pads. Abdominal pain is on and off, every few minutes, rated 10/10. Denies LOF. Good FM. Reports she did not seek care because it was [place]. She sees an OBGYN in [place].   Pregnancy complicated by:  1.  Possible PPROM, discharged on   - S/p  steroids -12/10  - s/p latency antibiotic course  - GBS neg. Gc/Ct negative.   2. Multiple antibodies in type and screen  3.  History of pericarditis with small pericardial effusion in February-2023. S/p prednisone and colchicine.  - Normal TTE  - Is followed by Dr. Behuria. Last seen in 2023  4.  Dx of Lupus  - Followed by Rheumatology, patient refusing treatment  5. Crohn's in remission  - Diagnosed at 16yo, last colonoscopy then  - Lost to follow up, pt asymptomatic  6. cHTN?  7.  Pregnancy  - Increased risk of down syndrome, Increased NT, right pyelectasis, small pericardial effusion.  S/p fetal echo (otherwise normal). NIPTS inconclusive, amniocentesis karyotype and array are negative.  - Previous  delivery.   (28 Dec 2023 09:48)    PAST MEDICAL & SURGICAL HISTORY:  H/O pericarditis      Lupus erythematosus      No significant past surgical history          PROCEDURES PERFORMED: Term spontaneous vaginal delivery  COMPLICATIONS:  -----   POST PARTUM COURSE: uncomplicated, discharged home on PPD2  FINAL DIAGNOSIS:  Status post normal spontaneous vaginal delivery at Gestational Age    DISCHARGE CBC:                       9.0    8.32  )-----------( 308      ( 30 Dec 2023 12:07 )             26.5     DISCHARGE INSTRUCTIONS:  If you have severe abdominal pain, heavy vaginal bleeding, shortness of breath or chest pain please call your doctor or come to the emergency room.   DIET:  Advance as tolerated.  ACTIVITY:  Advance as tolerated.  Pelvic rest for 6 weeks.  Nothing to be inserted into the vagina for 6 weeks, i.e. no tampons, douching, sexual relations, or tub baths.   FOLLOW UP: Make an appointment to see your doctor as instructed within 1 week   PRESCRIPTIONS: Prescriptions:  ibuprofen 600 mg oral tablet: 1 tab(s) orally every 6 hours (31 Dec 2023 13:17)  Tylenol 325 mg oral tablet: 2 tab(s) orally every 6 hours (31 Dec 2023 13:17)      Please see Dr. Haney's note from .  Follow up with Cardiology, Rheumatology, GI, Hematology, and her primary Obstetrician in [place].  Also follow up with MFM at [facility],  If she has significant vaginal bleeding or doesnt feel well she should return to the hospital for evaluation.

## 2023-12-31 NOTE — PROGRESS NOTE ADULT - SUBJECTIVE AND OBJECTIVE BOX
PGY 1 NOTE  Chief Complaint: Postpartum    HPI: Pt doing well, pain well controlled. No overnight events, no acute complaints.   Ambulating: yes  Voiding: yes  Diet: regular   Breastfeeding: pumping   Denies HA, lightheadedness, palpitations, N/V, fevers, chills, CP, SOB, LE edema, heavy vaginal bleeding.     PAST MEDICAL & SURGICAL HISTORY:  H/O pericarditis  Lupus erythematosus  No significant past surgical history    Physical Exam  Vital Signs Last 24 Hrs  T(F): 97.7 (31 Dec 2023 07:43), Max: 98.5 (30 Dec 2023 23:52)  HR: 81 (31 Dec 2023 07:43) (78 - 81)  BP: 122/61 (31 Dec 2023 07:43) (122/61 - 127/60)  RR: 18 (31 Dec 2023 07:43) (18 - 18)    Physical exam:  General - AAOx3, NAD  Heart - S1S2, RRR  Lungs - CTA BL  Abdomen:  - Soft, nontender, nondistended, BS+  - Fundus firm, nontender, below the umbilicus  Pelvis/Vagina - Normal rubra lochia  Extremities - No calf tenderness, no swelling    Labs:                        9.0    8.32  )-----------( 308      ( 30 Dec 2023 12:07 )             26.5                         8.5    10.77 )-----------( 274      ( 29 Dec 2023 17:06 )             24.3     ABO RH Interpretation: A POS (12-28-23 @ 09:45)  Antibody Screen: POS (12-28-23 @ 09:45)        acetaminophen     Tablet .. 975 milliGRAM(s) Oral <User Schedule>, Routine  benzocaine 20%/menthol 0.5% Spray 1 Spray(s) Topical every 6 hours, Routine PRN for Perineal discomfort  dexAMETHasone  Injectable 4 milliGRAM(s) IV Push every 6 hours, Routine PRN Nausea  dibucaine 1% Ointment 1 Application(s) Topical every 6 hours, Routine PRN Perineal discomfort  diphenhydrAMINE 25 milliGRAM(s) Oral every 6 hours, Routine PRN Pruritus  diphtheria/tetanus/pertussis (acellular) Vaccine (Adacel) 0.5 milliLiter(s) IntraMuscular once, Routine  fentanyl (2 MICROgram(s)/mL) + bupivacaine 0.0625%  in 0.9% Sodium Chloride PCEA 250 milliLiter(s) Epidural PCA Continuous, Routine  ibuprofen  Tablet. 600 milliGRAM(s) Oral every 6 hours, Routine  lanolin Ointment 1 Application(s) Topical every 6 hours, Routine PRN nipple soreness  magnesium hydroxide Suspension 30 milliLiter(s) Oral two times a day, Routine PRN Constipation  naloxone Injectable 0.1 milliGRAM(s) IV Push every 3 minutes, Routine PRN For ANY of the following changes in patient status:  A. RR LESS THAN 10 breaths per minute, B. Oxygen saturation LESS THAN 90%, C. Sedation score of 6  ondansetron Injectable 4 milliGRAM(s) IV Push every 6 hours, Routine PRN Nausea  oxyCODONE    IR 5 milliGRAM(s) Oral once, Routine PRN Moderate to Severe Pain (4-10)  oxyCODONE    IR 5 milliGRAM(s) Oral every 3 hours, Routine PRN Moderate to Severe Pain (4-10)  prenatal multivitamin 1 Tablet(s) Oral daily, Routine  simethicone 80 milliGRAM(s) Chew every 4 hours, Routine PRN Gas  sodium chloride 0.9% lock flush 3 milliLiter(s) IV Push every 8 hours, Routine  witch hazel Pads 1 Application(s) Topical every 4 hours, Routine PRN Perineal discomfort

## 2023-12-31 NOTE — DISCHARGE NOTE OB - PROVIDER TOKENS
PROVIDER:[TOKEN:[[id number]],FOLLOWUP:[1 week]] PROVIDER:[TOKEN:[[id number]],FOLLOWUP:[1 week]] FREE:[LAST:[clare],FIRST:[Maurice],PHONE:[(   )    -],FAX:[(   )    -],ADDRESS:[[address] [place] [address]]],PROVIDER:[TOKEN:[[id number]]] FREE:[LAST:[clare],FIRST:[Maurice],PHONE:[(   )    -],FAX:[(   )    -],ADDRESS:[[address] [place] [address]]],PROVIDER:[TOKEN:[[id number]]] PROVIDER:[TOKEN:[[id number]]],PROVIDER:[TOKEN:[[id number]]],FREE:[LAST:[clare],FIRST:[Maurice],PHONE:[(   )    -],FAX:[(   )    -],ADDRESS:[[address] [place] [address]]] PROVIDER:[TOKEN:[[id number]]],PROVIDER:[TOKEN:[[id number]]],FREE:[LAST:[clare],FIRST:[Maurice],PHONE:[(   )    -],FAX:[(   )    -],ADDRESS:[[address] [place] [address]]]

## 2023-12-31 NOTE — DISCHARGE NOTE OB - PATIENT PORTAL LINK FT
You can access the FollowMyHealth Patient Portal offered by [facility] by registering at the following website: [email or web address]. By joining [facility]’s FollowMyHealth portal, you will also be able to view your health information using other applications (apps) compatible with our system. You can access the FollowMyHealth Patient Portal offered by [facility] by registering at the following website: [email or web address]. By joining [facility]’s FollowMyHealth portal, you will also be able to view your health information using other applications (apps) compatible with our system.

## 2024-01-01 LAB
T PALLIDUM IGG SER QL IF: SIGNIFICANT CHANGE UP
T PALLIDUM IGG SER QL IF: SIGNIFICANT CHANGE UP

## 2024-01-04 DIAGNOSIS — O34.211 MATERNAL CARE FOR LOW TRANSVERSE SCAR FROM PREVIOUS CESAREAN DELIVERY: ICD-10-CM

## 2024-01-04 DIAGNOSIS — K50.919 CROHN'S DISEASE, UNSPECIFIED, WITH UNSPECIFIED COMPLICATIONS: ICD-10-CM

## 2024-01-04 DIAGNOSIS — M32.9 SYSTEMIC LUPUS ERYTHEMATOSUS, UNSPECIFIED: ICD-10-CM

## 2024-01-04 DIAGNOSIS — Z3A.29 29 WEEKS GESTATION OF PREGNANCY: ICD-10-CM

## 2024-01-04 DIAGNOSIS — O45.93 PREMATURE SEPARATION OF PLACENTA, UNSPECIFIED, THIRD TRIMESTER: ICD-10-CM

## 2024-01-04 LAB
SURGICAL PATHOLOGY STUDY: SIGNIFICANT CHANGE UP
SURGICAL PATHOLOGY STUDY: SIGNIFICANT CHANGE UP

## 2024-01-09 ENCOUNTER — APPOINTMENT (OUTPATIENT)
Dept: MATERNAL FETAL MEDICINE | Facility: CLINIC | Age: 24
End: 2024-01-09

## 2024-01-09 ENCOUNTER — APPOINTMENT (OUTPATIENT)
Dept: ANTEPARTUM | Facility: CLINIC | Age: 24
End: 2024-01-09

## 2024-03-06 PROBLEM — L93.0 DISCOID LUPUS ERYTHEMATOSUS: Chronic | Status: ACTIVE | Noted: 2023-12-28

## 2024-03-11 ENCOUNTER — APPOINTMENT (OUTPATIENT)
Dept: ANTEPARTUM | Facility: CLINIC | Age: 24
End: 2024-03-11

## 2024-03-12 ENCOUNTER — APPOINTMENT (OUTPATIENT)
Dept: MATERNAL FETAL MEDICINE | Facility: CLINIC | Age: 24
End: 2024-03-12

## 2024-04-17 NOTE — ED PROVIDER NOTE - CCCP TRG CHIEF CMPLNT
Pre assessment completed for upcoming procedure.   (Please see previous telephone encounter notes for complete details)    Family member  returned call. Son, Chrissy, verbal consent by patient given. Advised son to sign consent to communicate form when patient checks in for procedure.       Procedure details:    Arrival time and facility location reviewed.    Pre op exam needed? N/A    Designated  policy reviewed. Instructed to have someone stay 24  hours post procedure.       Medication review:    Medications reviewed. Please see supporting documentation below. Holding recommendations discussed (if applicable).       Prep for procedure:     Procedure prep instructions reviewed.        Any additional information needed:  N/A      Family member  verbalized understanding and had no questions or concerns at this time.      Roxane Glasgow RN  Endoscopy Procedure Pre Assessment RN  [phone] option 4   dizziness

## 2024-05-15 NOTE — ED ADULT NURSE NOTE - NSSUHOSCREENINGYN_ED_ALL_ED
HISTORY OF PRESENT ILLNESS     This is a 26 year old female here today for   Chief Complaint   Patient presents with    Sore Throat   .  Patient presents with sudden onset of fevers, body aches, scratchy throat and generalized body aches for the last 2 days.  She reports her highest temperature was 102.9° F. she reports yesterday developing a sore throat and reports the body aches continued to worsen and she would get a headache.  She also notes nasal congestion and did have a cough.  She reports yesterday she developed a nosebleed and then began to cough up a small amount of blood.  She reports a nosebleed did not last long.  She reports she did feel initially nauseous just prior to nosebleeds starting.  She did have 1 or 2 episodes of post-tussive emesis.  She does report an occasional sensation of feeling shortness of breath though denies these symptoms at this time.  She does attribute the shortness of breath to her nasal congestion.  She denies any known ill contacts, recent travel, pregnancy concern, abdominal pain, head injury/trauma, visual changes/diplopia, photophobia, LOC, CP or any other associated complaints/concerns.  She denies any significant PMH including prior history of PE/DVT. She is on Oral birth control.          PAST MEDICAL, FAMILY AND SOCIAL HISTORY     I have personally reviewed and updated the following EMR sections:  Allergies and Past Medical History    ALLERGIES:   Allergen Reactions    Unknown Other (See Comments)     ENVIRONMENTAL:  Dust and mold     No past medical history on file.        REVIEW OF SYSTEMS   Review of Systems   Constitutional:  Positive for appetite change, chills, fatigue and fever.   HENT:  Positive for congestion, nosebleeds and sore throat. Negative for ear pain, sinus pain, trouble swallowing and voice change.    Respiratory:  Positive for cough and shortness of breath (occasional and mild). Negative for wheezing and stridor.    Cardiovascular:  Negative for  chest pain and leg swelling.   Gastrointestinal:  Positive for nausea and vomiting. Negative for abdominal pain, blood in stool and diarrhea.   Genitourinary:  Negative for difficulty urinating, dysuria, frequency, hematuria, menstrual problem, pelvic pain, urgency, vaginal discharge and vaginal pain.   Musculoskeletal:  Negative for gait problem.     Negative other than above    PHYSICAL EXAM   Blood pressure 131/84, pulse 93, temperature 98.6 °F (37 °C), temperature source Oral, resp. rate 20, height 5' 8\" (1.727 m), weight 104.3 kg (230 lb), last menstrual period 05/01/2024, SpO2 98%.  Body mass index is 34.97 kg/m².  Physical Exam  Vitals and nursing note reviewed.   Constitutional:       General: She is not in acute distress.  HENT:      Head: Normocephalic and atraumatic.      Jaw: There is normal jaw occlusion. No trismus.      Comments: Pharynx patent without trismus/stridor/aphonia/drooling.  No submandibular swelling     Right Ear: Tympanic membrane, ear canal and external ear normal.      Left Ear: Tympanic membrane, ear canal and external ear normal.      Nose: Mucosal edema and congestion present.      Right Nostril: No epistaxis or occlusion.      Left Nostril: No epistaxis or occlusion.      Comments: Nares dry appearing. No bleeding noted to bilateral nares or oropharynx     Mouth/Throat:      Mouth: Mucous membranes are moist.   Cardiovascular:      Rate and Rhythm: Normal rate and regular rhythm.   Pulmonary:      Effort: Pulmonary effort is normal. No respiratory distress.      Breath sounds: Normal breath sounds. No stridor. No wheezing or rhonchi.   Musculoskeletal:      Cervical back: Neck supple. No rigidity.   Lymphadenopathy:      Cervical: No cervical adenopathy.   Skin:     General: Skin is warm and dry.      Capillary Refill: Capillary refill takes less than 2 seconds.   Neurological:      Mental Status: She is alert and oriented to person, place, and time.      Gait: Gait normal.          LABORATORY  I have reviewed the pertinent laboratory tests.  These are the pertinent findings:  Results for orders placed or performed in visit on 05/15/24   POCT Streptococcus Group A PCR   Result Value Ref Range    STREPTOCOCCUS GROUP A PCR Not Detected Not Detected    TEST LOT NUMBER n.a     TEST LOT EXPIRATION DATE n.a            @1920- pt called and updated about negative flu/covid/rsv tests. Discussed continued supportive care, f/u and return ED criteria for worsening sx/concerns.          ASSESSMENT/PLAN   Lyudmila was seen today for sore throat.    Diagnoses and all orders for this visit:    Sorethroat  -     POCT Streptococcus Group A PCR  -     Cancel: POCT Streptococcus Group A PCR  -     POCT COVID/Flu/RSV Panel via [facility]    Upper respiratory tract infection, unspecified type  -     POCT COVID/Flu/RSV Panel via [facility]        -Patient presents with multiple complaints including URI symptoms, nausea/vomiting and sore throat.   -On exam, patient is non-toxic appearing and in no acute distress.  She was afebrile and hemodynamically stable without hypoxia tachypnea or distress.  There were no neurological deficits or focal findings.  She had a soft/nonsurgical abdomen and denied any abdominal pain.  Patient's strep PCR was negative.  Her COVID-19/flu/RSV panel was also negative.     Discussed continued supportive care for suspected viral etiology including meds, follow up, plan of care and return to ED criteria reviewed.      EDUCATION  Lyudmila Morrison educated as to the above assessment and plan and did express understanding and agreement.     RETURN  Patient advised they can return to urgent care or the emergency room if their symptoms worsen and they are unable to see their primary care physician.    The patient was wearing a mask during this encounter.  I was wearing full PPE including an mask, and gloves during this encounter.     The patient was stable for discharge. Diagnosis and discharge  instructions were reviewed with patient. Discussed red flags signs and symptoms that would require emergent medical attention. Patient is in agreement with plan and disposition.     Supervising physician for shift: SAMINA Gautam     Yes - the patient is able to be screened

## 2024-08-01 NOTE — OB RN PATIENT PROFILE - THE METHOD OF FEEDING WHEN THE NEWBORN REQUESTS AND CONTINUING EACH FEEDING SESSION UNTIL THE NEWBORN IS SATISFIED
[FreeTextEntry1] : 86 year old man who  has been a heavy snorer during the day  he has some daytime somnolence heneralized fatigue  Son describes that he sleeps during though usually on schedule after breakfast  He did have complicated COVID-19 infection about 2 years ago  CT abdomen did not demonstrated basilar findings  PLAN  Home overnight sleep study Further recommendations based on results. Total time spent : 40 minutes Including: Preparation prior to visit - Reviewing prior record, results of tests and Consultation Reports as applicable Conducting an appropriate H & P during today's encounter  reviewing all available CT chest exams.  demonstrating images to pt as appropriate Counseling patient  Note completion  med renewal discussing differential diagnosis  Mukund Branham MD    Statement Selected

## 2024-10-23 ENCOUNTER — INPATIENT (INPATIENT)
Facility: HOSPITAL | Age: 24
LOS: 1 days | Discharge: HOME | DRG: 809 | End: 2024-10-25
Attending: INTERNAL MEDICINE | Admitting: STUDENT IN AN ORGANIZED HEALTH CARE EDUCATION/TRAINING PROGRAM
Payer: COMMERCIAL

## 2024-10-23 VITALS
DIASTOLIC BLOOD PRESSURE: 74 MMHG | SYSTOLIC BLOOD PRESSURE: 114 MMHG | OXYGEN SATURATION: 100 % | HEART RATE: 91 BPM | RESPIRATION RATE: 18 BRPM | WEIGHT: 179.9 LBS | HEIGHT: 63 IN | TEMPERATURE: 99 F

## 2024-10-23 DIAGNOSIS — D64.9 ANEMIA, UNSPECIFIED: ICD-10-CM

## 2024-10-23 LAB
ALBUMIN SERPL ELPH-MCNC: 4.4 G/DL — SIGNIFICANT CHANGE UP (ref 3.5–5.2)
ALP SERPL-CCNC: 81 U/L — SIGNIFICANT CHANGE UP (ref 30–115)
ALT FLD-CCNC: 8 U/L — SIGNIFICANT CHANGE UP (ref 0–41)
ANION GAP SERPL CALC-SCNC: 10 MMOL/L — SIGNIFICANT CHANGE UP (ref 7–14)
APTT BLD: 26.9 SEC — LOW (ref 27–39.2)
AST SERPL-CCNC: 22 U/L — SIGNIFICANT CHANGE UP (ref 0–41)
BASOPHILS # BLD AUTO: 0.06 K/UL — SIGNIFICANT CHANGE UP (ref 0–0.2)
BASOPHILS NFR BLD AUTO: 0.9 % — SIGNIFICANT CHANGE UP (ref 0–1)
BILIRUB DIRECT SERPL-MCNC: 0.3 MG/DL — SIGNIFICANT CHANGE UP (ref 0–0.3)
BILIRUB SERPL-MCNC: 3 MG/DL — HIGH (ref 0.2–1.2)
BUN SERPL-MCNC: 12 MG/DL — SIGNIFICANT CHANGE UP (ref 10–20)
CALCIUM SERPL-MCNC: 9.1 MG/DL — SIGNIFICANT CHANGE UP (ref 8.4–10.5)
CHLORIDE SERPL-SCNC: 96 MMOL/L — LOW (ref 98–110)
CO2 SERPL-SCNC: 24 MMOL/L — SIGNIFICANT CHANGE UP (ref 17–32)
CREAT SERPL-MCNC: 0.8 MG/DL — SIGNIFICANT CHANGE UP (ref 0.7–1.5)
EGFR: 105 ML/MIN/1.73M2 — SIGNIFICANT CHANGE UP
EOSINOPHIL # BLD AUTO: 0 K/UL — SIGNIFICANT CHANGE UP (ref 0–0.7)
EOSINOPHIL NFR BLD AUTO: 0 % — SIGNIFICANT CHANGE UP (ref 0–8)
GLUCOSE SERPL-MCNC: 85 MG/DL — SIGNIFICANT CHANGE UP (ref 70–99)
HCG SERPL QL: NEGATIVE — SIGNIFICANT CHANGE UP
HCT VFR BLD CALC: 12.9 % — LOW (ref 37–47)
HGB BLD-MCNC: 5.8 G/DL — CRITICAL LOW (ref 12–16)
INR BLD: 1.16 RATIO — SIGNIFICANT CHANGE UP (ref 0.65–1.3)
IRON SATN MFR SERPL: 115 UG/DL — SIGNIFICANT CHANGE UP (ref 35–150)
IRON SATN MFR SERPL: 46 % — SIGNIFICANT CHANGE UP (ref 15–50)
LDH SERPL L TO P-CCNC: 453 — HIGH (ref 50–242)
LYMPHOCYTES # BLD AUTO: 0.34 K/UL — LOW (ref 1.2–3.4)
LYMPHOCYTES # BLD AUTO: 5.2 % — LOW (ref 20.5–51.1)
MCHC RBC-ENTMCNC: 44.2 G/DL — HIGH (ref 32–37)
MCHC RBC-ENTMCNC: 55.3 PG — HIGH (ref 27–31)
MCV RBC AUTO: 125.2 FL — HIGH (ref 81–99)
MONOCYTES # BLD AUTO: 0.73 K/UL — HIGH (ref 0.1–0.6)
MONOCYTES NFR BLD AUTO: 11.3 % — HIGH (ref 1.7–9.3)
NEUTROPHILS # BLD AUTO: 5.5 K/UL — SIGNIFICANT CHANGE UP (ref 1.4–6.5)
NEUTROPHILS NFR BLD AUTO: 80 % — HIGH (ref 42.2–75.2)
NRBC # BLD: SIGNIFICANT CHANGE UP /100 WBCS (ref 0–0)
PLATELET # BLD AUTO: 309 K/UL — SIGNIFICANT CHANGE UP (ref 130–400)
PMV BLD: 10.5 FL — HIGH (ref 7.4–10.4)
POTASSIUM SERPL-MCNC: 4.2 MMOL/L — SIGNIFICANT CHANGE UP (ref 3.5–5)
POTASSIUM SERPL-SCNC: 4.2 MMOL/L — SIGNIFICANT CHANGE UP (ref 3.5–5)
PROT SERPL-MCNC: 9.1 G/DL — HIGH (ref 6–8)
PROTHROM AB SERPL-ACNC: 13.3 SEC — HIGH (ref 9.95–12.87)
RBC # BLD: 0.91 M/UL — LOW (ref 4.2–5.4)
RBC # BLD: 1.03 M/UL — LOW (ref 4.2–5.4)
RBC # FLD: 30.5 % — HIGH (ref 11.5–14.5)
RETICS #: 143.2 K/UL — HIGH (ref 25–125)
RETICS/RBC NFR: 15.7 % — HIGH (ref 0.5–1.5)
SODIUM SERPL-SCNC: 130 MMOL/L — LOW (ref 135–146)
TIBC SERPL-MCNC: 252 UG/DL — SIGNIFICANT CHANGE UP (ref 220–430)
UIBC SERPL-MCNC: 137 UG/DL — SIGNIFICANT CHANGE UP (ref 110–370)
WBC # BLD: 6.46 K/UL — SIGNIFICANT CHANGE UP (ref 4.8–10.8)
WBC # FLD AUTO: 6.46 K/UL — SIGNIFICANT CHANGE UP (ref 4.8–10.8)

## 2024-10-23 PROCEDURE — 93005 ELECTROCARDIOGRAM TRACING: CPT

## 2024-10-23 PROCEDURE — P9016: CPT

## 2024-10-23 PROCEDURE — 83010 ASSAY OF HAPTOGLOBIN QUANT: CPT

## 2024-10-23 PROCEDURE — 80074 ACUTE HEPATITIS PANEL: CPT

## 2024-10-23 PROCEDURE — 86900 BLOOD TYPING SEROLOGIC ABO: CPT

## 2024-10-23 PROCEDURE — 86140 C-REACTIVE PROTEIN: CPT

## 2024-10-23 PROCEDURE — 83615 LACTATE (LD) (LDH) ENZYME: CPT

## 2024-10-23 PROCEDURE — 87799 DETECT AGENT NOS DNA QUANT: CPT

## 2024-10-23 PROCEDURE — 86225 DNA ANTIBODY NATIVE: CPT

## 2024-10-23 PROCEDURE — 84550 ASSAY OF BLOOD/URIC ACID: CPT

## 2024-10-23 PROCEDURE — 71260 CT THORAX DX C+: CPT | Mod: MC

## 2024-10-23 PROCEDURE — 85652 RBC SED RATE AUTOMATED: CPT

## 2024-10-23 PROCEDURE — 86038 ANTINUCLEAR ANTIBODIES: CPT

## 2024-10-23 PROCEDURE — 86850 RBC ANTIBODY SCREEN: CPT

## 2024-10-23 PROCEDURE — 99285 EMERGENCY DEPT VISIT HI MDM: CPT

## 2024-10-23 PROCEDURE — 99223 1ST HOSP IP/OBS HIGH 75: CPT

## 2024-10-23 PROCEDURE — 36430 TRANSFUSION BLD/BLD COMPNT: CPT

## 2024-10-23 PROCEDURE — 74177 CT ABD & PELVIS W/CONTRAST: CPT | Mod: MC

## 2024-10-23 PROCEDURE — 85025 COMPLETE CBC W/AUTO DIFF WBC: CPT

## 2024-10-23 PROCEDURE — 86880 COOMBS TEST DIRECT: CPT

## 2024-10-23 PROCEDURE — 87389 HIV-1 AG W/HIV-1&-2 AB AG IA: CPT

## 2024-10-23 PROCEDURE — 70450 CT HEAD/BRAIN W/O DYE: CPT | Mod: 26,MC

## 2024-10-23 PROCEDURE — 83735 ASSAY OF MAGNESIUM: CPT

## 2024-10-23 PROCEDURE — 36415 COLL VENOUS BLD VENIPUNCTURE: CPT

## 2024-10-23 PROCEDURE — 83520 IMMUNOASSAY QUANT NOS NONAB: CPT

## 2024-10-23 PROCEDURE — 84100 ASSAY OF PHOSPHORUS: CPT

## 2024-10-23 PROCEDURE — 86901 BLOOD TYPING SEROLOGIC RH(D): CPT

## 2024-10-23 PROCEDURE — 86870 RBC ANTIBODY IDENTIFICATION: CPT

## 2024-10-23 PROCEDURE — 80053 COMPREHEN METABOLIC PANEL: CPT

## 2024-10-23 PROCEDURE — 86077 PHYS BLOOD BANK SERV XMATCH: CPT

## 2024-10-23 RX ORDER — ACETAMINOPHEN 500 MG
975 TABLET ORAL EVERY 8 HOURS
Refills: 0 | Status: DISCONTINUED | OUTPATIENT
Start: 2024-10-23 | End: 2024-10-25

## 2024-10-23 RX ORDER — METOCLOPRAMIDE HCL 10 MG
10 TABLET ORAL ONCE
Refills: 0 | Status: COMPLETED | OUTPATIENT
Start: 2024-10-23 | End: 2024-10-23

## 2024-10-23 RX ORDER — METOCLOPRAMIDE HCL 10 MG
10 TABLET ORAL ONCE
Refills: 0 | Status: DISCONTINUED | OUTPATIENT
Start: 2024-10-23 | End: 2024-10-23

## 2024-10-23 RX ORDER — ENOXAPARIN SODIUM 40MG/0.4ML
40 SYRINGE (ML) SUBCUTANEOUS EVERY 24 HOURS
Refills: 0 | Status: DISCONTINUED | OUTPATIENT
Start: 2024-10-23 | End: 2024-10-25

## 2024-10-23 RX ORDER — SODIUM CHLORIDE 9 MG/ML
1000 INJECTION, SOLUTION INTRAMUSCULAR; INTRAVENOUS; SUBCUTANEOUS ONCE
Refills: 0 | Status: COMPLETED | OUTPATIENT
Start: 2024-10-23 | End: 2024-10-23

## 2024-10-23 RX ORDER — ACETAMINOPHEN 500 MG
975 TABLET ORAL ONCE
Refills: 0 | Status: COMPLETED | OUTPATIENT
Start: 2024-10-23 | End: 2024-10-23

## 2024-10-23 RX ORDER — METOCLOPRAMIDE HCL 10 MG
10 TABLET ORAL EVERY 8 HOURS
Refills: 0 | Status: DISCONTINUED | OUTPATIENT
Start: 2024-10-23 | End: 2024-10-23

## 2024-10-23 RX ORDER — METOCLOPRAMIDE HCL 10 MG
10 TABLET ORAL EVERY 8 HOURS
Refills: 0 | Status: DISCONTINUED | OUTPATIENT
Start: 2024-10-23 | End: 2024-10-25

## 2024-10-23 RX ORDER — FOLIC ACID 1 MG/1
1 TABLET ORAL DAILY
Refills: 0 | Status: DISCONTINUED | OUTPATIENT
Start: 2024-10-23 | End: 2024-10-25

## 2024-10-23 RX ORDER — CYANOCOBALAMIN (VITAMIN B-12) 1000MCG/15
1000 LIQUID (ML) ORAL DAILY
Refills: 0 | Status: DISCONTINUED | OUTPATIENT
Start: 2024-10-23 | End: 2024-10-25

## 2024-10-23 RX ADMIN — Medication 975 MILLIGRAM(S): at 21:50

## 2024-10-23 RX ADMIN — Medication 10 MILLIGRAM(S): at 13:08

## 2024-10-23 RX ADMIN — Medication 975 MILLIGRAM(S): at 21:20

## 2024-10-23 RX ADMIN — SODIUM CHLORIDE 2000 MILLILITER(S): 9 INJECTION, SOLUTION INTRAMUSCULAR; INTRAVENOUS; SUBCUTANEOUS at 13:08

## 2024-10-23 RX ADMIN — Medication 975 MILLIGRAM(S): at 13:08

## 2024-10-23 NOTE — H&P ADULT - NSHPREVIEWOFSYSTEMS_GEN_ALL_CORE
Constitutional: No weakness, fevers, or chills  Eyes/ENT: Endorses blurry vision and dizziness along with photophobia and diffuse pulsating headache  Neck: No pain or stiffness  Respiratory: No cough, wheezing, or hemoptysis. No shortness of breath  Cardiovascular: No chest pain or palpitations  Gastrointestinal: Endorses nausea. No abdominal or epigastric pain. No vomiting, or hematemesis. No diarrhea or constipation. No melena or hematochezia.  Genitourinary: No dysuria, frequency, or hematuria  Musculoskeletal: FROM all extremities, normal strength, no calf tenderness  Neurological: No numbness or weakness  Skin: No itching or rashes

## 2024-10-23 NOTE — ED PROVIDER NOTE - PHYSICAL EXAMINATION
CONSTITUTIONAL:  in no apparent distress.   ENT: Hearing is intact with good acuity to spoken voice.  Patient is speaking clearly, not muffled and airway is intact.   RESPIRATORY: No signs of respiratory distress. Lung sounds are clear in all lobes bilaterally without rales, rhonchi, or wheezes.  CARDIOVASCULAR: Regular rate and rhythm.   GI: Abdomen is soft, non-tender, and without distention. Bowel sounds are present and normoactive in all four quadrants. No masses are noted.   Rectal: Chaperone: Dr. Yang. Normal rectal sphincter tone. No external masses or lesions. No bright red blood or melena noticed.  NEURO: A & O x 3. Normal speech. No focal deficit.  PSYCHOLOGICAL: Appropriate mood and affect. Good judgement and insight.

## 2024-10-23 NOTE — ED PROVIDER NOTE - PROGRESS NOTE DETAILS
Labs shows anemia.  CT normal.  Meds given in the ED and symptoms improved.  Will admit patient for anemia and hematology evaluation.  Patient is aware of the plan and agrees.

## 2024-10-23 NOTE — ED PROVIDER NOTE - CLINICAL SUMMARY MEDICAL DECISION MAKING FREE TEXT BOX
Patient presents with 1 week of gradual onset headache.  Which has been getting worse.  Labs CT scan done.  Medications for headache given.  Labs are significant for anemia.  Patient has chronic anemia but unclear the origin.  Has had transfusions in the past.  Hemoglobin now lowest it has ever been.  Denies any vaginal or rectal bleeding.  Does not currently follow with a hematologist.  Consented for blood.  Admitted for further management.

## 2024-10-23 NOTE — ED ADULT NURSE NOTE - OBJECTIVE STATEMENT
pt c/o intermittent headaches for the past week.. stated Excedrin is the only medication that works, but now stopped working.. Has seen neurology in the past for this issue, all findings were benign.

## 2024-10-23 NOTE — H&P ADULT - ATTENDING COMMENTS
Ms. Dumont is a 23 yo F with pericarditis associated w/ pregnancy presenting for progressively worsening headahce w/ photophobia x1w found to be have anemia w/ a hemoglobin of 5.8.    #Progressive, Diffuse Pulsating Headache w/ Photophobia, Dizziness, and Nausea x1w  #Hx of Anemia requiring Blood Transfusions  #Severe Macrocytic Anemia, possibly Warm AIHA  - Multiple episodes of HA over past year and usually found to be anemic.  Required previous blood transfusions, episodes resolved quickly after transfusion  - Family Hx: 2 older sisters w/ idiopathic intracranial HTN  - HgB: 5.8 (baseline 9-10), MCV: 125.2  - Reticulocytes: 15.7%, Absolute 143.2  - Reticulocyte Index: 1.93  - Direct Ryan: Positive  - Receiving 1u PRBC  - Start B12, Folate  - Tylenol 975mg q8h PRN and Reglan 10mg q8h PRN for pain and nausea  - hold off on steroids as hemolysis panel not finalized and need to send further autoimmune/rheum panels  - F/u Iron Studies, Hemolysis Panel, B12, Folate, MMA, Homocysteine levels (obtained prior to transfusion)  - F/u MALINI, EBV, Parvovirus B19 PCR  - F/u Heme/Onc consult    #Misc  #Code Status: Full Code  #DVT ppx: Lovenox  #GI ppx: N/A  #Diet: DASH  #Activity: AAT  #Dispo: Med/Surg

## 2024-10-23 NOTE — ED PROVIDER NOTE - OBJECTIVE STATEMENT
24-year-old female with a past medical history of pericarditis who presents to the ED for evaluation.  Reports that she has been having intermittent headache, fatigue, and bilateral blurry vision for the past year and became more severe since a week ago.  Reports that she had 1 unit of blood transfusion last year, but never, why she was anemic.  Denies fever, shortness of breath, chest pain, nausea, vomiting, abdominal pain, urinary symptoms, vaginal bleeding/discharge, and melena/hematochezia.  Reports that menstrual period is normal.

## 2024-10-23 NOTE — H&P ADULT - SOCIAL HISTORY
----------------------------------------------------------------  [facility] Number:  [phone]   Call with any questions about your care and for scheduling assistance.   Calls are returned Monday through Friday between 8 AM and 4:30 PM. We usually get back to you within 2 business days depending on the issue/request.    If we are prescribing your medications:  For opioid medication refills, call the clinic or send a [facility] message 7 days in advance.  Please include:  Name of requested medication  Name of the pharmacy.  For non-opioid medications, call your pharmacy directly to request a refill. Please allow 3-4 days to be processed.   Per [place] State Law:  All controlled substance prescriptions must be filled within 30 days of being written.    For those controlled substances allowing refills, pickup must occur within 30 days of last fill.      We believe regular attendance is key to your success in our program!    Any time you are unable to keep your appointment we ask that you call us at least 24 hours in advance to cancel.This will allow us to offer the appointment time to another patient.   Multiple missed appointments may lead to dismissal from the clinic.    No

## 2024-10-23 NOTE — H&P ADULT - HISTORY OF PRESENT ILLNESS
Vitals  Temp: 98.9 -> 97.9  HR: 91 -> 84  BP: 114/74 -> 122/76  O2: 100% on RA -> 100% on RA  RR: 18 -> 18    Labs  WBC: 6.46  HgB: 5.8 9 (baseline 9-10)  MCV: 125.2  MCHC: 44.2  RDW: 30.5  Smear: Anisocytosis, Macrocytosis, Polychromasia. Giant Platelets  Reticulocytes: 15.7%, Absolute 143.2  Direct Ryan: Positive  Na: 130  Serum Pregnancy: Negative    Imaging  CT Head Non-Con: No evidence of acute intracranial pathology.    In the ED  1L NS Bolus  Tylenol 975mg PO x1  Reglan 10mg IV x1  1u Packed RBCs Ms. Dumont is a 25 yo F with pericarditis associated w/ pregnancy presenting for progressively worsening headahce w/ photophobia x1w. Patient states over the past year she has had these episodes of headache with similar symptoms of insidious onset and photophobia. When she sought treatment, she was repeatedly found to be significantly anemic and required blood transfusions. She states that no one in her family has a history of anemia to her knowledge,though her two older sisters have a history of idiopathic intracranial hypertension, her mother has a history of diabetes mellitus, and her father has a history of seizure disorder. Patient states that in the past when she has received blood transfusions she soon experienced resolution of her headaches. Her hemoglobin on admission was found to be 5.8, a unit of PRBC was ordered for her and she is admitted for further workup.    Vitals  Temp: 98.9 -> 97.9  HR: 91 -> 84  BP: 114/74 -> 122/76  O2: 100% on RA -> 100% on RA  RR: 18 -> 18    Labs  WBC: 6.46  HgB: 5.8 9 (baseline 9-10)  MCV: 125.2  MCHC: 44.2  RDW: 30.5  Smear: Anisocytosis, Macrocytosis, Polychromasia. Giant Platelets  Reticulocytes: 15.7%, Absolute 143.2  Direct Ryan: Positive  Na: 130  Serum Pregnancy: Negative    Imaging  CT Head Non-Con: No evidence of acute intracranial pathology.    In the ED  1L NS Bolus  Tylenol 975mg PO x1  Reglan 10mg IV x1  1u Packed RBCs Ms. Dumont is a 25 yo F with pericarditis associated w/ pregnancy presenting for progressively worsening headahce w/ photophobia x1w. Patient states over the past year she has had these episodes of headache with similar symptoms of insidious onset and photophobia. When she sought treatment, she was repeatedly found to be significantly anemic and required blood transfusions. She states that no one in her family has a history of anemia to her knowledge,though her two older sisters have a history of idiopathic intracranial hypertension, her mother has a history of diabetes mellitus, and her father has a history of seizure disorder. Patient states that in the past when she has received blood transfusions she soon experienced resolution of her headaches. She only takes a multivitamin, iron tabs, and zinc at home. Her hemoglobin on admission was found to be 5.8, a unit of PRBC was ordered for her and she is admitted for further workup.    Vitals  Temp: 98.9 -> 97.9  HR: 91 -> 84  BP: 114/74 -> 122/76  O2: 100% on RA -> 100% on RA  RR: 18 -> 18    Labs  WBC: 6.46  HgB: 5.8 9 (baseline 9-10)  MCV: 125.2  MCHC: 44.2  RDW: 30.5  Smear: Anisocytosis, Macrocytosis, Polychromasia. Giant Platelets  Reticulocytes: 15.7%, Absolute 143.2  Direct Ryan: Positive  Na: 130  Serum Pregnancy: Negative    Imaging  CT Head Non-Con: No evidence of acute intracranial pathology.    In the ED  1L NS Bolus  Tylenol 975mg PO x1  Reglan 10mg IV x1  1u Packed RBCs

## 2024-10-23 NOTE — ED PROVIDER NOTE - ATTENDING APP SHARED VISIT CONTRIBUTION OF CARE
23 yo F pmh of episode of pericarditis in the past presents with a headache. 23 yo F pmh of episode of pericarditis in the past presents with a headache. Staes that for the last week she has had a gradual onset frontal pulsating headache. + N, no vomiting. no fevers or recent illness. + light sensitivity. Has had headaches in the past and previously saw a neurologist but never follow up again. no previous MRI.     CONSTITUTIONAL: Well-developed; well-nourished; in no acute distress.   SKIN: warm, dry  HEAD: Normocephalic; atraumatic.  EYES: PERRL, EOMI, no conjunctival erythema  ENT: No nasal discharge; airway clear.  NECK: Supple; non tender.  CARD: S1, S2 normal;  Regular rate and rhythm.   RESP: No wheezes, rales or rhonchi.  ABD: soft non tender, non distended, no rebound or guarding  EXT: Normal ROM.  5/5 strength in all 4 extremities   LYMPH: No acute cervical adenopathy.  NEURO: A&Ox3, CN 2-11 intact, moving all extremities, 5/5 strength, equal sensation bilaterally, normal steady gait  PSYCH: Cooperative, appropriate.

## 2024-10-23 NOTE — ED ADULT TRIAGE NOTE - CHIEF COMPLAINT QUOTE
pt BIBA from home c/o headaches with b/l blurred vision x1 week   denies recent head trauma   no meds pta

## 2024-10-23 NOTE — H&P ADULT - TIME-BASED BILLING (NON-CRITICAL CARE)
On chronic pred 10 for severe RA.   CTA without PE, small bilateral pleural effusions, pulm nodule.  Cardiac work-up negative.   Subjectively improved per patient.    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

## 2024-10-23 NOTE — H&P ADULT - NSHPPHYSICALEXAM_GEN_ALL_CORE
General: Sitting in bed, squinting  Head: No signs of trauma, endorses diffuse pulsating HA  Neck: Supple, no JVD  Cardiac: Regular rate and rhythm.  Pulmonary: CTAB  Abdominal: Soft, nontender, and nondistended  Extremities: No pitting edema  Neurologic: AOx3, nonfocal  Skin: No rashes or lesions General: Sitting in bed, squinting  Cardiac: Regular rate and rhythm.  Pulmonary: CTAB  Abdominal: Soft, nontender, and nondistended  Extremities: No pitting edema  Neurologic: AOx3, nonfocal  Skin: No rashes or lesions

## 2024-10-23 NOTE — H&P ADULT - ASSESSMENT
#Misc  #Code Status: Full Code  #DVT ppx:  #GI ppx:  #Diet:  #Activity: AAT  #Dispo: Med/Surg Ms. Dumont is a 23 yo F with pericarditis associated w/ pregnancy presenting for progressively worsening headahce w/ photophobia x1w found to be have anemia w/ a hemoglobin of 5.8.    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  - Multiple episodes of HA over past year  - Per patient always found to be anemic during these episodes  - Required previous blood transfusions, episodes resolved quickly after transfusion  - Family Hx: 2 older sisters w/ idiopathic intracranial HTN, mother w/ DM, father w/ seizure disorder  - HgB: 5.8 9 (baseline 9-10), MCV: 125.2  - Reticulocytes: 15.7%, Absolute 143.2  - Reticulocyte Index: 1.93  - Direct Ryan: Positive  - Receiving 1u PRBC  - Start B12, Folate  - Tylenol 975mg q8h PRN and Reglan 10mg q8h PRN for pain and nausea  - F/u Iron Studies, Hemolysis Panel, B12, Folate, MMA, Homocysteine levels  - F/u MALINI, EBV, Parvovirus B19 PCR  - F/u Heme/Onc consult    #Misc  #Code Status: Full Code  #DVT ppx: Lovenox  #GI ppx: N/A  #Diet: DASH  #Activity: AAT  #Dispo: Med/Surg Ms. Dumont is a 25 yo F with pericarditis associated w/ pregnancy presenting for progressively worsening headahce w/ photophobia x1w found to be have anemia w/ a hemoglobin of 5.8.    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  - Multiple episodes of HA over past year  - Per patient always found to be anemic during these episodes  - Required previous blood transfusions, episodes resolved quickly after transfusion  - No allergies, only takes multivitamin, iron tabs, and zinc supplements at home  - Family Hx: 2 older sisters w/ idiopathic intracranial HTN, mother w/ DM, father w/ seizure disorder  - HgB: 5.8 (baseline 9-10), MCV: 125.2  - Reticulocytes: 15.7%, Absolute 143.2  - Reticulocyte Index: 1.93  - Direct Ryan: Positive  - Receiving 1u PRBC  - Start B12, Folate  - Tylenol 975mg q8h PRN and Reglan 10mg q8h PRN for pain and nausea  - F/u Iron Studies, Hemolysis Panel, B12, Folate, MMA, Homocysteine levels (obtained prior to transfusion)  - F/u MALINI, EBV, Parvovirus B19 PCR  - F/u Heme/Onc consult    #Misc  #Code Status: Full Code  #DVT ppx: Lovenox  #GI ppx: N/A  #Diet: DASH  #Activity: AAT  #Dispo: Med/Surg Ms. Dumont is a 25 yo F with pericarditis associated w/ pregnancy presenting for progressively worsening headahce w/ photophobia x1w found to be have anemia w/ a hemoglobin of 5.8.    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  - Multiple episodes of HA over past year  - Per patient always found to be anemic during these episodes  - Required previous blood transfusions, episodes resolved quickly after transfusion  - No allergies, only takes multivitamin, iron tabs, and zinc supplements at home  - Family Hx: 2 older sisters w/ idiopathic intracranial HTN, mother w/ DM, father w/ seizure disorder  - HgB: 5.8 (baseline 9-10), MCV: 125.2  - Reticulocytes: 15.7%, Absolute 143.2  - Reticulocyte Index: 1.93  - Direct Ryan: Positive  - Receiving 1u PRBC  - Start B12, Folate  - Tylenol 975mg q8h PRN and Reglan 10mg q8h PRN for pain and nausea  - Discussed w/ attg will hold off on steroids as hemolysis panel not finalized and need to send further autoimmune/rheum panels  - F/u Iron Studies, Hemolysis Panel, B12, Folate, MMA, Homocysteine levels (obtained prior to transfusion)  - F/u MALINI, EBV, Parvovirus B19 PCR  - F/u Heme/Onc consult    #Misc  #Code Status: Full Code  #DVT ppx: Lovenox  #GI ppx: N/A  #Diet: DASH  #Activity: AAT  #Dispo: Med/Surg Ms. Dumont is a 23 yo F with pericarditis associated w/ pregnancy presenting for progressively worsening headahce w/ photophobia x1w found to be have anemia w/ a hemoglobin of 5.8.    #Progressive, Diffuse Pulsating Headache w/ Photophobia, Dizziness, and Nausea x1w  #Hx of Anemia requiring Blood Transfusions  #Severe Macrocytic Anemia, possibly Warm AIHA  - Multiple episodes of HA over past year  - Per patient always found to be anemic during these episodes  - Required previous blood transfusions, episodes resolved quickly after transfusion  - No allergies, only takes multivitamin, iron tabs, and zinc supplements at home  - Family Hx: 2 older sisters w/ idiopathic intracranial HTN, mother w/ DM, father w/ seizure disorder  - HgB: 5.8 (baseline 9-10), MCV: 125.2  - Reticulocytes: 15.7%, Absolute 143.2  - Reticulocyte Index: 1.93  - Direct Ryan: Positive  - Receiving 1u PRBC  - Start B12, Folate  - Tylenol 975mg q8h PRN and Reglan 10mg q8h PRN for pain and nausea  - Discussed w/ attg will hold off on steroids as hemolysis panel not finalized and need to send further autoimmune/rheum panels  - F/u Iron Studies, Hemolysis Panel, B12, Folate, MMA, Homocysteine levels (obtained prior to transfusion)  - F/u MALINI, EBV, Parvovirus B19 PCR  - F/u Heme/Onc consult    #Misc  #Code Status: Full Code  #DVT ppx: Lovenox  #GI ppx: N/A  #Diet: DASH  #Activity: AAT  #Dispo: Med/Surg

## 2024-10-24 LAB
ALBUMIN SERPL ELPH-MCNC: 3.9 G/DL — SIGNIFICANT CHANGE UP (ref 3.5–5.2)
ALP SERPL-CCNC: 72 U/L — SIGNIFICANT CHANGE UP (ref 30–115)
ALT FLD-CCNC: 7 U/L — SIGNIFICANT CHANGE UP (ref 0–41)
ANION GAP SERPL CALC-SCNC: 13 MMOL/L — SIGNIFICANT CHANGE UP (ref 7–14)
AST SERPL-CCNC: 26 U/L — SIGNIFICANT CHANGE UP (ref 0–41)
BASOPHILS # BLD AUTO: 0.01 K/UL — SIGNIFICANT CHANGE UP (ref 0–0.2)
BASOPHILS NFR BLD AUTO: 0.2 % — SIGNIFICANT CHANGE UP (ref 0–1)
BILIRUB SERPL-MCNC: 3.4 MG/DL — HIGH (ref 0.2–1.2)
BUN SERPL-MCNC: 12 MG/DL — SIGNIFICANT CHANGE UP (ref 10–20)
CALCIUM SERPL-MCNC: 8.5 MG/DL — SIGNIFICANT CHANGE UP (ref 8.4–10.5)
CHLORIDE SERPL-SCNC: 101 MMOL/L — SIGNIFICANT CHANGE UP (ref 98–110)
CO2 SERPL-SCNC: 23 MMOL/L — SIGNIFICANT CHANGE UP (ref 17–32)
CREAT SERPL-MCNC: 0.6 MG/DL — LOW (ref 0.7–1.5)
CRP SERPL-MCNC: 4.4 MG/L — HIGH
EGFR: 128 ML/MIN/1.73M2 — SIGNIFICANT CHANGE UP
EOSINOPHIL # BLD AUTO: 0.03 K/UL — SIGNIFICANT CHANGE UP (ref 0–0.7)
EOSINOPHIL NFR BLD AUTO: 0.7 % — SIGNIFICANT CHANGE UP (ref 0–8)
ERYTHROCYTE [SEDIMENTATION RATE] IN BLOOD: 140 MM/HR — HIGH (ref 0–20)
GLUCOSE SERPL-MCNC: 86 MG/DL — SIGNIFICANT CHANGE UP (ref 70–99)
HAPTOGLOB SERPL-MCNC: <20 MG/DL — LOW (ref 34–200)
HCT VFR BLD CALC: 19.7 % — LOW (ref 37–47)
HCYS SERPL-MCNC: 8.9 UMOL/L — SIGNIFICANT CHANGE UP
HGB BLD-MCNC: 7 G/DL — LOW (ref 12–16)
IMM GRANULOCYTES NFR BLD AUTO: 1.7 % — HIGH (ref 0.1–0.3)
LYMPHOCYTES # BLD AUTO: 0.61 K/UL — LOW (ref 1.2–3.4)
LYMPHOCYTES # BLD AUTO: 14.8 % — LOW (ref 20.5–51.1)
MAGNESIUM SERPL-MCNC: 2 MG/DL — SIGNIFICANT CHANGE UP (ref 1.8–2.4)
MCHC RBC-ENTMCNC: 35.5 G/DL — SIGNIFICANT CHANGE UP (ref 32–37)
MCHC RBC-ENTMCNC: 41.4 PG — HIGH (ref 27–31)
MCV RBC AUTO: 116.6 FL — HIGH (ref 81–99)
MONOCYTES # BLD AUTO: 0.47 K/UL — SIGNIFICANT CHANGE UP (ref 0.1–0.6)
MONOCYTES NFR BLD AUTO: 11.4 % — HIGH (ref 1.7–9.3)
NEUTROPHILS # BLD AUTO: 2.92 K/UL — SIGNIFICANT CHANGE UP (ref 1.4–6.5)
NEUTROPHILS NFR BLD AUTO: 71.2 % — SIGNIFICANT CHANGE UP (ref 42.2–75.2)
NRBC # BLD: 3 /100 WBCS — HIGH (ref 0–0)
PLATELET # BLD AUTO: 297 K/UL — SIGNIFICANT CHANGE UP (ref 130–400)
PMV BLD: 10.4 FL — SIGNIFICANT CHANGE UP (ref 7.4–10.4)
POTASSIUM SERPL-MCNC: 4.4 MMOL/L — SIGNIFICANT CHANGE UP (ref 3.5–5)
POTASSIUM SERPL-SCNC: 4.4 MMOL/L — SIGNIFICANT CHANGE UP (ref 3.5–5)
PROT SERPL-MCNC: 8.7 G/DL — HIGH (ref 6–8)
RBC # BLD: 1.69 M/UL — LOW (ref 4.2–5.4)
RBC # FLD: 24.3 % — HIGH (ref 11.5–14.5)
SODIUM SERPL-SCNC: 137 MMOL/L — SIGNIFICANT CHANGE UP (ref 135–146)
WBC # BLD: 4.11 K/UL — LOW (ref 4.8–10.8)
WBC # FLD AUTO: 4.11 K/UL — LOW (ref 4.8–10.8)

## 2024-10-24 PROCEDURE — 99223 1ST HOSP IP/OBS HIGH 75: CPT

## 2024-10-24 PROCEDURE — 99232 SBSQ HOSP IP/OBS MODERATE 35: CPT

## 2024-10-24 PROCEDURE — 71260 CT THORAX DX C+: CPT | Mod: 26

## 2024-10-24 PROCEDURE — 74177 CT ABD & PELVIS W/CONTRAST: CPT | Mod: 26

## 2024-10-24 PROCEDURE — 93010 ELECTROCARDIOGRAM REPORT: CPT

## 2024-10-24 RX ORDER — PANTOPRAZOLE SODIUM 40 MG/1
40 TABLET, DELAYED RELEASE ORAL ONCE
Refills: 0 | Status: COMPLETED | OUTPATIENT
Start: 2024-10-24 | End: 2024-10-24

## 2024-10-24 RX ORDER — PANTOPRAZOLE SODIUM 40 MG/1
40 TABLET, DELAYED RELEASE ORAL
Refills: 0 | Status: DISCONTINUED | OUTPATIENT
Start: 2024-10-25 | End: 2024-10-25

## 2024-10-24 RX ORDER — ACETAMINOPHEN 500 MG
1000 TABLET ORAL ONCE
Refills: 0 | Status: COMPLETED | OUTPATIENT
Start: 2024-10-24 | End: 2024-10-24

## 2024-10-24 RX ADMIN — PANTOPRAZOLE SODIUM 40 MILLIGRAM(S): 40 TABLET, DELAYED RELEASE ORAL at 11:57

## 2024-10-24 RX ADMIN — Medication 975 MILLIGRAM(S): at 17:59

## 2024-10-24 RX ADMIN — Medication 80 MILLIGRAM(S): at 11:57

## 2024-10-24 RX ADMIN — FOLIC ACID 1 MILLIGRAM(S): 1 TABLET ORAL at 11:07

## 2024-10-24 RX ADMIN — Medication 10 MILLIGRAM(S): at 05:56

## 2024-10-24 RX ADMIN — Medication 1000 MICROGRAM(S): at 11:08

## 2024-10-24 RX ADMIN — Medication 400 MILLIGRAM(S): at 06:28

## 2024-10-24 RX ADMIN — Medication 975 MILLIGRAM(S): at 18:30

## 2024-10-24 RX ADMIN — Medication 10 MILLIGRAM(S): at 17:59

## 2024-10-24 NOTE — PROGRESS NOTE ADULT - ASSESSMENT
Ms. Dumont is a 25 yo F with pericarditis associated w/ pregnancy presenting for progressively worsening headahce w/ photophobia x1w found to be have anemia w/ a hemoglobin of 5.8.    #Progressive, Diffuse Pulsating Headache w/ Photophobia, Dizziness, and Nausea x1w  #Hx of Anemia requiring Blood Transfusions  #Severe Macrocytic Anemia, possibly Warm AIHA  - Multiple episodes of HA over past year  - Per patient always found to be anemic during these episodes  - Required previous blood transfusions, episodes resolved quickly after transfusion  - No allergies, only takes multivitamin, iron tabs, and zinc supplements at home  - Family Hx: 2 older sisters w/ idiopathic intracranial HTN, mother w/ DM, father w/ seizure disorder  - HgB: 5.8 (baseline 9-10), MCV: 125.2  - Reticulocytes: 15.7%, Absolute 143.2  - Reticulocyte Index: 1.93  - Direct Ryan: Positive  - S/p 1 unit of pRBC, HgB 7.0 this AM  - Start B12, Folate  - Tylenol 975mg q8h PRN and Reglan 10mg q8h PRN for pain and nausea  - Discussed w/ attg will hold off on steroids as hemolysis panel not finalized and need to send further autoimmune/rheum panels  - Iron studies wnl, hemolysis panel noted  - F/u B12, Folate, MMA, Homocysteine levels (obtained prior to transfusion)  - F/u MALINI, EBV, Parvovirus B19 PCR  - Discussed with heme fellow, started prednisone 80mg PO qD with pantoprazole  - Ordered CT AP and CT Chest w/IV contrast per heme  - Rheumatology consulted for AIHA  - F/u Heme/Onc consult final recs, note is currently pending    #Misc  #Code Status: Full Code  #DVT ppx: Lovenox  #GI ppx: N/A  #Diet: DASH  #Activity: AAT  #Dispo: Med/Surg    To-do: heme onc final recs as note is still pending, rheumatology consult, CTAP and CT chest w/IV, macrocytic lab panel, viral blood tests, AM CBC for anemia Ms. Dumont is a 25 yo F with pericarditis associated w/ pregnancy presenting for progressively worsening headahce w/ photophobia x1w found to be have anemia w/ a hemoglobin of 5.8.    #Progressive, Diffuse Pulsating Headache w/ Photophobia, Dizziness, and Nausea x1w  #Hx of Anemia requiring Blood Transfusions  #Severe Macrocytic Anemia, possibly Warm AIHA  - Multiple episodes of HA over past year  - Per patient always found to be anemic during these episodes  - Required previous blood transfusions, episodes resolved quickly after transfusion  - No allergies, only takes multivitamin, iron tabs, and zinc supplements at home  - Family Hx: 2 older sisters w/ idiopathic intracranial HTN, mother w/ DM, father w/ seizure disorder  - HgB: 5.8 (baseline 9-10), MCV: 125.2  - Reticulocytes: 15.7%, Absolute 143.2  - Reticulocyte Index: 1.93  - Direct Ryan: Positive  - S/p 1 unit of pRBC, HgB 7.0 this AM  - Start B12, Folate  - Tylenol 975mg q8h PRN and Reglan 10mg q8h PRN for pain and nausea  - Discussed w/ attg will hold off on steroids as hemolysis panel not finalized and need to send further autoimmune/rheum panels  - Iron studies wnl, hemolysis panel noted  - F/u B12, Folate, MMA, Homocysteine levels (obtained prior to transfusion)  - F/u MALINI, EBV, Parvovirus B19 PCR  - Discussed with heme fellow, started prednisone 80mg PO qD with pantoprazole  - Ordered CT AP and CT Chest w/IV contrast per heme  - Rheumatology consulted for hx of lupus  - Heme/onc recs noted  - Dr. Corrales from general surgery to follow for potential surgical biopsy of lymph nodes.     #Misc  #Code Status: Full Code  #DVT ppx: Lovenox  #GI ppx: N/A  #Diet: DASH  #Activity: AAT  #Dispo: Med/Surg    To-do: Heme onc recs, rheumatology consult, macrocytic lab panel, viral blood tests, AM CBC for anemia, possible biopsy for tomorrow.

## 2024-10-24 NOTE — CONSULT NOTE ADULT - SUBJECTIVE AND OBJECTIVE BOX
Hematology Consult Note    HPI:  Ms. Dumont is a 23 yo F with pericarditis associated w/ pregnancy presenting for progressively worsening headahce w/ photophobia x1w. Patient states over the past year she has had these episodes of headache with similar symptoms of insidious onset and photophobia. When she sought treatment, she was repeatedly found to be significantly anemic and required blood transfusions. She states that no one in her family has a history of anemia to her knowledge,though her two older sisters have a history of idiopathic intracranial hypertension, her mother has a history of diabetes mellitus, and her father has a history of seizure disorder. Patient states that in the past when she has received blood transfusions she soon experienced resolution of her headaches. She only takes a multivitamin, iron tabs, and zinc at home. Her hemoglobin on admission was found to be 5.8, a unit of PRBC was ordered for her and she is admitted for further workup.    Vitals  Temp: 98.9 -> 97.9  HR: 91 -> 84  BP: 114/74 -> 122/76  O2: 100% on RA -> 100% on RA  RR: 18 -> 18    Labs  WBC: 6.46  HgB: 5.8 9 (baseline 9-10)  MCV: 125.2  MCHC: 44.2  RDW: 30.5  Smear: Anisocytosis, Macrocytosis, Polychromasia. Giant Platelets  Reticulocytes: 15.7%, Absolute 143.2  Direct Ryan: Positive  Na: 130  Serum Pregnancy: Negative    Imaging  CT Head Non-Con: No evidence of acute intracranial pathology.    In the ED  1L NS Bolus  Tylenol 975mg PO x1  Reglan 10mg IV x1  1u Packed RBCs (23 Oct 2024 19:40)      Allergies    No Known Allergies    Intolerances        MEDICATIONS  (STANDING):  cyanocobalamin 1000 MICROGram(s) Oral daily  enoxaparin Injectable 40 milliGRAM(s) SubCutaneous every 24 hours  folic acid 1 milliGRAM(s) Oral daily    MEDICATIONS  (PRN):  acetaminophen     Tablet .. 975 milliGRAM(s) Oral every 8 hours PRN Temp greater or equal to 38C (100.4F), Mild Pain (1 - 3)  metoclopramide Injectable 10 milliGRAM(s) IV Push every 8 hours PRN Nausea      PAST MEDICAL & SURGICAL HISTORY:  H/O pericarditis      Lupus erythematosus      No significant past surgical history          FAMILY HISTORY:      SOCIAL HISTORY: No EtOH, no tobacco    REVIEW OF SYSTEMS:    CONSTITUTIONAL: No weakness, fevers or chills  EYES/ENT: No visual changes;  No vertigo or throat pain   NECK: No pain or stiffness  RESPIRATORY: No cough, wheezing, hemoptysis; No shortness of breath  CARDIOVASCULAR: No chest pain or palpitations  GASTROINTESTINAL: No abdominal or epigastric pain. No nausea, vomiting, or hematemesis; No diarrhea or constipation. No melena or hematochezia.  GENITOURINARY: No dysuria, frequency or hematuria  NEUROLOGICAL: No numbness or weakness  SKIN: No itching, burning, rashes, or lesions   All other review of systems is negative unless indicated above.    Height (cm): 160 (10-23 @ 11:30)  Weight (kg): 81.6 (10-23 @ 11:30)  BMI (kg/m2): 31.9 (10-23 @ 11:30)  BSA (m2): 1.85 (10-23 @ 11:30)    T(F): 98.3 (10-24-24 @ 07:37), Max: 98.9 (10-23-24 @ 11:30)  HR: 82 (10-24-24 @ 07:37)  BP: 95/60 (10-24-24 @ 07:37)  RR: 18 (10-24-24 @ 07:37)  SpO2: 100% (10-24-24 @ 07:37)  Wt(kg): --    GENERAL: NAD, well-developed  HEAD:  Atraumatic, Normocephalic  EYES: EOMI, PERRLA, conjunctiva and sclera clear  NECK: Supple, No JVD  CHEST/LUNG: Clear to auscultation bilaterally; No wheeze  HEART: Regular rate and rhythm; No murmurs, rubs, or gallops  ABDOMEN: Soft, Nontender, Nondistended; Bowel sounds present  EXTREMITIES:  2+ Peripheral Pulses, No clubbing, cyanosis, or edema  NEUROLOGY: non-focal  SKIN: No rashes or lesions                          7.0    4.11  )-----------( 297      ( 24 Oct 2024 05:47 )             19.7       10-24    137  |  101  |  12  ----------------------------<  86  4.4   |  23  |  0.6[L]    Ca    8.5      24 Oct 2024 05:47  Mg     2.0     10-24    TPro  8.7[H]  /  Alb  3.9  /  TBili  3.4[H]  /  DBili  x   /  AST  26  /  ALT  7   /  AlkPhos  72  10-24      Magnesium: 2.0 mg/dL (10-24 @ 05:47)  Haptoglobin, Serum: <20 mg/dL (10-23 @ 17:33)  Lactate Dehydrogenase, Serum: 453 (10-23 @ 17:09)       Hematology Consult Note    HPI:  Ms. Dumont is a 23 yo F with pericarditis associated w/ pregnancy presenting for progressively worsening headahce w/ photophobia x1w. Patient states over the past year she has had these episodes of headache with similar symptoms of insidious onset and photophobia. When she sought treatment, she was repeatedly found to be significantly anemic and required blood transfusions. She states that no one in her family has a history of anemia to her knowledge,though her two older sisters have a history of idiopathic intracranial hypertension, her mother has a history of diabetes mellitus, and her father has a history of seizure disorder. Patient states that in the past when she has received blood transfusions she soon experienced resolution of her headaches. She only takes a multivitamin, iron tabs, and zinc at home. Her hemoglobin on admission was found to be 5.8, a unit of PRBC was ordered for her and she is admitted for further workup.    Vitals  Temp: 98.9 -> 97.9  HR: 91 -> 84  BP: 114/74 -> 122/76  O2: 100% on RA -> 100% on RA  RR: 18 -> 18    Labs  WBC: 6.46  HgB: 5.8 9 (baseline 9-10)  MCV: 125.2  MCHC: 44.2  RDW: 30.5  Smear: Anisocytosis, Macrocytosis, Polychromasia. Giant Platelets  Reticulocytes: 15.7%, Absolute 143.2  Direct Ryan: Positive  Na: 130  Serum Pregnancy: Negative    Imaging  CT Head Non-Con: No evidence of acute intracranial pathology.    In the ED  1L NS Bolus  Tylenol 975mg PO x1  Reglan 10mg IV x1  1u Packed RBCs (23 Oct 2024 19:40)      Allergies    No Known Allergies    Intolerances        MEDICATIONS  (STANDING):  cyanocobalamin 1000 MICROGram(s) Oral daily  enoxaparin Injectable 40 milliGRAM(s) SubCutaneous every 24 hours  folic acid 1 milliGRAM(s) Oral daily    MEDICATIONS  (PRN):  acetaminophen     Tablet .. 975 milliGRAM(s) Oral every 8 hours PRN Temp greater or equal to 38C (100.4F), Mild Pain (1 - 3)  metoclopramide Injectable 10 milliGRAM(s) IV Push every 8 hours PRN Nausea      PAST MEDICAL & SURGICAL HISTORY:  H/O pericarditis      Lupus erythematosus      No significant past surgical history          FAMILY HISTORY:      SOCIAL HISTORY: No EtOH, no tobacco    REVIEW OF SYSTEMS:  Headache and photophobia is better after blood transfusion    Height (cm): 160 (10-23 @ 11:30)  Weight (kg): 81.6 (10-23 @ 11:30)  BMI (kg/m2): 31.9 (10-23 @ 11:30)  BSA (m2): 1.85 (10-23 @ 11:30)    T(F): 98.3 (10-24-24 @ 07:37), Max: 98.9 (10-23-24 @ 11:30)  HR: 82 (10-24-24 @ 07:37)  BP: 95/60 (10-24-24 @ 07:37)  RR: 18 (10-24-24 @ 07:37)  SpO2: 100% (10-24-24 @ 07:37)  Wt(kg): --    GENERAL: NAD, well-developed  HEAD:  Atraumatic, Normocephalic  EYES: EOMI, PERRLA, conjunctiva and sclera clear  NECK: Supple, No JVD  CHEST/LUNG: Clear to auscultation bilaterally; No wheeze  HEART: Regular rate and rhythm; No murmurs, rubs, or gallops  ABDOMEN: Soft, Nontender, Nondistended; Bowel sounds present  EXTREMITIES:  2+ Peripheral Pulses, No clubbing, cyanosis, or edema  NEUROLOGY: non-focal  SKIN: No rashes or lesions                          7.0    4.11  )-----------( 297      ( 24 Oct 2024 05:47 )             19.7       10-24    137  |  101  |  12  ----------------------------<  86  4.4   |  23  |  0.6[L]    Ca    8.5      24 Oct 2024 05:47  Mg     2.0     10-24    TPro  8.7[H]  /  Alb  3.9  /  TBili  3.4[H]  /  DBili  x   /  AST  26  /  ALT  7   /  AlkPhos  72  10-24      Magnesium: 2.0 mg/dL (10-24 @ 05:47)  Haptoglobin, Serum: <20 mg/dL (10-23 @ 17:33)  Lactate Dehydrogenase, Serum: 453 (10-23 @ 17:09)       Hematology Consult Note    HPI:  Ms. Dumont is a 25 yo F with pericarditis associated w/ pregnancy presenting for progressively worsening headahce w/ photophobia x1w. Patient states over the past year she has had these episodes of headache with similar symptoms of insidious onset and photophobia. When she sought treatment, she was repeatedly found to be significantly anemic and required blood transfusions. She states that no one in her family has a history of anemia to her knowledge,though her two older sisters have a history of idiopathic intracranial hypertension, her mother has a history of diabetes mellitus, and her father has a history of seizure disorder. Patient states that in the past when she has received blood transfusions she soon experienced resolution of her headaches. She only takes a multivitamin, iron tabs, and zinc at home. Her hemoglobin on admission was found to be 5.8, a unit of PRBC was ordered for her and she is admitted for further workup.    Vitals  Temp: 98.9 -> 97.9  HR: 91 -> 84  BP: 114/74 -> 122/76  O2: 100% on RA -> 100% on RA  RR: 18 -> 18    Labs  WBC: 6.46  HgB: 5.8 9 (baseline 9-10)  MCV: 125.2  MCHC: 44.2  RDW: 30.5  Smear: Anisocytosis, Macrocytosis, Polychromasia. Giant Platelets  Reticulocytes: 15.7%, Absolute 143.2  Direct Ryan: Positive  Na: 130  Serum Pregnancy: Negative    Imaging  CT Head Non-Con: No evidence of acute intracranial pathology.    In the ED  1L NS Bolus  Tylenol 975mg PO x1  Reglan 10mg IV x1  1u Packed RBCs (23 Oct 2024 19:40)      Allergies    No Known Allergies    Intolerances        MEDICATIONS  (STANDING):  cyanocobalamin 1000 MICROGram(s) Oral daily  enoxaparin Injectable 40 milliGRAM(s) SubCutaneous every 24 hours  folic acid 1 milliGRAM(s) Oral daily    MEDICATIONS  (PRN):  acetaminophen     Tablet .. 975 milliGRAM(s) Oral every 8 hours PRN Temp greater or equal to 38C (100.4F), Mild Pain (1 - 3)  metoclopramide Injectable 10 milliGRAM(s) IV Push every 8 hours PRN Nausea      PAST MEDICAL & SURGICAL HISTORY:  H/O pericarditis      Lupus erythematosus      No significant past surgical history          FAMILY HISTORY:      SOCIAL HISTORY: No EtOH, no tobacco    REVIEW OF SYSTEMS:  Headache and photophobia is better after blood transfusion    Height (cm): 160 (10-23 @ 11:30)  Weight (kg): 81.6 (10-23 @ 11:30)  BMI (kg/m2): 31.9 (10-23 @ 11:30)  BSA (m2): 1.85 (10-23 @ 11:30)    T(F): 98.3 (10-24-24 @ 07:37), Max: 98.9 (10-23-24 @ 11:30)  HR: 82 (10-24-24 @ 07:37)  BP: 95/60 (10-24-24 @ 07:37)  RR: 18 (10-24-24 @ 07:37)  SpO2: 100% (10-24-24 @ 07:37)  Wt(kg): --    GENERAL: NAD, well-developed  HEAD:  Atraumatic, Normocephalic  EYES: EOMI, PERRLA, conjunctiva and sclera clear  NECK: Supple, No JVD  CHEST/LUNG: Clear to auscultation bilaterally; No wheeze  HEART: Regular rate and rhythm; No murmurs, rubs, or gallops  ABDOMEN: Soft, Nontender, Nondistended; Bowel sounds present  EXTREMITIES:  2+ Peripheral Pulses, No clubbing, cyanosis, or edema  NEUROLOGY: non-focal  SKIN: No rashes or lesions                          7.0    4.11  )-----------( 297      ( 24 Oct 2024 05:47 )             19.7       10-24    137  |  101  |  12  ----------------------------<  86  4.4   |  23  |  0.6[L]    Ca    8.5      24 Oct 2024 05:47  Mg     2.0     10-24    TPro  8.7[H]  /  Alb  3.9  /  TBili  3.4[H]  /  DBili  x   /  AST  26  /  ALT  7   /  AlkPhos  72  10-24      Magnesium: 2.0 mg/dL (10-24 @ 05:47)  Haptoglobin, Serum: <20 mg/dL (10-23 @ 17:33)  Lactate Dehydrogenase, Serum: 453 (10-23 @ 17:09)      < from: CT Chest w/ IV Cont (10.24.24 @ 14:00) >    IMPRESSION:    2.5 cm cyst in the right adnexa    Lymphadenopathy involving the chest, abdomen and pelvis as well as   splenomegaly.    1 x 0.7 cm oval mass in the lateral aspect of the left breast. This may   reflect an intramammary lymph node. Targeted outpatient diagnostic   ultrasound may be of benefit.      < end of copied text >  < from: CT Abdomen and Pelvis w/ IV Cont (10.24.24 @ 14:00) >  Prominent peripancreatic and para-aortic lymph nodes.   Enlarged bilateral inguinal as well as external iliac and obturator lymph   nodes measuring up to 2.4 x 1.2 cm (3/164).  ABDOMINAL WALL: Within normal limits.  BONES: Within normal limits.    < end of copied text >

## 2024-10-24 NOTE — CONSULT NOTE ADULT - SUBJECTIVE AND OBJECTIVE BOX
GENERAL SURGERY CONSULT NOTE    Patient: JASMINE BRAVO , 24y ([birth year])Female   MRN: [id number]  Location: [place] [address] A  Visit: 10-23-24 Inpatient  Date: 10-24-24 @ 19:01    HPI:  Pt is 24F PMHx pregnancy-associated pericarditis, anemia, and questionable history of SLE who presented to the ED with one year unprompted headache with photophobia that acutely worsened over the past week. Per pt, headache has responded to RBC transfusions in the past, denies fevers, chills, CP, SOB, N/V/C/D, night sweats, weight loss, early satiety, recent trauma, or recent infection. Upon arrival to ED, was afebrile, HDS, notable for hgb 5.8 that jono to 7 after 1U pRBC. Surgical oncology consulted for lymph node biopsy.     PAST MEDICAL & SURGICAL HISTORY:  H/O pericarditis  Lupus erythematosus (questionable)    Home Medications:  ferrous sulfate: once a day (23 Oct 2024 21:50)  Multiple Vitamins oral tablet: 1 tab(s) orally once a day (23 Oct 2024 21:01)  Zinc Acetate: once a day (23 Oct 2024 21:51)    VITALS:  T(F): 98.5 (10-24-24 @ 15:37), Max: 98.5 (10-24-24 @ 15:37)  HR: 85 (10-24-24 @ 15:37) (76 - 93)  BP: 127/72 (10-24-24 @ 15:37) (95/60 - 127/72)  RR: 18 (10-24-24 @ 15:37) (18 - 18)  SpO2: 100% (10-24-24 @ 15:37) (99% - 100%)    PHYSICAL EXAM:  GENERAL: A&Ox3, NAD  HEENT: Normocephalic, atraumatic, no cervical or axillary lymphadenopathy  PULM: Non-labored respirations, bilateral chest rise, saturating well on RA  CV: Regular rate and rhythm  ABDOMEN: Abdomen soft, non-distended, non-tender, no rebound tenderness or guardingt  : No inguinal lymphadenopathy  MSK: Moving extremities spontaneously, BUE and BLE sensorimotor and strength intact  NEURO: No focal deficits  SKIN: Warm, dry, normal skin color, texture, turgor    MEDICATIONS  (STANDING):  cyanocobalamin 1000 MICROGram(s) Oral daily  enoxaparin Injectable 40 milliGRAM(s) SubCutaneous every 24 hours  folic acid 1 milliGRAM(s) Oral daily  predniSONE   Tablet 80 milliGRAM(s) Oral daily    MEDICATIONS  (PRN):  acetaminophen     Tablet .. 975 milliGRAM(s) Oral every 8 hours PRN Temp greater or equal to 38C (100.4F), Mild Pain (1 - 3)  metoclopramide Injectable 10 milliGRAM(s) IV Push every 8 hours PRN Nausea    LAB/STUDIES:                        7.0    4.11  )-----------( 297      ( 24 Oct 2024 05:47 )             19.7     10-24    137  |  101  |  12  ----------------------------<  86  4.4   |  23  |  0.6[L]    Ca    8.5      24 Oct 2024 05:47  Mg     2.0     10-24    TPro  8.7[H]  /  Alb  3.9  /  TBili  3.4[H]  /  DBili  x   /  AST  26  /  ALT  7   /  AlkPhos  72  10-24    PT/INR - ( 23 Oct 2024 16:22 )   PT: 13.30 sec;   INR: 1.16 ratio      PTT - ( 23 Oct 2024 16:22 )  PTT:26.9 sec  LIVER FUNCTIONS - ( 24 Oct 2024 05:47 )  Alb: 3.9 g/dL / Pro: 8.7 g/dL / ALK PHOS: 72 U/L / ALT: 7 U/L / AST: 26 U/L / GGT: x           Urinalysis Basic - ( 24 Oct 2024 05:47 )    Color: x / Appearance: x / SG: x / pH: x  Gluc: 86 mg/dL / Ketone: x  / Bili: x / Urobili: x   Blood: x / Protein: x / Nitrite: x   Leuk Esterase: x / RBC: x / WBC x   Sq Epi: x / Non Sq Epi: x / Bacteria: x    IMAGING:  < from: CT Chest w/ IV Cont (10.24.24 @ 14:00) >  ACC: 1999 EXAM:  CT CHEST IC   ORDERED BY: BREANA MCMULLEN     ACC: [id number] EXAM:  CT ABDOMEN AND PELVIS IC   ORDERED BY: BREANA MCMULLEN     PROCEDURE DATE:  10/24/2024          INTERPRETATION:  CLINICAL INFORMATION: Immune hemolytic anemia    COMPARISON: None.    CONTRAST/COMPLICATIONS:  IV Contrast: IV contrast documented in unlinked concurrent exam   (accession [id number]), Omnipaque 350 (accession [id number])  95 cc   administered   5 cc discarded  Oral Contrast: NONE  Complications: None reported at time of study completion    PROCEDURE:  CT of the Chest, Abdomen and Pelvis was performed.  Sagittal and coronal reformats were performed.    FINDINGS:  CHEST:  LUNGS AND LARGE AIRWAYS: Patent central airways. 4 mm nodule in the right   lower lobe (3/50)  PLEURA: No pleural effusion.  VESSELS: Within normal limits.  HEART: Heart size is normal. No pericardial effusion.  MEDIASTINUM AND NAV: Nonspecific mediastinal and bilateral axillary   lymphadenopathy. Prominent subcarinal lymph node measuring 2.5 x 1.4 cm  CHEST WALL AND LOWER NECK: 1 x 0.7 cm oval mass in the lateral aspect of   the left breast. Supracervical lymphadenopathy is noted as well.    ABDOMEN AND PELVIS:  LIVER: Within normal limits.  BILE DUCTS: Normal caliber.  GALLBLADDER: Within normal limits.  SPLEEN: Splenomegaly measuring 12 cm AP  PANCREAS: Within normal limits.  ADRENALS: Within normal limits.  KIDNEYS/URETERS: Subcentimeter hypodensity in the right interpolar   region. No hydroureter or hydronephrosis.    BLADDER: Within normal limits.  REPRODUCTIVE ORGANS: 2.5 cm cyst in the right adnexa.    BOWEL: No bowel obstruction. Appendix is normal.  PERITONEUM/RETROPERITONEUM: Trace amount of fluid in the cul-de-sac.  VESSELS: Within normal limits.  LYMPH NODES:Prominent peripancreatic and para-aortic lymph nodes.   Enlarged bilateral inguinal as well as external iliac and obturator lymph   nodes measuring up to 2.4 x 1.2 cm (3/164).  ABDOMINAL WALL: Within normal limits.  BONES: Within normal limits.    IMPRESSION:    2.5 cm cyst in the right adnexa    Lymphadenopathy involving the chest, abdomen and pelvis as well as   splenomegaly.    1 x 0.7 cm oval mass in the lateral aspect of the left breast. This may   reflect an intramammary lymph node. Targeted outpatient diagnostic   ultrasound may be of benefit.    < end of copied text >      ASSESSMENT:  24F PMHx pregnancy-associated pericarditis, anemia, and questionable history of SLE who presented to the ED with one year unprompted headache with photophobia that acutely worsened over the past week. Per pt, headache has responded to RBC transfusions in the past, denies fevers, chills, CP, SOB, N/V/C/D, night sweats, weight loss, early satiety, recent trauma, or recent infection. Upon arrival to ED, was afebrile, HDS, notable for hgb 5.8 that jono to 7 after 1U pRBC. Surgical oncology consulted for lymph node biopsy.     PLAN:  - Added on for OR tomorrow for axillary lymph node biopsy with Dr. Corrales     - Informed consent to be obtained at bedside  - Management per primary  - NPO@MN, coags, prepare for OR    Discussed with attending.    Surgical oncology ([id number])

## 2024-10-24 NOTE — CONSULT NOTE ADULT - ASSESSMENT
Ms. Dumont is a 23 yo F with pericarditis associated w/ pregnancy presenting for progressively worsening headahce w/ photophobia x1w. Patient states over the past year she has had these episodes of headache with similar symptoms of insidious onset and photophobia. When she sought treatment, she was repeatedly found to be significantly anemic and required blood transfusions. She states that no one in her family has a history of anemia to her knowledge,though her two older sisters have a history of idiopathic intracranial hypertension, her mother has a history of diabetes mellitus, and her father has a history of seizure disorder. Patient states that in the past when she has received blood transfusions she soon experienced resolution of her headaches. She only takes a multivitamin, iron tabs, and zinc at home. Her hemoglobin on admission was found to be 5.8, a unit of PRBC was ordered for her and she is admitted for further workup.    # Anemia     Ms. Dumont is a 25 yo F with pericarditis associated w/ pregnancy presenting for progressively worsening headahce w/ photophobia x1w. Patient states over the past year she has had these episodes of headache with similar symptoms of insidious onset and photophobia. When she sought treatment, she was repeatedly found to be significantly anemic and required blood transfusions. She states that no one in her family has a history of anemia to her knowledge,though her two older sisters have a history of idiopathic intracranial hypertension, her mother has a history of diabetes mellitus, and her father has a history of seizure disorder. Patient states that in the past when she has received blood transfusions she soon experienced resolution of her headaches. She only takes a multivitamin, iron tabs, and zinc at home. Her hemoglobin on admission was found to be 5.8, a unit of PRBC was ordered for her and she is admitted for further workup.    # Macrocytic Anemia    Presented with Hb 5.8-- improved to 7 after 1 unit transfusion  Check iron panel  Follow up ferritin, Vitamin b12, MMA, folate  Haptoglobin low, total bili is 3.4, check indirect bili  Direct Ryan positive  Markers positive for hemolysis       Ms. Dumont is a 23 yo F with pericarditis associated w/ pregnancy presenting for progressively worsening headahce w/ photophobia x1w. Patient states over the past year she has had these episodes of headache with similar symptoms of insidious onset and photophobia. When she sought treatment, she was repeatedly found to be significantly anemic and required blood transfusions. She states that no one in her family has a history of anemia to her knowledge,though her two older sisters have a history of idiopathic intracranial hypertension, her mother has a history of diabetes mellitus, and her father has a history of seizure disorder. Patient states that in the past when she has received blood transfusions she soon experienced resolution of her headaches. She only takes a multivitamin, iron tabs, and zinc at home. Her hemoglobin on admission was found to be 5.8, a unit of PRBC was ordered for her and she is admitted for further workup.    # Autoimmune hemolytic anemia  # Diffuse lymphadenopathy    Presented with Hb 5.8-- improved to 7 after 1 unit transfusion  Check iron panel  Follow up ferritin, Vitamin b12, MMA, folate  Haptoglobin low, total bili is 3.4, check indirect bili  Direct Ryan positive  Recommended CT chest/abd pelvis: has diffuse lymphadenoopathy in peripancreatic, para-arotic lymph nodes, bilateral inguinal as well as external iliac and obturator lymph nodes.  1 x .7 cm oval mass in the lateral aspect of left breast.    Discussed with Dr. Corrales, who will see the patient, will need surgical vs IR guided biopsy.  She was diagnosed as SLE in the past, however she denies it, would recommend rheumatology consult  Check Hep B, Hep C, HIV, EBV, CMV, MALINI, dsdna    Start prednisone 80 mg daily with PPI  Transfuse 1 more unit PRBC

## 2024-10-24 NOTE — PATIENT PROFILE ADULT - PUBLIC BENEFITS
Bill 57962 For Specimen Handling/Conveyance To Laboratory?: no Hemostasis: [place] Lab: [id number] Type Of Destruction Used: Curettage Anesthesia Volume In Cc (Will Not Render If 0): 1 Post-Care Instructions: I reviewed with the patient in detail post-care instructions. Patient is to keep the biopsy site dry overnight, and then apply bacitracin twice daily until healed. Patient may apply hydrogen peroxide soaks to remove any crusting. Detail Level: Detailed Render Post-Care Instructions In Note?: yes Electrodesiccation Text: The wound bed was treated with electrodesiccation after the biopsy was performed. Silver Nitrate Text: The wound bed was treated with silver nitrate after the biopsy was performed. Biopsy Type: H and E Cryotherapy Text: The wound bed was treated with cryotherapy after the biopsy was performed. Anesthesia Type: 2% lidocaine with epinephrine X Size Of Lesion In Cm: 0 Body Location Override (Optional - Billing Will Still Be Based On Selected Body Map Location If Applicable): Left temple Billing Type: Third-Party Bill Path Notes (To The Dermatopathologist): R/O BCC Biopsy Method: scissors Wound Care: Vaseline Consent: Written consent was obtained and risks were reviewed including but not limited to scarring, infection, bleeding, scabbing, incomplete removal, nerve damage and allergy to anesthesia. Electrodesiccation And Curettage Text: The wound bed was treated with electrodesiccation and curettage after the biopsy was performed. Dressing: no dressing applied Notification Instructions: Patient will be notified of biopsy results. However, patient instructed to call the office if not contacted within 2 weeks. no

## 2024-10-25 ENCOUNTER — NON-APPOINTMENT (OUTPATIENT)
Age: 24
End: 2024-10-25

## 2024-10-25 ENCOUNTER — TRANSCRIPTION ENCOUNTER (OUTPATIENT)
Age: 24
End: 2024-10-25

## 2024-10-25 VITALS
TEMPERATURE: 98 F | HEART RATE: 80 BPM | OXYGEN SATURATION: 100 % | RESPIRATION RATE: 18 BRPM | DIASTOLIC BLOOD PRESSURE: 72 MMHG | SYSTOLIC BLOOD PRESSURE: 126 MMHG

## 2024-10-25 LAB
ALBUMIN SERPL ELPH-MCNC: 3.9 G/DL — SIGNIFICANT CHANGE UP (ref 3.5–5.2)
ALP SERPL-CCNC: 72 U/L — SIGNIFICANT CHANGE UP (ref 30–115)
ALT FLD-CCNC: 7 U/L — SIGNIFICANT CHANGE UP (ref 0–41)
ANA PAT FLD IF-IMP: ABNORMAL
ANA TITR SER: ABNORMAL
ANION GAP SERPL CALC-SCNC: 7 MMOL/L — SIGNIFICANT CHANGE UP (ref 7–14)
AST SERPL-CCNC: 21 U/L — SIGNIFICANT CHANGE UP (ref 0–41)
BASOPHILS # BLD AUTO: 0.01 K/UL — SIGNIFICANT CHANGE UP (ref 0–0.2)
BASOPHILS NFR BLD AUTO: 0.2 % — SIGNIFICANT CHANGE UP (ref 0–1)
BILIRUB SERPL-MCNC: 2.9 MG/DL — HIGH (ref 0.2–1.2)
BUN SERPL-MCNC: 11 MG/DL — SIGNIFICANT CHANGE UP (ref 10–20)
CALCIUM SERPL-MCNC: 8.8 MG/DL — SIGNIFICANT CHANGE UP (ref 8.4–10.4)
CHLORIDE SERPL-SCNC: 102 MMOL/L — SIGNIFICANT CHANGE UP (ref 98–110)
CO2 SERPL-SCNC: 25 MMOL/L — SIGNIFICANT CHANGE UP (ref 17–32)
CREAT SERPL-MCNC: 0.7 MG/DL — SIGNIFICANT CHANGE UP (ref 0.7–1.5)
EBV DNA SERPL NAA+PROBE-ACNC: SIGNIFICANT CHANGE UP IU/ML
EBVPCR LOG: SIGNIFICANT CHANGE UP LOG10IU/ML
EGFR: 123 ML/MIN/1.73M2 — SIGNIFICANT CHANGE UP
EOSINOPHIL # BLD AUTO: 0.01 K/UL — SIGNIFICANT CHANGE UP (ref 0–0.7)
EOSINOPHIL NFR BLD AUTO: 0.2 % — SIGNIFICANT CHANGE UP (ref 0–8)
FERRITIN SERPL-MCNC: 311 NG/ML — HIGH (ref 15–150)
FOLATE SERPL-MCNC: 11 NG/ML — SIGNIFICANT CHANGE UP
GLUCOSE SERPL-MCNC: 101 MG/DL — HIGH (ref 70–99)
HCT VFR BLD CALC: 21.8 % — LOW (ref 37–47)
HGB BLD-MCNC: 7.9 G/DL — LOW (ref 12–16)
HIV 1+2 AB+HIV1 P24 AG SERPL QL IA: SIGNIFICANT CHANGE UP
IMM GRANULOCYTES NFR BLD AUTO: 1.8 % — HIGH (ref 0.1–0.3)
LDH SERPL L TO P-CCNC: 453 — HIGH (ref 50–242)
LYMPHOCYTES # BLD AUTO: 0.85 K/UL — LOW (ref 1.2–3.4)
LYMPHOCYTES # BLD AUTO: 15.7 % — LOW (ref 20.5–51.1)
MAGNESIUM SERPL-MCNC: 2 MG/DL — SIGNIFICANT CHANGE UP (ref 1.8–2.4)
MCHC RBC-ENTMCNC: 36.2 G/DL — SIGNIFICANT CHANGE UP (ref 32–37)
MCHC RBC-ENTMCNC: 41.6 PG — HIGH (ref 27–31)
MCV RBC AUTO: 114.7 FL — HIGH (ref 81–99)
MONOCYTES # BLD AUTO: 0.56 K/UL — SIGNIFICANT CHANGE UP (ref 0.1–0.6)
MONOCYTES NFR BLD AUTO: 10.4 % — HIGH (ref 1.7–9.3)
NEUTROPHILS # BLD AUTO: 3.88 K/UL — SIGNIFICANT CHANGE UP (ref 1.4–6.5)
NEUTROPHILS NFR BLD AUTO: 71.7 % — SIGNIFICANT CHANGE UP (ref 42.2–75.2)
NRBC # BLD: 3 /100 WBCS — HIGH (ref 0–0)
PHOSPHATE SERPL-MCNC: 4.4 MG/DL — SIGNIFICANT CHANGE UP (ref 2.1–4.9)
PLATELET # BLD AUTO: 309 K/UL — SIGNIFICANT CHANGE UP (ref 130–400)
PMV BLD: 10.5 FL — HIGH (ref 7.4–10.4)
POTASSIUM SERPL-MCNC: 4.5 MMOL/L — SIGNIFICANT CHANGE UP (ref 3.5–5)
POTASSIUM SERPL-SCNC: 4.5 MMOL/L — SIGNIFICANT CHANGE UP (ref 3.5–5)
PROT SERPL-MCNC: 8.6 G/DL — HIGH (ref 6–8)
RBC # BLD: 1.9 M/UL — LOW (ref 4.2–5.4)
RBC # FLD: 25.1 % — HIGH (ref 11.5–14.5)
SODIUM SERPL-SCNC: 134 MMOL/L — LOW (ref 135–146)
URATE SERPL-MCNC: 4 MG/DL — SIGNIFICANT CHANGE UP (ref 2.5–7)
VIT B12 SERPL-MCNC: 590 PG/ML — SIGNIFICANT CHANGE UP (ref 232–1245)
WBC # BLD: 5.41 K/UL — SIGNIFICANT CHANGE UP (ref 4.8–10.8)
WBC # FLD AUTO: 5.41 K/UL — SIGNIFICANT CHANGE UP (ref 4.8–10.8)

## 2024-10-25 PROCEDURE — 99222 1ST HOSP IP/OBS MODERATE 55: CPT

## 2024-10-25 PROCEDURE — 99239 HOSP IP/OBS DSCHRG MGMT >30: CPT

## 2024-10-25 PROCEDURE — 86077 PHYS BLOOD BANK SERV XMATCH: CPT

## 2024-10-25 PROCEDURE — 99232 SBSQ HOSP IP/OBS MODERATE 35: CPT

## 2024-10-25 RX ORDER — FOLIC ACID 1 MG/1
1 TABLET ORAL
Qty: 30 | Refills: 0
Start: 2024-10-25 | End: 2024-11-23

## 2024-10-25 RX ORDER — PANTOPRAZOLE SODIUM 40 MG/1
1 TABLET, DELAYED RELEASE ORAL
Qty: 14 | Refills: 0
Start: 2024-10-25 | End: 2024-11-07

## 2024-10-25 RX ORDER — CYANOCOBALAMIN (VITAMIN B-12) 1000MCG/15
1 LIQUID (ML) ORAL
Qty: 30 | Refills: 0
Start: 2024-10-25 | End: 2024-11-23

## 2024-10-25 RX ORDER — PANTOPRAZOLE SODIUM 40 MG/1
1 TABLET, DELAYED RELEASE ORAL
Qty: 30 | Refills: 0
Start: 2024-10-25 | End: 2024-11-23

## 2024-10-25 RX ADMIN — Medication 975 MILLIGRAM(S): at 13:23

## 2024-10-25 RX ADMIN — Medication 975 MILLIGRAM(S): at 07:48

## 2024-10-25 RX ADMIN — Medication 120 MILLILITER(S): at 08:05

## 2024-10-25 RX ADMIN — Medication 80 MILLIGRAM(S): at 05:45

## 2024-10-25 RX ADMIN — Medication 10 MILLIGRAM(S): at 05:47

## 2024-10-25 RX ADMIN — Medication 10 MILLIGRAM(S): at 13:23

## 2024-10-25 RX ADMIN — Medication 975 MILLIGRAM(S): at 05:47

## 2024-10-25 RX ADMIN — PANTOPRAZOLE SODIUM 40 MILLIGRAM(S): 40 TABLET, DELAYED RELEASE ORAL at 05:45

## 2024-10-25 NOTE — CONSULT NOTE ADULT - ATTENDING COMMENTS
will proceed with axillary LN biopsy
25 y/o woman with h/o +MALINI, high +Posada and RNP, and pericarditis during pregnancy admitted with hemolytic anemia, rheumatology consulted for concern for SLE. Pt was previously seen as an outpatient and inpatient by rheumatology, was found to have positive labs as above but clinically was not thought to have any symptoms concerning for SLE. Pt was recommended to follow up periodically with rheumatology as an outpatient but has not followed up. Pt presented 10/23 with headaches and photophobia, and was found to have macrocytic anemia with hemoglobin 5.8. Pt also had a CT chest abdomen and pelvis which demonstrated diffuse lymphadenopathy as well as splenomegaly, and a breast mass ?lymph node. Pt says she is feeling better now, denies any other symptoms recently including oral ulcers, chills, rashes, joint pain. Pt's exam is unremarkable. Pt's hemolytic anemia may be related to SLE; however given her findings overall, I agree with a lymph node biopsy to rule out malignancy.     - Recommend for pt to f/u with rheumatology as an outpatient in 1 month. I will have my office call her to schedule an appointment
seen examined w/ hemonc fellows; note reviewed; case discussed; agree w plan    25 yo woman w/ Crohn's disesase has severe symptomatic hemolytic anemia, jodi test positive. There is a remote history of SLE in the chart, pt denies she has the diagnosis. As a w/u of jodi test (+), CT CAP IVC was recommended: it revealed lymphadenopathy both above and below diaphragm, in addition to breast mass. Pt denies B symptoms. Surgical oncology contacted to perform biopsy to establish the diagnosis: scheduled for 10/25/24 in OR. The management will depend on the full pathology diagnosis.     Transfuse an additional PRBC unit  Start prednisone at 1mg/kg daily to treat hemolysis; add PPI to prevent PUD    check labs   b12, folate, MMA  EBV, CMV, Parvovirus; HEP B, HEP C, HIV  MALINI, RF, dsDNA

## 2024-10-25 NOTE — ASU PATIENT PROFILE, ADULT - FALL HARM RISK - UNIVERSAL INTERVENTIONS
Bed in lowest position, wheels locked, appropriate side rails in place/Call bell, personal items and telephone in reach/Instruct patient to call for assistance before getting out of bed or chair/Non-slip footwear when patient is out of bed/Faith to call system/Physically safe environment - no spills, clutter or unnecessary equipment/Purposeful Proactive Rounding/Room/bathroom lighting operational, light cord in reach

## 2024-10-25 NOTE — CDI QUERY NOTE - NSCDIOTHERTXTBX_GEN_ALL_CORE_HH
Clinical documentation and/or evidence in the medical record indicates that this patient has a laboratory finding without an associated diagnosis.  In order to ensure accurate coding and accuracy of the clinical record, the documentation in this patient’s medical record requires additional clarification.      Please include documentation of a diagnosis associated with these findings in your progress note and/or discharge summary.    Please clarify if low sodium can be further specified as:    •	Hyponatremia  •	Low sodium associated with other (please specify)  •	Other (specify)      Supporting documentation and/or clinical evidence:     Labs:  10/23 Na 130  10/24 Na 137  10/25     IVF:  10/23 NS 0.9% 2L IVB x1 dose  10/25 LR IV @ 120 mL/hr x1 dose    Thank you,  Dianne Singh RN, BSN  [phone]

## 2024-10-25 NOTE — DISCHARGE NOTE PROVIDER - NSDCFUADDAPPT_GEN_ALL_CORE_FT
Please call Dr. Corrales's office at [phone] to confirm the time of your biopsy Please call Dr. Corrales's office at [phone] to confirm the time of your biopsy  in case of any new or worsening symptoms go to Ed.

## 2024-10-25 NOTE — DISCHARGE NOTE PROVIDER - CARE PROVIDER_API CALL
Dia Corrales  Complex General Surgical Oncology  [address] [place] [address]  Phone: [phone]  Fax: [phone]  Scheduled Appointment: 10/28/2024    Zach Gomez  Medical Oncology  [address] [address]  Phone: [phone]  Fax: [phone]  Follow Up Time: 1 week

## 2024-10-25 NOTE — CONSULT NOTE ADULT - SUBJECTIVE AND OBJECTIVE BOX
JASMINE BRAVO 24y Female  MRN#: [id number]   Hospital Day: 2d    SUBJECTIVE  Patient is a 24y old Female who presents with a chief complaint of Currently admitted to medicine with the primary diagnosis of Anemia      INTERVAL HPI AND OVERNIGHT EVENTS:  Patient was examined and seen at bedside. This morning she is resting comfortably in bed and reports no issues or overnight events.    OBJECTIVE  PAST MEDICAL & SURGICAL HISTORY  H/O pericarditis    Lupus erythematosus    No significant past surgical history      ALLERGIES:  No Known Allergies    MEDICATIONS:  STANDING MEDICATIONS  cyanocobalamin 1000 MICROGram(s) Oral daily  enoxaparin Injectable 40 milliGRAM(s) SubCutaneous every 24 hours  folic acid 1 milliGRAM(s) Oral daily  lactated ringers. 1000 milliLiter(s) IV Continuous <Continuous>  pantoprazole    Tablet 40 milliGRAM(s) Oral before breakfast  predniSONE   Tablet 80 milliGRAM(s) Oral daily    PRN MEDICATIONS  acetaminophen     Tablet .. 975 milliGRAM(s) Oral every 8 hours PRN  metoclopramide Injectable 10 milliGRAM(s) IV Push every 8 hours PRN      VITAL SIGNS: Last 24 Hours  T(C): 36.8 (25 Oct 2024 05:17), Max: 36.9 (24 Oct 2024 15:37)  T(F): 98.2 (25 Oct 2024 05:17), Max: 98.5 (24 Oct 2024 15:37)  HR: 82 (25 Oct 2024 05:17) (67 - 85)  BP: 123/69 (25 Oct 2024 05:17) (108/64 - 127/72)  BP(mean): 90 (24 Oct 2024 15:37) (90 - 90)  RR: 18 (25 Oct 2024 05:17) (18 - 18)  SpO2: 100% (24 Oct 2024 20:05) (100% - 100%)    LABS:                        7.9    5.41  )-----------( 309      ( 25 Oct 2024 07:07 )             21.8     10-25    134[L]  |  102  |  11  ----------------------------<  101[H]  4.5   |  25  |  0.7    Ca    8.8      25 Oct 2024 07:07  Mg     2.0     10-25    TPro  8.6[H]  /  Alb  3.9  /  TBili  2.9[H]  /  DBili  x   /  AST  21  /  ALT  7   /  AlkPhos  72  10-25    PT/INR - ( 23 Oct 2024 16:22 )   PT: 13.30 sec;   INR: 1.16 ratio         PTT - ( 23 Oct 2024 16:22 )  PTT:26.9 sec  Urinalysis Basic - ( 25 Oct 2024 07:07 )    Color: x / Appearance: x / SG: x / pH: x  Gluc: 101 mg/dL / Ketone: x  / Bili: x / Urobili: x   Blood: x / Protein: x / Nitrite: x   Leuk Esterase: x / RBC: x / WBC x   Sq Epi: x / Non Sq Epi: x / Bacteria: x                RADIOLOGY:      PHYSICAL EXAM:  CONSTITUTIONAL: No acute distress, well-developed, well-groomed, AAOx3  HEAD: Atraumatic, normocephalic  EYES: EOM intact, PERRLA, conjunctiva and sclera clear  ENT: Supple, no masses, no thyromegaly, no bruits, no JVD; moist mucous membranes  PULMONARY: Clear to auscultation bilaterally; no wheezes, rales, or rhonchi  CARDIOVASCULAR: Regular rate and rhythm; no murmurs, rubs, or gallops  GASTROINTESTINAL: Soft, non-tender, non-distended; bowel sounds present  MUSCULOSKELETAL: 2+ peripheral pulses; no clubbing, no cyanosis, no edema  NEUROLOGY: non-focal  SKIN: No rashes or lesions; warm and dry   JASMINE BRAVO 24y Female  MRN#: [id number]   Hospital Day: 2d    SUBJECTIVE  Patient is a 24y old Female who presents with a chief complaint of Currently admitted to medicine with the primary diagnosis of Anemia      INTERVAL HPI AND OVERNIGHT EVENTS:  Patient was examined and seen at bedside. This morning she is resting comfortably in bed and reports no issues or overnight events.    OBJECTIVE  PAST MEDICAL & SURGICAL HISTORY  H/O pericarditis    Lupus erythematosus    No significant past surgical history      ALLERGIES:  No Known Allergies    MEDICATIONS:  STANDING MEDICATIONS  cyanocobalamin 1000 MICROGram(s) Oral daily  enoxaparin Injectable 40 milliGRAM(s) SubCutaneous every 24 hours  folic acid 1 milliGRAM(s) Oral daily  lactated ringers. 1000 milliLiter(s) IV Continuous <Continuous>  pantoprazole    Tablet 40 milliGRAM(s) Oral before breakfast  predniSONE   Tablet 80 milliGRAM(s) Oral daily    PRN MEDICATIONS  acetaminophen     Tablet .. 975 milliGRAM(s) Oral every 8 hours PRN  metoclopramide Injectable 10 milliGRAM(s) IV Push every 8 hours PRN      VITAL SIGNS: Last 24 Hours  T(C): 36.8 (25 Oct 2024 05:17), Max: 36.9 (24 Oct 2024 15:37)  T(F): 98.2 (25 Oct 2024 05:17), Max: 98.5 (24 Oct 2024 15:37)  HR: 82 (25 Oct 2024 05:17) (67 - 85)  BP: 123/69 (25 Oct 2024 05:17) (108/64 - 127/72)  BP(mean): 90 (24 Oct 2024 15:37) (90 - 90)  RR: 18 (25 Oct 2024 05:17) (18 - 18)  SpO2: 100% (24 Oct 2024 20:05) (100% - 100%)    LABS:                        7.9    5.41  )-----------( 309      ( 25 Oct 2024 07:07 )             21.8     10-25    134[L]  |  102  |  11  ----------------------------<  101[H]  4.5   |  25  |  0.7    Ca    8.8      25 Oct 2024 07:07  Mg     2.0     10-25    TPro  8.6[H]  /  Alb  3.9  /  TBili  2.9[H]  /  DBili  x   /  AST  21  /  ALT  7   /  AlkPhos  72  10-25    PT/INR - ( 23 Oct 2024 16:22 )   PT: 13.30 sec;   INR: 1.16 ratio         PTT - ( 23 Oct 2024 16:22 )  PTT:26.9 sec  Urinalysis Basic - ( 25 Oct 2024 07:07 )    Color: x / Appearance: x / SG: x / pH: x  Gluc: 101 mg/dL / Ketone: x  / Bili: x / Urobili: x   Blood: x / Protein: x / Nitrite: x   Leuk Esterase: x / RBC: x / WBC x   Sq Epi: x / Non Sq Epi: x / Bacteria: x                RADIOLOGY:      PHYSICAL EXAM:  CONSTITUTIONAL: No acute distress, well-developed, well-groomed, AAOx3  PULMONARY: Clear to auscultation bilaterally;   CARDIOVASCULAR: Regular rate and rhythm;   GASTROINTESTINAL: Soft, non-tender, non-distended; bowel sounds present  MUSCULOSKELETAL: 2+ peripheral pulses; no edema, no signs of active joint disease  NEUROLOGY: non-focal  SKIN: No rashes or lesions; warm and dry

## 2024-10-25 NOTE — DISCHARGE NOTE PROVIDER - CARE PROVIDERS DIRECT ADDRESSES
[email or web address]

## 2024-10-25 NOTE — PROGRESS NOTE ADULT - ATTENDING COMMENTS
seen/ examined w/ hemonc team; note reviewed; case discussed; agree w/ plan
Ms. Dumont is a 25 yo F with pericarditis associated w/ pregnancy presenting for progressively worsening headahce w/ photophobia x1w found to be have anemia w/ a hemoglobin of 5.8.    #Bicytopenia (Hgb, WBC)  #Hemolytic anemia  #Diffuse lymphadenopathy  Hematology and surgery following  Planning to go to OR tomorrow, NPO at midnight  Transfused with 2 units of PRBC, patient had symptomatic improvement with this  Prednisone 80mg daily  Rheumatology consult, follow up labs for reported history of lupus  INR within normal limits, PTT mildly low (26.9) on admission  F/u hematology workup, vitamin B12 and folate

## 2024-10-25 NOTE — DISCHARGE NOTE PROVIDER - NSDCMRMEDTOKEN_GEN_ALL_CORE_FT
ferrous sulfate: once a day  Multiple Vitamins oral tablet: 1 tab(s) orally once a day  Zinc Acetate: once a day   cyanocobalamin 1000 mcg oral tablet: 1 tab(s) orally once a day  ferrous sulfate: once a day  folic acid 1 mg oral tablet: 1 tab(s) orally once a day  Multiple Vitamins oral tablet: 1 tab(s) orally once a day  pantoprazole 40 mg oral delayed release tablet: 1 tab(s) orally once a day (before a meal)  predniSONE 20 mg oral tablet: 4 tab(s) orally once a day  Zinc Acetate: once a day

## 2024-10-25 NOTE — PROGRESS NOTE ADULT - ASSESSMENT
Ms. Dumont is a 23 yo F with pericarditis associated w/ pregnancy presenting for progressively worsening headahce w/ photophobia x1w. Patient states over the past year she has had these episodes of headache with similar symptoms of insidious onset and photophobia. When she sought treatment, she was repeatedly found to be significantly anemic and required blood transfusions. She states that no one in her family has a history of anemia to her knowledge,though her two older sisters have a history of idiopathic intracranial hypertension, her mother has a history of diabetes mellitus, and her father has a history of seizure disorder. Patient states that in the past when she has received blood transfusions she soon experienced resolution of her headaches. She only takes a multivitamin, iron tabs, and zinc at home. Her hemoglobin on admission was found to be 5.8, a unit of PRBC was ordered for her and she is admitted for further workup.    # Autoimmune hemolytic anemia  # Diffuse lymphadenopathy    Presented with Hb 5.8-- improved to 7 after 1 unit transfusion  Ferrtin 300, Iron panel suggestive of ACD  Vitamin b12 and folate is normal  Haptoglobin low, total bili is 3.4, indirect bilirubin elevated  Direct Ryan positive  Recommended CT chest/abd pelvis: has diffuse lymphadenoopathy in peripancreatic, para-arotic lymph nodes, bilateral inguinal as well as external iliac and obturator lymph nodes.  1 x .7 cm oval mass in the lateral aspect of left breast.    General surgery consulted, plan for biopsy today  Rheumatology consulted, per records she was diagnosed with Lupus in the past, however she refuses it.  Check Hep B, Hep C, HIV, EBV, CMV, MALINI    Continue prednisone 80 mg daily along with PPI.  Can be discharged after LN biopsy today  Will arrange for follow up with Dr. Juarez at [facility] next week       Ms. Dumont is a 25 yo F with pericarditis associated w/ pregnancy presenting for progressively worsening headahce w/ photophobia x1w. Patient states over the past year she has had these episodes of headache with similar symptoms of insidious onset and photophobia. When she sought treatment, she was repeatedly found to be significantly anemic and required blood transfusions. She states that no one in her family has a history of anemia to her knowledge,though her two older sisters have a history of idiopathic intracranial hypertension, her mother has a history of diabetes mellitus, and her father has a history of seizure disorder. Patient states that in the past when she has received blood transfusions she soon experienced resolution of her headaches. She only takes a multivitamin, iron tabs, and zinc at home. Her hemoglobin on admission was found to be 5.8, a unit of PRBC was ordered for her and she is admitted for further workup.    # Autoimmune hemolytic anemia  # Diffuse lymphadenopathy    Presented with Hb 5.8-- improved to 7 after 1 unit transfusion  Ferrtin 300, Iron panel suggestive of ACD  Vitamin b12 and folate is normal  Haptoglobin low, total bili is 3.4, indirect bilirubin elevated  Direct Ryan positive  Recommended CT chest/abd pelvis: has diffuse lymphadenoopathy in peripancreatic, para-arotic lymph nodes, bilateral inguinal as well as external iliac and obturator lymph nodes.  1 x .7 cm oval mass in the lateral aspect of left breast.    General surgery consulted, plan for biopsy today  Rheumatology consulted, per records she was diagnosed with Lupus in the past, however she refuses it.  Check Hep B, Hep C, HIV, EBV, CMV, MALINI    Continue prednisone 80 mg daily along with PPI.  Can be discharged after LN biopsy today  Will arrange for follow up with Dr. Juarez at [facility] next week    ADDENDUM: Was informed that surgery would not be able to perform the LN biopsy today, scheduled for outpatient on Monday next week  Patient cannot stay inpatient due to her social situation, ok to discharge today with follow up with surgery for biopsy on Monday.

## 2024-10-25 NOTE — DISCHARGE NOTE PROVIDER - ATTENDING DISCHARGE PHYSICAL EXAMINATION:
patient seen at bedside. initially planned for biopsy but surgery cancelled it and advised medical team to discharge patient and plan to schedule biopsy on Monday. surgery is going to follow up patient regarding biopsy scheduled. information was also given to patient as well. heme on cleared patient for discharge an follow up appointment next week, patient was advised to go to ed in case of new or worsening symptoms. Also advised to go follow up with oncology as scheduled and call surgical office for biopsy appointment confirmation. patient verbalized understanding and agreement with discharge planning.

## 2024-10-25 NOTE — PROGRESS NOTE ADULT - ASSESSMENT
24 year old female with pericarditis associated with pregnancy presenting for progressively worsening headache with photophobia x 1 week, found to be anemic with hemoglobin of 5.87. Admitted for further evaluation of suspected SLE.     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 Corrales today as add-on                                           --------------------------------------------------------------    # DVT prophylaxis: Lovenox  # GI prophylaxis: PPI  # Diet:   # Activity:   # Code status: Full Code  # Disposition:    Pending:

## 2024-10-25 NOTE — CONSULT NOTE ADULT - ASSESSMENT
Case of 24-year-old woman with history of pericarditis thought to be related to a  questionable history of SLE back then, currently admitted with hemolytic anemia, rheumatology consulted given possible history of SLE.  She was thought to possibly have SLE in February 2023, when she was hospitalized with pericarditis and was found to have MALINI 1:2560 speckled, Posada >8, RNP >8, as well as +SUREKHA. She saw Dr. Kirby of rheumatology as an outpatient but declined any treatment for SLE as she was not convinced about the diagnosis, she thought it was based on bloodwork as opposed to her symptoms.    Pt has been feeling well since that time, she denies any episodes of chest pain or SOB, rashes, joint pain or swelling, oral ulcers. Denies any h/o miscarriages.    While pt had multiple tests for SLE which came back high positive, clinically it is not entirely clear whether she has the full-blown diagnosis at this point, or whether she would benefit from any long-term treatment.    #Hemolytic anemia  #Diffuse lymphadenopathy  #Questionable history of SLE  - Transiently took a course of steroids in the past for the pericarditis she had  - Prior to admission, was off steroids or any meds related to rheumatology   Plan  - Agree with LN biopsy to r/o any malignancy  - c/w steroids  - Start HCQ?    **INCOMPLETE NOTE** Case of 24-year-old woman with history of pericarditis thought to be related to a  questionable history of SLE back then, currently admitted with hemolytic anemia, rheumatology consulted given possible history of SLE.  She was thought to possibly have SLE in February 2023, when she was hospitalized with pericarditis and was found to have MALINI 1:2560 speckled, Posada >8, RNP >8, as well as +SUREKHA. She saw Dr. Kirby of rheumatology as an outpatient but declined any treatment for SLE as she was not convinced about the diagnosis, she thought it was based on bloodwork as opposed to her symptoms.    Pt has been feeling well since that time, she denies any episodes of chest pain or SOB, rashes, joint pain or swelling, oral ulcers. Denies any h/o miscarriages.    While pt had multiple tests for SLE which came back high positive, clinically it is not entirely clear whether she has the full-blown diagnosis at this point, or whether she would benefit from any long-term treatment.    #Hemolytic anemia  #Diffuse lymphadenopathy with splenomegaly  #Questionable history of SLE  - Transiently took a course of steroids in the past for the pericarditis she had  - Prior to admission, was off steroids or any meds related to rheumatology  - Concern for flare?  Plan  - Agree with LN biopsy to r/o any malignancy  - c/w steroids  - Start HCQ?    **INCOMPLETE NOTE** Case of 24-year-old woman with history of pericarditis thought to be related to a  questionable history of SLE back then, currently admitted with hemolytic anemia, rheumatology consulted given possible history of SLE.  She was thought to possibly have SLE in February 2023, when she was hospitalized with pericarditis and was found to have MALINI 1:2560 speckled, Posada >8, RNP >8, as well as +SUREKHA. She saw Dr. Kirby of rheumatology as an outpatient but declined any treatment for SLE as she was not convinced about the diagnosis, she thought it was based on bloodwork as opposed to her symptoms.    Pt has been feeling well since that time, she denies any episodes of chest pain or SOB, rashes, joint pain or swelling, oral ulcers. Denies any h/o miscarriages.    While pt had multiple tests for SLE which came back high positive, clinically it is not entirely clear whether she has the full-blown diagnosis at this point, or whether she would benefit from any long-term treatment.    #Hemolytic anemia  #Diffuse lymphadenopathy with splenomegaly  #Questionable history of SLE  - Transiently took a course of steroids in the past for the pericarditis she had  - Prior to admission, was off steroids or any meds related to rheumatology  - No concern for flare at this point, unclear if the diffuse lymphadenopathy with splenomegaly are related to her autoimmune condition or a separate issue.  Plan  - Agree with LN biopsy to r/o any malignancy  - c/w steroids as per heme-onc  - Needs to follow up with rheumatology as OP post biopsy for further workup and monitoring

## 2024-10-25 NOTE — DISCHARGE NOTE NURSING/CASE MANAGEMENT/SOCIAL WORK - NSDCFUADDAPPT_GEN_ALL_CORE_FT
Please call Dr. Corrales's office at [phone] to confirm the time of your biopsy  in case of any new or worsening symptoms go to Ed.

## 2024-10-25 NOTE — DISCHARGE NOTE PROVIDER - PROVIDER TOKENS
PROVIDER:[TOKEN:[[id number]],SCHEDULEDAPPT:[10/28/2024]],PROVIDER:[TOKEN:[[id number]],FOLLOWUP:[1 week]]

## 2024-10-25 NOTE — DISCHARGE NOTE NURSING/CASE MANAGEMENT/SOCIAL WORK - PATIENT PORTAL LINK FT
You can access the FollowMyHealth Patient Portal offered by [facility] by registering at the following website: [email or web address]. By joining [facility]’s FollowMyHealth portal, you will also be able to view your health information using other applications (apps) compatible with our system.

## 2024-10-25 NOTE — DISCHARGE NOTE PROVIDER - NSDCFUADDINST_GEN_ALL_CORE_FT
Please call Dr. Corrales's office at [phone] to confirm the time of your biopsy  in case of any new or worsening symptoms go to Ed.   for any appointments email [email or web address]  [phone]

## 2024-10-25 NOTE — DISCHARGE NOTE PROVIDER - HOSPITAL COURSE
Ms. Dumont is a 23 yo F with pericarditis associated w/ pregnancy presenting for progressively worsening headahce w/ photophobia x1w. Patient states over the past year she has had these episodes of headache with similar symptoms of insidious onset and photophobia. When she sought treatment, she was repeatedly found to be significantly anemic and required blood transfusions. She states that no one in her family has a history of anemia to her knowledge,though her two older sisters have a history of idiopathic intracranial hypertension, her mother has a history of diabetes mellitus, and her father has a history of seizure disorder. Patient states that in the past when she has received blood transfusions she soon experienced resolution of her headaches. She only takes a multivitamin, iron tabs, and zinc at home. Her hemoglobin on admission was found to be 5.8, a unit of PRBC was ordered for her and she is admitted for further workup.    Vitals  Temp: 98.9 -> 97.9  HR: 91 -> 84  BP: 114/74 -> 122/76  O2: 100% on RA -> 100% on RA  RR: 18 -> 18    Labs  WBC: 6.46  HgB: 5.8 9 (baseline 9-10)  MCV: 125.2  MCHC: 44.2  RDW: 30.5  Smear: Anisocytosis, Macrocytosis, Polychromasia. Giant Platelets  Reticulocytes: 15.7%, Absolute 143.2  Direct Ryan: Positive  Na: 130  Serum Pregnancy: Negative    Imaging  CT Head Non-Con: No evidence of acute intracranial pathology.    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  #h/o lupus diagnosed in the past  - multiple episodes of headache over past year with anemia noted during these episode requiring transfusion  - FHX: 2 sisters with idiopathic intracranial HTN  - HgB 5.8 (baseline 9-10), MCV: 125.2  - Reticulocytes: 15.7%, absoute 143.2  - Reticulocyte index: 1.93  - Direct Ryan positive  - s/p 1 unit pRBC, post-transfusion HgB 7.0  - c/w B12, Folate  - Tylenol 975 mg q8h PRN , Rewglan 10mg q8h PRN for pain/nausea respectively  - iron studies noted  - B12 590  - Folate 11.0  - Heme/Onc consulted- recommended obtaining Check Hep B, Hep C, HIV, EBV, CMV, MALINI and continuing prednisone 80 mg daily pauly with PPI. OP f/y with Dr. Juarez at [facility] next week  - Rheum consult   - Surgery consulted- pending LN biopsy with Dr. Corrales today as add-on but emergencies cases interferred with plan and now patient can only undergo LN biopsy on Monday.   - Change in plan to LN biopsy on Monday discussed with attending and heme/onc team and agreement reached on discharging patient today to return  on Monday for elective LN biopsy with Dr. Corrales  - Surgery booked case for Monday, 10/29/2024  - Discussed with patient at bedside    Plan for discharge discussed with attending. Patient deemed stable and cleared for discharge.              Ms. Dumont is a 23 yo F with pericarditis associated w/ pregnancy presenting for progressively worsening headahce w/ photophobia x1w. Patient states over the past year she has had these episodes of headache with similar symptoms of insidious onset and photophobia. When she sought treatment, she was repeatedly found to be significantly anemic and required blood transfusions. She states that no one in her family has a history of anemia to her knowledge,though her two older sisters have a history of idiopathic intracranial hypertension, her mother has a history of diabetes mellitus, and her father has a history of seizure disorder. Patient states that in the past when she has received blood transfusions she soon experienced resolution of her headaches. She only takes a multivitamin, iron tabs, and zinc at home. Her hemoglobin on admission was found to be 5.8, a unit of PRBC was ordered for her and she is admitted for further workup.    Vitals  Temp: 98.9 -> 97.9  HR: 91 -> 84  BP: 114/74 -> 122/76  O2: 100% on RA -> 100% on RA  RR: 18 -> 18        Imaging  CT Head Non-Con: No evidence of acute intracranial pathology.    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/2 Warm AIHA  #h/o lupus diagnosed in the past  - multiple episodes of headache over past year with anemia noted during these episode requiring transfusion  - FHX: 2 sisters with idiopathic intracranial HTN  - HgB 5.8 (baseline 9-10), MCV: 125.2  - Reticulocytes: 15.7%, absoute 143.2  - Reticulocyte index: 1.93  - Direct Ryan positive  - s/p 1 unit pRBC, post-transfusion HgB 7.0  - c/w B12, Folate  - Tylenol 975 mg q8h PRN , Rewglan 10mg q8h PRN for pain/nausea respectively  - B12 590  - Folate 11.0  - Heme/Onc consulted- recommended obtaining Check Hep B, Hep C, HIV, EBV, CMV, MALINI and continuing prednisone 80 mg daily pauly with PPI. OP f/y with Dr. Juarez at [facility] next week  - Rheum consult , OP follow up   - Surgery consulted- pending LN biopsy with Dr. Corrales today as add-on but emergencies cases interferred with plan and now patient can only undergo LN biopsy on Monday.   - Change in plan to LN biopsy on Monday discussed with attending and heme/onc team and agreement reached on discharging patient today to return  on Monday for elective LN biopsy with Dr. Corrales  - Surgery booked case for Monday, 10/29/2024  - Discussed with patient at bedside and was given information     Plan for discharge discussed with attending. Patient deemed stable and cleared for discharge.              Ms. Dumont is a 25 yo F with pericarditis associated w/ pregnancy presenting for progressively worsening headahce w/ photophobia x1w. Patient states over the past year she has had these episodes of headache with similar symptoms of insidious onset and photophobia. When she sought treatment, she was repeatedly found to be significantly anemic and required blood transfusions. She states that no one in her family has a history of anemia to her knowledge,though her two older sisters have a history of idiopathic intracranial hypertension, her mother has a history of diabetes mellitus, and her father has a history of seizure disorder. Patient states that in the past when she has received blood transfusions she soon experienced resolution of her headaches. She only takes a multivitamin, iron tabs, and zinc at home. Her hemoglobin on admission was found to be 5.8, a unit of PRBC was ordered for her and she is admitted for further workup.    Vitals  Temp: 98.9 -> 97.9  HR: 91 -> 84  BP: 114/74 -> 122/76  O2: 100% on RA -> 100% on RA  RR: 18 -> 18        Imaging  CT Head Non-Con: No evidence of acute intracranial pathology.    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/2 Warm AIHA  #h/o lupus diagnosed in the past  - multiple episodes of headache over past year with anemia noted during these episode requiring transfusion  - FHX: 2 sisters with idiopathic intracranial HTN  - HgB 5.8 (baseline 9-10), MCV: 125.2  - Reticulocytes: 15.7%, absoute 143.2  - Reticulocyte index: 1.93  - Direct Ryan positive  - s/p 1 unit pRBC, post-transfusion HgB 7.0  - c/w B12, Folate  - Tylenol 975 mg q8h PRN , Rewglan 10mg q8h PRN for pain/nausea respectively  - B12 590  - Folate 11.0  - Heme/Onc consulted- recommended obtaining Check Hep B, Hep C, HIV, EBV, CMV, MALINI and continuing prednisone 80 mg daily pauly with PPI. OP f/y with Dr. Juarez at [facility] next week  - Rheum consult , OP follow up   - Surgery consulted- pending LN biopsy with Dr. Corrales today as add-on but emergencies cases interferred with plan and now patient can only undergo LN biopsy on Monday.   - Change in plan to LN biopsy on Monday discussed with attending and heme/onc team and agreement reached on discharging patient today to return  on Monday for elective LN biopsy with Dr. Corrales  - Surgery booked case for Monday, 10/29/2024  - Discussed with patient at bedside and was given information     #hyponatremia, mild, improved   OP BMP    Plan for discharge discussed with attending. Patient deemed stable and cleared for discharge.

## 2024-10-25 NOTE — DISCHARGE NOTE PROVIDER - NSDCCPCAREPLAN_GEN_ALL_CORE_FT
PRINCIPAL DISCHARGE DIAGNOSIS  Diagnosis: Anemia  Assessment and Plan of Treatment: You presented to the ED due to progressively worsening headaches accompanied by photophobia over the past week. You were noted to have history of recurrent headaches and significant anemia, requiring blood transfusions in the past. During this admission, your hemoglobin was found to be critically low at 5.8, prompting a unit of packed red blood cells (PRBC) transfusion. Your family history is notable for two sisters with idiopathic intracranial hypertension, a mother with diabetes mellitus, and a father with a seizure disorder. You reported that your headaches have improved following previous transfusions.  On evaluation, laboratory findings revealed severe macrocytic anemia likely due to warm autoimmune hemolytic anemia (AIHA), as indicated by a positive direct Ryan test. Additional tests confirmed your vitamin B12 and folate levels were within normal limits. A CT scan of the head showed no acute intracranial pathology. After receiving 1 unit of PRBCs, your hemoglobin improved to 7.0.  Consults were made with hematology/oncology and rheumatology, leading to plans for further testing, including checking for hepatitis B, hepatitis C, HIV, EBV, CMV, and MALINI. You were prescribed prednisone and a proton pump inhibitor (PPI) for ongoing management. Additionally, a lymph node biopsy was scheduled for Monday, October 29, 2024, with Dr. Corrales, though it was rescheduled due to emergency cases.  You have been advised on the discharge plan, and it was determined you are stable to go home. You are to return for your scheduled biopsy on Monday, with follow-up care arranged with Dr. Juarez at [facility] next week. It was important that you understood the plan and felt comfortable with the next steps in your care.      SECONDARY DISCHARGE DIAGNOSES  Diagnosis: Headache  Assessment and Plan of Treatment:     Diagnosis: Weak  Assessment and Plan of Treatment:      PRINCIPAL DISCHARGE DIAGNOSIS  Diagnosis: Anemia  Assessment and Plan of Treatment: You presented to the ED due to progressively worsening headaches accompanied by photophobia over the past week. You were noted to have history of recurrent headaches and significant anemia, requiring blood transfusions in the past. During this admission, your hemoglobin was found to be critically low at 5.8, prompting a unit of packed red blood cells (PRBC) transfusion. Your family history is notable for two sisters with idiopathic intracranial hypertension, a mother with diabetes mellitus, and a father with a seizure disorder. You reported that your headaches have improved following previous transfusions.  On evaluation, laboratory findings revealed severe macrocytic anemia likely due to warm autoimmune hemolytic anemia (AIHA), as indicated by a positive direct Ryan test. Additional tests confirmed your vitamin B12 and folate levels were within normal limits. A CT scan of the head showed no acute intracranial pathology. After receiving 1 unit of PRBCs, your hemoglobin improved to 7.0.  Consults were made with hematology/oncology and rheumatology, leading to plans for further testing, including checking for hepatitis B, hepatitis C, HIV, EBV, CMV, and MALINI. You were prescribed prednisone and a proton pump inhibitor (PPI) for ongoing management. Additionally, a lymph node biopsy was scheduled for Monday, October 29, 2024, with Dr. Corrales, though it was rescheduled due to emergency cases.  You have been advised on the discharge plan, and it was determined you are stable to go home. You are to return for your biopsy on Monday, with follow-up care arranged with Dr. Juarez at [facility] next week. Please call Dr. Corrales's office at [phone] to confirm the time of your biopsy and do night after midnight on Sunday.      SECONDARY DISCHARGE DIAGNOSES  Diagnosis: Headache  Assessment and Plan of Treatment:     Diagnosis: Weak  Assessment and Plan of Treatment:

## 2024-10-25 NOTE — DISCHARGE NOTE PROVIDER - NSDCFUSCHEDAPPT_GEN_ALL_CORE_FT
Zach Gomez  [facility] PreAdmits  Scheduled Appointment: 10/30/2024    Zach Gomez  [facility]  [address]  Scheduled Appointment: 10/30/2024     Dia Corrales  [facility] PreAdmits  Scheduled Appointment: 10/28/2024    Zach Gomez  [facility] PreAdmits  Scheduled Appointment: 10/30/2024    Zach Gomez  [facility]  [address]  Scheduled Appointment: 10/30/2024

## 2024-10-25 NOTE — DISCHARGE NOTE NURSING/CASE MANAGEMENT/SOCIAL WORK - FINANCIAL ASSISTANCE
[facility] provides services at a reduced cost to those who are determined to be eligible through [facility]’s financial assistance program. Information regarding [facility]’s financial assistance program can be found by going to [email or web address] or by calling [phone].

## 2024-10-25 NOTE — PROGRESS NOTE ADULT - SUBJECTIVE AND OBJECTIVE BOX
24H events:    Patient is a 24y old Female who presents with a chief complaint of   Primary diagnosis of Anemia        Today is hospital day 2d. This morning patient was seen and examined at bedside, resting comfortably in bed.    Patient denies any complaints.     Code Status:    PAST MEDICAL & SURGICAL HISTORY  H/O pericarditis    Lupus erythematosus    No significant past surgical history      SOCIAL HISTORY:  Social History:      ALLERGIES:  No Known Allergies    MEDICATIONS:  STANDING MEDICATIONS  cyanocobalamin 1000 MICROGram(s) Oral daily  enoxaparin Injectable 40 milliGRAM(s) SubCutaneous every 24 hours  folic acid 1 milliGRAM(s) Oral daily  lactated ringers. 1000 milliLiter(s) IV Continuous <Continuous>  pantoprazole    Tablet 40 milliGRAM(s) Oral before breakfast  predniSONE   Tablet 80 milliGRAM(s) Oral daily    PRN MEDICATIONS  acetaminophen     Tablet .. 975 milliGRAM(s) Oral every 8 hours PRN  metoclopramide Injectable 10 milliGRAM(s) IV Push every 8 hours PRN    VITALS:   T(F): 98.2  HR: 82  BP: 123/69  RR: 18  SpO2: 100%    PHYSICAL EXAM:  GENERAL: NAD, lying in bed comfortably  HEAD: atraumatic, normocephalic  NECK: supple  HEART: regular rhythm  LUNGS: unlabored respirations, b/l air entry   ABDOMEN: BS (+), soft, nontender, nondistended  NERVOUS SYSTEM: A&O x 3  EXTREMITIES: No pedal edema b/l.     LABS:                        7.9    5.41  )-----------( 309      ( 25 Oct 2024 07:07 )             21.8     10-25    134[L]  |  102  |  11  ----------------------------<  101[H]  4.5   |  25  |  0.7    Ca    8.8      25 Oct 2024 07:07  Mg     2.0     10-25    TPro  8.6[H]  /  Alb  3.9  /  TBili  2.9[H]  /  DBili  x   /  AST  21  /  ALT  7   /  AlkPhos  72  10-25    PT/INR - ( 23 Oct 2024 16:22 )   PT: 13.30 sec;   INR: 1.16 ratio         PTT - ( 23 Oct 2024 16:22 )  PTT:26.9 sec  Urinalysis Basic - ( 25 Oct 2024 07:07 )    Color: x / Appearance: x / SG: x / pH: x  Gluc: 101 mg/dL / Ketone: x  / Bili: x / Urobili: x   Blood: x / Protein: x / Nitrite: x   Leuk Esterase: x / RBC: x / WBC x   Sq Epi: x / Non Sq Epi: x / Bacteria: x                RADIOLOGY:      < from: CT Chest w/ IV Cont (10.24.24 @ 14:00) >  IMPRESSION:    2.5 cm cyst in the right adnexa    Lymphadenopathy involving the chest, abdomen and pelvis as well as   splenomegaly.    1 x 0.7 cm oval mass in the lateral aspect of the left breast. This may   reflect an intramammary lymph node. Targeted outpatient diagnostic   ultrasound may be of benefit.    < end of copied text >    < from: CT Head No Cont (10.23.24 @ 13:24) >  IMPRESSION:    No evidence of acute intracranial pathology.    < end of copied text >      
JASMINE BRAVO 24y Female  MRN#: [id number]   Hospital Day: 2d    SUBJECTIVE  Patient is a 24y old Female who presents with a chief complaint of Currently admitted to medicine with the primary diagnosis of Anemia      INTERVAL HPI AND OVERNIGHT EVENTS:  Patient was examined and seen at bedside.   Her headache and photophobia is better    ROS:  No pain  Headache and photophobia is better    OBJECTIVE  PAST MEDICAL & SURGICAL HISTORY  H/O pericarditis    Lupus erythematosus    No significant past surgical history      ALLERGIES:  No Known Allergies    MEDICATIONS:  STANDING MEDICATIONS  cyanocobalamin 1000 MICROGram(s) Oral daily  enoxaparin Injectable 40 milliGRAM(s) SubCutaneous every 24 hours  folic acid 1 milliGRAM(s) Oral daily  lactated ringers. 1000 milliLiter(s) IV Continuous <Continuous>  pantoprazole    Tablet 40 milliGRAM(s) Oral before breakfast  predniSONE   Tablet 80 milliGRAM(s) Oral daily    PRN MEDICATIONS  acetaminophen     Tablet .. 975 milliGRAM(s) Oral every 8 hours PRN  metoclopramide Injectable 10 milliGRAM(s) IV Push every 8 hours PRN      VITAL SIGNS: Last 24 Hours  T(C): 36.8 (25 Oct 2024 05:17), Max: 36.9 (24 Oct 2024 15:37)  T(F): 98.2 (25 Oct 2024 05:17), Max: 98.5 (24 Oct 2024 15:37)  HR: 82 (25 Oct 2024 05:17) (67 - 85)  BP: 123/69 (25 Oct 2024 05:17) (108/64 - 127/72)  BP(mean): 90 (24 Oct 2024 15:37) (90 - 90)  RR: 18 (25 Oct 2024 05:17) (18 - 18)  SpO2: 100% (24 Oct 2024 20:05) (100% - 100%)    LABS:                        7.9    5.41  )-----------( 309      ( 25 Oct 2024 07:07 )             21.8     10-25    134[L]  |  102  |  11  ----------------------------<  101[H]  4.5   |  25  |  0.7    Ca    8.8      25 Oct 2024 07:07  Mg     2.0     10-25    TPro  8.6[H]  /  Alb  3.9  /  TBili  2.9[H]  /  DBili  x   /  AST  21  /  ALT  7   /  AlkPhos  72  10-25    PT/INR - ( 23 Oct 2024 16:22 )   PT: 13.30 sec;   INR: 1.16 ratio         PTT - ( 23 Oct 2024 16:22 )  PTT:26.9 sec  Urinalysis Basic - ( 25 Oct 2024 07:07 )    Color: x / Appearance: x / SG: x / pH: x  Gluc: 101 mg/dL / Ketone: x  / Bili: x / Urobili: x   Blood: x / Protein: x / Nitrite: x   Leuk Esterase: x / RBC: x / WBC x   Sq Epi: x / Non Sq Epi: x / Bacteria: x    PHYSICAL EXAM:  CONSTITUTIONAL: No acute distress, well-developed, well-groomed, AAOx3  HEAD: Atraumatic, normocephalic  EYES: EOM intact, PERRLA, conjunctiva and sclera clear  ENT: Supple, no masses, no thyromegaly, no bruits, no JVD; moist mucous membranes  PULMONARY: Clear to auscultation bilaterally; no wheezes, rales, or rhonchi  CARDIOVASCULAR: Regular rate and rhythm; no murmurs, rubs, or gallops  GASTROINTESTINAL: Soft, non-tender, non-distended; bowel sounds present  MUSCULOSKELETAL: 2+ peripheral pulses; no clubbing, no cyanosis, no edema  NEUROLOGY: non-focal  SKIN: No rashes or lesions; warm and dry  
24H events:    Patient is a 24y old Female who presents with a chief complaint of   Primary diagnosis of Anemia          Today is hospital day 1d.   This morning patient was seen and examined at bedside, resting comfortably in bed. Headache, malaise, lethargy has resolved after receiving 1 unit of pRBC. Describes normal menstrual cycles without abnormal bleeding. Denies GI bleeding/blood in stools.    No acute or major events overnight.    Code Status: Full    PAST MEDICAL & SURGICAL HISTORY  H/O pericarditis    Lupus erythematosus    No significant past surgical history      SOCIAL HISTORY:  Social History:      ALLERGIES:  No Known Allergies    MEDICATIONS:  STANDING MEDICATIONS  cyanocobalamin 1000 MICROGram(s) Oral daily  enoxaparin Injectable 40 milliGRAM(s) SubCutaneous every 24 hours  folic acid 1 milliGRAM(s) Oral daily  predniSONE   Tablet 80 milliGRAM(s) Oral daily    PRN MEDICATIONS  acetaminophen     Tablet .. 975 milliGRAM(s) Oral every 8 hours PRN  metoclopramide Injectable 10 milliGRAM(s) IV Push every 8 hours PRN    VITALS:   T(F): 98.3  HR: 82  BP: 95/60  RR: 18  SpO2: 100%    PHYSICAL EXAM:  GENERAL: NAD, lying in bed comfortably  HEAD:  Atraumatic, Normocephalic  EYES: EOMI, PERRLA, conjunctiva and sclera clear  ENT: Moist mucous membranes  NECK: Supple, No JVD  CHEST/LUNG: Clear to auscultation bilaterally; No rales, rhonchi, wheezing, or rubs. Unlabored respirations  HEART: Regular rate and rhythm; No murmurs, rubs, or gallops  ABDOMEN: BSx4; Soft, nontender, nondistended  EXTREMITIES:  2+ Peripheral Pulses, brisk capillary refill. No clubbing, cyanosis, or edema  NERVOUS SYSTEM:  A&Ox3, no focal deficits   SKIN: No rashes or lesions      LABS:                        7.0    4.11  )-----------( 297      ( 24 Oct 2024 05:47 )             19.7     10-24    137  |  101  |  12  ----------------------------<  86  4.4   |  23  |  0.6[L]    Ca    8.5      24 Oct 2024 05:47  Mg     2.0     10-24    TPro  8.7[H]  /  Alb  3.9  /  TBili  3.4[H]  /  DBili  x   /  AST  26  /  ALT  7   /  AlkPhos  72  10-24    PT/INR - ( 23 Oct 2024 16:22 )   PT: 13.30 sec;   INR: 1.16 ratio         PTT - ( 23 Oct 2024 16:22 )  PTT:26.9 sec  Urinalysis Basic - ( 24 Oct 2024 05:47 )    Color: x / Appearance: x / SG: x / pH: x  Gluc: 86 mg/dL / Ketone: x  / Bili: x / Urobili: x   Blood: x / Protein: x / Nitrite: x   Leuk Esterase: x / RBC: x / WBC x   Sq Epi: x / Non Sq Epi: x / Bacteria: x        Sedimentation Rate, Erythrocyte: 140 mm/Hr *H* (10-24-24 @ 00:27)          RADIOLOGY:  CXR      CT  CT Abdomen and Pelvis w/ IV Cont:   ACC: [id number] EXAM:  CT CHEST IC   ORDERED BY: BREANA MCMULLEN     ACC: [id number] EXAM:  CT ABDOMEN AND PELVIS IC   ORDERED BY: BREANA MCMULLEN     PROCEDURE DATE:  10/24/2024          INTERPRETATION:  CLINICAL INFORMATION: Immune hemolytic anemia    COMPARISON: None.    CONTRAST/COMPLICATIONS:  IV Contrast: IV contrast documented in unlinked concurrent exam   (accession [id number]), Omnipaque 350 (accession [id number])  95 cc   administered   5 cc discarded  Oral Contrast: NONE  Complications: None reported at time of study completion    PROCEDURE:  CT of the Chest, Abdomen and Pelvis was performed.  Sagittal and coronal reformats were performed.    FINDINGS:  CHEST:  LUNGS AND LARGE AIRWAYS: Patent central airways. 4 mm nodule in the right   lower lobe (3/50)  PLEURA: No pleural effusion.  VESSELS: Within normal limits.  HEART: Heart size is normal. No pericardial effusion.  MEDIASTINUM AND NAV: Nonspecific mediastinal and bilateral axillary   lymphadenopathy. Prominent subcarinal lymph node measuring 2.5 x 1.4 cm  CHEST WALL AND LOWER NECK: 1 x 0.7 cm oval mass in the lateral aspect of   the left breast. Supracervical lymphadenopathy is noted as well.    ABDOMEN AND PELVIS:  LIVER: Within normal limits.  BILE DUCTS: Normal caliber.  GALLBLADDER: Within normal limits.  SPLEEN: Splenomegaly measuring 12 cm AP  PANCREAS: Within normal limits.  ADRENALS: Within normal limits.  KIDNEYS/URETERS: Subcentimeter hypodensity in the right interpolar   region. No hydroureter or hydronephrosis.    BLADDER: Within normal limits.  REPRODUCTIVE ORGANS: 2.5 cm cyst in the right adnexa.    BOWEL: No bowel obstruction. Appendix is normal.  PERITONEUM/RETROPERITONEUM: Trace amount of fluid in the cul-de-sac.  VESSELS: Within normal limits.  LYMPH NODES:Prominent peripancreatic and para-aortic lymph nodes.   Enlarged bilateral inguinal as well as external iliac and obturator lymph   nodes measuring up to 2.4 x 1.2 cm (3/164).  ABDOMINAL WALL: Within normal limits.  BONES: Within normal limits.    IMPRESSION:    2.5 cm cyst in the right adnexa    Lymphadenopathy involving the chest, abdomen and pelvis as well as   splenomegaly.    1 x 0.7 cm oval mass in the lateral aspect of the left breast. This may   reflect an intramammary lymph node. Targeted outpatient diagnostic   ultrasound may be of benefit.      --- End of Report ---            BREONNA BENITEZ MD; Attending Radiologist  This document has been electronically signed. Oct 24 2024  2:27PM (10-24-24 @ 14:00)  CT Chest w/ IV Cont:   ACC: [id number] EXAM:  CT CHEST IC   ORDERED BY: BREANA MCMULLEN     ACC: [id number] EXAM:  CT ABDOMEN AND PELVIS IC   ORDERED BY: BREANA MCMULLEN     PROCEDURE DATE:  10/24/2024          INTERPRETATION:  CLINICAL INFORMATION: Immune hemolytic anemia    COMPARISON: None.    CONTRAST/COMPLICATIONS:  IV Contrast: IV contrast documented in unlinked concurrent exam   (accession [id number]), Omnipaque 350 (accession [id number])  95 cc   administered   5 cc discarded  Oral Contrast: NONE  Complications: None reported at time of study completion    PROCEDURE:  CT of the Chest, Abdomen and Pelvis was performed.  Sagittal and coronal reformats were performed.    FINDINGS:  CHEST:  LUNGS AND LARGE AIRWAYS: Patent central airways. 4 mm nodule in the right   lower lobe (3/50)  PLEURA: No pleural effusion.  VESSELS: Within normal limits.  HEART: Heart size is normal. No pericardial effusion.  MEDIASTINUM AND NAV: Nonspecific mediastinal and bilateral axillary   lymphadenopathy. Prominent subcarinal lymph node measuring 2.5 x 1.4 cm  CHEST WALL AND LOWER NECK: 1 x 0.7 cm oval mass in the lateral aspect of   the left breast. Supracervical lymphadenopathy is noted as well.    ABDOMEN AND PELVIS:  LIVER: Within normal limits.  BILE DUCTS: Normal caliber.  GALLBLADDER: Within normal limits.  SPLEEN: Splenomegaly measuring 12 cm AP  PANCREAS: Within normal limits.  ADRENALS: Within normal limits.  KIDNEYS/URETERS: Subcentimeter hypodensity in the right interpolar   region. No hydroureter or hydronephrosis.    BLADDER: Within normal limits.  REPRODUCTIVE ORGANS: 2.5 cm cyst in the right adnexa.    BOWEL: No bowel obstruction. Appendix is normal.  PERITONEUM/RETROPERITONEUM: Trace amount of fluid in the cul-de-sac.  VESSELS: Within normal limits.  LYMPH NODES:Prominent peripancreatic and para-aortic lymph nodes.   Enlarged bilateral inguinal as well as external iliac and obturator lymph   nodes measuring up to 2.4 x 1.2 cm (3/164).  ABDOMINAL WALL: Within normal limits.  BONES: Within normal limits.    IMPRESSION:    2.5 cm cyst in the right adnexa    Lymphadenopathy involving the chest, abdomen and pelvis as well as   splenomegaly.    1 x 0.7 cm oval mass in the lateral aspect of the left breast. This may   reflect an intramammary lymph node. Targeted outpatient diagnostic   ultrasound may be of benefit.      --- End of Report ---            BREONNA BENITEZ MD; Attending Radiologist  This document has been electronically signed. Oct 24 2024  2:27PM (10-24-24 @ 14:00)
SURGERY PROGRESS NOTE    Patient: JASMINE BRAVO , 24y ([birth year])Female   MRN: [id number]  Location: [address]  Visit: 10-23-24 Inpatient  Date: 10-25-24 @ 05:46    Hospital Day #: 3    Procedure/Dx/Injuries: Lymphadenopathy in chest, abdomen and pelvis.    Events of past 24 hours: Today going for axillary lymph node biopsy with Dr. Corrales as an add on. NPO since midnight, preoped. consented. Over night hemodynamically stable, saturating well on room air.     PHYSICAL EXAM:  GENERAL: NAD  HEENT: EOMI  PULM: Non-labored respirations, bilateral chest rise, saturating well on RA  CV: RRR  ABDOMEN: Abdomen soft, non-distended, non-tender, no rebound tenderness or guardingt  MSK: sensorimotor and strength intact    PAST MEDICAL & SURGICAL HISTORY:  H/O pericarditis      Lupus erythematosus      No significant past surgical history          Vitals:   T(F): 98.2 (10-25-24 @ 05:17), Max: 98.5 (10-24-24 @ 15:37)  HR: 82 (10-25-24 @ 05:17)  BP: 123/69 (10-25-24 @ 05:17)  RR: 18 (10-25-24 @ 05:17)  SpO2: 100% (10-24-24 @ 20:05)      Diet, NPO after Midnight:      NPO Start Date: 24-Oct-2024,   NPO Start Time: 23:59  Diet, DASH/TLC:   Sodium & Cholesterol Restricted      Fluids:     I & O's:      MEDICATIONS  (STANDING):  cyanocobalamin 1000 MICROGram(s) Oral daily  enoxaparin Injectable 40 milliGRAM(s) SubCutaneous every 24 hours  folic acid 1 milliGRAM(s) Oral daily  pantoprazole    Tablet 40 milliGRAM(s) Oral before breakfast  predniSONE   Tablet 80 milliGRAM(s) Oral daily    MEDICATIONS  (PRN):  acetaminophen     Tablet .. 975 milliGRAM(s) Oral every 8 hours PRN Temp greater or equal to 38C (100.4F), Mild Pain (1 - 3)  metoclopramide Injectable 10 milliGRAM(s) IV Push every 8 hours PRN Nausea      DVT PROPHYLAXIS: enoxaparin Injectable 40 milliGRAM(s) SubCutaneous every 24 hours    GI PROPHYLAXIS: pantoprazole    Tablet 40 milliGRAM(s) Oral before breakfast    ANTICOAGULATION:   ANTIBIOTICS:            LAB/STUDIES:  Labs:  CAPILLARY BLOOD GLUCOSE                              7.0    4.11  )-----------( 297      ( 24 Oct 2024 05:47 )             19.7       Auto Neutrophil %: 71.2 % (10-24-24 @ 05:47)  Auto Immature Granulocyte %: 1.7 % (10-24-24 @ 05:47)    10-24    137  |  101  |  12  ----------------------------<  86  4.4   |  23  |  0.6[L]      Calcium: 8.5 mg/dL (10-24-24 @ 05:47)      LFTs:             8.7  | 3.4  | 26       ------------------[72      ( 24 Oct 2024 05:47 )  3.9  | x    | 7           Lipase:x      Amylase:x             Coags:     13.30  ----< 1.16    ( 23 Oct 2024 16:22 )     26.9                Urinalysis Basic - ( 24 Oct 2024 05:47 )    Color: x / Appearance: x / SG: x / pH: x  Gluc: 86 mg/dL / Ketone: x  / Bili: x / Urobili: x   Blood: x / Protein: x / Nitrite: x   Leuk Esterase: x / RBC: x / WBC x   Sq Epi: x / Non Sq Epi: x / Bacteria: x

## 2024-10-25 NOTE — PROGRESS NOTE ADULT - ASSESSMENT
24F PMHx pregnancy-associated pericarditis, anemia, and questionable history of SLE who presented to the ED with one year unprompted headache with photophobia that acutely worsened over the past week. Per pt, headache has responded to RBC transfusions in the past, denies fevers, chills, CP, SOB, N/V/C/D, night sweats, weight loss, early satiety, recent trauma, or recent infection. Upon arrival to ED, was afebrile, HDS, notable for hgb 5.8 that jono to 7 after 1U pRBC. Surgical oncology consulted for lymph node biopsy.     PLAN:  - OR today for axillary lymph node biopsy with Dr. Corrales  - ANILO   - Preoped - has direct jodi and antiglobulins positive.  - Rest of care as per primary    * to be discussed with attending in AM*

## 2024-10-25 NOTE — CHART NOTE - NSCHARTNOTEFT_GEN_A_CORE
To whom it may concern,     Ms. Kelsey Dumont was hospitalized at [facility] from 10/23/2024 to 10/25/2024.

## 2024-10-26 LAB
B19V DNA FLD QL NAA+PROBE: SIGNIFICANT CHANGE UP IU/ML
CMV DNA CSF QL NAA+PROBE: SIGNIFICANT CHANGE UP IU/ML
CMV DNA SPEC NAA+PROBE-LOG#: SIGNIFICANT CHANGE UP LOG10IU/ML
DSDNA AB SER-ACNC: 12 IU/ML — HIGH
EBV DNA SERPL NAA+PROBE-ACNC: SIGNIFICANT CHANGE UP IU/ML
EBVPCR LOG: SIGNIFICANT CHANGE UP LOG10IU/ML
HAV IGM SER-ACNC: SIGNIFICANT CHANGE UP
HBV CORE IGM SER-ACNC: SIGNIFICANT CHANGE UP
HBV SURFACE AG SER-ACNC: SIGNIFICANT CHANGE UP
HCV AB S/CO SERPL IA: 0.16 S/CO — SIGNIFICANT CHANGE UP (ref 0–0.99)
HCV AB SERPL-IMP: SIGNIFICANT CHANGE UP

## 2024-10-28 ENCOUNTER — TRANSCRIPTION ENCOUNTER (OUTPATIENT)
Age: 24
End: 2024-10-28

## 2024-10-28 ENCOUNTER — OUTPATIENT (OUTPATIENT)
Dept: OUTPATIENT SERVICES | Facility: HOSPITAL | Age: 24
LOS: 1 days | Discharge: HOME | End: 2024-10-28
Payer: COMMERCIAL

## 2024-10-28 ENCOUNTER — APPOINTMENT (OUTPATIENT)
Dept: SURGERY | Facility: AMBULATORY SURGERY CENTER | Age: 24
End: 2024-10-28

## 2024-10-28 ENCOUNTER — RESULT REVIEW (OUTPATIENT)
Age: 24
End: 2024-10-28

## 2024-10-28 VITALS
RESPIRATION RATE: 18 BRPM | OXYGEN SATURATION: 100 % | SYSTOLIC BLOOD PRESSURE: 129 MMHG | HEART RATE: 60 BPM | DIASTOLIC BLOOD PRESSURE: 87 MMHG | TEMPERATURE: 98 F

## 2024-10-28 VITALS
DIASTOLIC BLOOD PRESSURE: 68 MMHG | HEIGHT: 63 IN | TEMPERATURE: 98 F | RESPIRATION RATE: 17 BRPM | HEART RATE: 74 BPM | WEIGHT: 191.14 LBS | OXYGEN SATURATION: 100 % | SYSTOLIC BLOOD PRESSURE: 129 MMHG

## 2024-10-28 DIAGNOSIS — I31.9 DISEASE OF PERICARDIUM, UNSPECIFIED: ICD-10-CM

## 2024-10-28 DIAGNOSIS — Z98.891 HISTORY OF UTERINE SCAR FROM PREVIOUS SURGERY: Chronic | ICD-10-CM

## 2024-10-28 DIAGNOSIS — C85.90 NON-HODGKIN LYMPHOMA, UNSPECIFIED, UNSPECIFIED SITE: ICD-10-CM

## 2024-10-28 DIAGNOSIS — R59.9 ENLARGED LYMPH NODES, UNSPECIFIED: ICD-10-CM

## 2024-10-28 DIAGNOSIS — R51.9 HEADACHE, UNSPECIFIED: ICD-10-CM

## 2024-10-28 LAB
ANA PAT FLD IF-IMP: ABNORMAL
ANA TITR SER: ABNORMAL

## 2024-10-28 PROCEDURE — 88237 TISSUE CULTURE BONE MARROW: CPT

## 2024-10-28 PROCEDURE — 88360 TUMOR IMMUNOHISTOCHEM/MANUAL: CPT

## 2024-10-28 PROCEDURE — 88264 CHROMOSOME ANALYSIS 20-25: CPT

## 2024-10-28 PROCEDURE — 87205 SMEAR GRAM STAIN: CPT

## 2024-10-28 PROCEDURE — 88360 TUMOR IMMUNOHISTOCHEM/MANUAL: CPT | Mod: 26,59

## 2024-10-28 PROCEDURE — 88184 FLOWCYTOMETRY/ TC 1 MARKER: CPT

## 2024-10-28 PROCEDURE — 88342 IMHCHEM/IMCYTCHM 1ST ANTB: CPT

## 2024-10-28 PROCEDURE — 88364 INSITU HYBRIDIZATION (FISH): CPT

## 2024-10-28 PROCEDURE — 88365 INSITU HYBRIDIZATION (FISH): CPT

## 2024-10-28 PROCEDURE — 38525 BIOPSY/REMOVAL LYMPH NODES: CPT | Mod: RT

## 2024-10-28 PROCEDURE — 88189 FLOWCYTOMETRY/READ 16 & >: CPT | Mod: 59

## 2024-10-28 PROCEDURE — 88291 CYTO/MOLECULAR REPORT: CPT

## 2024-10-28 PROCEDURE — C1889: CPT

## 2024-10-28 PROCEDURE — 88185 FLOWCYTOMETRY/TC ADD-ON: CPT

## 2024-10-28 PROCEDURE — 88280 CHROMOSOME KARYOTYPE STUDY: CPT

## 2024-10-28 PROCEDURE — 88364 INSITU HYBRIDIZATION (FISH): CPT | Mod: 26

## 2024-10-28 PROCEDURE — 88365 INSITU HYBRIDIZATION (FISH): CPT | Mod: 26

## 2024-10-28 PROCEDURE — 88342 IMHCHEM/IMCYTCHM 1ST ANTB: CPT | Mod: 26

## 2024-10-28 PROCEDURE — 88307 TISSUE EXAM BY PATHOLOGIST: CPT

## 2024-10-28 PROCEDURE — 88341 IMHCHEM/IMCYTCHM EA ADD ANTB: CPT

## 2024-10-28 PROCEDURE — 88341 IMHCHEM/IMCYTCHM EA ADD ANTB: CPT | Mod: 26

## 2024-10-28 PROCEDURE — 88307 TISSUE EXAM BY PATHOLOGIST: CPT | Mod: 26

## 2024-10-28 RX ORDER — FERROUS SULFATE 325(65) MG
0 TABLET ORAL
Refills: 0 | DISCHARGE

## 2024-10-28 RX ORDER — HYDROMORPHONE HCL/0.9% NACL/PF 6 MG/30 ML
0.5 PATIENT CONTROLLED ANALGESIA SYRINGE INTRAVENOUS
Refills: 0 | Status: DISCONTINUED | OUTPATIENT
Start: 2024-10-28 | End: 2024-10-28

## 2024-10-28 RX ORDER — ACETAMINOPHEN 500 MG
1000 TABLET ORAL ONCE
Refills: 0 | Status: DISCONTINUED | OUTPATIENT
Start: 2024-10-28 | End: 2024-10-28

## 2024-10-28 RX ORDER — ONDANSETRON HYDROCHLORIDE 2 MG/ML
4 INJECTION, SOLUTION INTRAMUSCULAR; INTRAVENOUS ONCE
Refills: 0 | Status: DISCONTINUED | OUTPATIENT
Start: 2024-10-28 | End: 2024-10-28

## 2024-10-28 RX ORDER — B-COMPLEX WITH VITAMIN C
1 VIAL (ML) INJECTION
Refills: 0 | DISCHARGE

## 2024-10-28 RX ORDER — OXYCODONE HYDROCHLORIDE 30 MG/1
5 TABLET ORAL ONCE
Refills: 0 | Status: DISCONTINUED | OUTPATIENT
Start: 2024-10-28 | End: 2024-10-28

## 2024-10-28 RX ORDER — ZINC ACETATE 50 MG/1
0 CAPSULE ORAL
Refills: 0 | DISCHARGE

## 2024-10-28 NOTE — ASU PREOP CHECKLIST - AS BP NONINV METHOD
[FreeTextEntry1] : 70F with PMH of COPD, DLD, DM II, HTN, Breast Cancer s/p resection and RT presents for follow up.\par \par #)As per patient she had bone scan CT scan abd which was done outside oncology office Dr. Paris spoke with oncology over phone plan to do biopsy for pelvic mass at [facility] Dr. Cerrato they will refer pt to gyn/onc or IR for biopsy asked Dr Cerrato office to fax results\par -Bone scan showed heterogenous activity in thoracolumbar spine likely metastatic disease.\par \par #)Left Hand pain resolved\par Xray showed basal joint,mild PIP, DIP degenerative changes\par \par #)Breast Ca\par repeat mammogram and US in August 2018.\par \par #)HTN repeat BP is 135/80\par continue enalapril and amlodipine\par \par #)DLD\par continue simvastatin\par LDL is 35, \par \par #)DM II\par continue metformin, Aspirin\par HbA1c is 6.0\par Increased microalbumin to creatinine ratio c/w enalapril\par \par #)Osteoporosis\par continue alendronate\par \par Follow up in 3 month. \par 
electronic

## 2024-10-28 NOTE — CHART NOTE - NSCHARTNOTEFT_GEN_A_CORE
PACU ANESTHESIA ADMISSION NOTE      Procedure: right axillary lymph node biopsy  Post op diagnosis:  lymphadenopathy    __x__  Patent Airway    __x__  Full return of protective reflexes    ____  Full recovery from anesthesia / back to baseline     Vitals:   see anesthesia record      Mental Status:  __x__ Awake   _____ Alert   _____ Drowsy   _____ Sedated    Nausea/Vomiting:  _x___ NO  ______Yes,   See Post - Op Orders          Pain Scale (0-10):  _____    Treatment: ____ None    ___x_ See Post - Op/PCA Orders    Post - Operative Fluids:   ____ Oral   __x__ See Post - Op Orders    Plan: Discharge:   __x__Home       _____Floor     _____Critical Care    _____  Other:_________________    Comments:  Uneventful intraoperative course. No anesthesia issues or complications noted. Patient stable upon arrival to PACU. Report given to RN. Discharge when criteria met.

## 2024-10-28 NOTE — ASU PREOP CHECKLIST - ASSESSMENT, HISTORY & PHYSICAL COMPLETED AND ON MEDICAL RECORD
Quality 47: Advance Care Plan: Advance Care Planning discussed and documented in the medical record; patient did not wish or was not able to name a surrogate decision maker or provide an advance care plan.
Quality 226: Preventive Care And Screening: Tobacco Use: Screening And Cessation Intervention: Patient screened for tobacco use, is a smoker AND received Cessation Counseling within measurement period or in the six months prior to the measurement period
Detail Level: Detailed
Quality 130: Documentation Of Current Medications In The Medical Record: Current Medications Documented
Quality 431: Preventive Care And Screening: Unhealthy Alcohol Use - Screening: Patient identified as an unhealthy alcohol user when screened for unhealthy alcohol use using a systematic screening method and received brief counseling
SR/done

## 2024-10-28 NOTE — H&P ADULT - ASSESSMENT
H&P Adult [Charted Location: [facility] 3B 010 A] [Authored: 23-Oct-2024 19:40]- for Visit: [id number], Complete, Appended Only, Signed in Full, Available to Patient      Patient Identity   · Birth Sex	Female    History of Present Illness   History of Present Illness:   Ms. Dumont is a 25 yo F with pericarditis associated w/ pregnancy presenting for progressively worsening headahce w/ photophobia x1w. Patient states over the past year she has had these episodes of headache with similar symptoms of insidious onset and photophobia. When she sought treatment, she was repeatedly found to be significantly anemic and required blood transfusions. She states that no one in her family has a history of anemia to her knowledge,though her two older sisters have a history of idiopathic intracranial hypertension, her mother has a history of diabetes mellitus, and her father has a history of seizure disorder. Patient states that in the past when she has received blood transfusions she soon experienced resolution of her headaches. She only takes a multivitamin, iron tabs, and zinc at home. Her hemoglobin on admission was found to be 5.8, a unit of PRBC was ordered for her and she is admitted for further workup.    Vitals  Temp: 98.9 -> 97.9  HR: 91 -> 84  BP: 114/74 -> 122/76  O2: 100% on RA -> 100% on RA  RR: 18 -> 18    Labs  WBC: 6.46  HgB: 5.8 9 (baseline 9-10)  MCV: 125.2  MCHC: 44.2  RDW: 30.5  Smear: Anisocytosis, Macrocytosis, Polychromasia. Giant Platelets  Reticulocytes: 15.7%, Absolute 143.2  Direct Ryan: Positive  Na: 130  Serum Pregnancy: Negative    Imaging  CT Head Non-Con: No evidence of acute intracranial pathology.    In the ED  1L NS Bolus  Tylenol 975mg PO x1  Reglan 10mg IV x1  1u Packed RBCs      Review of Systems   Other Review of Systems: All other review of systems negative, except as noted in HPI      Allergies and Intolerances:        Allergies:  	No Known Allergies:     Home Medications:   * Patient Currently Takes Medications as of 23-Oct-2024 21:52 documented in Structured Notes  · 	Multiple Vitamins oral tablet: Last Dose Taken:  , 1 tab(s) orally once a day  · 	ferrous sulfate: Last Dose Taken:  , once a day  · 	Zinc Acetate: Last Dose Taken:  , once a day    .    Patient History:   Past Medical, Past Surgical, and Family History   PAST MEDICAL HISTORY:  H/O pericarditis     Lupus erythematosus.     PAST SURGICAL HISTORY:  No significant past surgical history.    Social History   · Substance use	No    Tobacco Screening   · Core Measure Site	No    Risk Assessment:   Present on Admission   Deep Venous Thrombosis	no  Pulmonary Embolus	no    HIV Screening   · In accordance with [facility] law, we offer every patient who comes to our ED an HIV test. Would you like to be tested today?	Opt out    Hepatitis C Test Questions   · In accordance with [facility] Law, we offer every patient a Hepatitis C test. Would you like to be tested today?	Opt out    Physical Exam:   Physical Exam: General: Sitting in bed, squinting  Cardiac: Regular rate and rhythm.  Pulmonary: CTAB  Abdominal: Soft, nontender, and nondistended  Extremities: No pitting edema  Neurologic: AOx3, nonfocal  Skin: No rashes or lesions    Assessment and Plan:   · Completed VTE Risk Assessment(s)	Medical Assessment Completed on: 23-Oct-2024 21:02  · Completed VTE Risk Assessment(s)	Refer to the Assessment tab to view/cancel completed assessment.    Assessment   · Assessment	  Ms. Dumont is a 25 yo F with pericarditis associated w/ pregnancy presenting for progressively worsening headahce w/ photophobia x1w found to be have anemia w/ a hemoglobin of 5.8.    #Progressive, Diffuse Pulsating Headache w/ Photophobia, Dizziness, and Nausea x1w  #Hx of Anemia requiring Blood Transfusions  #Severe Macrocytic Anemia, possibly Warm AIHA  - Multiple episodes of HA over past year  - Per patient always found to be anemic during these episodes  - Required previous blood transfusions, episodes resolved quickly after transfusion  - No allergies, only takes multivitamin, iron tabs, and zinc supplements at home  - Family Hx: 2 older sisters w/ idiopathic intracranial HTN, mother w/ DM, father w/ seizure disorder  - HgB: 5.8 (baseline 9-10), MCV: 125.2  - Reticulocytes: 15.7%, Absolute 143.2  - Reticulocyte Index: 1.93  - Direct Ryan: Positive  - Receiving 1u PRBC  - Start B12, Folate  - Tylenol 975mg q8h PRN and Reglan 10mg q8h PRN for pain and nausea  - Discussed w/ attg will hold off on steroids as hemolysis panel not finalized and need to send further autoimmune/rheum panels  - F/u Iron Studies, Hemolysis Panel, B12, Folate, MMA, Homocysteine levels (obtained prior to transfusion)  - F/u MALINI, EBV, Parvovirus B19 PCR  - F/u Heme/Onc consult    #Misc  #Code Status: Full Code  #DVT ppx: Lovenox  #GI ppx: N/A  #Diet: DASH  #Activity: AAT  #Dispo: Med/Surg        Attestation Statements   I have personally seen and examined the patient.  I fully participated in the care of this patient.  I have made amendments to the documentation where necessary, and agree with the history, physical exam, and plan as documented by the Resident.     Ms. Dumont is a 25 yo F with pericarditis associated w/ pregnancy presenting for progressively worsening headahce w/ photophobia x1w found to be have anemia w/ a hemoglobin of 5.8.    #Progressive, Diffuse Pulsating Headache w/ Photophobia, Dizziness, and Nausea x1w  #Hx of Anemia requiring Blood Transfusions  #Severe Macrocytic Anemia, possibly Warm AIHA  - Multiple episodes of HA over past year and usually found to be anemic.  Required previous blood transfusions, episodes resolved quickly after transfusion  - Family Hx: 2 older sisters w/ idiopathic intracranial HTN  - HgB: 5.8 (baseline 9-10), MCV: 125.2  - Reticulocytes: 15.7%, Absolute 143.2  - Reticulocyte Index: 1.93  - Direct Ryan: Positive  - Receiving 1u PRBC  - Start B12, Folate  - Tylenol 975mg q8h PRN and Reglan 10mg q8h PRN for pain and nausea  - hold off on steroids as hemolysis panel not finalized and need to send further autoimmune/rheum panels  - F/u Iron Studies, Hemolysis Panel, B12, Folate, MMA, Homocysteine levels (obtained prior to transfusion)  - F/u MALINI, EBV, Parvovirus B19 PCR  - F/u Heme/Onc consult    #Misc  #Code Status: Full Code  #DVT ppx: Lovenox  #GI ppx: N/A  #Diet: DASH  #Activity: AAT  #Dispo: Med/Surg.     Time-based billing (NON-critical care).     75 minutes spent on total encounter. The necessity of the time spent during the encounter on this date of service was due to:     Patient seen at bedside, time spent evaluating and treating the patient's acute illness as well as time spent reviewing labs, radiology, discussing with patient and/or patient's family and discussing the case with a multidisciplinary team.    Electronic Signatures for Addendum Section:   Dia Corrales) (Signed Addendum 28-Oct-2024 07:33)  	plan for axillary LN biopsy    Electronic Signatures:  Duc Varner ()  (Signed 23-Oct-2024 22:25)  	Authored: History of Present Illness, Allergies/Medications, Patient History, Physical Exam, Attestation Statements  	Co-Signer: History of Present Illness, Allergies/Medications, Patient History, Risk Assessment, Assessment and Plan  Jed Modi)  (Signed 23-Oct-2024 21:57)  	Authored: History of Present Illness, Allergies/Medications, Patient History, Risk Assessment, Physical Exam, Assessment and Plan      Last Updated: 28-Oct-2024 07:33 by Dia Corrales)

## 2024-10-28 NOTE — ASU DISCHARGE PLAN (ADULT/PEDIATRIC) - CARE PROVIDER_API CALL
Dia Corrales  Complex General Surgical Oncology  [address] [facility] [address] [facility] [address] [address], [address] [place] [address]  Phone: [phone]  Fax: [phone]  Follow Up Time: 2 weeks

## 2024-10-28 NOTE — BRIEF OPERATIVE NOTE - COMMENTS
Right axillary lymph node biopsy performed, axillary lymph node specimen sent to pathology. Patient brought to recovery hemodynamically stable.

## 2024-10-28 NOTE — BRIEF OPERATIVE NOTE - ELECTIVE PROCEDURE
05/09/24                            Yue August  [address] [place] [address]    To Whom It May Concern:    This is to certify Yue August is a patient with Sarah Cruz MD. Please excuse her from school today due to illness.        Electronically signed by:  Sarah Cruz MD  [address]  [address]  [facility] [address]  Dept Phone: [phone]        Yes

## 2024-10-28 NOTE — BRIEF OPERATIVE NOTE - NSICDXBRIEFPROCEDURE_GEN_ALL_CORE_FT
PROCEDURES:  Biopsy, lymph node or cyst, axillary 28-Oct-2024 09:02:16 Right axillary lymph node biopsy   Lennie Garg

## 2024-10-28 NOTE — ASU DISCHARGE PLAN (ADULT/PEDIATRIC) - COMMENTS
Shower 24 hours after surgery. Keep the clear Tegaderm dressing on for 7 days. Do not remove the white steri-strips, they will fall off on their own. Call Dr. Corrales's clinic to schedule a follow up appointment in 10-14 days. Take Tylenol and Motrin every 8 hours for pain, switching between the two.

## 2024-10-28 NOTE — BRIEF OPERATIVE NOTE - NSICDXBRIEFPOSTOP_GEN_ALL_CORE_FT
[DrJakub  ___] : Dr. SPARROW POST-OP DIAGNOSIS:  Axillary lymphadenopathy 28-Oct-2024 09:03:13  Lennie Garg

## 2024-10-28 NOTE — PRE-ANESTHESIA EVALUATION ADULT - NSANTHPMHFT_GEN_ALL_CORE
questionable diagnosis of SLE, recently admitted last week for profound anemia needing transfusion (5.8->7.9 on 10/25/24), started on prednisone 80mg daily last week, took it y esterday  h/o pregnancy pericarditis, that resolved without intervention. TTE 10/2023 showing normal EF and no pericardial effusion  reports h/o Crohn's?   METS>4 without CP/palpitations/sob  Denies orthopnea

## 2024-10-28 NOTE — ASU DISCHARGE PLAN (ADULT/PEDIATRIC) - FINANCIAL ASSISTANCE
[facility] provides services at a reduced cost to those who are determined to be eligible through [facility]’s financial assistance program. Information regarding [facility]’s financial assistance program can be found by going to [email or web address] or by calling [phone].

## 2024-10-29 LAB
METHYLMALONATE SERPL-SCNC: 89 NMOL/L — SIGNIFICANT CHANGE UP (ref 0–378)
RF IGA SER-ACNC: <5 U — SIGNIFICANT CHANGE UP
RF IGG SER-ACNC: <5 U — SIGNIFICANT CHANGE UP
RF IGM SER-ACNC: 19 U — HIGH

## 2024-10-30 ENCOUNTER — LABORATORY RESULT (OUTPATIENT)
Age: 24
End: 2024-10-30

## 2024-10-30 ENCOUNTER — OUTPATIENT (OUTPATIENT)
Dept: OUTPATIENT SERVICES | Facility: HOSPITAL | Age: 24
LOS: 1 days | End: 2024-10-30
Payer: COMMERCIAL

## 2024-10-30 ENCOUNTER — APPOINTMENT (OUTPATIENT)
Age: 24
End: 2024-10-30
Payer: COMMERCIAL

## 2024-10-30 VITALS
HEIGHT: 63 IN | OXYGEN SATURATION: 100 % | SYSTOLIC BLOOD PRESSURE: 125 MMHG | DIASTOLIC BLOOD PRESSURE: 79 MMHG | WEIGHT: 192 LBS | HEART RATE: 71 BPM | BODY MASS INDEX: 34.02 KG/M2 | RESPIRATION RATE: 19 BRPM

## 2024-10-30 DIAGNOSIS — Z98.891 HISTORY OF UTERINE SCAR FROM PREVIOUS SURGERY: Chronic | ICD-10-CM

## 2024-10-30 DIAGNOSIS — D59.10 AUTOIMMUNE HEMOLYTIC ANEMIA, UNSPECIFIED: ICD-10-CM

## 2024-10-30 DIAGNOSIS — M32.9 SYSTEMIC LUPUS ERYTHEMATOSUS, UNSPECIFIED: ICD-10-CM

## 2024-10-30 PROBLEM — D58.9 HEMOLYTIC ANEMIA: Status: ACTIVE | Noted: 2024-10-30

## 2024-10-30 PROBLEM — R59.9 ADENOPATHY: Status: ACTIVE | Noted: 2024-10-30

## 2024-10-30 PROCEDURE — 99215 OFFICE O/P EST HI 40 MIN: CPT

## 2024-10-30 PROCEDURE — 85027 COMPLETE CBC AUTOMATED: CPT

## 2024-10-31 DIAGNOSIS — D59.10 AUTOIMMUNE HEMOLYTIC ANEMIA, UNSPECIFIED: ICD-10-CM

## 2024-10-31 LAB
HCT VFR BLD CALC: 25.7 %
HGB BLD-MCNC: 10 G/DL
MCHC RBC-ENTMCNC: 38.9 G/DL
MCHC RBC-ENTMCNC: 42.9 PG
MCV RBC AUTO: 110.3 FL
PLATELET # BLD AUTO: 241 K/UL
PMV BLD: 10.8 FL
RBC # BLD: 2.33 M/UL
RBC # FLD: 25.2 %
WBC # FLD AUTO: 10.75 K/UL

## 2024-10-31 RX ORDER — PREDNISONE 10 MG/1
10 TABLET ORAL AS DIRECTED
Qty: 60 | Refills: 1 | Status: ACTIVE | COMMUNITY
Start: 2024-10-31 | End: 1900-01-01

## 2024-11-04 DIAGNOSIS — M32.9 SYSTEMIC LUPUS ERYTHEMATOSUS, UNSPECIFIED: ICD-10-CM

## 2024-11-04 DIAGNOSIS — R16.1 SPLENOMEGALY, NOT ELSEWHERE CLASSIFIED: ICD-10-CM

## 2024-11-04 DIAGNOSIS — K50.90 CROHN'S DISEASE, UNSPECIFIED, WITHOUT COMPLICATIONS: ICD-10-CM

## 2024-11-04 DIAGNOSIS — R59.0 LOCALIZED ENLARGED LYMPH NODES: ICD-10-CM

## 2024-11-04 DIAGNOSIS — E87.1 HYPO-OSMOLALITY AND HYPONATREMIA: ICD-10-CM

## 2024-11-04 DIAGNOSIS — D59.19 OTHER AUTOIMMUNE HEMOLYTIC ANEMIA: ICD-10-CM

## 2024-11-04 LAB — TM INTERPRETATION: SIGNIFICANT CHANGE UP

## 2024-11-06 ENCOUNTER — APPOINTMENT (OUTPATIENT)
Age: 24
End: 2024-11-06
Payer: COMMERCIAL

## 2024-11-06 ENCOUNTER — OUTPATIENT (OUTPATIENT)
Dept: OUTPATIENT SERVICES | Facility: HOSPITAL | Age: 24
LOS: 1 days | End: 2024-11-06
Payer: COMMERCIAL

## 2024-11-06 ENCOUNTER — LABORATORY RESULT (OUTPATIENT)
Age: 24
End: 2024-11-06

## 2024-11-06 VITALS
HEIGHT: 63 IN | SYSTOLIC BLOOD PRESSURE: 126 MMHG | DIASTOLIC BLOOD PRESSURE: 76 MMHG | RESPIRATION RATE: 17 BRPM | TEMPERATURE: 98 F | OXYGEN SATURATION: 100 % | BODY MASS INDEX: 34.02 KG/M2 | HEART RATE: 93 BPM | WEIGHT: 192 LBS

## 2024-11-06 DIAGNOSIS — Z98.891 HISTORY OF UTERINE SCAR FROM PREVIOUS SURGERY: Chronic | ICD-10-CM

## 2024-11-06 DIAGNOSIS — D59.10 AUTOIMMUNE HEMOLYTIC ANEMIA, UNSPECIFIED: ICD-10-CM

## 2024-11-06 LAB
HCT VFR BLD CALC: 27.2 %
HGB BLD-MCNC: 10 G/DL
MCHC RBC-ENTMCNC: 36.8 G/DL
MCHC RBC-ENTMCNC: 38.6 PG
MCV RBC AUTO: 105 FL
PLATELET # BLD AUTO: 324 K/UL
PMV BLD: 10.5 FL
RBC # BLD: 2.59 M/UL
RBC # FLD: 18.6 %
WBC # FLD AUTO: 10.3 K/UL

## 2024-11-06 PROCEDURE — 99214 OFFICE O/P EST MOD 30 MIN: CPT

## 2024-11-06 PROCEDURE — G2211 COMPLEX E/M VISIT ADD ON: CPT

## 2024-11-06 PROCEDURE — 85027 COMPLETE CBC AUTOMATED: CPT

## 2024-11-07 DIAGNOSIS — D59.10 AUTOIMMUNE HEMOLYTIC ANEMIA, UNSPECIFIED: ICD-10-CM

## 2024-11-07 LAB — SURGICAL PATHOLOGY STUDY: SIGNIFICANT CHANGE UP

## 2024-11-07 NOTE — ED PROVIDER NOTE - CONDITION AT DISCHARGE:
Quality 431: Preventive Care And Screening: Unhealthy Alcohol Use - Screening: Patient not identified as an unhealthy alcohol user when screened for unhealthy alcohol use using a systematic screening method Quality 110: Preventive Care And Screening: Influenza Immunization: Influenza Immunization Administered during Influenza season Quality 226: Preventive Care And Screening: Tobacco Use: Screening And Cessation Intervention: Patient screened for tobacco use and is an ex/non-smoker Detail Level: Detailed Improved JESÚS

## 2024-11-11 ENCOUNTER — APPOINTMENT (OUTPATIENT)
Age: 24
End: 2024-11-11

## 2024-11-12 ENCOUNTER — APPOINTMENT (OUTPATIENT)
Dept: SURGERY | Facility: CLINIC | Age: 24
End: 2024-11-12
Payer: COMMERCIAL

## 2024-11-12 VITALS
SYSTOLIC BLOOD PRESSURE: 124 MMHG | TEMPERATURE: 97 F | WEIGHT: 192 LBS | OXYGEN SATURATION: 99 % | BODY MASS INDEX: 34.02 KG/M2 | HEIGHT: 63 IN | DIASTOLIC BLOOD PRESSURE: 84 MMHG | HEART RATE: 99 BPM

## 2024-11-12 LAB — CHROM ANALY OVERALL INTERP SPEC-IMP: SIGNIFICANT CHANGE UP

## 2024-11-12 PROCEDURE — 99024 POSTOP FOLLOW-UP VISIT: CPT

## 2024-11-13 ENCOUNTER — LABORATORY RESULT (OUTPATIENT)
Age: 24
End: 2024-11-13

## 2024-11-13 ENCOUNTER — OUTPATIENT (OUTPATIENT)
Dept: OUTPATIENT SERVICES | Facility: HOSPITAL | Age: 24
LOS: 1 days | End: 2024-11-13
Payer: COMMERCIAL

## 2024-11-13 ENCOUNTER — APPOINTMENT (OUTPATIENT)
Age: 24
End: 2024-11-13
Payer: COMMERCIAL

## 2024-11-13 VITALS
RESPIRATION RATE: 16 BRPM | TEMPERATURE: 98.5 F | BODY MASS INDEX: 34.38 KG/M2 | DIASTOLIC BLOOD PRESSURE: 84 MMHG | OXYGEN SATURATION: 99 % | HEIGHT: 63 IN | SYSTOLIC BLOOD PRESSURE: 146 MMHG | WEIGHT: 194 LBS | HEART RATE: 86 BPM

## 2024-11-13 DIAGNOSIS — D59.10 AUTOIMMUNE HEMOLYTIC ANEMIA, UNSPECIFIED: ICD-10-CM

## 2024-11-13 DIAGNOSIS — Z98.891 HISTORY OF UTERINE SCAR FROM PREVIOUS SURGERY: Chronic | ICD-10-CM

## 2024-11-13 DIAGNOSIS — R59.9 ENLARGED LYMPH NODES, UNSPECIFIED: ICD-10-CM

## 2024-11-13 LAB
HCT VFR BLD CALC: 30.9 %
HGB BLD-MCNC: 11.4 G/DL
MCHC RBC-ENTMCNC: 35.6 PG
MCHC RBC-ENTMCNC: 36.9 G/DL
MCV RBC AUTO: 96.6 FL
PLATELET # BLD AUTO: 305 K/UL
PMV BLD: 9.9 FL
RBC # BLD: 3.2 M/UL
RBC # FLD: 14.6 %
WBC # FLD AUTO: 9.12 K/UL

## 2024-11-13 PROCEDURE — 85027 COMPLETE CBC AUTOMATED: CPT

## 2024-11-13 PROCEDURE — 99215 OFFICE O/P EST HI 40 MIN: CPT

## 2024-11-13 PROCEDURE — G2211 COMPLEX E/M VISIT ADD ON: CPT

## 2024-11-15 RX ORDER — PANTOPRAZOLE 40 MG/1
40 TABLET, DELAYED RELEASE ORAL
Qty: 30 | Refills: 5 | Status: ACTIVE | COMMUNITY
Start: 2024-11-15 | End: 1900-01-01

## 2024-11-15 RX ORDER — PREDNISONE 5 MG/1
5 TABLET ORAL DAILY
Qty: 30 | Refills: 5 | Status: ACTIVE | COMMUNITY
Start: 2024-11-15 | End: 1900-01-01

## 2024-11-20 ENCOUNTER — APPOINTMENT (OUTPATIENT)
Age: 24
End: 2024-11-20
Payer: COMMERCIAL

## 2024-11-20 ENCOUNTER — LABORATORY RESULT (OUTPATIENT)
Age: 24
End: 2024-11-20

## 2024-11-20 ENCOUNTER — OUTPATIENT (OUTPATIENT)
Dept: OUTPATIENT SERVICES | Facility: HOSPITAL | Age: 24
LOS: 1 days | End: 2024-11-20
Payer: COMMERCIAL

## 2024-11-20 VITALS
BODY MASS INDEX: 35.47 KG/M2 | DIASTOLIC BLOOD PRESSURE: 86 MMHG | WEIGHT: 200.19 LBS | HEIGHT: 63 IN | OXYGEN SATURATION: 100 % | TEMPERATURE: 98.7 F | SYSTOLIC BLOOD PRESSURE: 139 MMHG | HEART RATE: 93 BPM

## 2024-11-20 DIAGNOSIS — D58.9 HEREDITARY HEMOLYTIC ANEMIA, UNSPECIFIED: ICD-10-CM

## 2024-11-20 DIAGNOSIS — D59.10 AUTOIMMUNE HEMOLYTIC ANEMIA, UNSPECIFIED: ICD-10-CM

## 2024-11-20 DIAGNOSIS — Z98.891 HISTORY OF UTERINE SCAR FROM PREVIOUS SURGERY: Chronic | ICD-10-CM

## 2024-11-20 LAB
HCT VFR BLD CALC: 32.6 %
HGB BLD-MCNC: 11.8 G/DL
MCHC RBC-ENTMCNC: 35.6 PG
MCHC RBC-ENTMCNC: 36.2 G/DL
MCV RBC AUTO: 98.5 FL
PLATELET # BLD AUTO: 308 K/UL
PMV BLD: 9.8 FL
RBC # BLD: 3.31 M/UL
RBC # FLD: 13.3 %
WBC # FLD AUTO: 9.46 K/UL

## 2024-11-20 PROCEDURE — 85027 COMPLETE CBC AUTOMATED: CPT

## 2024-11-20 PROCEDURE — 99214 OFFICE O/P EST MOD 30 MIN: CPT

## 2024-11-26 NOTE — PROCEDURAL SAFETY CHECKLIST WITH OR WITHOUT SEDATION - FIRE RISK ASSESSMENT ADDRESSED
"It was a pleasure seeing you today.     Reduce Ropinirole 2mg daily ( due to confusion/ lethargy).  Stop Primidone.  I would like you to start a medication called Sinemet (carbidopa-levadopa) 25-100mg start with 1/2 tab three times daily with meals (or small meal) x 7 days, then 1 tab three times daily and continue.      Some potential adverse effects are dizziness, nausea, confusion, or hallucinations-however, unlikely to occur at small doses like this.  This medication should help with symptoms like tremor, slow movements and stiffness.        Please make a follow up appointment in 5-6 months and a virtual visit in 1 month.     For any urgent issues or needing to speak to a medical assistant please call [phone], option 6 during our office hours Monday-Friday 8am-4pm, and leave a voicemail with your concern.  My office will try to reach back you as soon as possible within 24 (business) hours.  If you have an emergency please call 911 or visit a local urgent care or nearest emergency room.      Please understand that Sionex is a useful communication tool for simple \"normal\" results or a refill request but I would not recommend using this tool for emergent or urgent issues or for conversations with me.  I am happy to ask my staff to rearrange a follow up visit or a virtual visit sooner than requested if appropriate for your care.    "
n/a

## 2024-11-27 ENCOUNTER — LABORATORY RESULT (OUTPATIENT)
Age: 24
End: 2024-11-27

## 2024-11-27 ENCOUNTER — APPOINTMENT (OUTPATIENT)
Dept: RHEUMATOLOGY | Facility: CLINIC | Age: 24
End: 2024-11-27
Payer: COMMERCIAL

## 2024-11-27 ENCOUNTER — APPOINTMENT (OUTPATIENT)
Age: 24
End: 2024-11-27
Payer: COMMERCIAL

## 2024-11-27 ENCOUNTER — OUTPATIENT (OUTPATIENT)
Dept: OUTPATIENT SERVICES | Facility: HOSPITAL | Age: 24
LOS: 1 days | End: 2024-11-27
Payer: COMMERCIAL

## 2024-11-27 VITALS
TEMPERATURE: 98.2 F | DIASTOLIC BLOOD PRESSURE: 81 MMHG | HEIGHT: 63 IN | RESPIRATION RATE: 16 BRPM | WEIGHT: 198 LBS | HEART RATE: 89 BPM | OXYGEN SATURATION: 99 % | SYSTOLIC BLOOD PRESSURE: 131 MMHG | BODY MASS INDEX: 35.08 KG/M2

## 2024-11-27 VITALS
HEIGHT: 63 IN | DIASTOLIC BLOOD PRESSURE: 72 MMHG | BODY MASS INDEX: 35.08 KG/M2 | SYSTOLIC BLOOD PRESSURE: 130 MMHG | OXYGEN SATURATION: 98 % | HEART RATE: 85 BPM | WEIGHT: 198 LBS

## 2024-11-27 DIAGNOSIS — D59.10 AUTOIMMUNE HEMOLYTIC ANEMIA, UNSPECIFIED: ICD-10-CM

## 2024-11-27 DIAGNOSIS — Z98.891 HISTORY OF UTERINE SCAR FROM PREVIOUS SURGERY: Chronic | ICD-10-CM

## 2024-11-27 DIAGNOSIS — Z51.81 ENCOUNTER FOR THERAPEUTIC DRUG LVL MONITORING: ICD-10-CM

## 2024-11-27 DIAGNOSIS — R59.9 ENLARGED LYMPH NODES, UNSPECIFIED: ICD-10-CM

## 2024-11-27 LAB
HCT VFR BLD CALC: 35.1 %
HGB BLD-MCNC: 12.7 G/DL
MCHC RBC-ENTMCNC: 34.9 PG
MCHC RBC-ENTMCNC: 36.2 G/DL
MCV RBC AUTO: 96.4 FL
PLATELET # BLD AUTO: 337 K/UL
PMV BLD: 9.6 FL
RBC # BLD: 3.64 M/UL
RBC # FLD: 12.3 %
WBC # FLD AUTO: 8.28 K/UL

## 2024-11-27 PROCEDURE — 99214 OFFICE O/P EST MOD 30 MIN: CPT

## 2024-11-27 PROCEDURE — 85027 COMPLETE CBC AUTOMATED: CPT

## 2024-11-27 RX ORDER — PNV NO.95/FERROUS FUM/FOLIC AC 28MG-0.8MG
TABLET ORAL
Refills: 0 | Status: ACTIVE | COMMUNITY

## 2024-11-27 RX ORDER — FOLIC ACID 20 MG
CAPSULE ORAL
Refills: 0 | Status: ACTIVE | COMMUNITY

## 2024-11-27 RX ORDER — PREDNISONE 10 MG/1
10 TABLET ORAL
Qty: 56 | Refills: 0 | Status: ACTIVE | COMMUNITY
Start: 2024-11-27 | End: 1900-01-01

## 2024-12-04 ENCOUNTER — OUTPATIENT (OUTPATIENT)
Dept: OUTPATIENT SERVICES | Facility: HOSPITAL | Age: 24
LOS: 1 days | End: 2024-12-04
Payer: COMMERCIAL

## 2024-12-04 ENCOUNTER — LABORATORY RESULT (OUTPATIENT)
Age: 24
End: 2024-12-04

## 2024-12-04 ENCOUNTER — APPOINTMENT (OUTPATIENT)
Age: 24
End: 2024-12-04
Payer: COMMERCIAL

## 2024-12-04 VITALS
SYSTOLIC BLOOD PRESSURE: 123 MMHG | DIASTOLIC BLOOD PRESSURE: 83 MMHG | HEART RATE: 81 BPM | HEIGHT: 63 IN | OXYGEN SATURATION: 100 % | BODY MASS INDEX: 35.08 KG/M2 | WEIGHT: 198 LBS

## 2024-12-04 VITALS — RESPIRATION RATE: 16 BRPM | TEMPERATURE: 98.4 F

## 2024-12-04 DIAGNOSIS — Z98.891 HISTORY OF UTERINE SCAR FROM PREVIOUS SURGERY: Chronic | ICD-10-CM

## 2024-12-04 DIAGNOSIS — D58.9 HEREDITARY HEMOLYTIC ANEMIA, UNSPECIFIED: ICD-10-CM

## 2024-12-04 DIAGNOSIS — M32.9 SYSTEMIC LUPUS ERYTHEMATOSUS, UNSPECIFIED: ICD-10-CM

## 2024-12-04 LAB
ALBUMIN SERPL ELPH-MCNC: 4.3 G/DL
ALP BLD-CCNC: 56 U/L
ALT SERPL-CCNC: 15 U/L
ANION GAP SERPL CALC-SCNC: 14 MMOL/L
AST SERPL-CCNC: 12 U/L
BASOPHILS # BLD AUTO: 0.01 K/UL
BASOPHILS NFR BLD AUTO: 0.1 %
BILIRUB SERPL-MCNC: 0.5 MG/DL
BUN SERPL-MCNC: 18 MG/DL
CALCIUM SERPL-MCNC: 9.6 MG/DL
CHLORIDE SERPL-SCNC: 102 MMOL/L
CO2 SERPL-SCNC: 24 MMOL/L
CREAT SERPL-MCNC: 0.7 MG/DL
EGFR: 123 ML/MIN/1.73M2
EOSINOPHIL # BLD AUTO: 0.01 K/UL
EOSINOPHIL NFR BLD AUTO: 0.1 %
GLUCOSE SERPL-MCNC: 76 MG/DL
HCT VFR BLD CALC: 32.5 %
HCT VFR BLD CALC: 34.7 %
HGB BLD-MCNC: 11.9 G/DL
HGB BLD-MCNC: 12.4 G/DL
IMM GRANULOCYTES NFR BLD AUTO: 0.4 %
LYMPHOCYTES # BLD AUTO: 1.83 K/UL
LYMPHOCYTES NFR BLD AUTO: 22.9 %
MAN DIFF?: NORMAL
MCHC RBC-ENTMCNC: 34.9 PG
MCHC RBC-ENTMCNC: 35.7 G/DL
MCHC RBC-ENTMCNC: 35.7 PG
MCHC RBC-ENTMCNC: 36.6 G/DL
MCV RBC AUTO: 100 FL
MCV RBC AUTO: 95.3 FL
MONOCYTES # BLD AUTO: 1.06 K/UL
MONOCYTES NFR BLD AUTO: 13.3 %
NEUTROPHILS # BLD AUTO: 5.04 K/UL
NEUTROPHILS NFR BLD AUTO: 63.2 %
PLATELET # BLD AUTO: 294 K/UL
PLATELET # BLD AUTO: 348 K/UL
PMV BLD AUTO: 0 /100 WBCS
PMV BLD: 10 FL
POTASSIUM SERPL-SCNC: 3.6 MMOL/L
PROT SERPL-MCNC: 7.1 G/DL
RBC # BLD: 3.41 M/UL
RBC # BLD: 3.47 M/UL
RBC # FLD: 12.3 %
RBC # FLD: 12.3 %
SODIUM SERPL-SCNC: 140 MMOL/L
WBC # FLD AUTO: 7.98 K/UL
WBC # FLD AUTO: 8.94 K/UL

## 2024-12-04 PROCEDURE — 85027 COMPLETE CBC AUTOMATED: CPT

## 2024-12-04 PROCEDURE — 36415 COLL VENOUS BLD VENIPUNCTURE: CPT

## 2024-12-04 PROCEDURE — 99214 OFFICE O/P EST MOD 30 MIN: CPT

## 2024-12-04 PROCEDURE — G2211 COMPLEX E/M VISIT ADD ON: CPT

## 2024-12-05 DIAGNOSIS — D58.9 HEREDITARY HEMOLYTIC ANEMIA, UNSPECIFIED: ICD-10-CM

## 2024-12-05 LAB
APPEARANCE: CLEAR
BACTERIA: ABNORMAL /HPF
BILIRUBIN URINE: NEGATIVE
BLOOD URINE: NEGATIVE
C3 SERPL-MCNC: 88 MG/DL
C4 SERPL-MCNC: 10 MG/DL
CALCIUM OXALATE CRYSTALS: PRESENT
CAST: 1 /LPF
COLOR: YELLOW
CREAT SPEC-SCNC: 277 MG/DL
CREAT/PROT UR: 0.1 RATIO
DSDNA AB SER-ACNC: 3 IU/ML
EPITHELIAL CELLS: 3 /HPF
GLUCOSE QUALITATIVE U: NEGATIVE MG/DL
KETONES URINE: NEGATIVE MG/DL
LEUKOCYTE ESTERASE URINE: NEGATIVE
MICROSCOPIC-UA: NORMAL
NITRITE URINE: NEGATIVE
PH URINE: 6
PROT UR-MCNC: 28 MG/DLG/24H
PROTEIN URINE: NORMAL MG/DL
RED BLOOD CELLS URINE: 5 /HPF
REVIEW: NORMAL
SPECIFIC GRAVITY URINE: >1.03
UROBILINOGEN URINE: 0.2 MG/DL
WHITE BLOOD CELLS URINE: 0 /HPF

## 2024-12-06 ENCOUNTER — APPOINTMENT (OUTPATIENT)
Dept: GASTROENTEROLOGY | Facility: CLINIC | Age: 24
End: 2024-12-06
Payer: COMMERCIAL

## 2024-12-06 VITALS
HEIGHT: 63 IN | SYSTOLIC BLOOD PRESSURE: 127 MMHG | DIASTOLIC BLOOD PRESSURE: 79 MMHG | HEART RATE: 68 BPM | BODY MASS INDEX: 35.44 KG/M2 | WEIGHT: 200 LBS

## 2024-12-06 DIAGNOSIS — Z87.19 PERSONAL HISTORY OF OTHER DISEASES OF THE DIGESTIVE SYSTEM: ICD-10-CM

## 2024-12-06 DIAGNOSIS — K50.10 CROHN'S DISEASE OF LARGE INTESTINE W/OUT COMPLICATIONS: ICD-10-CM

## 2024-12-06 PROCEDURE — 99214 OFFICE O/P EST MOD 30 MIN: CPT

## 2024-12-06 PROCEDURE — 99204 OFFICE O/P NEW MOD 45 MIN: CPT

## 2024-12-09 RX ORDER — HYDROXYCHLOROQUINE SULFATE 200 MG/1
200 TABLET, FILM COATED ORAL
Qty: 180 | Refills: 3 | Status: ACTIVE | COMMUNITY
Start: 2024-12-09 | End: 1900-01-01

## 2024-12-11 ENCOUNTER — OUTPATIENT (OUTPATIENT)
Dept: OUTPATIENT SERVICES | Facility: HOSPITAL | Age: 24
LOS: 1 days | End: 2024-12-11
Payer: COMMERCIAL

## 2024-12-11 ENCOUNTER — APPOINTMENT (OUTPATIENT)
Age: 24
End: 2024-12-11

## 2024-12-11 ENCOUNTER — APPOINTMENT (OUTPATIENT)
Age: 24
End: 2024-12-11
Payer: COMMERCIAL

## 2024-12-11 ENCOUNTER — LABORATORY RESULT (OUTPATIENT)
Age: 24
End: 2024-12-11

## 2024-12-11 VITALS
SYSTOLIC BLOOD PRESSURE: 121 MMHG | HEIGHT: 63 IN | TEMPERATURE: 98.4 F | BODY MASS INDEX: 35.97 KG/M2 | HEART RATE: 79 BPM | DIASTOLIC BLOOD PRESSURE: 81 MMHG | RESPIRATION RATE: 16 BRPM | OXYGEN SATURATION: 100 % | WEIGHT: 203 LBS

## 2024-12-11 DIAGNOSIS — D58.9 HEREDITARY HEMOLYTIC ANEMIA, UNSPECIFIED: ICD-10-CM

## 2024-12-11 DIAGNOSIS — R59.9 ENLARGED LYMPH NODES, UNSPECIFIED: ICD-10-CM

## 2024-12-11 LAB
ALBUMIN SERPL ELPH-MCNC: 4.2 G/DL
ALP BLD-CCNC: 55 U/L
ALT SERPL-CCNC: 17 U/L
ANION GAP SERPL CALC-SCNC: 10 MMOL/L
AST SERPL-CCNC: 16 U/L
BILIRUB SERPL-MCNC: 0.5 MG/DL
BUN SERPL-MCNC: 13 MG/DL
CALCIUM SERPL-MCNC: 9 MG/DL
CHLORIDE SERPL-SCNC: 103 MMOL/L
CO2 SERPL-SCNC: 25 MMOL/L
CREAT SERPL-MCNC: 0.7 MG/DL
EGFR: 123 ML/MIN/1.73M2
ERYTHROCYTE [SEDIMENTATION RATE] IN BLOOD BY WESTERGREN METHOD: 75 MM/HR
GLUCOSE SERPL-MCNC: 89 MG/DL
HCT VFR BLD CALC: 32.5 %
HGB BLD-MCNC: 11.9 G/DL
IRON SATN MFR SERPL: 30 %
IRON SERPL-MCNC: 87 UG/DL
MCHC RBC-ENTMCNC: 34.5 PG
MCHC RBC-ENTMCNC: 36.6 G/DL
MCV RBC AUTO: 94.2 FL
PLATELET # BLD AUTO: 306 K/UL
PMV BLD: 9.8 FL
POTASSIUM SERPL-SCNC: 4.2 MMOL/L
PROT SERPL-MCNC: 6.8 G/DL
RBC # BLD: 3.45 M/UL
RBC # FLD: 12.1 %
SODIUM SERPL-SCNC: 138 MMOL/L
TIBC SERPL-MCNC: 291 UG/DL
TSH SERPL-ACNC: 1.07 UIU/ML
UIBC SERPL-MCNC: 204 UG/DL
WBC # FLD AUTO: 7.82 K/UL

## 2024-12-11 PROCEDURE — 85027 COMPLETE CBC AUTOMATED: CPT

## 2024-12-11 PROCEDURE — G2211 COMPLEX E/M VISIT ADD ON: CPT

## 2024-12-11 PROCEDURE — 99214 OFFICE O/P EST MOD 30 MIN: CPT

## 2024-12-12 DIAGNOSIS — R59.9 ENLARGED LYMPH NODES, UNSPECIFIED: ICD-10-CM

## 2024-12-12 LAB
24R-OH-CALCIDIOL SERPL-MCNC: 37.9 PG/ML
CRP SERPL-MCNC: 3.8 MG/L
FERRITIN SERPL-MCNC: 161 NG/ML
VIT B12 SERPL-MCNC: 468 PG/ML

## 2024-12-12 RX ORDER — FOLIC ACID 1 MG/1
1 TABLET ORAL DAILY
Qty: 30 | Refills: 1 | Status: ACTIVE | COMMUNITY
Start: 2024-12-04 | End: 1900-01-01

## 2024-12-12 RX ORDER — MAGNESIUM 200 MG
1000 TABLET ORAL DAILY
Qty: 30 | Refills: 0 | Status: ACTIVE | COMMUNITY
Start: 2024-12-04 | End: 1900-01-01

## 2024-12-16 NOTE — ED PROVIDER NOTE - IV ALTEPLASE ADMIN OUTSIDE HIDDEN
show JONAH 12/13/24 11pm. Presented to [facility], found to have LEFT occipital stroke w/ perfusion defect in LEFT PCA and possible dissection vs thrombosis in vertebral artery  - appreciate neuro recs  - neuro checks q4h  - tele monitoring  - check ECG  - hypercoagulability workup - will send anticardiolipin, beta 2 glycoprotein ab, DRVVT, Factor V Leiden, Prothrombin genetics; will hold off on sending Protein C and S given current VTE  - A1c, lipid panel  - pending TTE w/ bubble study  - pending MRI brain w/ w/o  - check CT CAP for malignancy eval given early onset of stroke  - consider ILR per neuro  - PT/OT  - atorvastatin 80  - consider aspirin 81 if recommended by neuro JONAH 12/13/24 11pm. Presented to [facility], found to have LEFT occipital stroke w/ perfusion defect in LEFT PCA and possible dissection vs thrombosis in vertebral artery  - CD w/ CT imaging from [facility] in chart - can bring to radiology to upload once they are open  - appreciate neuro recs  - neuro checks q4h  - tele monitoring  - ECG -  NSR  - hypercoagulability workup - will send anticardiolipin, beta 2 glycoprotein ab, DRVVT, Factor V Leiden, Prothrombin genetics; will hold off on sending Protein C and S given current VTE  - A1c, lipid panel  - pending TTE w/ bubble study  - pending MRI brain w/ w/o  - check CT CAP for malignancy eval given early onset of stroke  - consider ILR per neuro  - PT/OT  - atorvastatin 80  - aspirin 81

## 2024-12-19 ENCOUNTER — APPOINTMENT (OUTPATIENT)
Age: 24
End: 2024-12-19
Payer: COMMERCIAL

## 2024-12-19 ENCOUNTER — LABORATORY RESULT (OUTPATIENT)
Age: 24
End: 2024-12-19

## 2024-12-19 ENCOUNTER — OUTPATIENT (OUTPATIENT)
Dept: OUTPATIENT SERVICES | Facility: HOSPITAL | Age: 24
LOS: 1 days | End: 2024-12-19
Payer: COMMERCIAL

## 2024-12-19 VITALS
WEIGHT: 205 LBS | OXYGEN SATURATION: 100 % | TEMPERATURE: 98.6 F | HEIGHT: 63 IN | DIASTOLIC BLOOD PRESSURE: 75 MMHG | HEART RATE: 83 BPM | SYSTOLIC BLOOD PRESSURE: 125 MMHG | RESPIRATION RATE: 16 BRPM | BODY MASS INDEX: 36.32 KG/M2

## 2024-12-19 DIAGNOSIS — Z51.81 ENCOUNTER FOR THERAPEUTIC DRUG LVL MONITORING: ICD-10-CM

## 2024-12-19 DIAGNOSIS — Z98.891 HISTORY OF UTERINE SCAR FROM PREVIOUS SURGERY: Chronic | ICD-10-CM

## 2024-12-19 DIAGNOSIS — D58.9 HEREDITARY HEMOLYTIC ANEMIA, UNSPECIFIED: ICD-10-CM

## 2024-12-19 LAB
HCT VFR BLD CALC: 36.4 %
HGB BLD-MCNC: 13.2 G/DL
MCHC RBC-ENTMCNC: 34 PG
MCHC RBC-ENTMCNC: 36.3 G/DL
MCV RBC AUTO: 93.8 FL
PLATELET # BLD AUTO: 335 K/UL
PMV BLD: 10.1 FL
RBC # BLD: 3.88 M/UL
RBC # FLD: 11.8 %
WBC # FLD AUTO: 8.64 K/UL

## 2024-12-19 PROCEDURE — 99214 OFFICE O/P EST MOD 30 MIN: CPT

## 2024-12-19 PROCEDURE — 85027 COMPLETE CBC AUTOMATED: CPT

## 2024-12-19 PROCEDURE — G2211 COMPLEX E/M VISIT ADD ON: CPT

## 2024-12-26 ENCOUNTER — LABORATORY RESULT (OUTPATIENT)
Age: 24
End: 2024-12-26

## 2024-12-26 ENCOUNTER — OUTPATIENT (OUTPATIENT)
Dept: OUTPATIENT SERVICES | Facility: HOSPITAL | Age: 24
LOS: 1 days | End: 2024-12-26
Payer: COMMERCIAL

## 2024-12-26 ENCOUNTER — APPOINTMENT (OUTPATIENT)
Age: 24
End: 2024-12-26
Payer: COMMERCIAL

## 2024-12-26 VITALS
OXYGEN SATURATION: 100 % | TEMPERATURE: 98.1 F | HEIGHT: 63 IN | WEIGHT: 205 LBS | SYSTOLIC BLOOD PRESSURE: 126 MMHG | BODY MASS INDEX: 36.32 KG/M2 | HEART RATE: 84 BPM | DIASTOLIC BLOOD PRESSURE: 79 MMHG | RESPIRATION RATE: 17 BRPM

## 2024-12-26 DIAGNOSIS — M32.9 SYSTEMIC LUPUS ERYTHEMATOSUS, UNSPECIFIED: ICD-10-CM

## 2024-12-26 DIAGNOSIS — Z98.891 HISTORY OF UTERINE SCAR FROM PREVIOUS SURGERY: Chronic | ICD-10-CM

## 2024-12-26 DIAGNOSIS — D58.9 HEREDITARY HEMOLYTIC ANEMIA, UNSPECIFIED: ICD-10-CM

## 2024-12-26 LAB
HCT VFR BLD CALC: 30.2 %
HGB BLD-MCNC: 11.6 G/DL
MCHC RBC-ENTMCNC: 34.4 PG
MCHC RBC-ENTMCNC: 38.4 G/DL
MCV RBC AUTO: 89.6 FL
PLATELET # BLD AUTO: 206 K/UL
PMV BLD: 11.4 FL
RBC # BLD: 3.37 M/UL
RBC # FLD: 11.5 %
WBC # FLD AUTO: 7.64 K/UL

## 2024-12-26 PROCEDURE — 99215 OFFICE O/P EST HI 40 MIN: CPT

## 2024-12-26 PROCEDURE — G2211 COMPLEX E/M VISIT ADD ON: CPT

## 2024-12-26 PROCEDURE — 85027 COMPLETE CBC AUTOMATED: CPT

## 2025-01-02 ENCOUNTER — APPOINTMENT (OUTPATIENT)
Age: 25
End: 2025-01-02

## 2025-01-08 ENCOUNTER — APPOINTMENT (OUTPATIENT)
Age: 25
End: 2025-01-08

## 2025-01-22 ENCOUNTER — APPOINTMENT (OUTPATIENT)
Age: 25
End: 2025-01-22

## 2025-01-31 ENCOUNTER — APPOINTMENT (OUTPATIENT)
Dept: GASTROENTEROLOGY | Facility: CLINIC | Age: 25
End: 2025-01-31

## 2025-01-31 NOTE — ASU PREOP CHECKLIST - SURGICAL CONSENT
The patient is Stable - Low risk of patient condition declining or worsening    Shift Goals  Clinical Goals: mobility, hemodynamic stablity  Patient Goals: go home  Family Goals: john    Progress made toward(s) clinical / shift goals:      Problem: Knowledge Deficit - Standard  Goal: Patient and family/care givers will demonstrate understanding of plan of care, disease process/condition, diagnostic tests and medications  Description: Target End Date:  1-3 days or as soon as patient condition allows    Document in Patient Education    1.  Patient and family/caregiver oriented to unit, equipment, visitation policy and means for communicating concern  2.  Complete/review Learning Assessment  3.  Assess knowledge level of disease process/condition, treatment plan, diagnostic tests and medications  4.  Explain disease process/condition, treatment plan, diagnostic tests and medications  Outcome: Progressing     Problem: Pain - Standard  Goal: Alleviation of pain or a reduction in pain to the patient’s comfort goal  Description: Target End Date:  Prior to discharge or change in level of care    Document on Vitals flowsheet    1.  Document pain using the appropriate pain scale per order or unit policy  2.  Educate and implement non-pharmacologic comfort measures (i.e. relaxation, distraction, massage, cold/heat therapy, etc.)  3.  Pain management medications as ordered  4.  Reassess pain after pain med administration per policy  5.  If opiods administered assess patient's response to pain medication is appropriate per POSS sedation scale  6.  Follow pain management plan developed in collaboration with patient and interdisciplinary team (including palliative care or pain specialists if applicable)  Outcome: Progressing       Patient is not progressing towards the following goals:       [Home] : at home, [unfilled] , at the time of the visit. [Medical Office: ([facility])___] : at the medical office located in  [Verbal consent obtained from patient] : the patient, [unfilled] done

## 2025-02-18 ENCOUNTER — APPOINTMENT (OUTPATIENT)
Dept: SURGERY | Facility: CLINIC | Age: 25
End: 2025-02-18

## 2025-02-21 ENCOUNTER — APPOINTMENT (OUTPATIENT)
Dept: GASTROENTEROLOGY | Facility: CLINIC | Age: 25
End: 2025-02-21

## 2025-03-03 ENCOUNTER — NON-APPOINTMENT (OUTPATIENT)
Age: 25
End: 2025-03-03

## 2025-03-03 ENCOUNTER — LABORATORY RESULT (OUTPATIENT)
Age: 25
End: 2025-03-03

## 2025-03-03 ENCOUNTER — APPOINTMENT (OUTPATIENT)
Dept: ANTEPARTUM | Facility: CLINIC | Age: 25
End: 2025-03-03
Payer: COMMERCIAL

## 2025-03-03 ENCOUNTER — OUTPATIENT (OUTPATIENT)
Dept: OUTPATIENT SERVICES | Facility: HOSPITAL | Age: 25
LOS: 1 days | End: 2025-03-03
Payer: COMMERCIAL

## 2025-03-03 ENCOUNTER — APPOINTMENT (OUTPATIENT)
Dept: OBGYN | Facility: CLINIC | Age: 25
End: 2025-03-03
Payer: COMMERCIAL

## 2025-03-03 ENCOUNTER — ASOB RESULT (OUTPATIENT)
Age: 25
End: 2025-03-03

## 2025-03-03 VITALS — DIASTOLIC BLOOD PRESSURE: 70 MMHG | WEIGHT: 211 LBS | SYSTOLIC BLOOD PRESSURE: 122 MMHG | BODY MASS INDEX: 37.38 KG/M2

## 2025-03-03 DIAGNOSIS — M32.9 SYSTEMIC LUPUS ERYTHEMATOSUS, UNSPECIFIED: ICD-10-CM

## 2025-03-03 DIAGNOSIS — Z98.891 HISTORY OF UTERINE SCAR FROM PREVIOUS SURGERY: Chronic | ICD-10-CM

## 2025-03-03 DIAGNOSIS — Z34.90 ENCOUNTER FOR SUPERVISION OF NORMAL PREGNANCY, UNSPECIFIED, UNSPECIFIED TRIMESTER: ICD-10-CM

## 2025-03-03 PROCEDURE — 99459 PELVIC EXAMINATION: CPT

## 2025-03-03 PROCEDURE — 81243 FMR1 GEN ALY DETC ABNL ALLEL: CPT

## 2025-03-03 PROCEDURE — 81257 HBA1/HBA2 GENE: CPT

## 2025-03-03 PROCEDURE — 86762 RUBELLA ANTIBODY: CPT

## 2025-03-03 PROCEDURE — 83036 HEMOGLOBIN GLYCOSYLATED A1C: CPT

## 2025-03-03 PROCEDURE — 88142 CYTOPATH C/V THIN LAYER: CPT

## 2025-03-03 PROCEDURE — 76816 OB US FOLLOW-UP PER FETUS: CPT | Mod: 26

## 2025-03-03 PROCEDURE — 86901 BLOOD TYPING SEROLOGIC RH(D): CPT

## 2025-03-03 PROCEDURE — 87491 CHLMYD TRACH DNA AMP PROBE: CPT

## 2025-03-03 PROCEDURE — 86787 VARICELLA-ZOSTER ANTIBODY: CPT

## 2025-03-03 PROCEDURE — 86780 TREPONEMA PALLIDUM: CPT

## 2025-03-03 PROCEDURE — 81222 CFTR GENE DUP/DELET VARIANTS: CPT

## 2025-03-03 PROCEDURE — 81329 SMN1 GENE DOS/DELETION ALYS: CPT

## 2025-03-03 PROCEDURE — 81443 GENETIC TSTG SEVERE INH COND: CPT

## 2025-03-03 PROCEDURE — 83020 HEMOGLOBIN ELECTROPHORESIS: CPT

## 2025-03-03 PROCEDURE — 99214 OFFICE O/P EST MOD 30 MIN: CPT | Mod: 25

## 2025-03-03 PROCEDURE — 87481 CANDIDA DNA AMP PROBE: CPT

## 2025-03-03 PROCEDURE — 81420 FETAL CHRMOML ANEUPLOIDY: CPT

## 2025-03-03 PROCEDURE — 82105 ALPHA-FETOPROTEIN SERUM: CPT

## 2025-03-03 PROCEDURE — 83655 ASSAY OF LEAD: CPT

## 2025-03-03 PROCEDURE — 76815 OB US LIMITED FETUS(S): CPT | Mod: 26

## 2025-03-03 PROCEDURE — 86870 RBC ANTIBODY IDENTIFICATION: CPT

## 2025-03-03 PROCEDURE — 86765 RUBEOLA ANTIBODY: CPT

## 2025-03-03 PROCEDURE — 87591 N.GONORRHOEAE DNA AMP PROB: CPT

## 2025-03-03 PROCEDURE — 86900 BLOOD TYPING SEROLOGIC ABO: CPT

## 2025-03-03 PROCEDURE — 86860 RBC ANTIBODY ELUTION: CPT

## 2025-03-03 PROCEDURE — 87340 HEPATITIS B SURFACE AG IA: CPT

## 2025-03-03 PROCEDURE — 86850 RBC ANTIBODY SCREEN: CPT

## 2025-03-03 PROCEDURE — 36415 COLL VENOUS BLD VENIPUNCTURE: CPT

## 2025-03-03 PROCEDURE — 86880 COOMBS TEST DIRECT: CPT

## 2025-03-03 PROCEDURE — 81513 NFCT DS BV RNA VAG FLU ALG: CPT

## 2025-03-03 PROCEDURE — 85027 COMPLETE CBC AUTOMATED: CPT

## 2025-03-03 PROCEDURE — 87389 HIV-1 AG W/HIV-1&-2 AB AG IA: CPT

## 2025-03-03 PROCEDURE — 81001 URINALYSIS AUTO W/SCOPE: CPT

## 2025-03-03 PROCEDURE — 76816 OB US FOLLOW-UP PER FETUS: CPT

## 2025-03-03 PROCEDURE — 81220 CFTR GENE COM VARIANTS: CPT

## 2025-03-03 PROCEDURE — 81422 FETAL CHRMOML MICRODELTJ: CPT

## 2025-03-03 PROCEDURE — 83020 HEMOGLOBIN ELECTROPHORESIS: CPT | Mod: 26

## 2025-03-03 PROCEDURE — 87661 TRICHOMONAS VAGINALIS AMPLIF: CPT

## 2025-03-03 PROCEDURE — 86803 HEPATITIS C AB TEST: CPT

## 2025-03-04 ENCOUNTER — OUTPATIENT (OUTPATIENT)
Dept: OUTPATIENT SERVICES | Facility: HOSPITAL | Age: 25
LOS: 1 days | End: 2025-03-04

## 2025-03-04 ENCOUNTER — APPOINTMENT (OUTPATIENT)
Dept: OBGYN | Facility: CLINIC | Age: 25
End: 2025-03-04

## 2025-03-04 ENCOUNTER — NON-APPOINTMENT (OUTPATIENT)
Age: 25
End: 2025-03-04

## 2025-03-04 DIAGNOSIS — Z34.90 ENCOUNTER FOR SUPERVISION OF NORMAL PREGNANCY, UNSPECIFIED, UNSPECIFIED TRIMESTER: ICD-10-CM

## 2025-03-04 DIAGNOSIS — Z98.891 HISTORY OF UTERINE SCAR FROM PREVIOUS SURGERY: Chronic | ICD-10-CM

## 2025-03-04 DIAGNOSIS — O09.292 SUPERVISION OF PREGNANCY WITH OTHER POOR REPRODUCTIVE OR OBSTETRIC HISTORY, SECOND TRIMESTER: ICD-10-CM

## 2025-03-04 DIAGNOSIS — O99.212 OBESITY COMPLICATING PREGNANCY, SECOND TRIMESTER: ICD-10-CM

## 2025-03-04 DIAGNOSIS — O34.219 MATERNAL CARE FOR UNSPECIFIED TYPE SCAR FROM PREVIOUS CESAREAN DELIVERY: ICD-10-CM

## 2025-03-04 DIAGNOSIS — Z3A.16 16 WEEKS GESTATION OF PREGNANCY: ICD-10-CM

## 2025-03-05 DIAGNOSIS — Z34.90 ENCOUNTER FOR SUPERVISION OF NORMAL PREGNANCY, UNSPECIFIED, UNSPECIFIED TRIMESTER: ICD-10-CM

## 2025-03-06 LAB
ABORH: NORMAL
AFP INTERP SERPL-IMP: NORMAL
AFP INTERP SERPL-IMP: NORMAL
AFP MOM CUT-OFF: 2.5
AFP MOM SERPL: 0.9
AFP PERCENTILE: 37.7
AFP SERPL-ACNC: 27.38 NG/ML
ANTIBODY SCREEN: NORMAL
APPEARANCE: CLEAR
BILIRUB UR QL STRIP: NEGATIVE
BILIRUBIN URINE: NEGATIVE
BLOOD URINE: NEGATIVE
BV BACTERIA RRNA VAG QL NAA+PROBE: NOT DETECTED
C GLABRATA RNA VAG QL NAA+PROBE: NOT DETECTED
C TRACH RRNA SPEC QL NAA+PROBE: NOT DETECTED
CANDIDA RRNA VAG QL PROBE: NOT DETECTED
CARBAMAZEPINE?: NO
COLOR: YELLOW
CURRENT SMOKER: NORMAL
DIABETES STATUS PATIENT: NORMAL
ESTIMATED AVERAGE GLUCOSE: <68 MG/DL
GA: NORMAL
GESTATIONAL AGE METHOD: NORMAL
GLUCOSE QUALITATIVE U: NEGATIVE MG/DL
GLUCOSE UR-MCNC: NEGATIVE
HBA1C MFR BLD HPLC: <4 %
HBV SURFACE AG SER QL: NONREACTIVE
HCG UR QL: 0.2 EU/DL
HCT VFR BLD CALC: 27.9 %
HCV AB SER QL: NONREACTIVE
HCV S/CO RATIO: 0.05 COI
HGB A MFR BLD: 94.1 %
HGB A2 MFR BLD: 2.6 %
HGB BLD-MCNC: 10.1 G/DL
HGB F MFR BLD: 3.3 %
HGB FRACT BLD-IMP: NORMAL
HGB UR QL STRIP.AUTO: NEGATIVE
HIV1+2 AB SPEC QL IA.RAPID: NONREACTIVE
HX OF NTD NARR: NORMAL
KETONES UR-MCNC: NEGATIVE
KETONES URINE: ABNORMAL MG/DL
LEAD BLD-MCNC: <1 UG/DL
LEUKOCYTE ESTERASE UR QL STRIP: NEGATIVE
LEUKOCYTE ESTERASE URINE: ABNORMAL
MCHC RBC-ENTMCNC: 34.8 PG
MCHC RBC-ENTMCNC: 36.2 G/DL
MCV RBC AUTO: 96.2 FL
MEV IGG FLD QL IA: 19.8 AU/ML
MEV IGG+IGM SER-IMP: POSITIVE
MULTIPLE PREGNANCY: NORMAL
N GONORRHOEA RRNA SPEC QL NAA+PROBE: NOT DETECTED
NEURAL TUBE DEFECT RISK FETUS: NORMAL
NEURAL TUBE DEFECT RISK POP: NORMAL
NITRITE UR QL STRIP: NEGATIVE
NITRITE URINE: NEGATIVE
PH UR STRIP: 6.5
PH URINE: 6.5
PLATELET # BLD AUTO: 284 K/UL
PMV BLD AUTO: 0 /100 WBCS
PMV BLD: 10.8 FL
PROT UR STRIP-MCNC: NEGATIVE
PROTEIN URINE: NORMAL MG/DL
RBC # BLD: 2.9 M/UL
RBC # FLD: 14 %
RECOM F/U: NO
RUBV IGG FLD-ACNC: 7.11 INDEX
RUBV IGG SER-IMP: POSITIVE
SP GR UR STRIP: 1020
SPECIFIC GRAVITY URINE: 1.03
T PALLIDUM AB SER QL IA: NEGATIVE
T VAGINALIS RRNA SPEC QL NAA+PROBE: NOT DETECTED
TEST PERFORMANCE INFO SPEC: NORMAL
UROBILINOGEN URINE: 0.2 MG/DL
VALPROIC ACID?: NORMAL
VZV AB TITR SER: POSITIVE
VZV IGG SER IF-ACNC: 1.79 S/CO
WBC # FLD AUTO: 4.39 K/UL

## 2025-03-10 LAB — CYTOLOGY CVX/VAG DOC THIN PREP: NORMAL

## 2025-03-11 ENCOUNTER — APPOINTMENT (OUTPATIENT)
Dept: ANTEPARTUM | Facility: CLINIC | Age: 25
End: 2025-03-11

## 2025-03-16 LAB
1P36 DELETION SYN POPULATION-BASED RISK: NORMAL
1P36 DELETION SYNDROME RESULT: NORMAL
1P36 DELETION SYNDROME RISK AFTER TEST: NORMAL
22Q11.2 DELETION POPULATION-BASED RISK: NORMAL
22Q11.2 DELETION RISK AFTER TEST: NORMAL
22Q11.2 DELETION SYNDROME RESULT: NORMAL
ANGELMAN SYNDROME POPULATION-BASED RISK: NORMAL
ANGELMAN SYNDROME RESULT: NORMAL
ANGELMAN SYNDROME RISK AFTER TEST: NORMAL
CRI-DU-CHAT SYNDR POPULATION-BASED RISK: NORMAL
CRI-DU-CHAT SYNDROME RESULT: NORMAL
CRI-DU-CHAT SYNDROME RISK AFTER TEST: NORMAL
FETAL FRACTION: NORMAL
GENDER OF FETUS: NORMAL
GENE DIS ANL CARRIER INTERP-IMP: NORMAL
MONOSOMY X AGE-BASED RISK: NORMAL
MONOSOMY X RESULT: NORMAL
MONOSOMY X RISK AFTER TEST: NORMAL
PANORAMA SCREEN: NORMAL
PRADER-WILLI SYND POPULATION-BASED RISK: NORMAL
PRADER-WILLI SYNDROME RESULT: NORMAL
PRADER-WILLI SYNDROME RISK AFTER TEST: NORMAL
RPT COMMENT: NORMAL
TEST PERFORMANCE INFO SPEC: NORMAL
TRIPLOIDY RESULT: NORMAL
TRISOMY 13 AGE-BASED RISK: NORMAL
TRISOMY 13 RESULT: NORMAL
TRISOMY 13 RISK AFTER TEST: NORMAL
TRISOMY 18 AGE-BASED RISK: NORMAL
TRISOMY 18 RESULT: NORMAL
TRISOMY 18 RISK AFTER TEST: NORMAL
TRISOMY 21 RESULT: NORMAL
TRISOMY 21 RISK AFTER TEST: NORMAL

## 2025-03-18 ENCOUNTER — APPOINTMENT (OUTPATIENT)
Dept: ANTEPARTUM | Facility: CLINIC | Age: 25
End: 2025-03-18
Payer: COMMERCIAL

## 2025-03-18 ENCOUNTER — ASOB RESULT (OUTPATIENT)
Age: 25
End: 2025-03-18

## 2025-03-18 ENCOUNTER — OUTPATIENT (OUTPATIENT)
Dept: OUTPATIENT SERVICES | Facility: HOSPITAL | Age: 25
LOS: 1 days | End: 2025-03-18
Payer: COMMERCIAL

## 2025-03-18 ENCOUNTER — LABORATORY RESULT (OUTPATIENT)
Age: 25
End: 2025-03-18

## 2025-03-18 DIAGNOSIS — Z98.891 HISTORY OF UTERINE SCAR FROM PREVIOUS SURGERY: Chronic | ICD-10-CM

## 2025-03-18 DIAGNOSIS — Z34.90 ENCOUNTER FOR SUPERVISION OF NORMAL PREGNANCY, UNSPECIFIED, UNSPECIFIED TRIMESTER: ICD-10-CM

## 2025-03-18 PROCEDURE — 76815 OB US LIMITED FETUS(S): CPT | Mod: 26

## 2025-03-18 PROCEDURE — 76815 OB US LIMITED FETUS(S): CPT

## 2025-03-18 PROCEDURE — 76817 TRANSVAGINAL US OBSTETRIC: CPT

## 2025-03-18 PROCEDURE — 76817 TRANSVAGINAL US OBSTETRIC: CPT | Mod: 26

## 2025-03-21 DIAGNOSIS — O34.219 MATERNAL CARE FOR UNSPECIFIED TYPE SCAR FROM PREVIOUS CESAREAN DELIVERY: ICD-10-CM

## 2025-03-21 DIAGNOSIS — Z3A.19 19 WEEKS GESTATION OF PREGNANCY: ICD-10-CM

## 2025-03-21 DIAGNOSIS — O09.292 SUPERVISION OF PREGNANCY WITH OTHER POOR REPRODUCTIVE OR OBSTETRIC HISTORY, SECOND TRIMESTER: ICD-10-CM

## 2025-03-28 ENCOUNTER — NON-APPOINTMENT (OUTPATIENT)
Age: 25
End: 2025-03-28

## 2025-03-31 ENCOUNTER — APPOINTMENT (OUTPATIENT)
Dept: ANTEPARTUM | Facility: CLINIC | Age: 25
End: 2025-03-31
Payer: COMMERCIAL

## 2025-03-31 ENCOUNTER — OUTPATIENT (OUTPATIENT)
Dept: OUTPATIENT SERVICES | Facility: HOSPITAL | Age: 25
LOS: 1 days | End: 2025-03-31
Payer: COMMERCIAL

## 2025-03-31 ENCOUNTER — ASOB RESULT (OUTPATIENT)
Age: 25
End: 2025-03-31

## 2025-03-31 DIAGNOSIS — Z71.3 DIETARY COUNSELING AND SURVEILLANCE: ICD-10-CM

## 2025-03-31 DIAGNOSIS — Z98.891 HISTORY OF UTERINE SCAR FROM PREVIOUS SURGERY: Chronic | ICD-10-CM

## 2025-03-31 DIAGNOSIS — Z34.90 ENCOUNTER FOR SUPERVISION OF NORMAL PREGNANCY, UNSPECIFIED, UNSPECIFIED TRIMESTER: ICD-10-CM

## 2025-03-31 DIAGNOSIS — Z3A.20 20 WEEKS GESTATION OF PREGNANCY: ICD-10-CM

## 2025-03-31 DIAGNOSIS — O34.219 MATERNAL CARE FOR UNSPECIFIED TYPE SCAR FROM PREVIOUS CESAREAN DELIVERY: ICD-10-CM

## 2025-03-31 DIAGNOSIS — Z36.86 ENCOUNTER FOR ANTENATAL SCREENING FOR CERVICAL LENGTH: ICD-10-CM

## 2025-03-31 DIAGNOSIS — O09.299 SUPERVISION OF PREGNANCY WITH OTHER POOR REPRODUCTIVE OR OBSTETRIC HISTORY, UNSPECIFIED TRIMESTER: ICD-10-CM

## 2025-03-31 PROCEDURE — 76811 OB US DETAILED SNGL FETUS: CPT

## 2025-03-31 PROCEDURE — 76817 TRANSVAGINAL US OBSTETRIC: CPT | Mod: 26

## 2025-03-31 PROCEDURE — 76817 TRANSVAGINAL US OBSTETRIC: CPT

## 2025-03-31 PROCEDURE — 76811 OB US DETAILED SNGL FETUS: CPT | Mod: 26

## 2025-04-01 ENCOUNTER — NON-APPOINTMENT (OUTPATIENT)
Age: 25
End: 2025-04-01

## 2025-04-01 ENCOUNTER — APPOINTMENT (OUTPATIENT)
Dept: OBGYN | Facility: CLINIC | Age: 25
End: 2025-04-01
Payer: COMMERCIAL

## 2025-04-01 ENCOUNTER — OUTPATIENT (OUTPATIENT)
Dept: OUTPATIENT SERVICES | Facility: HOSPITAL | Age: 25
LOS: 1 days | End: 2025-04-01
Payer: COMMERCIAL

## 2025-04-01 VITALS
HEIGHT: 63 IN | DIASTOLIC BLOOD PRESSURE: 74 MMHG | SYSTOLIC BLOOD PRESSURE: 114 MMHG | WEIGHT: 212 LBS | BODY MASS INDEX: 37.56 KG/M2

## 2025-04-01 DIAGNOSIS — Z34.90 ENCOUNTER FOR SUPERVISION OF NORMAL PREGNANCY, UNSPECIFIED, UNSPECIFIED TRIMESTER: ICD-10-CM

## 2025-04-01 DIAGNOSIS — Z71.3 DIETARY COUNSELING AND SURVEILLANCE: ICD-10-CM

## 2025-04-01 PROCEDURE — 99213 OFFICE O/P EST LOW 20 MIN: CPT

## 2025-04-01 PROCEDURE — 81002 URINALYSIS NONAUTO W/O SCOPE: CPT

## 2025-04-02 ENCOUNTER — LABORATORY RESULT (OUTPATIENT)
Age: 25
End: 2025-04-02

## 2025-04-02 ENCOUNTER — OUTPATIENT (OUTPATIENT)
Dept: OUTPATIENT SERVICES | Facility: HOSPITAL | Age: 25
LOS: 1 days | End: 2025-04-02
Payer: COMMERCIAL

## 2025-04-02 DIAGNOSIS — Z98.891 HISTORY OF UTERINE SCAR FROM PREVIOUS SURGERY: Chronic | ICD-10-CM

## 2025-04-02 DIAGNOSIS — Z34.90 ENCOUNTER FOR SUPERVISION OF NORMAL PREGNANCY, UNSPECIFIED, UNSPECIFIED TRIMESTER: ICD-10-CM

## 2025-04-02 LAB
BILIRUB UR QL STRIP: NORMAL
CLARITY UR: CLEAR
COLLECTION METHOD: NORMAL
GLUCOSE UR-MCNC: NORMAL
HCG UR QL: 0.2 EU/DL
HGB UR QL STRIP.AUTO: NORMAL
KETONES UR-MCNC: NORMAL
LEUKOCYTE ESTERASE UR QL STRIP: NORMAL
NITRITE UR QL STRIP: NORMAL
PH UR STRIP: 6
PROT UR STRIP-MCNC: NORMAL
SP GR UR STRIP: 1.01

## 2025-04-02 PROCEDURE — 36415 COLL VENOUS BLD VENIPUNCTURE: CPT

## 2025-04-07 DIAGNOSIS — Z34.90 ENCOUNTER FOR SUPERVISION OF NORMAL PREGNANCY, UNSPECIFIED, UNSPECIFIED TRIMESTER: ICD-10-CM

## 2025-04-08 DIAGNOSIS — Z34.90 ENCOUNTER FOR SUPERVISION OF NORMAL PREGNANCY, UNSPECIFIED, UNSPECIFIED TRIMESTER: ICD-10-CM

## 2025-04-17 ENCOUNTER — OUTPATIENT (OUTPATIENT)
Dept: OUTPATIENT SERVICES | Facility: HOSPITAL | Age: 25
LOS: 1 days | End: 2025-04-17
Payer: COMMERCIAL

## 2025-04-17 ENCOUNTER — APPOINTMENT (OUTPATIENT)
Dept: ANTEPARTUM | Facility: CLINIC | Age: 25
End: 2025-04-17
Payer: COMMERCIAL

## 2025-04-17 ENCOUNTER — ASOB RESULT (OUTPATIENT)
Age: 25
End: 2025-04-17

## 2025-04-17 DIAGNOSIS — Z34.90 ENCOUNTER FOR SUPERVISION OF NORMAL PREGNANCY, UNSPECIFIED, UNSPECIFIED TRIMESTER: ICD-10-CM

## 2025-04-17 DIAGNOSIS — Z98.891 HISTORY OF UTERINE SCAR FROM PREVIOUS SURGERY: Chronic | ICD-10-CM

## 2025-04-17 PROCEDURE — 76816 OB US FOLLOW-UP PER FETUS: CPT | Mod: 26

## 2025-04-17 PROCEDURE — 76816 OB US FOLLOW-UP PER FETUS: CPT

## 2025-04-17 PROCEDURE — 76817 TRANSVAGINAL US OBSTETRIC: CPT | Mod: 26

## 2025-04-17 PROCEDURE — 76817 TRANSVAGINAL US OBSTETRIC: CPT

## 2025-04-21 DIAGNOSIS — O99.619 DISEASES OF THE DIGESTIVE SYSTEM COMPLICATING PREGNANCY, UNSPECIFIED TRIMESTER: ICD-10-CM

## 2025-04-21 DIAGNOSIS — Z36.86 ENCOUNTER FOR ANTENATAL SCREENING FOR CERVICAL LENGTH: ICD-10-CM

## 2025-04-21 DIAGNOSIS — Z3A.23 23 WEEKS GESTATION OF PREGNANCY: ICD-10-CM

## 2025-04-21 DIAGNOSIS — O99.212 OBESITY COMPLICATING PREGNANCY, SECOND TRIMESTER: ICD-10-CM

## 2025-04-21 DIAGNOSIS — O09.299 SUPERVISION OF PREGNANCY WITH OTHER POOR REPRODUCTIVE OR OBSTETRIC HISTORY, UNSPECIFIED TRIMESTER: ICD-10-CM

## 2025-04-21 DIAGNOSIS — O34.219 MATERNAL CARE FOR UNSPECIFIED TYPE SCAR FROM PREVIOUS CESAREAN DELIVERY: ICD-10-CM

## 2025-04-21 DIAGNOSIS — Z36.3 ENCOUNTER FOR ANTENATAL SCREENING FOR MALFORMATIONS: ICD-10-CM

## 2025-04-28 ENCOUNTER — NON-APPOINTMENT (OUTPATIENT)
Age: 25
End: 2025-04-28

## 2025-04-29 ENCOUNTER — APPOINTMENT (OUTPATIENT)
Dept: ANTEPARTUM | Facility: CLINIC | Age: 25
End: 2025-04-29
Payer: COMMERCIAL

## 2025-04-29 ENCOUNTER — OUTPATIENT (OUTPATIENT)
Dept: OUTPATIENT SERVICES | Facility: HOSPITAL | Age: 25
LOS: 1 days | End: 2025-04-29
Payer: COMMERCIAL

## 2025-04-29 ENCOUNTER — RESULT CHARGE (OUTPATIENT)
Age: 25
End: 2025-04-29

## 2025-04-29 ENCOUNTER — APPOINTMENT (OUTPATIENT)
Dept: OBGYN | Facility: CLINIC | Age: 25
End: 2025-04-29
Payer: COMMERCIAL

## 2025-04-29 VITALS — SYSTOLIC BLOOD PRESSURE: 120 MMHG | DIASTOLIC BLOOD PRESSURE: 70 MMHG

## 2025-04-29 VITALS — BODY MASS INDEX: 36.67 KG/M2 | WEIGHT: 207 LBS | DIASTOLIC BLOOD PRESSURE: 77 MMHG | SYSTOLIC BLOOD PRESSURE: 134 MMHG

## 2025-04-29 DIAGNOSIS — Z34.90 ENCOUNTER FOR SUPERVISION OF NORMAL PREGNANCY, UNSPECIFIED, UNSPECIFIED TRIMESTER: ICD-10-CM

## 2025-04-29 DIAGNOSIS — Z00.00 ENCOUNTER FOR GENERAL ADULT MEDICAL EXAMINATION W/OUT ABNORMAL FINDINGS: ICD-10-CM

## 2025-04-29 DIAGNOSIS — Z14.8 GENETIC CARRIER OF OTHER DISEASE: ICD-10-CM

## 2025-04-29 DIAGNOSIS — Z98.891 HISTORY OF UTERINE SCAR FROM PREVIOUS SURGERY: Chronic | ICD-10-CM

## 2025-04-29 LAB
BILIRUB UR QL STRIP: NORMAL
CLARITY UR: NORMAL
COLLECTION METHOD: NORMAL
GLUCOSE UR-MCNC: NORMAL
HCG UR QL: 0.2 EU/DL
HGB UR QL STRIP.AUTO: NORMAL
KETONES UR-MCNC: NORMAL
LEUKOCYTE ESTERASE UR QL STRIP: NORMAL
NITRITE UR QL STRIP: NORMAL
PH UR STRIP: 6.5
PROT UR STRIP-MCNC: NORMAL
SP GR UR STRIP: 1.01

## 2025-04-29 PROCEDURE — 99211 OFF/OP EST MAY X REQ PHY/QHP: CPT

## 2025-04-29 PROCEDURE — 99213 OFFICE O/P EST LOW 20 MIN: CPT

## 2025-04-29 PROCEDURE — 85027 COMPLETE CBC AUTOMATED: CPT

## 2025-04-29 PROCEDURE — 81002 URINALYSIS NONAUTO W/O SCOPE: CPT

## 2025-04-29 PROCEDURE — 96041 GENETIC COUNSELING SVC EA 30: CPT

## 2025-04-29 PROCEDURE — 82950 GLUCOSE TEST: CPT

## 2025-04-30 DIAGNOSIS — O10.012 PRE-EXISTING ESSENTIAL HYPERTENSION COMPLICATING PREGNANCY, SECOND TRIMESTER: ICD-10-CM

## 2025-04-30 DIAGNOSIS — O43.112 CIRCUMVALLATE PLACENTA, SECOND TRIMESTER: ICD-10-CM

## 2025-04-30 DIAGNOSIS — O99.212 OBESITY COMPLICATING PREGNANCY, SECOND TRIMESTER: ICD-10-CM

## 2025-04-30 DIAGNOSIS — Z14.8 GENETIC CARRIER OF OTHER DISEASE: ICD-10-CM

## 2025-04-30 LAB
GLUCOSE 1H P 50 G GLC PO SERPL-MCNC: 103 MG/DL
HCT VFR BLD CALC: 27.1 %
HGB BLD-MCNC: 10 G/DL
MCHC RBC-ENTMCNC: 36.9 G/DL
MCHC RBC-ENTMCNC: 38.9 PG
MCV RBC AUTO: 105.4 FL
PLATELET # BLD AUTO: 275 K/UL
PMV BLD AUTO: 0 /100 WBCS
PMV BLD: 10.4 FL
RBC # BLD: 2.57 M/UL
RBC # FLD: 15 %
WBC # FLD AUTO: 4.26 K/UL

## 2025-05-04 LAB — G6PD SER-CCNC: 15.8 U/G HGB

## 2025-05-20 ENCOUNTER — OUTPATIENT (OUTPATIENT)
Dept: OUTPATIENT SERVICES | Facility: HOSPITAL | Age: 25
LOS: 1 days | End: 2025-05-20
Payer: COMMERCIAL

## 2025-05-20 ENCOUNTER — APPOINTMENT (OUTPATIENT)
Dept: ANTEPARTUM | Facility: CLINIC | Age: 25
End: 2025-05-20
Payer: COMMERCIAL

## 2025-05-20 ENCOUNTER — ASOB RESULT (OUTPATIENT)
Age: 25
End: 2025-05-20

## 2025-05-20 DIAGNOSIS — Z34.90 ENCOUNTER FOR SUPERVISION OF NORMAL PREGNANCY, UNSPECIFIED, UNSPECIFIED TRIMESTER: ICD-10-CM

## 2025-05-20 DIAGNOSIS — Z98.891 HISTORY OF UTERINE SCAR FROM PREVIOUS SURGERY: Chronic | ICD-10-CM

## 2025-05-20 PROCEDURE — 76816 OB US FOLLOW-UP PER FETUS: CPT | Mod: 26

## 2025-05-20 PROCEDURE — 76819 FETAL BIOPHYS PROFIL W/O NST: CPT

## 2025-05-20 PROCEDURE — 76819 FETAL BIOPHYS PROFIL W/O NST: CPT | Mod: 26,59

## 2025-05-20 PROCEDURE — 76816 OB US FOLLOW-UP PER FETUS: CPT

## 2025-05-23 ENCOUNTER — NON-APPOINTMENT (OUTPATIENT)
Age: 25
End: 2025-05-23

## 2025-05-23 DIAGNOSIS — O34.219 MATERNAL CARE FOR UNSPECIFIED TYPE SCAR FROM PREVIOUS CESAREAN DELIVERY: ICD-10-CM

## 2025-05-23 DIAGNOSIS — O09.292 SUPERVISION OF PREGNANCY WITH OTHER POOR REPRODUCTIVE OR OBSTETRIC HISTORY, SECOND TRIMESTER: ICD-10-CM

## 2025-05-23 DIAGNOSIS — Z3A.28 28 WEEKS GESTATION OF PREGNANCY: ICD-10-CM

## 2025-05-23 DIAGNOSIS — O09.299 SUPERVISION OF PREGNANCY WITH OTHER POOR REPRODUCTIVE OR OBSTETRIC HISTORY, UNSPECIFIED TRIMESTER: ICD-10-CM

## 2025-05-23 DIAGNOSIS — O99.619 DISEASES OF THE DIGESTIVE SYSTEM COMPLICATING PREGNANCY, UNSPECIFIED TRIMESTER: ICD-10-CM

## 2025-05-23 DIAGNOSIS — O99.212 OBESITY COMPLICATING PREGNANCY, SECOND TRIMESTER: ICD-10-CM

## 2025-05-27 ENCOUNTER — APPOINTMENT (OUTPATIENT)
Dept: ANTEPARTUM | Facility: CLINIC | Age: 25
End: 2025-05-27
Payer: COMMERCIAL

## 2025-05-27 ENCOUNTER — OUTPATIENT (OUTPATIENT)
Dept: OUTPATIENT SERVICES | Facility: HOSPITAL | Age: 25
LOS: 1 days | End: 2025-05-27
Payer: COMMERCIAL

## 2025-05-27 ENCOUNTER — APPOINTMENT (OUTPATIENT)
Dept: OBGYN | Facility: CLINIC | Age: 25
End: 2025-05-27
Payer: COMMERCIAL

## 2025-05-27 VITALS
HEIGHT: 63 IN | WEIGHT: 207.44 LBS | BODY MASS INDEX: 36.75 KG/M2 | DIASTOLIC BLOOD PRESSURE: 75 MMHG | SYSTOLIC BLOOD PRESSURE: 113 MMHG

## 2025-05-27 DIAGNOSIS — Z98.891 HISTORY OF UTERINE SCAR FROM PREVIOUS SURGERY: Chronic | ICD-10-CM

## 2025-05-27 DIAGNOSIS — Z34.90 ENCOUNTER FOR SUPERVISION OF NORMAL PREGNANCY, UNSPECIFIED, UNSPECIFIED TRIMESTER: ICD-10-CM

## 2025-05-27 PROCEDURE — 99213 OFFICE O/P EST LOW 20 MIN: CPT

## 2025-05-27 PROCEDURE — ZZZZZ: CPT

## 2025-05-27 PROCEDURE — 81002 URINALYSIS NONAUTO W/O SCOPE: CPT

## 2025-05-28 LAB
BILIRUB UR QL STRIP: NORMAL
CLARITY UR: CLEAR
COLLECTION METHOD: NORMAL
GLUCOSE UR-MCNC: NORMAL
HCG UR QL: 0.2 EU/DL
HGB UR QL STRIP.AUTO: NORMAL
KETONES UR-MCNC: NORMAL
LEUKOCYTE ESTERASE UR QL STRIP: NORMAL
NITRITE UR QL STRIP: NORMAL
PH UR STRIP: 5
PROT UR STRIP-MCNC: NORMAL
SP GR UR STRIP: 1.01

## 2025-06-11 ENCOUNTER — APPOINTMENT (OUTPATIENT)
Dept: OBGYN | Facility: CLINIC | Age: 25
End: 2025-06-11
Payer: MEDICAID

## 2025-06-11 ENCOUNTER — OUTPATIENT (OUTPATIENT)
Dept: OUTPATIENT SERVICES | Facility: HOSPITAL | Age: 25
LOS: 1 days | End: 2025-06-11
Payer: MEDICAID

## 2025-06-11 ENCOUNTER — APPOINTMENT (OUTPATIENT)
Dept: OBGYN | Facility: CLINIC | Age: 25
End: 2025-06-11

## 2025-06-11 ENCOUNTER — NON-APPOINTMENT (OUTPATIENT)
Age: 25
End: 2025-06-11

## 2025-06-11 VITALS — DIASTOLIC BLOOD PRESSURE: 78 MMHG | WEIGHT: 212 LBS | BODY MASS INDEX: 37.55 KG/M2 | SYSTOLIC BLOOD PRESSURE: 128 MMHG

## 2025-06-11 DIAGNOSIS — Z34.90 ENCOUNTER FOR SUPERVISION OF NORMAL PREGNANCY, UNSPECIFIED, UNSPECIFIED TRIMESTER: ICD-10-CM

## 2025-06-11 DIAGNOSIS — Z98.891 HISTORY OF UTERINE SCAR FROM PREVIOUS SURGERY: Chronic | ICD-10-CM

## 2025-06-11 PROBLEM — Z23 ENCOUNTER FOR IMMUNIZATION: Status: ACTIVE | Noted: 2025-06-11 | Resolved: 2025-06-25

## 2025-06-11 PROCEDURE — 99213 OFFICE O/P EST LOW 20 MIN: CPT | Mod: 25

## 2025-06-11 PROCEDURE — 90471 IMMUNIZATION ADMIN: CPT

## 2025-06-11 PROCEDURE — 99213 OFFICE O/P EST LOW 20 MIN: CPT

## 2025-06-11 PROCEDURE — 81002 URINALYSIS NONAUTO W/O SCOPE: CPT

## 2025-06-12 LAB
BILIRUB UR QL STRIP: NEGATIVE
CLARITY UR: CLEAR
COLLECTION METHOD: NORMAL
GLUCOSE UR-MCNC: NEGATIVE
HCG UR QL: 0.2 EU/DL
HGB UR QL STRIP.AUTO: NEGATIVE
KETONES UR-MCNC: NEGATIVE
LEUKOCYTE ESTERASE UR QL STRIP: NEGATIVE
NITRITE UR QL STRIP: NEGATIVE
PH UR STRIP: 6.5
PROT UR STRIP-MCNC: NORMAL
SP GR UR STRIP: 1.01

## 2025-06-13 DIAGNOSIS — Z34.90 ENCOUNTER FOR SUPERVISION OF NORMAL PREGNANCY, UNSPECIFIED, UNSPECIFIED TRIMESTER: ICD-10-CM

## 2025-06-17 ENCOUNTER — OUTPATIENT (OUTPATIENT)
Dept: OUTPATIENT SERVICES | Facility: HOSPITAL | Age: 25
LOS: 1 days | End: 2025-06-17
Payer: MEDICAID

## 2025-06-17 ENCOUNTER — ASOB RESULT (OUTPATIENT)
Age: 25
End: 2025-06-17

## 2025-06-17 ENCOUNTER — APPOINTMENT (OUTPATIENT)
Dept: ANTEPARTUM | Facility: CLINIC | Age: 25
End: 2025-06-17
Payer: MEDICAID

## 2025-06-17 DIAGNOSIS — Z98.891 HISTORY OF UTERINE SCAR FROM PREVIOUS SURGERY: Chronic | ICD-10-CM

## 2025-06-17 DIAGNOSIS — Z34.90 ENCOUNTER FOR SUPERVISION OF NORMAL PREGNANCY, UNSPECIFIED, UNSPECIFIED TRIMESTER: ICD-10-CM

## 2025-06-17 PROCEDURE — 76816 OB US FOLLOW-UP PER FETUS: CPT | Mod: 26

## 2025-06-17 PROCEDURE — 76819 FETAL BIOPHYS PROFIL W/O NST: CPT

## 2025-06-17 PROCEDURE — 76819 FETAL BIOPHYS PROFIL W/O NST: CPT | Mod: 26,59

## 2025-06-17 PROCEDURE — 76816 OB US FOLLOW-UP PER FETUS: CPT

## 2025-06-24 DIAGNOSIS — O99.619 DISEASES OF THE DIGESTIVE SYSTEM COMPLICATING PREGNANCY, UNSPECIFIED TRIMESTER: ICD-10-CM

## 2025-06-24 DIAGNOSIS — O34.219 MATERNAL CARE FOR UNSPECIFIED TYPE SCAR FROM PREVIOUS CESAREAN DELIVERY: ICD-10-CM

## 2025-06-24 DIAGNOSIS — O09.299 SUPERVISION OF PREGNANCY WITH OTHER POOR REPRODUCTIVE OR OBSTETRIC HISTORY, UNSPECIFIED TRIMESTER: ICD-10-CM

## 2025-06-24 DIAGNOSIS — O09.292 SUPERVISION OF PREGNANCY WITH OTHER POOR REPRODUCTIVE OR OBSTETRIC HISTORY, SECOND TRIMESTER: ICD-10-CM

## 2025-06-24 DIAGNOSIS — Z3A.32 32 WEEKS GESTATION OF PREGNANCY: ICD-10-CM

## 2025-06-24 DIAGNOSIS — O99.212 OBESITY COMPLICATING PREGNANCY, SECOND TRIMESTER: ICD-10-CM

## 2025-06-25 ENCOUNTER — OUTPATIENT (OUTPATIENT)
Dept: OUTPATIENT SERVICES | Facility: HOSPITAL | Age: 25
LOS: 1 days | End: 2025-06-25
Payer: MEDICAID

## 2025-06-25 ENCOUNTER — NON-APPOINTMENT (OUTPATIENT)
Age: 25
End: 2025-06-25

## 2025-06-25 ENCOUNTER — APPOINTMENT (OUTPATIENT)
Dept: OBGYN | Facility: CLINIC | Age: 25
End: 2025-06-25
Payer: MEDICAID

## 2025-06-25 VITALS
HEIGHT: 63 IN | SYSTOLIC BLOOD PRESSURE: 117 MMHG | WEIGHT: 210 LBS | BODY MASS INDEX: 37.21 KG/M2 | DIASTOLIC BLOOD PRESSURE: 81 MMHG

## 2025-06-25 DIAGNOSIS — Z34.90 ENCOUNTER FOR SUPERVISION OF NORMAL PREGNANCY, UNSPECIFIED, UNSPECIFIED TRIMESTER: ICD-10-CM

## 2025-06-25 PROCEDURE — 81002 URINALYSIS NONAUTO W/O SCOPE: CPT

## 2025-06-25 PROCEDURE — 99213 OFFICE O/P EST LOW 20 MIN: CPT

## 2025-06-30 DIAGNOSIS — Z34.90 ENCOUNTER FOR SUPERVISION OF NORMAL PREGNANCY, UNSPECIFIED, UNSPECIFIED TRIMESTER: ICD-10-CM

## 2025-07-03 ENCOUNTER — ASOB RESULT (OUTPATIENT)
Age: 25
End: 2025-07-03

## 2025-07-03 ENCOUNTER — APPOINTMENT (OUTPATIENT)
Dept: ANTEPARTUM | Facility: CLINIC | Age: 25
End: 2025-07-03
Payer: MEDICAID

## 2025-07-03 ENCOUNTER — OUTPATIENT (OUTPATIENT)
Dept: OUTPATIENT SERVICES | Facility: HOSPITAL | Age: 25
LOS: 1 days | End: 2025-07-03
Payer: COMMERCIAL

## 2025-07-03 DIAGNOSIS — Z98.891 HISTORY OF UTERINE SCAR FROM PREVIOUS SURGERY: Chronic | ICD-10-CM

## 2025-07-03 DIAGNOSIS — Z34.90 ENCOUNTER FOR SUPERVISION OF NORMAL PREGNANCY, UNSPECIFIED, UNSPECIFIED TRIMESTER: ICD-10-CM

## 2025-07-03 PROCEDURE — 76819 FETAL BIOPHYS PROFIL W/O NST: CPT

## 2025-07-03 PROCEDURE — 76819 FETAL BIOPHYS PROFIL W/O NST: CPT | Mod: 26

## 2025-07-07 DIAGNOSIS — Z3A.34 34 WEEKS GESTATION OF PREGNANCY: ICD-10-CM

## 2025-07-07 DIAGNOSIS — O09.292 SUPERVISION OF PREGNANCY WITH OTHER POOR REPRODUCTIVE OR OBSTETRIC HISTORY, SECOND TRIMESTER: ICD-10-CM

## 2025-07-07 DIAGNOSIS — O34.219 MATERNAL CARE FOR UNSPECIFIED TYPE SCAR FROM PREVIOUS CESAREAN DELIVERY: ICD-10-CM

## 2025-07-07 DIAGNOSIS — O99.619 DISEASES OF THE DIGESTIVE SYSTEM COMPLICATING PREGNANCY, UNSPECIFIED TRIMESTER: ICD-10-CM

## 2025-07-07 DIAGNOSIS — O99.212 OBESITY COMPLICATING PREGNANCY, SECOND TRIMESTER: ICD-10-CM

## 2025-07-07 DIAGNOSIS — O09.299 SUPERVISION OF PREGNANCY WITH OTHER POOR REPRODUCTIVE OR OBSTETRIC HISTORY, UNSPECIFIED TRIMESTER: ICD-10-CM

## 2025-07-09 ENCOUNTER — APPOINTMENT (OUTPATIENT)
Dept: OBGYN | Facility: CLINIC | Age: 25
End: 2025-07-09
Payer: MEDICAID

## 2025-07-09 ENCOUNTER — OUTPATIENT (OUTPATIENT)
Dept: OUTPATIENT SERVICES | Facility: HOSPITAL | Age: 25
LOS: 1 days | End: 2025-07-09
Payer: MEDICAID

## 2025-07-09 ENCOUNTER — NON-APPOINTMENT (OUTPATIENT)
Age: 25
End: 2025-07-09

## 2025-07-09 ENCOUNTER — RESULT CHARGE (OUTPATIENT)
Age: 25
End: 2025-07-09

## 2025-07-09 VITALS
BODY MASS INDEX: 37.74 KG/M2 | HEART RATE: 84 BPM | SYSTOLIC BLOOD PRESSURE: 128 MMHG | WEIGHT: 213 LBS | HEIGHT: 63 IN | DIASTOLIC BLOOD PRESSURE: 78 MMHG

## 2025-07-09 DIAGNOSIS — Z34.90 ENCOUNTER FOR SUPERVISION OF NORMAL PREGNANCY, UNSPECIFIED, UNSPECIFIED TRIMESTER: ICD-10-CM

## 2025-07-09 PROCEDURE — 99213 OFFICE O/P EST LOW 20 MIN: CPT

## 2025-07-09 PROCEDURE — 81002 URINALYSIS NONAUTO W/O SCOPE: CPT

## 2025-07-10 ENCOUNTER — OUTPATIENT (OUTPATIENT)
Dept: OUTPATIENT SERVICES | Facility: HOSPITAL | Age: 25
LOS: 1 days | End: 2025-07-10
Payer: COMMERCIAL

## 2025-07-10 ENCOUNTER — APPOINTMENT (OUTPATIENT)
Dept: ANTEPARTUM | Facility: CLINIC | Age: 25
End: 2025-07-10

## 2025-07-10 ENCOUNTER — ASOB RESULT (OUTPATIENT)
Age: 25
End: 2025-07-10

## 2025-07-10 DIAGNOSIS — Z34.90 ENCOUNTER FOR SUPERVISION OF NORMAL PREGNANCY, UNSPECIFIED, UNSPECIFIED TRIMESTER: ICD-10-CM

## 2025-07-10 DIAGNOSIS — Z98.891 HISTORY OF UTERINE SCAR FROM PREVIOUS SURGERY: Chronic | ICD-10-CM

## 2025-07-10 LAB
BILIRUB UR QL STRIP: NEGATIVE
CLARITY UR: CLEAR
COLLECTION METHOD: NORMAL
GLUCOSE UR-MCNC: NEGATIVE
HCG UR QL: 0.2 EU/DL
HGB UR QL STRIP.AUTO: NEGATIVE
KETONES UR-MCNC: NEGATIVE
LEUKOCYTE ESTERASE UR QL STRIP: NEGATIVE
NITRITE UR QL STRIP: NEGATIVE
PH UR STRIP: 6
PROT UR STRIP-MCNC: NEGATIVE
SP GR UR STRIP: 1.02

## 2025-07-10 PROCEDURE — 76816 OB US FOLLOW-UP PER FETUS: CPT | Mod: 26

## 2025-07-10 PROCEDURE — 76819 FETAL BIOPHYS PROFIL W/O NST: CPT | Mod: 26,59

## 2025-07-10 PROCEDURE — 76819 FETAL BIOPHYS PROFIL W/O NST: CPT

## 2025-07-10 PROCEDURE — 76816 OB US FOLLOW-UP PER FETUS: CPT

## 2025-07-16 ENCOUNTER — OUTPATIENT (OUTPATIENT)
Dept: OUTPATIENT SERVICES | Facility: HOSPITAL | Age: 25
LOS: 1 days | End: 2025-07-16
Payer: MEDICAID

## 2025-07-16 ENCOUNTER — APPOINTMENT (OUTPATIENT)
Dept: OBGYN | Facility: CLINIC | Age: 25
End: 2025-07-16
Payer: MEDICAID

## 2025-07-16 VITALS
BODY MASS INDEX: 37.3 KG/M2 | DIASTOLIC BLOOD PRESSURE: 76 MMHG | SYSTOLIC BLOOD PRESSURE: 124 MMHG | HEIGHT: 63 IN | WEIGHT: 210.5 LBS

## 2025-07-16 DIAGNOSIS — Z98.891 HISTORY OF UTERINE SCAR FROM PREVIOUS SURGERY: Chronic | ICD-10-CM

## 2025-07-16 DIAGNOSIS — Z34.90 ENCOUNTER FOR SUPERVISION OF NORMAL PREGNANCY, UNSPECIFIED, UNSPECIFIED TRIMESTER: ICD-10-CM

## 2025-07-16 PROCEDURE — 87491 CHLMYD TRACH DNA AMP PROBE: CPT

## 2025-07-16 PROCEDURE — 87653 STREP B DNA AMP PROBE: CPT

## 2025-07-16 PROCEDURE — 99213 OFFICE O/P EST LOW 20 MIN: CPT

## 2025-07-16 PROCEDURE — 87481 CANDIDA DNA AMP PROBE: CPT

## 2025-07-16 PROCEDURE — 87661 TRICHOMONAS VAGINALIS AMPLIF: CPT

## 2025-07-16 PROCEDURE — 87591 N.GONORRHOEAE DNA AMP PROB: CPT

## 2025-07-16 PROCEDURE — 81513 NFCT DS BV RNA VAG FLU ALG: CPT

## 2025-07-16 PROCEDURE — 81002 URINALYSIS NONAUTO W/O SCOPE: CPT

## 2025-07-17 ENCOUNTER — ASOB RESULT (OUTPATIENT)
Age: 25
End: 2025-07-17

## 2025-07-17 ENCOUNTER — OUTPATIENT (OUTPATIENT)
Dept: OUTPATIENT SERVICES | Facility: HOSPITAL | Age: 25
LOS: 1 days | End: 2025-07-17
Payer: COMMERCIAL

## 2025-07-17 ENCOUNTER — APPOINTMENT (OUTPATIENT)
Dept: ANTEPARTUM | Facility: CLINIC | Age: 25
End: 2025-07-17
Payer: MEDICAID

## 2025-07-17 DIAGNOSIS — Z3A.35 35 WEEKS GESTATION OF PREGNANCY: ICD-10-CM

## 2025-07-17 DIAGNOSIS — O34.219 MATERNAL CARE FOR UNSPECIFIED TYPE SCAR FROM PREVIOUS CESAREAN DELIVERY: ICD-10-CM

## 2025-07-17 DIAGNOSIS — O09.299 SUPERVISION OF PREGNANCY WITH OTHER POOR REPRODUCTIVE OR OBSTETRIC HISTORY, UNSPECIFIED TRIMESTER: ICD-10-CM

## 2025-07-17 DIAGNOSIS — O99.213 OBESITY COMPLICATING PREGNANCY, THIRD TRIMESTER: ICD-10-CM

## 2025-07-17 DIAGNOSIS — Z34.90 ENCOUNTER FOR SUPERVISION OF NORMAL PREGNANCY, UNSPECIFIED, UNSPECIFIED TRIMESTER: ICD-10-CM

## 2025-07-17 DIAGNOSIS — Z98.891 HISTORY OF UTERINE SCAR FROM PREVIOUS SURGERY: Chronic | ICD-10-CM

## 2025-07-17 LAB
BILIRUB UR QL STRIP: NEGATIVE
CLARITY UR: CLEAR
COLLECTION METHOD: NORMAL
GLUCOSE UR-MCNC: NEGATIVE
HCG UR QL: NEGATIVE EU/DL
HGB UR QL STRIP.AUTO: NEGATIVE
KETONES UR-MCNC: NEGATIVE
LEUKOCYTE ESTERASE UR QL STRIP: NEGATIVE
NITRITE UR QL STRIP: NEGATIVE
PH UR STRIP: 6
PROT UR STRIP-MCNC: NORMAL
SP GR UR STRIP: 1.02

## 2025-07-17 PROCEDURE — 76819 FETAL BIOPHYS PROFIL W/O NST: CPT

## 2025-07-17 PROCEDURE — 76819 FETAL BIOPHYS PROFIL W/O NST: CPT | Mod: 26

## 2025-07-18 DIAGNOSIS — O99.619 DISEASES OF THE DIGESTIVE SYSTEM COMPLICATING PREGNANCY, UNSPECIFIED TRIMESTER: ICD-10-CM

## 2025-07-18 DIAGNOSIS — O09.299 SUPERVISION OF PREGNANCY WITH OTHER POOR REPRODUCTIVE OR OBSTETRIC HISTORY, UNSPECIFIED TRIMESTER: ICD-10-CM

## 2025-07-18 DIAGNOSIS — O99.213 OBESITY COMPLICATING PREGNANCY, THIRD TRIMESTER: ICD-10-CM

## 2025-07-18 DIAGNOSIS — O34.219 MATERNAL CARE FOR UNSPECIFIED TYPE SCAR FROM PREVIOUS CESAREAN DELIVERY: ICD-10-CM

## 2025-07-18 DIAGNOSIS — O09.292 SUPERVISION OF PREGNANCY WITH OTHER POOR REPRODUCTIVE OR OBSTETRIC HISTORY, SECOND TRIMESTER: ICD-10-CM

## 2025-07-18 DIAGNOSIS — Z3A.36 36 WEEKS GESTATION OF PREGNANCY: ICD-10-CM

## 2025-07-20 LAB
BV BACTERIA RRNA VAG QL NAA+PROBE: NOT DETECTED
C GLABRATA RNA VAG QL NAA+PROBE: NOT DETECTED
C TRACH RRNA SPEC QL NAA+PROBE: NOT DETECTED
CANDIDA RRNA VAG QL PROBE: NOT DETECTED
GP B STREP DNA SPEC QL NAA+PROBE: NOT DETECTED
N GONORRHOEA RRNA SPEC QL NAA+PROBE: NOT DETECTED
SOURCE GBS: NORMAL
T VAGINALIS RRNA SPEC QL NAA+PROBE: NOT DETECTED

## 2025-07-23 ENCOUNTER — OUTPATIENT (OUTPATIENT)
Dept: OUTPATIENT SERVICES | Facility: HOSPITAL | Age: 25
LOS: 1 days | End: 2025-07-23
Payer: MEDICAID

## 2025-07-23 ENCOUNTER — APPOINTMENT (OUTPATIENT)
Dept: OBGYN | Facility: CLINIC | Age: 25
End: 2025-07-23
Payer: MEDICAID

## 2025-07-23 VITALS
DIASTOLIC BLOOD PRESSURE: 72 MMHG | HEIGHT: 63 IN | BODY MASS INDEX: 38.27 KG/M2 | WEIGHT: 216 LBS | SYSTOLIC BLOOD PRESSURE: 119 MMHG

## 2025-07-23 DIAGNOSIS — Z98.891 HISTORY OF UTERINE SCAR FROM PREVIOUS SURGERY: Chronic | ICD-10-CM

## 2025-07-23 DIAGNOSIS — Z34.90 ENCOUNTER FOR SUPERVISION OF NORMAL PREGNANCY, UNSPECIFIED, UNSPECIFIED TRIMESTER: ICD-10-CM

## 2025-07-23 PROCEDURE — 81002 URINALYSIS NONAUTO W/O SCOPE: CPT

## 2025-07-23 PROCEDURE — 99213 OFFICE O/P EST LOW 20 MIN: CPT

## 2025-07-24 ENCOUNTER — APPOINTMENT (OUTPATIENT)
Dept: ANTEPARTUM | Facility: CLINIC | Age: 25
End: 2025-07-24

## 2025-07-24 DIAGNOSIS — Z34.90 ENCOUNTER FOR SUPERVISION OF NORMAL PREGNANCY, UNSPECIFIED, UNSPECIFIED TRIMESTER: ICD-10-CM

## 2025-07-30 ENCOUNTER — NON-APPOINTMENT (OUTPATIENT)
Age: 25
End: 2025-07-30

## 2025-07-30 ENCOUNTER — OUTPATIENT (OUTPATIENT)
Dept: OUTPATIENT SERVICES | Facility: HOSPITAL | Age: 25
LOS: 1 days | End: 2025-07-30
Payer: MEDICAID

## 2025-07-30 ENCOUNTER — APPOINTMENT (OUTPATIENT)
Dept: OBGYN | Facility: CLINIC | Age: 25
End: 2025-07-30
Payer: MEDICAID

## 2025-07-30 VITALS
BODY MASS INDEX: 37.56 KG/M2 | WEIGHT: 212 LBS | SYSTOLIC BLOOD PRESSURE: 124 MMHG | DIASTOLIC BLOOD PRESSURE: 83 MMHG | HEIGHT: 63 IN

## 2025-07-30 DIAGNOSIS — Z34.90 ENCOUNTER FOR SUPERVISION OF NORMAL PREGNANCY, UNSPECIFIED, UNSPECIFIED TRIMESTER: ICD-10-CM

## 2025-07-30 DIAGNOSIS — Z98.891 HISTORY OF UTERINE SCAR FROM PREVIOUS SURGERY: Chronic | ICD-10-CM

## 2025-07-30 LAB
BILIRUB UR QL STRIP: NEGATIVE
CLARITY UR: CLEAR
COLLECTION METHOD: NORMAL
GLUCOSE UR-MCNC: NEGATIVE
HCG UR QL: 0.2 EU/DL
HGB UR QL STRIP.AUTO: NEGATIVE
KETONES UR-MCNC: NEGATIVE
LEUKOCYTE ESTERASE UR QL STRIP: NEGATIVE
NITRITE UR QL STRIP: NEGATIVE
PH UR STRIP: 6
PROT UR STRIP-MCNC: NORMAL
SP GR UR STRIP: 1.01

## 2025-07-30 PROCEDURE — 99213 OFFICE O/P EST LOW 20 MIN: CPT

## 2025-07-30 PROCEDURE — 81002 URINALYSIS NONAUTO W/O SCOPE: CPT

## 2025-07-31 ENCOUNTER — ASOB RESULT (OUTPATIENT)
Age: 25
End: 2025-07-31

## 2025-07-31 ENCOUNTER — OUTPATIENT (OUTPATIENT)
Dept: OUTPATIENT SERVICES | Facility: HOSPITAL | Age: 25
LOS: 1 days | End: 2025-07-31
Payer: COMMERCIAL

## 2025-07-31 ENCOUNTER — APPOINTMENT (OUTPATIENT)
Dept: ANTEPARTUM | Facility: CLINIC | Age: 25
End: 2025-07-31
Payer: MEDICAID

## 2025-07-31 DIAGNOSIS — O34.219 MATERNAL CARE FOR UNSPECIFIED TYPE SCAR FROM PREVIOUS CESAREAN DELIVERY: ICD-10-CM

## 2025-07-31 DIAGNOSIS — O99.619 DISEASES OF THE DIGESTIVE SYSTEM COMPLICATING PREGNANCY, UNSPECIFIED TRIMESTER: ICD-10-CM

## 2025-07-31 DIAGNOSIS — O09.299 SUPERVISION OF PREGNANCY WITH OTHER POOR REPRODUCTIVE OR OBSTETRIC HISTORY, UNSPECIFIED TRIMESTER: ICD-10-CM

## 2025-07-31 DIAGNOSIS — Z34.90 ENCOUNTER FOR SUPERVISION OF NORMAL PREGNANCY, UNSPECIFIED, UNSPECIFIED TRIMESTER: ICD-10-CM

## 2025-07-31 DIAGNOSIS — Z3A.38 38 WEEKS GESTATION OF PREGNANCY: ICD-10-CM

## 2025-07-31 DIAGNOSIS — Z00.00 ENCOUNTER FOR GENERAL ADULT MEDICAL EXAMINATION WITHOUT ABNORMAL FINDINGS: ICD-10-CM

## 2025-07-31 DIAGNOSIS — O99.213 OBESITY COMPLICATING PREGNANCY, THIRD TRIMESTER: ICD-10-CM

## 2025-07-31 PROCEDURE — 76819 FETAL BIOPHYS PROFIL W/O NST: CPT

## 2025-07-31 PROCEDURE — 76819 FETAL BIOPHYS PROFIL W/O NST: CPT | Mod: 26

## 2025-08-06 ENCOUNTER — APPOINTMENT (OUTPATIENT)
Dept: OBGYN | Facility: CLINIC | Age: 25
End: 2025-08-06

## 2025-08-07 ENCOUNTER — ASOB RESULT (OUTPATIENT)
Age: 25
End: 2025-08-07

## 2025-08-07 ENCOUNTER — APPOINTMENT (OUTPATIENT)
Dept: ANTEPARTUM | Facility: CLINIC | Age: 25
End: 2025-08-07
Payer: MEDICAID

## 2025-08-07 ENCOUNTER — OUTPATIENT (OUTPATIENT)
Dept: OUTPATIENT SERVICES | Facility: HOSPITAL | Age: 25
LOS: 1 days | End: 2025-08-07
Payer: COMMERCIAL

## 2025-08-07 DIAGNOSIS — Z34.90 ENCOUNTER FOR SUPERVISION OF NORMAL PREGNANCY, UNSPECIFIED, UNSPECIFIED TRIMESTER: ICD-10-CM

## 2025-08-07 DIAGNOSIS — Z98.891 HISTORY OF UTERINE SCAR FROM PREVIOUS SURGERY: Chronic | ICD-10-CM

## 2025-08-07 PROCEDURE — 76816 OB US FOLLOW-UP PER FETUS: CPT | Mod: 26

## 2025-08-07 PROCEDURE — 76816 OB US FOLLOW-UP PER FETUS: CPT

## 2025-08-07 PROCEDURE — 76819 FETAL BIOPHYS PROFIL W/O NST: CPT | Mod: 26

## 2025-08-07 PROCEDURE — 76819 FETAL BIOPHYS PROFIL W/O NST: CPT

## 2025-08-14 ENCOUNTER — APPOINTMENT (OUTPATIENT)
Dept: ANTEPARTUM | Facility: CLINIC | Age: 25
End: 2025-08-14

## 2025-08-14 ENCOUNTER — INPATIENT (INPATIENT)
Facility: HOSPITAL | Age: 25
LOS: 2 days | Discharge: HOME | DRG: 540 | End: 2025-08-17
Attending: OBSTETRICS & GYNECOLOGY | Admitting: OBSTETRICS & GYNECOLOGY
Payer: MEDICAID

## 2025-08-14 ENCOUNTER — EMERGENCY (EMERGENCY)
Facility: HOSPITAL | Age: 25
LOS: 0 days | Discharge: LEFT BEFORE DISCHARGE | End: 2025-08-14
Payer: COMMERCIAL

## 2025-08-14 VITALS
OXYGEN SATURATION: 98 % | HEART RATE: 106 BPM | SYSTOLIC BLOOD PRESSURE: 153 MMHG | DIASTOLIC BLOOD PRESSURE: 82 MMHG | WEIGHT: 210.1 LBS | RESPIRATION RATE: 17 BRPM | TEMPERATURE: 98 F

## 2025-08-14 VITALS — DIASTOLIC BLOOD PRESSURE: 96 MMHG | SYSTOLIC BLOOD PRESSURE: 156 MMHG | HEART RATE: 73 BPM

## 2025-08-14 DIAGNOSIS — O09.292 SUPERVISION OF PREGNANCY WITH OTHER POOR REPRODUCTIVE OR OBSTETRIC HISTORY, SECOND TRIMESTER: ICD-10-CM

## 2025-08-14 DIAGNOSIS — K50.90 CROHN'S DISEASE, UNSPECIFIED, WITHOUT COMPLICATIONS: ICD-10-CM

## 2025-08-14 DIAGNOSIS — M32.9 SYSTEMIC LUPUS ERYTHEMATOSUS, UNSPECIFIED: ICD-10-CM

## 2025-08-14 DIAGNOSIS — O26.899 OTHER SPECIFIED PREGNANCY RELATED CONDITIONS, UNSPECIFIED TRIMESTER: ICD-10-CM

## 2025-08-14 DIAGNOSIS — Z03.74 ENCOUNTER FOR SUSPECTED PROBLEM WITH FETAL GROWTH RULED OUT: ICD-10-CM

## 2025-08-14 DIAGNOSIS — O34.219 MATERNAL CARE FOR UNSPECIFIED TYPE SCAR FROM PREVIOUS CESAREAN DELIVERY: ICD-10-CM

## 2025-08-14 DIAGNOSIS — O47.1 FALSE LABOR AT OR AFTER 37 COMPLETED WEEKS OF GESTATION: ICD-10-CM

## 2025-08-14 DIAGNOSIS — O09.299 SUPERVISION OF PREGNANCY WITH OTHER POOR REPRODUCTIVE OR OBSTETRIC HISTORY, UNSPECIFIED TRIMESTER: ICD-10-CM

## 2025-08-14 DIAGNOSIS — Z3A.40 40 WEEKS GESTATION OF PREGNANCY: ICD-10-CM

## 2025-08-14 DIAGNOSIS — Z3A.39 39 WEEKS GESTATION OF PREGNANCY: ICD-10-CM

## 2025-08-14 DIAGNOSIS — Z98.890 OTHER SPECIFIED POSTPROCEDURAL STATES: Chronic | ICD-10-CM

## 2025-08-14 DIAGNOSIS — O99.619 DISEASES OF THE DIGESTIVE SYSTEM COMPLICATING PREGNANCY, UNSPECIFIED TRIMESTER: ICD-10-CM

## 2025-08-14 DIAGNOSIS — O99.213 OBESITY COMPLICATING PREGNANCY, THIRD TRIMESTER: ICD-10-CM

## 2025-08-14 DIAGNOSIS — O42.92 FULL-TERM PREMATURE RUPTURE OF MEMBRANES, UNSPECIFIED AS TO LENGTH OF TIME BETWEEN RUPTURE AND ONSET OF LABOR: ICD-10-CM

## 2025-08-14 DIAGNOSIS — Z98.891 HISTORY OF UTERINE SCAR FROM PREVIOUS SURGERY: Chronic | ICD-10-CM

## 2025-08-14 DIAGNOSIS — D59.10 AUTOIMMUNE HEMOLYTIC ANEMIA, UNSPECIFIED: ICD-10-CM

## 2025-08-14 DIAGNOSIS — Z53.21 PROCEDURE AND TREATMENT NOT CARRIED OUT DUE TO PATIENT LEAVING PRIOR TO BEING SEEN BY HEALTH CARE PROVIDER: ICD-10-CM

## 2025-08-14 LAB
ALBUMIN SERPL ELPH-MCNC: 3.8 G/DL — SIGNIFICANT CHANGE UP (ref 3.5–5.2)
ALLERGY+IMMUNOLOGY DIAG STUDY NOTE: SIGNIFICANT CHANGE UP
ALP SERPL-CCNC: 88 U/L — SIGNIFICANT CHANGE UP (ref 30–115)
ALT FLD-CCNC: 12 U/L — SIGNIFICANT CHANGE UP (ref 0–41)
ANION GAP SERPL CALC-SCNC: 13 MMOL/L — SIGNIFICANT CHANGE UP (ref 7–14)
APPEARANCE UR: CLEAR — SIGNIFICANT CHANGE UP
AST SERPL-CCNC: 19 U/L — SIGNIFICANT CHANGE UP (ref 0–41)
BASOPHILS # BLD AUTO: 0.01 K/UL — SIGNIFICANT CHANGE UP (ref 0–0.2)
BASOPHILS NFR BLD AUTO: 0.2 % — SIGNIFICANT CHANGE UP (ref 0–1)
BILIRUB SERPL-MCNC: 0.6 MG/DL — SIGNIFICANT CHANGE UP (ref 0.2–1.2)
BILIRUB UR-MCNC: NEGATIVE — SIGNIFICANT CHANGE UP
BUN SERPL-MCNC: 6 MG/DL — LOW (ref 10–20)
CALCIUM SERPL-MCNC: 8.5 MG/DL — SIGNIFICANT CHANGE UP (ref 8.4–10.5)
CHLORIDE SERPL-SCNC: 107 MMOL/L — SIGNIFICANT CHANGE UP (ref 98–110)
CO2 SERPL-SCNC: 17 MMOL/L — SIGNIFICANT CHANGE UP (ref 17–32)
COLOR SPEC: YELLOW — SIGNIFICANT CHANGE UP
CREAT ?TM UR-MCNC: 127 MG/DL — SIGNIFICANT CHANGE UP
CREAT SERPL-MCNC: 0.5 MG/DL — LOW (ref 0.7–1.5)
DIFF PNL FLD: NEGATIVE — SIGNIFICANT CHANGE UP
EGFR: 133 ML/MIN/1.73M2 — SIGNIFICANT CHANGE UP
EGFR: 133 ML/MIN/1.73M2 — SIGNIFICANT CHANGE UP
EOSINOPHIL # BLD AUTO: 0.01 K/UL — SIGNIFICANT CHANGE UP (ref 0–0.7)
EOSINOPHIL NFR BLD AUTO: 0.2 % — SIGNIFICANT CHANGE UP (ref 0–8)
GLUCOSE SERPL-MCNC: 71 MG/DL — SIGNIFICANT CHANGE UP (ref 70–99)
GLUCOSE UR QL: NEGATIVE MG/DL — SIGNIFICANT CHANGE UP
HCT VFR BLD CALC: 30.5 % — LOW (ref 37–47)
HGB BLD-MCNC: 10.7 G/DL — LOW (ref 12–16)
HIV 1 & 2 AB SERPL IA.RAPID: SIGNIFICANT CHANGE UP
IMM GRANULOCYTES NFR BLD AUTO: 0.4 % — HIGH (ref 0.1–0.3)
KETONES UR QL: NEGATIVE MG/DL — SIGNIFICANT CHANGE UP
L&D DRUG SCREEN, URINE: SIGNIFICANT CHANGE UP
LDH SERPL L TO P-CCNC: 228 — SIGNIFICANT CHANGE UP (ref 50–242)
LEUKOCYTE ESTERASE UR-ACNC: NEGATIVE — SIGNIFICANT CHANGE UP
LYMPHOCYTES # BLD AUTO: 0.72 K/UL — LOW (ref 1.2–3.4)
LYMPHOCYTES # BLD AUTO: 12.9 % — LOW (ref 20.5–51.1)
MCHC RBC-ENTMCNC: 35.1 G/DL — SIGNIFICANT CHANGE UP (ref 32–37)
MCHC RBC-ENTMCNC: 35.3 PG — HIGH (ref 27–31)
MCV RBC AUTO: 100.7 FL — HIGH (ref 81–99)
MONOCYTES # BLD AUTO: 0.48 K/UL — SIGNIFICANT CHANGE UP (ref 0.1–0.6)
MONOCYTES NFR BLD AUTO: 8.6 % — SIGNIFICANT CHANGE UP (ref 1.7–9.3)
NEUTROPHILS # BLD AUTO: 4.36 K/UL — SIGNIFICANT CHANGE UP (ref 1.4–6.5)
NEUTROPHILS NFR BLD AUTO: 77.7 % — HIGH (ref 42.2–75.2)
NITRITE UR-MCNC: NEGATIVE — SIGNIFICANT CHANGE UP
NRBC BLD AUTO-RTO: 0 /100 WBCS — SIGNIFICANT CHANGE UP (ref 0–0)
PH UR: 7 — SIGNIFICANT CHANGE UP (ref 5–8)
PLATELET # BLD AUTO: 296 K/UL — SIGNIFICANT CHANGE UP (ref 130–400)
PMV BLD: 10.5 FL — HIGH (ref 7.4–10.4)
POTASSIUM SERPL-MCNC: 4.4 MMOL/L — SIGNIFICANT CHANGE UP (ref 3.5–5)
POTASSIUM SERPL-SCNC: 4.4 MMOL/L — SIGNIFICANT CHANGE UP (ref 3.5–5)
PRENATAL SYPHILIS TEST: ABNORMAL
PROT ?TM UR-MCNC: 44 MG/DL — SIGNIFICANT CHANGE UP
PROT SERPL-MCNC: 8 G/DL — SIGNIFICANT CHANGE UP (ref 6–8)
PROT UR-MCNC: 30 MG/DL
PROT/CREAT UR-RTO: 0.3 RATIO — HIGH (ref 0–0.2)
RBC # BLD: 3.03 M/UL — LOW (ref 4.2–5.4)
RBC # FLD: 13.7 % — SIGNIFICANT CHANGE UP (ref 11.5–14.5)
SODIUM SERPL-SCNC: 137 MMOL/L — SIGNIFICANT CHANGE UP (ref 135–146)
SP GR SPEC: 1.02 — SIGNIFICANT CHANGE UP (ref 1–1.03)
URATE SERPL-MCNC: 3.6 MG/DL — SIGNIFICANT CHANGE UP (ref 2.5–7)
UROBILINOGEN FLD QL: 1 MG/DL — SIGNIFICANT CHANGE UP (ref 0.2–1)
WBC # BLD: 5.6 K/UL — SIGNIFICANT CHANGE UP (ref 4.8–10.8)
WBC # FLD AUTO: 5.6 K/UL — SIGNIFICANT CHANGE UP (ref 4.8–10.8)

## 2025-08-14 PROCEDURE — 85025 COMPLETE CBC W/AUTO DIFF WBC: CPT

## 2025-08-14 PROCEDURE — 86880 COOMBS TEST DIRECT: CPT

## 2025-08-14 PROCEDURE — 86850 RBC ANTIBODY SCREEN: CPT

## 2025-08-14 PROCEDURE — 36415 COLL VENOUS BLD VENIPUNCTURE: CPT

## 2025-08-14 PROCEDURE — 86900 BLOOD TYPING SEROLOGIC ABO: CPT

## 2025-08-14 PROCEDURE — 84156 ASSAY OF PROTEIN URINE: CPT

## 2025-08-14 PROCEDURE — 86922 COMPATIBILITY TEST ANTIGLOB: CPT

## 2025-08-14 PROCEDURE — 86901 BLOOD TYPING SEROLOGIC RH(D): CPT

## 2025-08-14 PROCEDURE — 86703 HIV-1/HIV-2 1 RESULT ANTBDY: CPT

## 2025-08-14 PROCEDURE — 86592 SYPHILIS TEST NON-TREP QUAL: CPT

## 2025-08-14 PROCEDURE — 82607 VITAMIN B-12: CPT

## 2025-08-14 PROCEDURE — 82746 ASSAY OF FOLIC ACID SERUM: CPT

## 2025-08-14 PROCEDURE — 86860 RBC ANTIBODY ELUTION: CPT

## 2025-08-14 PROCEDURE — 83615 LACTATE (LD) (LDH) ENZYME: CPT

## 2025-08-14 PROCEDURE — 81001 URINALYSIS AUTO W/SCOPE: CPT

## 2025-08-14 PROCEDURE — 59025 FETAL NON-STRESS TEST: CPT

## 2025-08-14 PROCEDURE — 82570 ASSAY OF URINE CREATININE: CPT

## 2025-08-14 PROCEDURE — L9996: CPT

## 2025-08-14 PROCEDURE — 86780 TREPONEMA PALLIDUM: CPT

## 2025-08-14 PROCEDURE — 84550 ASSAY OF BLOOD/URIC ACID: CPT

## 2025-08-14 PROCEDURE — 86077 PHYS BLOOD BANK SERV XMATCH: CPT

## 2025-08-14 PROCEDURE — 80307 DRUG TEST PRSMV CHEM ANLYZR: CPT

## 2025-08-14 PROCEDURE — 80354 DRUG SCREENING FENTANYL: CPT

## 2025-08-14 PROCEDURE — 86870 RBC ANTIBODY IDENTIFICATION: CPT

## 2025-08-14 PROCEDURE — 86902 BLOOD TYPE ANTIGEN DONOR EA: CPT

## 2025-08-14 PROCEDURE — 80053 COMPREHEN METABOLIC PANEL: CPT

## 2025-08-14 RX ORDER — SODIUM CHLORIDE 9 G/1000ML
1000 INJECTION, SOLUTION INTRAVENOUS
Refills: 0 | Status: DISCONTINUED | OUTPATIENT
Start: 2025-08-14 | End: 2025-08-15

## 2025-08-14 RX ORDER — TRANEXAMIC ACID 1000 MG/10
1000 AMPUL (ML) INTRAVENOUS ONCE
Refills: 0 | Status: DISCONTINUED | OUTPATIENT
Start: 2025-08-14 | End: 2025-08-17

## 2025-08-14 RX ORDER — SODIUM CHLORIDE 9 G/1000ML
1000 INJECTION, SOLUTION INTRAVENOUS
Refills: 0 | Status: DISCONTINUED | OUTPATIENT
Start: 2025-08-14 | End: 2025-08-14

## 2025-08-14 RX ORDER — CARBOPROST TROMETHAMINE 250 UG/ML
250 INJECTION, SOLUTION INTRAMUSCULAR ONCE
Refills: 0 | Status: DISCONTINUED | OUTPATIENT
Start: 2025-08-14 | End: 2025-08-17

## 2025-08-14 RX ORDER — OXYTOCIN-SODIUM CHLORIDE 0.9% IV SOLN 30 UNIT/500ML 30-0.9/5 UT/ML-%
167 SOLUTION INTRAVENOUS
Qty: 30 | Refills: 0 | Status: DISCONTINUED | OUTPATIENT
Start: 2025-08-14 | End: 2025-08-15

## 2025-08-15 ENCOUNTER — TRANSCRIPTION ENCOUNTER (OUTPATIENT)
Age: 25
End: 2025-08-15

## 2025-08-15 LAB
BASOPHILS # BLD AUTO: 0.01 K/UL — SIGNIFICANT CHANGE UP (ref 0–0.2)
BASOPHILS NFR BLD AUTO: 0.1 % — SIGNIFICANT CHANGE UP (ref 0–1)
EOSINOPHIL # BLD AUTO: 0 K/UL — SIGNIFICANT CHANGE UP (ref 0–0.7)
EOSINOPHIL NFR BLD AUTO: 0 % — SIGNIFICANT CHANGE UP (ref 0–8)
HCT VFR BLD CALC: 24.5 % — LOW (ref 37–47)
HGB BLD-MCNC: 8.5 G/DL — LOW (ref 12–16)
IMM GRANULOCYTES NFR BLD AUTO: 0.5 % — HIGH (ref 0.1–0.3)
LYMPHOCYTES # BLD AUTO: 0.76 K/UL — LOW (ref 1.2–3.4)
LYMPHOCYTES # BLD AUTO: 6.9 % — LOW (ref 20.5–51.1)
MCHC RBC-ENTMCNC: 34.7 G/DL — SIGNIFICANT CHANGE UP (ref 32–37)
MCHC RBC-ENTMCNC: 34.8 PG — HIGH (ref 27–31)
MCV RBC AUTO: 100.4 FL — HIGH (ref 81–99)
MONOCYTES # BLD AUTO: 0.92 K/UL — HIGH (ref 0.1–0.6)
MONOCYTES NFR BLD AUTO: 8.3 % — SIGNIFICANT CHANGE UP (ref 1.7–9.3)
NEUTROPHILS # BLD AUTO: 9.33 K/UL — HIGH (ref 1.4–6.5)
NEUTROPHILS NFR BLD AUTO: 84.2 % — HIGH (ref 42.2–75.2)
NRBC BLD AUTO-RTO: 0 /100 WBCS — SIGNIFICANT CHANGE UP (ref 0–0)
PLATELET # BLD AUTO: 254 K/UL — SIGNIFICANT CHANGE UP (ref 130–400)
PMV BLD: 10.5 FL — HIGH (ref 7.4–10.4)
RBC # BLD: 2.44 M/UL — LOW (ref 4.2–5.4)
RBC # FLD: 13.4 % — SIGNIFICANT CHANGE UP (ref 11.5–14.5)
WBC # BLD: 11.08 K/UL — HIGH (ref 4.8–10.8)
WBC # FLD AUTO: 11.08 K/UL — HIGH (ref 4.8–10.8)

## 2025-08-15 PROCEDURE — 99222 1ST HOSP IP/OBS MODERATE 55: CPT

## 2025-08-15 RX ORDER — SIMETHICONE 80 MG
80 TABLET,CHEWABLE ORAL EVERY 4 HOURS
Refills: 0 | Status: DISCONTINUED | OUTPATIENT
Start: 2025-08-15 | End: 2025-08-17

## 2025-08-15 RX ORDER — SODIUM CHLORIDE 9 G/1000ML
1000 INJECTION, SOLUTION INTRAVENOUS
Refills: 0 | Status: DISCONTINUED | OUTPATIENT
Start: 2025-08-15 | End: 2025-08-17

## 2025-08-15 RX ORDER — PRENATAL 136/IRON/FOLIC ACID 27 MG-1 MG
1 TABLET ORAL DAILY
Refills: 0 | Status: DISCONTINUED | OUTPATIENT
Start: 2025-08-15 | End: 2025-08-17

## 2025-08-15 RX ORDER — DIPHENHYDRAMINE HCL 12.5MG/5ML
25 ELIXIR ORAL EVERY 6 HOURS
Refills: 0 | Status: DISCONTINUED | OUTPATIENT
Start: 2025-08-15 | End: 2025-08-17

## 2025-08-15 RX ORDER — FOLIC ACID 1 MG/1
1 TABLET ORAL DAILY
Refills: 0 | Status: DISCONTINUED | OUTPATIENT
Start: 2025-08-15 | End: 2025-08-17

## 2025-08-15 RX ORDER — AZITHROMYCIN 250 MG
500 CAPSULE ORAL ONCE
Refills: 0 | Status: DISCONTINUED | OUTPATIENT
Start: 2025-08-15 | End: 2025-08-15

## 2025-08-15 RX ORDER — NALOXONE HYDROCHLORIDE 0.4 MG/ML
0.1 INJECTION, SOLUTION INTRAMUSCULAR; INTRAVENOUS; SUBCUTANEOUS
Refills: 0 | Status: DISCONTINUED | OUTPATIENT
Start: 2025-08-15 | End: 2025-08-15

## 2025-08-15 RX ORDER — DEXAMETHASONE 0.5 MG/1
4 TABLET ORAL EVERY 6 HOURS
Refills: 0 | Status: DISCONTINUED | OUTPATIENT
Start: 2025-08-15 | End: 2025-08-15

## 2025-08-15 RX ORDER — CYANOCOBALAMIN 1000 UG/ML
1000 INJECTION INTRAMUSCULAR; SUBCUTANEOUS EVERY 24 HOURS
Refills: 0 | Status: DISCONTINUED | OUTPATIENT
Start: 2025-08-15 | End: 2025-08-17

## 2025-08-15 RX ORDER — ENOXAPARIN SODIUM 100 MG/ML
40 INJECTION SUBCUTANEOUS EVERY 24 HOURS
Refills: 0 | Status: DISCONTINUED | OUTPATIENT
Start: 2025-08-15 | End: 2025-08-17

## 2025-08-15 RX ORDER — MODIFIED LANOLIN 100 %
1 CREAM (GRAM) TOPICAL EVERY 6 HOURS
Refills: 0 | Status: DISCONTINUED | OUTPATIENT
Start: 2025-08-15 | End: 2025-08-17

## 2025-08-15 RX ORDER — MAGNESIUM HYDROXIDE 400 MG/5ML
30 SUSPENSION ORAL
Refills: 0 | Status: DISCONTINUED | OUTPATIENT
Start: 2025-08-15 | End: 2025-08-17

## 2025-08-15 RX ORDER — OXYCODONE HYDROCHLORIDE 30 MG/1
5 TABLET ORAL ONCE
Refills: 0 | Status: DISCONTINUED | OUTPATIENT
Start: 2025-08-15 | End: 2025-08-17

## 2025-08-15 RX ORDER — ONDANSETRON HCL/PF 4 MG/2 ML
4 VIAL (ML) INJECTION EVERY 6 HOURS
Refills: 0 | Status: DISCONTINUED | OUTPATIENT
Start: 2025-08-15 | End: 2025-08-15

## 2025-08-15 RX ORDER — OXYCODONE HYDROCHLORIDE 30 MG/1
5 TABLET ORAL
Refills: 0 | Status: COMPLETED | OUTPATIENT
Start: 2025-08-15 | End: 2025-08-22

## 2025-08-15 RX ORDER — CEFAZOLIN SODIUM IN 0.9 % NACL 3 G/100 ML
2000 INTRAVENOUS SOLUTION, PIGGYBACK (ML) INTRAVENOUS ONCE
Refills: 0 | Status: DISCONTINUED | OUTPATIENT
Start: 2025-08-15 | End: 2025-08-15

## 2025-08-15 RX ORDER — KETOROLAC TROMETHAMINE 30 MG/ML
30 INJECTION, SOLUTION INTRAMUSCULAR; INTRAVENOUS EVERY 6 HOURS
Refills: 0 | Status: COMPLETED | OUTPATIENT
Start: 2025-08-15 | End: 2025-08-16

## 2025-08-15 RX ORDER — ACETAMINOPHEN 500 MG/5ML
975 LIQUID (ML) ORAL
Refills: 0 | Status: DISCONTINUED | OUTPATIENT
Start: 2025-08-15 | End: 2025-08-17

## 2025-08-15 RX ORDER — OXYTOCIN-SODIUM CHLORIDE 0.9% IV SOLN 30 UNIT/500ML 30-0.9/5 UT/ML-%
42 SOLUTION INTRAVENOUS
Qty: 30 | Refills: 0 | Status: DISCONTINUED | OUTPATIENT
Start: 2025-08-15 | End: 2025-08-17

## 2025-08-15 RX ORDER — IBUPROFEN 200 MG
600 TABLET ORAL EVERY 6 HOURS
Refills: 0 | Status: COMPLETED | OUTPATIENT
Start: 2025-08-15 | End: 2026-07-14

## 2025-08-15 RX ADMIN — Medication 80 MILLIGRAM(S): at 15:52

## 2025-08-15 RX ADMIN — Medication 975 MILLIGRAM(S): at 21:22

## 2025-08-15 RX ADMIN — Medication 80 MILLIGRAM(S): at 11:50

## 2025-08-15 RX ADMIN — KETOROLAC TROMETHAMINE 30 MILLIGRAM(S): 30 INJECTION, SOLUTION INTRAMUSCULAR; INTRAVENOUS at 11:50

## 2025-08-15 RX ADMIN — KETOROLAC TROMETHAMINE 30 MILLIGRAM(S): 30 INJECTION, SOLUTION INTRAMUSCULAR; INTRAVENOUS at 17:55

## 2025-08-15 RX ADMIN — Medication 975 MILLIGRAM(S): at 22:22

## 2025-08-15 RX ADMIN — CYANOCOBALAMIN 1000 MICROGRAM(S): 1000 INJECTION INTRAMUSCULAR; SUBCUTANEOUS at 23:34

## 2025-08-15 RX ADMIN — KETOROLAC TROMETHAMINE 30 MILLIGRAM(S): 30 INJECTION, SOLUTION INTRAMUSCULAR; INTRAVENOUS at 23:34

## 2025-08-15 RX ADMIN — Medication 80 MILLIGRAM(S): at 21:22

## 2025-08-15 RX ADMIN — KETOROLAC TROMETHAMINE 30 MILLIGRAM(S): 30 INJECTION, SOLUTION INTRAMUSCULAR; INTRAVENOUS at 12:20

## 2025-08-15 RX ADMIN — Medication 1 TABLET(S): at 11:50

## 2025-08-15 RX ADMIN — Medication 975 MILLIGRAM(S): at 15:51

## 2025-08-15 RX ADMIN — Medication 80 MILLIGRAM(S): at 17:55

## 2025-08-15 RX ADMIN — ENOXAPARIN SODIUM 40 MILLIGRAM(S): 100 INJECTION SUBCUTANEOUS at 17:55

## 2025-08-15 RX ADMIN — FOLIC ACID 1 MILLIGRAM(S): 1 TABLET ORAL at 23:34

## 2025-08-16 LAB
FOLATE SERPL-MCNC: 12.4 NG/ML — SIGNIFICANT CHANGE UP
VIT B12 SERPL-MCNC: 246 PG/ML — SIGNIFICANT CHANGE UP (ref 232–1245)

## 2025-08-16 RX ORDER — OXYCODONE HYDROCHLORIDE 30 MG/1
5 TABLET ORAL
Refills: 0 | Status: DISCONTINUED | OUTPATIENT
Start: 2025-08-16 | End: 2025-08-17

## 2025-08-16 RX ORDER — IBUPROFEN 200 MG
600 TABLET ORAL EVERY 6 HOURS
Refills: 0 | Status: DISCONTINUED | OUTPATIENT
Start: 2025-08-16 | End: 2025-08-17

## 2025-08-16 RX ADMIN — Medication 600 MILLIGRAM(S): at 12:43

## 2025-08-16 RX ADMIN — Medication 975 MILLIGRAM(S): at 10:33

## 2025-08-16 RX ADMIN — Medication 600 MILLIGRAM(S): at 20:24

## 2025-08-16 RX ADMIN — CYANOCOBALAMIN 1000 MICROGRAM(S): 1000 INJECTION INTRAMUSCULAR; SUBCUTANEOUS at 23:45

## 2025-08-16 RX ADMIN — Medication 600 MILLIGRAM(S): at 06:29

## 2025-08-16 RX ADMIN — Medication 975 MILLIGRAM(S): at 21:17

## 2025-08-16 RX ADMIN — Medication 975 MILLIGRAM(S): at 03:51

## 2025-08-16 RX ADMIN — Medication 80 MILLIGRAM(S): at 21:18

## 2025-08-16 RX ADMIN — Medication 975 MILLIGRAM(S): at 22:17

## 2025-08-16 RX ADMIN — OXYCODONE HYDROCHLORIDE 5 MILLIGRAM(S): 30 TABLET ORAL at 21:17

## 2025-08-16 RX ADMIN — Medication 600 MILLIGRAM(S): at 19:54

## 2025-08-16 RX ADMIN — KETOROLAC TROMETHAMINE 30 MILLIGRAM(S): 30 INJECTION, SOLUTION INTRAMUSCULAR; INTRAVENOUS at 00:34

## 2025-08-16 RX ADMIN — Medication 600 MILLIGRAM(S): at 13:13

## 2025-08-16 RX ADMIN — OXYCODONE HYDROCHLORIDE 5 MILLIGRAM(S): 30 TABLET ORAL at 16:30

## 2025-08-16 RX ADMIN — ENOXAPARIN SODIUM 40 MILLIGRAM(S): 100 INJECTION SUBCUTANEOUS at 19:54

## 2025-08-16 RX ADMIN — Medication 80 MILLIGRAM(S): at 12:43

## 2025-08-16 RX ADMIN — Medication 1 TABLET(S): at 12:43

## 2025-08-16 RX ADMIN — OXYCODONE HYDROCHLORIDE 5 MILLIGRAM(S): 30 TABLET ORAL at 22:17

## 2025-08-16 RX ADMIN — OXYCODONE HYDROCHLORIDE 5 MILLIGRAM(S): 30 TABLET ORAL at 16:00

## 2025-08-16 RX ADMIN — Medication 80 MILLIGRAM(S): at 06:29

## 2025-08-16 RX ADMIN — Medication 975 MILLIGRAM(S): at 02:51

## 2025-08-16 RX ADMIN — Medication 80 MILLIGRAM(S): at 19:54

## 2025-08-16 RX ADMIN — FOLIC ACID 1 MILLIGRAM(S): 1 TABLET ORAL at 12:43

## 2025-08-16 RX ADMIN — Medication 600 MILLIGRAM(S): at 07:29

## 2025-08-16 RX ADMIN — Medication 975 MILLIGRAM(S): at 10:03

## 2025-08-17 VITALS
SYSTOLIC BLOOD PRESSURE: 125 MMHG | HEART RATE: 76 BPM | OXYGEN SATURATION: 99 % | DIASTOLIC BLOOD PRESSURE: 77 MMHG | TEMPERATURE: 98 F

## 2025-08-17 PROCEDURE — 99238 HOSP IP/OBS DSCHRG MGMT 30/<: CPT

## 2025-08-17 RX ORDER — OXYCODONE HYDROCHLORIDE 30 MG/1
1 TABLET ORAL
Qty: 5 | Refills: 0
Start: 2025-08-17

## 2025-08-17 RX ORDER — IBUPROFEN 200 MG
1 TABLET ORAL
Qty: 0 | Refills: 0 | DISCHARGE
Start: 2025-08-17

## 2025-08-17 RX ORDER — ACETAMINOPHEN 500 MG/5ML
3 LIQUID (ML) ORAL
Qty: 0 | Refills: 0 | DISCHARGE
Start: 2025-08-17

## 2025-08-17 RX ADMIN — Medication 600 MILLIGRAM(S): at 01:29

## 2025-08-17 RX ADMIN — Medication 975 MILLIGRAM(S): at 10:31

## 2025-08-17 RX ADMIN — OXYCODONE HYDROCHLORIDE 5 MILLIGRAM(S): 30 TABLET ORAL at 00:30

## 2025-08-17 RX ADMIN — FOLIC ACID 1 MILLIGRAM(S): 1 TABLET ORAL at 12:55

## 2025-08-17 RX ADMIN — Medication 600 MILLIGRAM(S): at 00:29

## 2025-08-17 RX ADMIN — OXYCODONE HYDROCHLORIDE 5 MILLIGRAM(S): 30 TABLET ORAL at 11:23

## 2025-08-17 RX ADMIN — OXYCODONE HYDROCHLORIDE 5 MILLIGRAM(S): 30 TABLET ORAL at 01:30

## 2025-08-17 RX ADMIN — OXYCODONE HYDROCHLORIDE 5 MILLIGRAM(S): 30 TABLET ORAL at 06:40

## 2025-08-17 RX ADMIN — OXYCODONE HYDROCHLORIDE 5 MILLIGRAM(S): 30 TABLET ORAL at 10:53

## 2025-08-17 RX ADMIN — Medication 600 MILLIGRAM(S): at 13:26

## 2025-08-17 RX ADMIN — Medication 80 MILLIGRAM(S): at 10:01

## 2025-08-17 RX ADMIN — Medication 600 MILLIGRAM(S): at 06:40

## 2025-08-17 RX ADMIN — Medication 80 MILLIGRAM(S): at 06:40

## 2025-08-17 RX ADMIN — Medication 975 MILLIGRAM(S): at 10:01

## 2025-08-17 RX ADMIN — Medication 1 TABLET(S): at 12:55

## 2025-08-17 RX ADMIN — Medication 600 MILLIGRAM(S): at 12:56

## 2025-08-18 ENCOUNTER — NON-APPOINTMENT (OUTPATIENT)
Age: 25
End: 2025-08-18

## 2025-08-18 LAB — T PALLIDUM AB TITR SER: NEGATIVE — SIGNIFICANT CHANGE UP

## 2025-09-02 PROBLEM — Z14.8 GENETIC CARRIER OF OTHER DISEASE: Chronic | Status: ACTIVE | Noted: 2025-08-14

## 2025-09-02 PROBLEM — K50.90 CROHN'S DISEASE, UNSPECIFIED, WITHOUT COMPLICATIONS: Chronic | Status: ACTIVE | Noted: 2025-08-14

## 2025-09-02 PROBLEM — Z86.2 PERSONAL HISTORY OF DISEASES OF THE BLOOD AND BLOOD-FORMING ORGANS AND CERTAIN DISORDERS INVOLVING THE IMMUNE MECHANISM: Chronic | Status: ACTIVE | Noted: 2025-08-14

## 2025-09-03 ENCOUNTER — OUTPATIENT (OUTPATIENT)
Dept: OUTPATIENT SERVICES | Facility: HOSPITAL | Age: 25
LOS: 1 days | End: 2025-09-03
Payer: MEDICAID

## 2025-09-03 ENCOUNTER — APPOINTMENT (OUTPATIENT)
Dept: OBGYN | Facility: CLINIC | Age: 25
End: 2025-09-03
Payer: MEDICAID

## 2025-09-03 VITALS — DIASTOLIC BLOOD PRESSURE: 83 MMHG | WEIGHT: 195 LBS | SYSTOLIC BLOOD PRESSURE: 122 MMHG | BODY MASS INDEX: 34.54 KG/M2

## 2025-09-03 DIAGNOSIS — Z34.90 ENCOUNTER FOR SUPERVISION OF NORMAL PREGNANCY, UNSPECIFIED, UNSPECIFIED TRIMESTER: ICD-10-CM

## 2025-09-03 DIAGNOSIS — Z98.891 HISTORY OF UTERINE SCAR FROM PREVIOUS SURGERY: Chronic | ICD-10-CM

## 2025-09-03 DIAGNOSIS — Z98.890 OTHER SPECIFIED POSTPROCEDURAL STATES: Chronic | ICD-10-CM

## 2025-09-03 PROCEDURE — 99212 OFFICE O/P EST SF 10 MIN: CPT
